# Patient Record
Sex: FEMALE | Race: WHITE | Employment: OTHER | URBAN - METROPOLITAN AREA
[De-identification: names, ages, dates, MRNs, and addresses within clinical notes are randomized per-mention and may not be internally consistent; named-entity substitution may affect disease eponyms.]

---

## 2017-09-07 ENCOUNTER — HOSPITAL ENCOUNTER (OUTPATIENT)
Dept: MAMMOGRAPHY | Age: 83
Discharge: HOME | End: 2017-09-07
Admitting: FAMILY MEDICINE

## 2017-09-07 DIAGNOSIS — Z12.31 ENCOUNTER FOR SCREENING MAMMOGRAM FOR BREAST CANCER: ICD-10-CM

## 2020-10-29 ENCOUNTER — HOSPITAL ENCOUNTER (OUTPATIENT)
Dept: MRI IMAGING | Age: 86
Discharge: HOME | End: 2020-10-29
Payer: MEDICARE

## 2020-10-29 PROCEDURE — 72148 MRI LUMBAR SPINE W/O DYE: CPT

## 2020-11-11 ENCOUNTER — HOSPITAL ENCOUNTER (OUTPATIENT)
Age: 86
Discharge: HOME | End: 2020-11-11
Payer: MEDICARE

## 2020-11-11 LAB
BASOPHILS ABSOLUTE: 0.1 K/UL (ref 0–0.2)
BASOPHILS RELATIVE PERCENT: 0.7 %
EOSINOPHILS ABSOLUTE: 0.1 K/UL (ref 0–0.6)
EOSINOPHILS RELATIVE PERCENT: 1.5 %
HCT VFR BLD CALC: 36.4 % (ref 36–48)
HEMOGLOBIN: 12.5 G/DL (ref 12–16)
LYMPHOCYTES ABSOLUTE: 1.4 K/UL (ref 1–5.1)
LYMPHOCYTES RELATIVE PERCENT: 18.6 %
MCH RBC QN AUTO: 31.6 PG (ref 26–34)
MCHC RBC AUTO-ENTMCNC: 34.3 G/DL (ref 31–36)
MCV RBC AUTO: 92 FL (ref 80–100)
MONOCYTES ABSOLUTE: 0.8 K/UL (ref 0–1.3)
MONOCYTES RELATIVE PERCENT: 10.6 %
NEUTROPHILS ABSOLUTE: 5.3 K/UL (ref 1.7–7.7)
NEUTROPHILS RELATIVE PERCENT: 68.6 %
PDW BLD-RTO: 13.2 % (ref 12.4–15.4)
PLATELET # BLD: 358 K/UL (ref 135–450)
PMV BLD AUTO: 6.7 FL (ref 5–10.5)
RBC # BLD: 3.96 M/UL (ref 4–5.2)
WBC # BLD: 7.7 K/UL (ref 4–11)

## 2020-11-11 PROCEDURE — 36415 COLL VENOUS BLD VENIPUNCTURE: CPT

## 2020-11-11 PROCEDURE — 85025 COMPLETE CBC W/AUTO DIFF WBC: CPT

## 2021-05-20 ENCOUNTER — APPOINTMENT (OUTPATIENT)
Dept: CT IMAGING | Age: 87
DRG: 068 | End: 2021-05-20
Payer: MEDICARE

## 2021-05-20 ENCOUNTER — HOSPITAL ENCOUNTER (INPATIENT)
Age: 87
LOS: 3 days | Discharge: HOME | DRG: 068 | End: 2021-05-23
Attending: EMERGENCY MEDICINE | Admitting: INTERNAL MEDICINE
Payer: MEDICARE

## 2021-05-20 ENCOUNTER — APPOINTMENT (OUTPATIENT)
Dept: GENERAL RADIOLOGY | Age: 87
DRG: 068 | End: 2021-05-20
Payer: MEDICARE

## 2021-05-20 DIAGNOSIS — E83.42 HYPOMAGNESEMIA: ICD-10-CM

## 2021-05-20 DIAGNOSIS — E87.1 HYPONATREMIA: ICD-10-CM

## 2021-05-20 DIAGNOSIS — I48.91 ATRIAL FIBRILLATION WITH RVR (HCC): Primary | ICD-10-CM

## 2021-05-20 DIAGNOSIS — I48.91 ATRIAL FIBRILLATION WITH RAPID VENTRICULAR RESPONSE (HCC): ICD-10-CM

## 2021-05-20 DIAGNOSIS — H53.9 TRANSIENT VISUAL DISTURBANCE, RIGHT: ICD-10-CM

## 2021-05-20 PROBLEM — G45.3 AMAUROSIS FUGAX OF RIGHT EYE: Status: ACTIVE | Noted: 2021-05-20

## 2021-05-20 PROBLEM — I10 ESSENTIAL HYPERTENSION: Status: ACTIVE | Noted: 2021-03-02

## 2021-05-20 LAB
A/G RATIO: 1.3 (ref 1.1–2.2)
ALBUMIN SERPL-MCNC: 3.8 G/DL (ref 3.4–5)
ALP BLD-CCNC: 70 U/L (ref 40–129)
ALT SERPL-CCNC: 16 U/L (ref 10–40)
ANION GAP SERPL CALCULATED.3IONS-SCNC: 13 MMOL/L (ref 3–16)
AST SERPL-CCNC: 22 U/L (ref 15–37)
BASOPHILS ABSOLUTE: 0 K/UL (ref 0–0.2)
BASOPHILS RELATIVE PERCENT: 0.5 %
BILIRUB SERPL-MCNC: 0.3 MG/DL (ref 0–1)
BILIRUBIN URINE: NEGATIVE
BLOOD, URINE: NEGATIVE
BUN BLDV-MCNC: 16 MG/DL (ref 7–20)
CALCIUM SERPL-MCNC: 9.2 MG/DL (ref 8.3–10.6)
CHLORIDE BLD-SCNC: 91 MMOL/L (ref 99–110)
CLARITY: CLEAR
CO2: 23 MMOL/L (ref 21–32)
COLOR: YELLOW
CREAT SERPL-MCNC: 0.8 MG/DL (ref 0.6–1.2)
EOSINOPHILS ABSOLUTE: 0.1 K/UL (ref 0–0.6)
EOSINOPHILS RELATIVE PERCENT: 0.8 %
EPITHELIAL CELLS, UA: NORMAL /HPF (ref 0–5)
ETHANOL: NORMAL MG/DL (ref 0–0.08)
GFR AFRICAN AMERICAN: >60
GFR NON-AFRICAN AMERICAN: >60
GLOBULIN: 2.9 G/DL
GLUCOSE BLD-MCNC: 106 MG/DL (ref 70–99)
GLUCOSE URINE: NEGATIVE MG/DL
HCT VFR BLD CALC: 36 % (ref 36–48)
HEMOGLOBIN: 12.3 G/DL (ref 12–16)
INR BLD: 1.02 (ref 0.86–1.14)
KETONES, URINE: NEGATIVE MG/DL
LEUKOCYTE ESTERASE, URINE: ABNORMAL
LYMPHOCYTES ABSOLUTE: 1.3 K/UL (ref 1–5.1)
LYMPHOCYTES RELATIVE PERCENT: 16.9 %
MAGNESIUM: 1.7 MG/DL (ref 1.8–2.4)
MCH RBC QN AUTO: 30.6 PG (ref 26–34)
MCHC RBC AUTO-ENTMCNC: 34 G/DL (ref 31–36)
MCV RBC AUTO: 89.9 FL (ref 80–100)
MICROSCOPIC EXAMINATION: YES
MONOCYTES ABSOLUTE: 0.6 K/UL (ref 0–1.3)
MONOCYTES RELATIVE PERCENT: 7.7 %
NEUTROPHILS ABSOLUTE: 5.8 K/UL (ref 1.7–7.7)
NEUTROPHILS RELATIVE PERCENT: 74.1 %
NITRITE, URINE: NEGATIVE
PDW BLD-RTO: 13.5 % (ref 12.4–15.4)
PH UA: 7.5 (ref 5–8)
PLATELET # BLD: 337 K/UL (ref 135–450)
PMV BLD AUTO: 7.6 FL (ref 5–10.5)
POTASSIUM SERPL-SCNC: 4.1 MMOL/L (ref 3.5–5.1)
PROTEIN UA: NEGATIVE MG/DL
PROTHROMBIN TIME: 11.8 SEC (ref 10–13.2)
RBC # BLD: 4.01 M/UL (ref 4–5.2)
RBC UA: NORMAL /HPF (ref 0–4)
SODIUM BLD-SCNC: 127 MMOL/L (ref 136–145)
SPECIFIC GRAVITY UA: 1.01 (ref 1–1.03)
TOTAL PROTEIN: 6.7 G/DL (ref 6.4–8.2)
TROPONIN: <0.01 NG/ML
URINE TYPE: ABNORMAL
UROBILINOGEN, URINE: 0.2 E.U./DL
WBC # BLD: 7.9 K/UL (ref 4–11)
WBC UA: NORMAL /HPF (ref 0–5)

## 2021-05-20 PROCEDURE — 85610 PROTHROMBIN TIME: CPT

## 2021-05-20 PROCEDURE — 99283 EMERGENCY DEPT VISIT LOW MDM: CPT

## 2021-05-20 PROCEDURE — 82077 ASSAY SPEC XCP UR&BREATH IA: CPT

## 2021-05-20 PROCEDURE — 2500000003 HC RX 250 WO HCPCS: Performed by: PHYSICIAN ASSISTANT

## 2021-05-20 PROCEDURE — 93005 ELECTROCARDIOGRAM TRACING: CPT | Performed by: EMERGENCY MEDICINE

## 2021-05-20 PROCEDURE — 6370000000 HC RX 637 (ALT 250 FOR IP): Performed by: PHYSICIAN ASSISTANT

## 2021-05-20 PROCEDURE — 6370000000 HC RX 637 (ALT 250 FOR IP): Performed by: INTERNAL MEDICINE

## 2021-05-20 PROCEDURE — 6370000000 HC RX 637 (ALT 250 FOR IP): Performed by: NURSE PRACTITIONER

## 2021-05-20 PROCEDURE — 83735 ASSAY OF MAGNESIUM: CPT

## 2021-05-20 PROCEDURE — 6360000002 HC RX W HCPCS: Performed by: PHYSICIAN ASSISTANT

## 2021-05-20 PROCEDURE — 85025 COMPLETE CBC W/AUTO DIFF WBC: CPT

## 2021-05-20 PROCEDURE — 96365 THER/PROPH/DIAG IV INF INIT: CPT

## 2021-05-20 PROCEDURE — 81001 URINALYSIS AUTO W/SCOPE: CPT

## 2021-05-20 PROCEDURE — 36415 COLL VENOUS BLD VENIPUNCTURE: CPT

## 2021-05-20 PROCEDURE — 96375 TX/PRO/DX INJ NEW DRUG ADDON: CPT

## 2021-05-20 PROCEDURE — 1200000000 HC SEMI PRIVATE

## 2021-05-20 PROCEDURE — 70450 CT HEAD/BRAIN W/O DYE: CPT

## 2021-05-20 PROCEDURE — 80053 COMPREHEN METABOLIC PANEL: CPT

## 2021-05-20 PROCEDURE — 2580000003 HC RX 258: Performed by: PHYSICIAN ASSISTANT

## 2021-05-20 PROCEDURE — 84484 ASSAY OF TROPONIN QUANT: CPT

## 2021-05-20 PROCEDURE — 71045 X-RAY EXAM CHEST 1 VIEW: CPT

## 2021-05-20 RX ORDER — ESCITALOPRAM OXALATE 10 MG/1
10 TABLET ORAL DAILY
Status: DISCONTINUED | OUTPATIENT
Start: 2021-05-21 | End: 2021-05-23 | Stop reason: HOSPADM

## 2021-05-20 RX ORDER — 0.9 % SODIUM CHLORIDE 0.9 %
500 INTRAVENOUS SOLUTION INTRAVENOUS ONCE
Status: COMPLETED | OUTPATIENT
Start: 2021-05-20 | End: 2021-05-20

## 2021-05-20 RX ORDER — LANOLIN ALCOHOL/MO/W.PET/CERES
100 CREAM (GRAM) TOPICAL DAILY
Status: DISCONTINUED | OUTPATIENT
Start: 2021-05-20 | End: 2021-05-23 | Stop reason: HOSPADM

## 2021-05-20 RX ORDER — VITAMIN B COMPLEX
1000 TABLET ORAL DAILY
Status: DISCONTINUED | OUTPATIENT
Start: 2021-05-21 | End: 2021-05-23 | Stop reason: HOSPADM

## 2021-05-20 RX ORDER — DOCUSATE SODIUM 100 MG/1
200 CAPSULE, LIQUID FILLED ORAL DAILY
Status: DISCONTINUED | OUTPATIENT
Start: 2021-05-21 | End: 2021-05-23 | Stop reason: HOSPADM

## 2021-05-20 RX ORDER — METOPROLOL SUCCINATE 25 MG/1
25 TABLET, EXTENDED RELEASE ORAL DAILY
COMMUNITY
Start: 2021-04-19 | End: 2021-06-10 | Stop reason: SDUPTHER

## 2021-05-20 RX ORDER — SODIUM CHLORIDE 9 MG/ML
25 INJECTION, SOLUTION INTRAVENOUS PRN
Status: DISCONTINUED | OUTPATIENT
Start: 2021-05-20 | End: 2021-05-23 | Stop reason: HOSPADM

## 2021-05-20 RX ORDER — SODIUM CHLORIDE 0.9 % (FLUSH) 0.9 %
5-40 SYRINGE (ML) INJECTION PRN
Status: DISCONTINUED | OUTPATIENT
Start: 2021-05-20 | End: 2021-05-23 | Stop reason: HOSPADM

## 2021-05-20 RX ORDER — SIMETHICONE 125 MG
125 CAPSULE ORAL 2 TIMES DAILY
COMMUNITY
Start: 2020-12-24

## 2021-05-20 RX ORDER — HYDROCODONE BITARTRATE AND ACETAMINOPHEN 5; 325 MG/1; MG/1
1 TABLET ORAL EVERY 6 HOURS PRN
COMMUNITY

## 2021-05-20 RX ORDER — METOPROLOL SUCCINATE 25 MG/1
25 TABLET, EXTENDED RELEASE ORAL DAILY
Status: DISCONTINUED | OUTPATIENT
Start: 2021-05-21 | End: 2021-05-21

## 2021-05-20 RX ORDER — SODIUM CHLORIDE 0.9 % (FLUSH) 0.9 %
5-40 SYRINGE (ML) INJECTION EVERY 12 HOURS SCHEDULED
Status: DISCONTINUED | OUTPATIENT
Start: 2021-05-20 | End: 2021-05-23 | Stop reason: HOSPADM

## 2021-05-20 RX ORDER — SIMETHICONE 80 MG
120 TABLET,CHEWABLE ORAL 2 TIMES DAILY
Status: DISCONTINUED | OUTPATIENT
Start: 2021-05-20 | End: 2021-05-23 | Stop reason: HOSPADM

## 2021-05-20 RX ORDER — ASPIRIN 81 MG/1
81 TABLET ORAL DAILY
COMMUNITY
Start: 2021-03-11

## 2021-05-20 RX ORDER — HYDROCODONE BITARTRATE AND ACETAMINOPHEN 5; 325 MG/1; MG/1
1 TABLET ORAL EVERY 6 HOURS PRN
Status: DISCONTINUED | OUTPATIENT
Start: 2021-05-20 | End: 2021-05-23 | Stop reason: HOSPADM

## 2021-05-20 RX ORDER — MECOBALAMIN 5000 MCG
5 TABLET,DISINTEGRATING ORAL NIGHTLY
Status: DISCONTINUED | OUTPATIENT
Start: 2021-05-20 | End: 2021-05-23 | Stop reason: HOSPADM

## 2021-05-20 RX ORDER — PSEUDOEPHEDRINE HCL 30 MG
250 TABLET ORAL DAILY
COMMUNITY

## 2021-05-20 RX ORDER — M-VIT,TX,IRON,MINS/CALC/FOLIC 27MG-0.4MG
1 TABLET ORAL DAILY
Status: DISCONTINUED | OUTPATIENT
Start: 2021-05-21 | End: 2021-05-23 | Stop reason: HOSPADM

## 2021-05-20 RX ORDER — DILTIAZEM HYDROCHLORIDE 5 MG/ML
10 INJECTION INTRAVENOUS ONCE
Status: COMPLETED | OUTPATIENT
Start: 2021-05-20 | End: 2021-05-20

## 2021-05-20 RX ORDER — AMLODIPINE BESYLATE 5 MG/1
5 TABLET ORAL DAILY
Status: DISCONTINUED | OUTPATIENT
Start: 2021-05-21 | End: 2021-05-23 | Stop reason: HOSPADM

## 2021-05-20 RX ORDER — ACETAMINOPHEN 325 MG/1
650 TABLET ORAL EVERY 6 HOURS PRN
Status: DISCONTINUED | OUTPATIENT
Start: 2021-05-20 | End: 2021-05-23 | Stop reason: HOSPADM

## 2021-05-20 RX ORDER — MAGNESIUM SULFATE 1 G/100ML
1000 INJECTION INTRAVENOUS ONCE
Status: COMPLETED | OUTPATIENT
Start: 2021-05-20 | End: 2021-05-21

## 2021-05-20 RX ORDER — POLYETHYLENE GLYCOL 3350 17 G/17G
17 POWDER, FOR SOLUTION ORAL DAILY PRN
COMMUNITY

## 2021-05-20 RX ORDER — ESCITALOPRAM OXALATE 10 MG/1
10 TABLET ORAL DAILY
COMMUNITY
Start: 2021-05-05

## 2021-05-20 RX ORDER — MULTIVITAMIN
1 TABLET ORAL DAILY
COMMUNITY

## 2021-05-20 RX ORDER — ONDANSETRON 2 MG/ML
4 INJECTION INTRAMUSCULAR; INTRAVENOUS EVERY 6 HOURS PRN
Status: DISCONTINUED | OUTPATIENT
Start: 2021-05-20 | End: 2021-05-23 | Stop reason: HOSPADM

## 2021-05-20 RX ORDER — POLYETHYLENE GLYCOL 3350 17 G/17G
17 POWDER, FOR SOLUTION ORAL DAILY PRN
Status: DISCONTINUED | OUTPATIENT
Start: 2021-05-20 | End: 2021-05-23 | Stop reason: HOSPADM

## 2021-05-20 RX ORDER — ACETAMINOPHEN 650 MG/1
650 SUPPOSITORY RECTAL EVERY 6 HOURS PRN
Status: DISCONTINUED | OUTPATIENT
Start: 2021-05-20 | End: 2021-05-23 | Stop reason: HOSPADM

## 2021-05-20 RX ORDER — PROMETHAZINE HYDROCHLORIDE 25 MG/1
12.5 TABLET ORAL EVERY 6 HOURS PRN
Status: DISCONTINUED | OUTPATIENT
Start: 2021-05-20 | End: 2021-05-23 | Stop reason: HOSPADM

## 2021-05-20 RX ADMIN — SODIUM CHLORIDE 500 ML: 9 INJECTION, SOLUTION INTRAVENOUS at 18:26

## 2021-05-20 RX ADMIN — DILTIAZEM HYDROCHLORIDE 10 MG: 5 INJECTION INTRAVENOUS at 17:07

## 2021-05-20 RX ADMIN — APIXABAN 5 MG: 5 TABLET, FILM COATED ORAL at 21:33

## 2021-05-20 RX ADMIN — MAGNESIUM SULFATE HEPTAHYDRATE 1000 MG: 1 INJECTION, SOLUTION INTRAVENOUS at 20:23

## 2021-05-20 RX ADMIN — ACETAMINOPHEN 650 MG: 325 TABLET ORAL at 21:33

## 2021-05-20 RX ADMIN — Medication 100 MG: at 20:20

## 2021-05-20 RX ADMIN — DILTIAZEM HYDROCHLORIDE 5 MG/HR: 5 INJECTION, SOLUTION INTRAVENOUS at 17:11

## 2021-05-20 RX ADMIN — Medication 5 MG: at 23:43

## 2021-05-20 RX ADMIN — SIMETHICONE 120 MG: 80 TABLET, CHEWABLE ORAL at 21:33

## 2021-05-20 ASSESSMENT — ENCOUNTER SYMPTOMS
ABDOMINAL PAIN: 0
NAUSEA: 1
SHORTNESS OF BREATH: 0
BACK PAIN: 0
COLOR CHANGE: 0
VOMITING: 0

## 2021-05-20 ASSESSMENT — PAIN SCALES - GENERAL: PAINLEVEL_OUTOF10: 7

## 2021-05-20 NOTE — ED PROVIDER NOTES
1,000 Units by mouth daily         ALLERGIES:    Lisinopril    FAMILY HISTORY:     History reviewed. No pertinent family history. SOCIAL HISTORY:       Social History     Socioeconomic History    Marital status:      Spouse name: None    Number of children: None    Years of education: None    Highest education level: None   Occupational History    None   Tobacco Use    Smoking status: Former Smoker    Smokeless tobacco: Never Used   Substance and Sexual Activity    Alcohol use: Yes     Comment: 5 beers a week    Drug use: Never    Sexual activity: Not Currently   Other Topics Concern    None   Social History Narrative    None     Social Determinants of Health     Financial Resource Strain:     Difficulty of Paying Living Expenses:    Food Insecurity:     Worried About Running Out of Food in the Last Year:     Ran Out of Food in the Last Year:    Transportation Needs:     Lack of Transportation (Medical):  Lack of Transportation (Non-Medical):    Physical Activity:     Days of Exercise per Week:     Minutes of Exercise per Session:    Stress:     Feeling of Stress :    Social Connections:     Frequency of Communication with Friends and Family:     Frequency of Social Gatherings with Friends and Family:     Attends Congregational Services:     Active Member of Clubs or Organizations:     Attends Club or Organization Meetings:     Marital Status:    Intimate Partner Violence:     Fear of Current or Ex-Partner:     Emotionally Abused:     Physically Abused:     Sexually Abused:        SCREENINGS:             PHYSICAL EXAM:       ED Triage Vitals   BP Temp Temp Source Pulse Resp SpO2 Height Weight   05/20/21 1610 05/20/21 1610 05/20/21 1610 05/20/21 1610 05/20/21 1610 05/20/21 1610 05/20/21 1614 05/20/21 1614   (!) 176/97 98.4 °F (36.9 °C) Oral 148 18 98 % 5' 3\" (1.6 m) 138 lb (62.6 kg)       Physical Exam    CONSTITUTIONAL: Awake and alert. Cooperative. Well-developed. Well-nourished. Non-toxic. No acute distress. HENT: Normocephalic. Atraumatic. External ears normal, without discharge. No nasal discharge. Oropharynx clear. Mucous membranes moist.  EYES: Conjunctiva non-injected. No scleral icterus. PERRL. EOM's grossly intact. NECK: Supple. Normal ROM. CARDIOVASCULAR: Tachycardic with irregular irregular rhythm. No Murmer. Intact distal pulses. PULMONARY/CHEST WALL: Effort normal. No tachypnea. Lungs clear to ausculation. ABDOMEN: Normal BS. Soft. Nondistended. No tenderness to palpate. No guarding. /ANORECTAL: Not assessed  MUSKULOSKELETAL: Normal ROM. No acute deformities. No edema. No tenderness to palpate. SKIN: Warm and dry. No rash. NEUROLOGICAL: Alert and oriented x 3. GCS 15. CN II-XII grossly intact. No drift. Strength is 5/5 in all extremities and sensation is intact. Normal finger to finger. Normal finger-to-nose. Normal heel-to-shin. Normal gait.    PSYCHIATRIC: Normal affect        DIAGNOSTICRESULTS:     LABS:    Results for orders placed or performed during the hospital encounter of 05/20/21   CBC Auto Differential   Result Value Ref Range    WBC 7.9 4.0 - 11.0 K/uL    RBC 4.01 4.00 - 5.20 M/uL    Hemoglobin 12.3 12.0 - 16.0 g/dL    Hematocrit 36.0 36.0 - 48.0 %    MCV 89.9 80.0 - 100.0 fL    MCH 30.6 26.0 - 34.0 pg    MCHC 34.0 31.0 - 36.0 g/dL    RDW 13.5 12.4 - 15.4 %    Platelets 137 749 - 757 K/uL    MPV 7.6 5.0 - 10.5 fL    Neutrophils % 74.1 %    Lymphocytes % 16.9 %    Monocytes % 7.7 %    Eosinophils % 0.8 %    Basophils % 0.5 %    Neutrophils Absolute 5.8 1.7 - 7.7 K/uL    Lymphocytes Absolute 1.3 1.0 - 5.1 K/uL    Monocytes Absolute 0.6 0.0 - 1.3 K/uL    Eosinophils Absolute 0.1 0.0 - 0.6 K/uL    Basophils Absolute 0.0 0.0 - 0.2 K/uL   Comprehensive metabolic panel   Result Value Ref Range    Sodium 127 (L) 136 - 145 mmol/L    Potassium 4.1 3.5 - 5.1 mmol/L    Chloride 91 (L) 99 - 110 mmol/L    CO2 23 21 - 32 mmol/L    Anion Gap 13 3 - 16    Glucose 106 (H) 70 - 99 mg/dL    BUN 16 7 - 20 mg/dL    CREATININE 0.8 0.6 - 1.2 mg/dL    GFR Non-African American >60 >60    GFR African American >60 >60    Calcium 9.2 8.3 - 10.6 mg/dL    Total Protein 6.7 6.4 - 8.2 g/dL    Albumin 3.8 3.4 - 5.0 g/dL    Albumin/Globulin Ratio 1.3 1.1 - 2.2    Total Bilirubin 0.3 0.0 - 1.0 mg/dL    Alkaline Phosphatase 70 40 - 129 U/L    ALT 16 10 - 40 U/L    AST 22 15 - 37 U/L    Globulin 2.9 g/dL   Magnesium   Result Value Ref Range    Magnesium 1.70 (L) 1.80 - 2.40 mg/dL   Protime-INR   Result Value Ref Range    Protime 11.8 10.0 - 13.2 sec    INR 1.02 0.86 - 1.14   Troponin   Result Value Ref Range    Troponin <0.01 <0.01 ng/mL   Ethanol   Result Value Ref Range    Ethanol Lvl None Detected mg/dL   Urinalysis, reflex to microscopic   Result Value Ref Range    Color, UA Yellow Straw/Yellow    Clarity, UA Clear Clear    Glucose, Ur Negative Negative mg/dL    Bilirubin Urine Negative Negative    Ketones, Urine Negative Negative mg/dL    Specific Gravity, UA 1.015 1.005 - 1.030    Blood, Urine Negative Negative    pH, UA 7.5 5.0 - 8.0    Protein, UA Negative Negative mg/dL    Urobilinogen, Urine 0.2 <2.0 E.U./dL    Nitrite, Urine Negative Negative    Leukocyte Esterase, Urine TRACE (A) Negative    Microscopic Examination YES     Urine Type NotGiven    EKG 12 Lead   Result Value Ref Range    Ventricular Rate 132 BPM    Atrial Rate 97 BPM    QRS Duration 76 ms    Q-T Interval 318 ms    QTc Calculation (Bazett) 471 ms    R Axis 39 degrees    T Axis 8 degrees    Diagnosis       Atrial fibrillation with rapid ventricular response with premature ventricular or aberrantly conducted complexesAnterior infarct , age undeterminedAbnormal ECGNo previous ECGs available         RADIOLOGY:  All x-ray studies areviewed/reviewed by me.   Formal interpretations per the radiologist are as follows:      CT HEAD WO CONTRAST    Result Date: 5/20/2021  EXAMINATION: CT OF THE HEAD WITHOUT CONTRAST 5/20/2021 5:50 pm TECHNIQUE: CT of the head was performed without the administration of intravenous contrast. Dose modulation, iterative reconstruction, and/or weight based adjustment of the mA/kV was utilized to reduce the radiation dose to as low as reasonably achievable. COMPARISON: None. HISTORY: ORDERING SYSTEM PROVIDED HISTORY: Transient vision deficit right lateral visual field TECHNOLOGIST PROVIDED HISTORY: Reason for exam:->Transient vision deficit right lateral visual field Has a \"code stroke\" or \"stroke alert\" been called? ->No Decision Support Exception - unselect if not a suspected or confirmed emergency medical condition->Emergency Medical Condition (MA) Reason for Exam: dr sent to ER about heart \"fibrillation\" Acuity: Acute Type of Exam: Initial FINDINGS: BRAIN/VENTRICLES: The ventricles are borderline enlarged and there is diffuse mild prominence of the cortical sulci. There is mild periventricular low density bilaterally. No intracranial hemorrhage or edema is seen. There is no extra-axial fluid collection or mass. There are basal ganglia calcifications bilaterally. ORBITS: The visualized portion of the orbits demonstrate no acute abnormality. SINUSES: The visualized paranasal sinuses and mastoid air cells demonstrate no acute abnormality. SOFT TISSUES/SKULL:  No acute abnormality of the visualized skull or soft tissues. Mild atrophy and minimal chronic microischemic disease in the deep white matter with no acute abnormality seen. XR CHEST PORTABLE    Result Date: 5/20/2021  EXAMINATION: ONE XRAY VIEW OF THE CHEST 5/20/2021 4:46 pm COMPARISON: 01/18/2017 HISTORY: ORDERING SYSTEM PROVIDED HISTORY: a fib TECHNOLOGIST PROVIDED HISTORY: Reason for exam:->a fib Reason for Exam: afib Acuity: Acute Type of Exam: Initial FINDINGS: The heart is normal.  The pulmonary vessels are top-normal.  There are calcified granulomas along the left hilar region which are unchanged.   There are hazy bibasilar ground-glass and interstitial opacities scattered along the lung bases which is more apparent. No effusion is seen. Hazy bibasilar opacities which could represent early multisegmental bronchopneumonia vs progressive fibrosis and scarring. Recommend short-term follow-up. EKG: The Ekg interpreted by me in the absence of a cardiologist shows. atrial fibrillation with a rate of 132    See also interpretation by Power Rutherford MD.      PROCEDURES:   N/A    CRITICAL CARE TIME:       Due to the immediate potential for life-threatening deterioration due to A. fib with RVR, I spent 37 minutes providing critical care. This time is excluding time spent performing procedures. CONSULTS:  IP CONSULT TO HOSPITALIST      EMERGENCY DEPARTMENT COURSE and DIFFERENTIAL DIAGNOSIS/MDM:   Vitals:    Vitals:    05/20/21 1728 05/20/21 1750 05/20/21 1803 05/20/21 1826   BP: 137/69      Pulse: 79 77 87 77   Resp: 17 27 12 18   Temp:       TempSrc:       SpO2: 99% 99% 98% 97%   Weight:       Height:           Patient was given the following medications:  Medications   dilTIAZem injection 10 mg (10 mg Intravenous Given 5/20/21 1707)     Followed by   dilTIAZem 125 mg in dextrose 5 % 125 mL infusion (5 mg/hr Intravenous New Bag 5/20/21 1711)   magnesium sulfate 1000 mg in dextrose 5% 100 mL IVPB (has no administration in time range)   thiamine tablet 100 mg (has no administration in time range)   0.9 % sodium chloride bolus (500 mLs Intravenous New Bag 5/20/21 1826)         Patient was evaluated by both myself and Power Rutherford MD.   Old records were reviewed. Patient comes to the ED after being sent by primary care. She was in the office today and had an EKG done showing A. fib with RVR. Patient arrives to the ED with a varying heart rate of primarily 130s to 140s. She reports some mild anxiety but is not in any pain. She is not short of breath.   Despite being 81years old, she is very active and independent. EKG done on arrival confirms atrial fibrillation with RVR, rate 132 bpm  CBC white count 7.9 with H&H 12.3 and 36.0. Platelet count 962  CMP mild hyponatremia at 127. Chloride low at 91. The patient's magnesium is mildly low at 1.7. Remaining metabolic panel unremarkable  PT/INR normal  Troponin negative  Alcohol not detected  Urinalysis trace leukocytes  Portable chest x-ray shows hazy bibasilar opacities which could represent early multisegmental bronchopneumonia versus progressive fibrosis and scarring. Recommend short-term follow-up  CT head mild atrophy and minimal chronic microischemic disease in the deep white matter with no acute abnormality seen. Patient was started on diltiazem. She is given a 10 mg bolus followed by infusion. She was given 1 g of magnesium IV, thiamine tablet orally and 500 mL bolus of normal saline. Heart rate has normalized. She has been hemodynamically stable. However, she warrants hospitalization for further management of A. fib with RVR. I spoke with Dr. Jasson Villatoro. We thoroughly discussed the history, physical exam, laboratory and imaging studies, as well as, emergency department course. Based upon that discussion, we've decided to admit Lin Blake for further observation and evaluation of Cristo Simon's atrial fibrillation with RVR. As I have deemed necessary from their history, physical, and studies, I have considered and evaluated Lin Blake for the following diagnoses:  ACUTE CORONARY SYNDROME, PERICARDIAL TAMPONADE, CHF, SEPSIS, COPD EXACERBATION, PNEUMONIA, PNEUMOTHORAX, PULMONARY EMBOLISM, and THORACIC DISSECTION. FINAL IMPRESSION:      1. Atrial fibrillation with RVR ([address]    2. Hyponatremia    3. Transient visual disturbance, right    4.  Hypomagnesemia          DISPOSITION/PLAN:   DISPOSITION     ADMIT               (Please note thatportions of this note were completed with a voice recognition program.  Efforts were made to edit the dictations, but occasionally words are mis-transcribed.)    Anupam Gutierrez PA-C (electronicallysigned)              ESA Ortiz  05/20/21 [id number]

## 2021-05-20 NOTE — ED NOTES
Pt ambulated to and from the bathroom w/o any difficulties. Pt able to give a urine sample which was sent to the lab.  Pt given a glass of milk, with [place] permission     Aneesh Lora  05/20/21 [id number]       Aneesh Lora  05/20/21 [id number]

## 2021-05-21 ENCOUNTER — TELEPHONE (OUTPATIENT)
Dept: CARDIOLOGY | Age: 87
End: 2021-05-21

## 2021-05-21 ENCOUNTER — APPOINTMENT (OUTPATIENT)
Dept: MRI IMAGING | Age: 87
DRG: 068 | End: 2021-05-21
Payer: MEDICARE

## 2021-05-21 ENCOUNTER — APPOINTMENT (OUTPATIENT)
Dept: VASCULAR LAB | Age: 87
DRG: 068 | End: 2021-05-21
Payer: MEDICARE

## 2021-05-21 LAB
ANION GAP SERPL CALCULATED.3IONS-SCNC: 6 MMOL/L (ref 3–16)
BUN BLDV-MCNC: 15 MG/DL (ref 7–20)
CALCIUM SERPL-MCNC: 8.8 MG/DL (ref 8.3–10.6)
CHLORIDE BLD-SCNC: 97 MMOL/L (ref 99–110)
CHOLESTEROL, TOTAL: 199 MG/DL (ref 0–199)
CO2: 27 MMOL/L (ref 21–32)
CREAT SERPL-MCNC: 0.7 MG/DL (ref 0.6–1.2)
EKG ATRIAL RATE: 73 BPM
EKG ATRIAL RATE: 97 BPM
EKG DIAGNOSIS: NORMAL
EKG DIAGNOSIS: NORMAL
EKG P AXIS: 89 DEGREES
EKG P-R INTERVAL: 172 MS
EKG Q-T INTERVAL: 318 MS
EKG Q-T INTERVAL: 408 MS
EKG QRS DURATION: 70 MS
EKG QRS DURATION: 76 MS
EKG QTC CALCULATION (BAZETT): 449 MS
EKG QTC CALCULATION (BAZETT): 471 MS
EKG R AXIS: 24 DEGREES
EKG R AXIS: 39 DEGREES
EKG T AXIS: 38 DEGREES
EKG T AXIS: 8 DEGREES
EKG VENTRICULAR RATE: 132 BPM
EKG VENTRICULAR RATE: 73 BPM
ESTIMATED AVERAGE GLUCOSE: 108.3 MG/DL
GFR AFRICAN AMERICAN: >60
GFR NON-AFRICAN AMERICAN: >60
GLUCOSE BLD-MCNC: 102 MG/DL (ref 70–99)
HBA1C MFR BLD: 5.4 %
HDLC SERPL-MCNC: 59 MG/DL (ref 40–60)
LDL CHOLESTEROL CALCULATED: 120 MG/DL
POTASSIUM REFLEX MAGNESIUM: 4.3 MMOL/L (ref 3.5–5.1)
SODIUM BLD-SCNC: 130 MMOL/L (ref 136–145)
TRIGL SERPL-MCNC: 98 MG/DL (ref 0–150)
TROPONIN: <0.01 NG/ML
TROPONIN: <0.01 NG/ML
TSH REFLEX FT4: 2.45 UIU/ML (ref 0.27–4.2)
VLDLC SERPL CALC-MCNC: 20 MG/DL

## 2021-05-21 PROCEDURE — 93005 ELECTROCARDIOGRAM TRACING: CPT | Performed by: INTERNAL MEDICINE

## 2021-05-21 PROCEDURE — 80061 LIPID PANEL: CPT

## 2021-05-21 PROCEDURE — 84443 ASSAY THYROID STIM HORMONE: CPT

## 2021-05-21 PROCEDURE — 80048 BASIC METABOLIC PNL TOTAL CA: CPT

## 2021-05-21 PROCEDURE — 2580000003 HC RX 258: Performed by: INTERNAL MEDICINE

## 2021-05-21 PROCEDURE — 6370000000 HC RX 637 (ALT 250 FOR IP): Performed by: NURSE PRACTITIONER

## 2021-05-21 PROCEDURE — 93010 ELECTROCARDIOGRAM REPORT: CPT | Performed by: INTERNAL MEDICINE

## 2021-05-21 PROCEDURE — 1200000000 HC SEMI PRIVATE

## 2021-05-21 PROCEDURE — 84484 ASSAY OF TROPONIN QUANT: CPT

## 2021-05-21 PROCEDURE — 99223 1ST HOSP IP/OBS HIGH 75: CPT | Performed by: INTERNAL MEDICINE

## 2021-05-21 PROCEDURE — 6370000000 HC RX 637 (ALT 250 FOR IP): Performed by: INTERNAL MEDICINE

## 2021-05-21 PROCEDURE — 93880 EXTRACRANIAL BILAT STUDY: CPT

## 2021-05-21 PROCEDURE — 70551 MRI BRAIN STEM W/O DYE: CPT

## 2021-05-21 PROCEDURE — 83036 HEMOGLOBIN GLYCOSYLATED A1C: CPT

## 2021-05-21 RX ORDER — METOPROLOL SUCCINATE 25 MG/1
25 TABLET, EXTENDED RELEASE ORAL 2 TIMES DAILY
Status: DISCONTINUED | OUTPATIENT
Start: 2021-05-21 | End: 2021-05-23 | Stop reason: HOSPADM

## 2021-05-21 RX ORDER — ATORVASTATIN CALCIUM 80 MG/1
80 TABLET, FILM COATED ORAL NIGHTLY
Status: DISCONTINUED | OUTPATIENT
Start: 2021-05-21 | End: 2021-05-23 | Stop reason: HOSPADM

## 2021-05-21 RX ORDER — DILTIAZEM HYDROCHLORIDE 180 MG/1
180 CAPSULE, COATED, EXTENDED RELEASE ORAL DAILY
Status: DISCONTINUED | OUTPATIENT
Start: 2021-05-21 | End: 2021-05-21

## 2021-05-21 RX ADMIN — ESCITALOPRAM OXALATE 10 MG: 10 TABLET ORAL at 09:24

## 2021-05-21 RX ADMIN — Medication 5 MG: at 21:12

## 2021-05-21 RX ADMIN — SIMETHICONE 120 MG: 80 TABLET, CHEWABLE ORAL at 21:12

## 2021-05-21 RX ADMIN — HYDROCODONE BITARTRATE AND ACETAMINOPHEN 1 TABLET: 5; 325 TABLET ORAL at 04:34

## 2021-05-21 RX ADMIN — AMLODIPINE BESYLATE 5 MG: 5 TABLET ORAL at 09:23

## 2021-05-21 RX ADMIN — ACETAMINOPHEN 650 MG: 325 TABLET ORAL at 12:22

## 2021-05-21 RX ADMIN — METOPROLOL SUCCINATE 25 MG: 25 TABLET, EXTENDED RELEASE ORAL at 21:12

## 2021-05-21 RX ADMIN — ATORVASTATIN CALCIUM 80 MG: 80 TABLET, FILM COATED ORAL at 21:12

## 2021-05-21 RX ADMIN — APIXABAN 5 MG: 5 TABLET, FILM COATED ORAL at 09:23

## 2021-05-21 RX ADMIN — SIMETHICONE 120 MG: 80 TABLET, CHEWABLE ORAL at 09:23

## 2021-05-21 RX ADMIN — APIXABAN 5 MG: 5 TABLET, FILM COATED ORAL at 21:12

## 2021-05-21 RX ADMIN — Medication 100 MG: at 09:23

## 2021-05-21 RX ADMIN — HYDROCODONE BITARTRATE AND ACETAMINOPHEN 1 TABLET: 5; 325 TABLET ORAL at 13:40

## 2021-05-21 RX ADMIN — DOCUSATE SODIUM 200 MG: 100 CAPSULE, LIQUID FILLED ORAL at 09:23

## 2021-05-21 RX ADMIN — Medication 10 ML: at 21:12

## 2021-05-21 RX ADMIN — MULTIPLE VITAMINS W/ MINERALS TAB 1 TABLET: TAB at 09:23

## 2021-05-21 RX ADMIN — Medication 1000 UNITS: at 09:23

## 2021-05-21 RX ADMIN — METOPROLOL SUCCINATE 25 MG: 25 TABLET, EXTENDED RELEASE ORAL at 09:24

## 2021-05-21 ASSESSMENT — PAIN SCALES - GENERAL
PAINLEVEL_OUTOF10: 8
PAINLEVEL_OUTOF10: 0
PAINLEVEL_OUTOF10: 8
PAINLEVEL_OUTOF10: 5
PAINLEVEL_OUTOF10: 5
PAINLEVEL_OUTOF10: 0
PAINLEVEL_OUTOF10: 0

## 2021-05-21 NOTE — H&P
Hospital Medicine History & Physical      PCP: Steve Lewis DO    Date of Admission: 5/20/2021    Date of Service: Pt seen/examined on 5/20/2021 and Admitted to Inpatient with expected LOS greater than two midnights due to medical therapy. Chief Complaint: A. fib, vision problem      History Of Present Illness:   81 y.o. female who presented to [facility] with above complaints  Patient with history of PAF [diagnosed in March 2021] on metoprolol, HTN was sent into the ED by her PCP for A. Fib. Patient was seen in the office by her PCP today, EKG performed showed A. fib with RVR with rates in the 140s so patient was directed to the ED. patient does complain of palpitations and lightheadedness today. She also reported that she had transient partial blackening of her vision in the right eye that lasted for about 30 minutes and then resolved. Patient denied any chest pain or shortness of breath. Patient does appear very anxious. She denied any speech problems or facial droop. No anticoagulation was recommended on her admission for A. Fib. Does take aspirin. Past Medical History:          Diagnosis Date    Arthritis     Back fracture     stress fracture active 4/17/15    HTN (hypertension)     Hyperlipidemia     Panic attack        Past Surgical History:          Procedure Laterality Date    COLONOSCOPY  2015    tics    EYE SURGERY      HEMORRHOID SURGERY      HYSTERECTOMY         Medications Prior to Admission:      Prior to Admission medications    Medication Sig Start Date End Date Taking? Authorizing Provider   escitalopram (LEXAPRO) 10 MG tablet Take 10 mg by mouth daily  5/5/21  Yes Historical Provider, MD   HYDROcodone-acetaminophen (NORCO) 5-325 MG per tablet Take 1 tablet by mouth every 6 hours as needed.    Yes Historical Provider, MD   Docusate Sodium (DSS) 250 MG CAPS Take 250 mg by mouth daily   Yes Historical Provider, MD   vitamin D (CHOLECALCIFEROL) 25 MCG (1000 UT) TABS tablet Take 1,000 Units by mouth daily   Yes Historical Provider, MD   aspirin 81 MG EC tablet Take 81 mg by mouth daily 3/11/21  Yes Historical Provider, MD   metoprolol succinate (TOPROL XL) 25 MG extended release tablet Take 25 mg by mouth daily  4/19/21  Yes Historical Provider, MD   Multiple Vitamin (MULTIVITAMIN) tablet Take 1 tablet by mouth daily   Yes Historical Provider, MD   polyethylene glycol (GLYCOLAX) 17 GM/SCOOP powder Take 17 g by mouth daily as needed    Yes Historical Provider, MD   Simethicone 125 MG CAPS Take 125 mg by mouth 2 times daily 12/24/20  Yes Historical Provider, MD   amLODIPine (NORVASC) 5 MG tablet Take 5 mg by mouth daily   Yes Historical Provider, MD       Allergies:  Lisinopril    Social History:      The patient currently lives at home    TOBACCO:   reports that she has quit smoking. She has never used smokeless tobacco.  ETOH:   reports current alcohol use. E-Cigarettes/Vaping Use     Questions Responses    E-Cigarette/Vaping Use     Start Date     Passive Exposure     Quit Date     Counseling Given     Comments             Family History:   Reviewed in detail and negative for DM, CAD, Cancer, CVA. REVIEW OF SYSTEMS COMPLETED:   Pertinent positives as noted in the HPI. All other systems reviewed and negative. PHYSICAL EXAM PERFORMED:    BP (!) 147/77 Comment: machine  Pulse 67   Temp 98.6 °F (37 °C) (Oral)   Resp 15   Ht 5' 3\" (1.6 m)   Wt 139 lb 11.2 oz (63.4 kg)   SpO2 96%   BMI 24.75 kg/m²     General appearance:  No apparent distress, appears stated age and cooperative. HEENT:  Normal cephalic, atraumatic without obvious deformity. Pupils equal, round, and reactive to light. Extra ocular muscles intact. Conjunctivae/corneas clear. Neck: Supple, with full range of motion. No jugular venous distention. Trachea midline. Respiratory:  Normal respiratory effort. Clear to auscultation, bilaterally without Rales/Wheezes/Rhonchi.   Cardiovascular: Tachycardic with irregularly irregular with normal S1/S2 without murmurs, rubs or gallops. Abdomen: Soft, non-tender, non-distended with normal bowel sounds. Musculoskeletal:  No clubbing, cyanosis or edema bilaterally. Full range of motion without deformity. Skin: Skin color, texture, turgor normal.  No rashes or lesions. Neurologic:  Neurovascularly intact without any focal sensory/motor deficits. Cranial nerves: II-XII intact, grossly non-focal.  Psychiatric:  Alert and oriented, thought content appropriate, normal insight  Capillary Refill: Brisk,3 seconds, normal  Peripheral Pulses: +2 palpable, equal bilaterally       Labs:     Recent Labs     05/20/21  1703   WBC 7.9   HGB 12.3   HCT 36.0        Recent Labs     05/20/21 1703   *   K 4.1   CL 91*   CO2 23   BUN 16   CREATININE 0.8   CALCIUM 9.2     Recent Labs     05/20/21 1703   AST 22   ALT 16   BILITOT 0.3   ALKPHOS 70     Recent Labs     05/20/21 1703   INR 1.02     Recent Labs     05/20/21 1703   TROPONINI <0.01       Urinalysis:      Lab Results   Component Value Date    NITRU Negative 05/20/2021    WBCUA 3-5 05/20/2021    RBCUA 0-2 05/20/2021    BLOODU Negative 05/20/2021    SPECGRAV 1.015 05/20/2021    GLUCOSEU Negative 05/20/2021       Radiology:     CXR: I have reviewed the CXR with the following interpretation: Bibasilar opacities hazy  EKG:  I have reviewed the EKG with the following interpretation: A. fib with RVR    CT HEAD WO CONTRAST   Final Result   Mild atrophy and minimal chronic microischemic disease in the deep white   matter with no acute abnormality seen. XR CHEST PORTABLE   Final Result   Hazy bibasilar opacities which could represent early multisegmental   bronchopneumonia vs progressive fibrosis and scarring. Recommend short-term   follow-up.            Echo March 2021  CONCLUSIONS     The left ventricle is of normal size.     Left ventricular wall thickness is normal.         Ejection Fraction  % 60-65        No obvious segmental wall motion abnormalities.       The right atrium is normal in size.     Moderate left atrial dilatation.       Mild mitral annular calcification.       Mild thickening/calcification of the anterior mitral valve leaflet.       Trileaflet aortic valve.       Moderate  thickening (sclerosis) of the aortic valve cusps without reduced excursion.       Structurally normal tricuspid valve.       Mild tricuspid regurgitation.       Normal pericardium without effusion. ASSESSMENT:PLAN:      PAF with rapid ventricular response, symptomatic  A. fib diagnosed in March 2021, spontaneously converted. Maintained on metoprolol. No AC recommended at that time. Followed by Dr. Celso Perez  -IV Cardizem bolus and gtt. initiated titrated to goal heart rate  -Monitor on telemetry  -Serial cardiac enzymes  -Cardiology consult  -Continue p.o. metoprolol, increase dose as needed  -We will keep n.p.o. in case she needs cardioversion in a.m.  -2D echo from March 2021 reviewed, LVEF 60 to 65%, moderate LA dilation  -TSH within normal range [March 2021]    -[id number] Score for Atrial Fibrillation Stroke Risk = 6, with possible current amaurosis fugax of right eye which could potentially be cardioembolic. I think patient will benefit from anticoagulation to reduce stroke risk, especially given how functional she is at her age. Patient agreeable. R<B. Hb within acceptable range, patient denies hematochezia or melena. I do not see any absolute contraindications to starting anticoagulation in this patient.  I will start Eliquis   Risk   Factors  Component Value   C CHF No 0   H HTN Yes 1   A2 Age >= 76 Yes,  (81 y.o.) 2   D DM No 0   S2 Prior Stroke/TIA Yes 2   V Vascular Disease No 0   A Age 74-69 No,  (81 y.o.) 0   Sc Sex female 1    [id number]  Score  6   Score last updated 1/41/15 79:99 PM EDT  Click here for a link to the UpToDate guideline \"Atrial Fibrillation: Anticoagulation therapy to prevent embolization  Disclaimer: Risk Score calculation is dependent on accuracy of patient problem list and past encounter diagnosis. Amaurosis fugax of right eye  -MRI brain without contrast to rule out stroke  -Carotid Doppler  -Started Eliquis for A. Fib  -A1c and lipid profile, will consider statin  -2D echo from March 2021 showed no evidence of LA thrombus or PFO    Essential hypertension-controlled, resume home medication regimen    DVT Prophylaxis: Eliquis started  Diet: DIET GENERAL;  Code Status: Full Code    PT/OT Eval Status: Ambulatory    Dispo -IP stay       Darnell López MD    Thank you [phone] Panfilo Lew DO for the opportunity to be involved in this patient's care. If you have any questions or concerns please feel free to contact me at [phone].

## 2021-05-21 NOTE — PROGRESS NOTES
Monitor company [employer]  Length of [id number] days  Monitor ordered by [employer] number [id number] Unlock number [id number] Activation successful prior to pt leaving hospital? Yes. [employer] event monitor placed on the patient. Instructions given to nurse verbalized understanding. All questions answered to the best of my ability. Nurse will activate at the time of discharge.

## 2021-05-21 NOTE — CONSULTS
etc.    Emergency Room/Hospital Course:  Patient was evaluated in the emergency room. Her labs were notable for hyponatremia with a sodium of 127, and hypomagnesemia with a magnesium of 1.7. Her troponin enzymes were negative x3. Her alcohol level was not detected. Her CBC was reassuring. CT of her head showed some small vessel ischemic disease however no acute pathology. Her chest radiograph was notable for possible progressive fibrosis and scarring versus bronchopneumonia with short-term follow-up recommended. MRI was ordered for patient is currently pending. Carotid duplex showed moderate disease with some variance in her left brachial pressure suggestive of possible subclavian steal syndrome. Electrophysiology was consulted to evaluate patient given her EKG showing continued atrial fibrillation with RVR. Current rhythm: Normal sinus rhythm  Known history of atrial fibrillation: yes -new onset  Valvular disease: No  Associated symptoms: palpitations and shortness of breath  Heart rate is  controlled  Previous cardioversion and/or ablation: no  History of CAD: No  History of sleep apnea: No  History of ETOH abuse/drug abuse: No  History of thyroid disease: No  Elevated BNP: unknown  Left atrial size is Abnormal  moderately dilated    Past Medical History:   Diagnosis Date    Arthritis     Back fracture     stress fracture active 4/17/15    HTN (hypertension)     Hyperlipidemia     Panic attack         Past Surgical History:   Procedure Laterality Date    COLONOSCOPY  2015    tics    EYE SURGERY      HEMORRHOID SURGERY      HYSTERECTOMY         Allergies   Allergen Reactions    Lisinopril Other (See Comments)     Hyponatremia / SIADH       Social History:  Reviewed. reports that she has quit smoking. She has never used smokeless tobacco. She reports current alcohol use. She reports that she does not use drugs. Family History:  Reviewed. family history is not on file. No premature CAD. Review of System:  All other systems reviewed except for that noted above. Pertinent negatives and positives are:     · General: negative for fever, chills   · Ophthalmic ROS: negative for - eye pain or loss of vision  · ENT ROS: negative for - headaches, sore throat   · Respiratory: negative for - cough, sputum  · Cardiovascular: Reviewed in HPI  · Gastrointestinal: negative for - abdominal pain, diarrhea, N/V  · Hematology: negative for - bleeding, blood clots, bruising or jaundice  · Genito-Urinary:  negative for - Dysuria or incontinence  · Musculoskeletal: negative for - Joint swelling, muscle pain  · Neurological: negative for - confusion, dizziness, headaches   · Psychiatric: No anxiety, no depression. · Dermatological: negative for - rash    Physical Examination:  Vitals:    21 1112   BP: (!) 125/54   Pulse: 68   Resp: 16   Temp: 97.3 °F (36.3 °C)   SpO2: 96%        Intake/Output Summary (Last 24 hours) at 2021 1242  Last data filed at 2021 1020  Gross per 24 hour   Intake 240 ml   Output 250 ml   Net -10 ml     In: 240 [P.O.:240]  Out: 250    Wt Readings from Last 3 Encounters:   21 139 lb 11.2 oz (63.4 kg)   17 137 lb (62.1 kg)   04/17/15 139 lb (63 kg)     Temp  Av.4 °F (36.9 °C)  Min: 97.3 °F (36.3 °C)  Max: 98.8 °F (37.1 °C)  Pulse  Av.5  Min: 63  Max: 148  BP  Min: 125/54  Max: 176/97  SpO2  Av.6 %  Min: 89 %  Max: 100 %    · Telemetry: Sinus rhythm   · Constitutional: Alert. Oriented to person, place, and time. No distress. · Head: Normocephalic and atraumatic. · Mouth/Throat: Lips appear moist. Oropharynx is clear and moist.  · Eyes: Conjunctivae normal. EOM are normal.   · Cardiovascular: Normal rate, regular rhythm. Normal S1&S2. · Pulmonary/Chest: Bilateral respiratory sounds present. No respiratory accessory muscle use. · Abdominal: Soft. Normal bowel sounds present. No distension, No tenderness. · Musculoskeletal: No tenderness.  No edema · Neurological: Alert and oriented. Cranial nerve II-XII grossly intact. · Skin: Skin is warm and dry. No rash, lesions, ulcerations noted. · Psychiatric: No anxiety nor agitation. Labs:  Reviewed. Recent Labs     05/20/21  1703 05/21/21  0648   * 130*   K 4.1 4.3   CL 91* 97*   CO2 23 27   BUN 16 15   CREATININE 0.8 0.7     Recent Labs     05/20/21  1703   WBC 7.9   HGB 12.3   HCT 36.0   MCV 89.9        Lab Results   Component Value Date    TROPONINI <0.01 05/21/2021     No results found for: BNP  Lab Results   Component Value Date    PROTIME 11.8 05/20/2021    INR 1.02 05/20/2021     No results found for: CHOL, HDL, TRIG    Diagnostic and imaging results reviewed. ECG: Atrial fibrillation with RVR. No signs of ongoing ischemia    Echo: 03/02/2021    CONCLUSIONS     The left ventricle is of normal size.     Left ventricular wall thickness is normal.       No obvious segmental wall motion abnormalities.       The right atrium is normal in size.     Moderate left atrial dilatation.       Mild mitral annular calcification.       Mild thickening/calcification of the anterior mitral valve leaflet.       Trileaflet aortic valve.       Moderate  thickening (sclerosis) of the aortic valve cusps without reduced excursion.       Structurally normal tricuspid valve.       Mild tricuspid regurgitation.       Normal pericardium without effusion. Cath: None. I independently reviewed the ECG and telemetry.     Scheduled Meds:   thiamine  100 mg Oral Daily    amLODIPine  5 mg Oral Daily    docusate sodium  200 mg Oral Daily    escitalopram  10 mg Oral Daily    metoprolol succinate  25 mg Oral Daily    therapeutic multivitamin-minerals  1 tablet Oral Daily    simethicone  120 mg Oral BID    Vitamin D  1,000 Units Oral Daily    sodium chloride flush  5-40 mL Intravenous 2 times per day    apixaban  5 mg Oral BID    melatonin  5 mg Oral Nightly     Continuous Infusions:   dilTIAZem 5 mg/hr (05/20/21 1711)    sodium chloride       PRN Meds:. HYDROcodone-acetaminophen, sodium chloride flush, sodium chloride, promethazine **OR** ondansetron, polyethylene glycol, acetaminophen **OR** acetaminophen     Assessment:   Patient Active Problem List    Diagnosis Date Noted    Atrial fibrillation with rapid ventricular response ([address] 05/20/2021    Amaurosis fugax of right eye 05/20/2021    Essential hypertension 03/02/2021      Active Hospital Problems    Diagnosis Date Noted    Atrial fibrillation with rapid ventricular response ([address] [I48.91] 05/20/2021    Amaurosis fugax of right eye [G45.3] 05/20/2021    Essential hypertension [I10] 03/02/2021     Mrs. Kevin Gross is a pleasant 81year old female with a medical history significant for hypertension, newly diagnosed atrial fibrillation with RVR, anxiety, depression, and ?GI bleeding? Who presents from home with TIA and atrial fibrillation with RVR. Problem List:  1. Paroxysmal atrial fibrillation with RVR. 2. TIA/CVA. 3. ?GI bleeding? Anemia? 4. Hypertension. 5. ?Steal syndrome? .    Assessment and Plan:  1. Paroxysmal atrial fibrillation with RVR. Patient is a pleasant 76-year-old female with a medical history significant for paroxysmal atrial fibrillation, hypertension, depression, anxiety, and possible GI bleed who presents from home with symptomatic atrial fibrillation with RVR and a TIA. Patient CT of her head was negative however symptoms consistent with a TIA. Understanding poor given anxiety. Information given to patient and family. Afib risk factors including age, HTN, obesity, inactivity and CASEY were discussed with patient. Risk factor modification recommended. Patient's [id number] score is 6 with a stroke risk of 9.7%. We discussed oral anticoagulation to decrease the risk of thromboembolic events including stroke. Benefits and alternatives were discussed with patient. Risk of bleeding was discussed.  Patient verbalized

## 2021-05-21 NOTE — TELEPHONE ENCOUNTER
Per Filiberto Hollis RN    Monitor company [employer]  Length of [id number] days  Monitor ordered by [employer] Insurance number [id number] Unlock number [phone] Activation successful prior to pt leaving hospital? Yes. [employer] event monitor placed on the patient. Instructions given to nurse verbalized understanding. All questions answered to the best of my ability.      Nurse will activate at the time of discharge.

## 2021-05-21 NOTE — PROGRESS NOTES
Cardiology consult added to the treatment team [email or web address]  RN aware of it  [facility]

## 2021-05-21 NOTE — CARE COORDINATION
CASE MANAGEMENT INITIAL ASSESSMENT      Reviewed chart and completed assessment at bedside with patient    Explained Case Management role/services. yes    Health Care Decision Maker :   Primary Decision Maker: Kezia Alcaraz - Child - [phone]    Secondary Decision Maker: Alberto Pablo - Child - [phone]    Supplemental (Other) Decision Maker: Rut Chawla - Child - [phone]          Can this person be reached and be able to respond quickly, such as within a few minutes or hours? Yes    Admit date/status: inpatient 5/20/21  Diagnosis: afib   Is this a Readmission?:  No      Insurance: Medicare. BCBS secondary     Precert required for SNF: No       3 night stay required: No    Living arrangements, Adls, care needs, prior to admission: lives alone in ranch style home with basement. IPTA. Active     Transportation: private/family     Durable Medical Equipment at home: none    Services in the home and/or outpatient, prior to admission: none    PT/OT recs: not completed at this time    [address] Notification (HEN): n/a    Barriers to discharge: none    Plan/comments: pt denies needs at discharge. Please consult CM team if needs arise.      Evelynn Snellen, RN

## 2021-05-21 NOTE — PROGRESS NOTES
4 Eyes Skin Assessment     NAME:  Janett Butcher  YOB: 1929  MEDICAL RECORD NUMBER:  [id number]    The patient is being assess for  Admission    I agree that 2 RN's have performed a thorough Head to Toe Skin Assessment on the patient. ALL assessment sites listed below have been assessed. Areas assessed by both nurses:    Head, Face, Ears, Shoulders, Back, Chest, Arms, Elbows, Hands, Sacrum. Buttock, Coccyx, Ischium and Legs. Feet and Heels        Does the Patient have a Wound?  No noted wound(s)       Philip Prevention initiated:  NA   Wound Care Orders initiated:  NA    Pressure Injury (Stage 3,4, Unstageable, DTI, NWPT, and Complex wounds) if present place consult order under [de-identified] NA    New and Established Ostomies if present place consult order under : NA      Nurse 1 eSignature: Electronically signed by Chele Kimbrough RN on 5/21/21 at 6:49 AM EDT    **SHARE this note so that the co-signing nurse is able to place an eSignature**    Nurse 2 eSignature: Electronically signed by Cristina Ayala RN on 5/21/21 at 6:50 AM EDT

## 2021-05-21 NOTE — PROGRESS NOTES
Hospitalist Progress Note      PCP: Yuni Lynn DO    Date of Admission: 5/20/2021    Chief Complaint on Admission: A. fib, vision problem    History Of Present Illness:   81 y.o. female who presented to [facility] with above complaints  Patient with history of PAF [diagnosed in March 2021] on metoprolol, HTN was sent into the ED by her PCP for A. Fib. Patient was seen in the office by her PCP today, EKG performed showed A. fib with RVR with rates in the 140s so patient was directed to the ED. patient does complain of palpitations and lightheadedness today. She also reported that she had transient partial blackening of her vision in the right eye that lasted for about 30 minutes and then resolved. Patient denied any chest pain or shortness of breath. Patient does appear very anxious. She denied any speech problems or facial droop. No anticoagulation was recommended on her admission for A. Fib. Does take aspirin. Pt Seen/Examined and Chart Reviewed. Admitting dx: PAF with RVR    SUBJECTIVE:   Patient states no palpitations or SOB. Reports feeling better. Visual deficits have reversed and she states being back at her baseline. Plan: Titrate off cardizem gtt, MRI to eval for R/O CVA.      OBJECTIVE:     Allergies  Lisinopril    Medications      Scheduled Meds:   thiamine  100 mg Oral Daily    amLODIPine  5 mg Oral Daily    docusate sodium  200 mg Oral Daily    escitalopram  10 mg Oral Daily    metoprolol succinate  25 mg Oral Daily    therapeutic multivitamin-minerals  1 tablet Oral Daily    simethicone  120 mg Oral BID    Vitamin D  1,000 Units Oral Daily    sodium chloride flush  5-40 mL Intravenous 2 times per day    apixaban  5 mg Oral BID    melatonin  5 mg Oral Nightly       Infusions:   dilTIAZem 5 mg/hr (05/20/21 1711)    sodium chloride         PRN Meds:  HYDROcodone-acetaminophen, sodium chloride flush, sodium chloride, promethazine **OR** ondansetron, polyethylene glycol, acetaminophen **OR** acetaminophen    Intake and Output     Intake/Output Summary (Last 24 hours) at 5/21/2021 1102  Last data filed at 5/21/2021 1020  Gross per 24 hour   Intake 240 ml   Output 250 ml   Net -10 ml       Vitals    BP (!) 136/56   Pulse 67   Temp 98.7 °F (37.1 °C) (Oral)   Resp 18   Ht 5' 3\" (1.6 m)   Wt 139 lb 11.2 oz (63.4 kg)   SpO2 96%   BMI 24.75 kg/m²     Exam:  General appearance:  No apparent distress, appears stated age and cooperative. HEENT:  Normal cephalic, atraumatic without obvious deformity. Pupils equal, round, and reactive to light. Extra ocular muscles intact. Conjunctivae/corneas clear. Neck: Supple, with full range of motion. No jugular venous distention. Trachea midline. Respiratory:  Normal respiratory effort. Clear to auscultation, bilaterally without Rales/Wheezes/Rhonchi. Cardiovascular: Tachycardic with irregularly irregular with normal S1/S2 without murmurs, rubs or gallops. Abdomen: Soft, non-tender, non-distended with normal bowel sounds. Musculoskeletal:  No clubbing, cyanosis or edema bilaterally. Full range of motion without deformity. Skin: Skin color, texture, turgor normal.  No rashes or lesions. Neurologic:  Neurovascularly intact without any focal sensory/motor deficits.  Cranial nerves: II-XII intact, grossly non-focal.  Psychiatric:  Alert and oriented, thought content appropriate, normal insight  Capillary Refill: Brisk,3 seconds, normal  Peripheral Pulses: +2 palpable, equal bilaterally     Data    Recent Labs     05/20/21  1703   WBC 7.9   HGB 12.3   HCT 36.0         Recent Labs     05/20/21  1703 05/21/21  0648   * 130*   K 4.1 4.3   CL 91* 97*   CO2 23 27   BUN 16 15   CREATININE 0.8 0.7     Recent Labs     05/20/21  1703   AST 22   ALT 16   BILITOT 0.3   ALKPHOS 70     Recent Labs     05/20/21  1703   INR 1.02     Recent Labs     05/20/21  1703 05/21/21  0003 05/21/21  0648   TROPONINI <0.01 <0.01 <0.01       Consults: IP CONSULT TO HOSPITALIST  IP CONSULT TO CARDIOLOGY    ASSESSMENT AND PLAN      Active Hospital Problems    Diagnosis Date Noted    Atrial fibrillation with rapid ventricular response ([facility] [address] [I48.91] 05/20/2021    Amaurosis fugax of right eye [G45.3] 05/20/2021    Essential hypertension [I10] 03/02/2021   PAF with rapid ventricular response, symptomatic  A. fib diagnosed in March 2021, spontaneously converted. Maintained on metoprolol. AC is recommended at that time. Followed by Dr. Germaine Dunn  -IV Cardizem bolus and gtt. initiated titrated to goal heart rate  -Monitor on telemetry  -Serial cardiac enzymes  -Cardiology consult  -Continue p.o. metoprolol, increase dose as needed  -2D echo from March 2021 reviewed, LVEF 60 to 65%, moderate LA dilation  -TSH within normal range [March 2021]     -[id number] Score for Atrial Fibrillation Stroke Risk = 6, with possible current amaurosis fugax of right eye which could potentially be cardioembolic. I think patient will benefit from anticoagulation to reduce stroke risk, especially given how functional she is at her age. Patient agreeable. R<B. Hb within acceptable range, patient denies hematochezia or melena. I do not see any absolute contraindications to starting anticoagulation in this patient. I will start Eliquis    Risk   Factors   Component Value   C CHF No 0   H HTN Yes 1   A2 Age >= 76 Yes,  (81 y.o.) 2   D DM No 0   S2 Prior Stroke/TIA Yes 2   V Vascular Disease No 0   A Age 74-69 No,  (81 y.o.) 0   Sc Sex female 1     [id number]  Score   6   Score last updated 8/48/87 09:12 PM EDT  Click here for a link to the UpToDate guideline \"Atrial Fibrillation: Anticoagulation therapy to prevent embolization  Disclaimer: Risk Score calculation is dependent on accuracy of patient problem list and past encounter diagnosis.     Amaurosis fugax of right eye  -MRI brain without contrast to rule out stroke  -Carotid Doppler: 50-69% bilat internal carotid stenosis. -Started Eliquis for A. Fib  -A1c and lipid profile, will consider statin if CVA or lipid panel provokes. -2D echo from March 2021 showed no evidence of LA thrombus or PFO     Essential hypertension-controlled, resume home medication regimen     DVT Prophylaxis: Eliquis started  Diet: DIET GENERAL;  Code Status: Full Code     PT/OT Eval Status: Ambulatory     Dispo -Expect DC 5/22 pending MRI, cardizem gtt titration and heart rate control.        Hari Daniel MD

## 2021-05-22 PROCEDURE — 1200000000 HC SEMI PRIVATE

## 2021-05-22 PROCEDURE — 6370000000 HC RX 637 (ALT 250 FOR IP): Performed by: INTERNAL MEDICINE

## 2021-05-22 PROCEDURE — 2580000003 HC RX 258: Performed by: INTERNAL MEDICINE

## 2021-05-22 PROCEDURE — 6370000000 HC RX 637 (ALT 250 FOR IP): Performed by: NURSE PRACTITIONER

## 2021-05-22 RX ORDER — ATORVASTATIN CALCIUM 80 MG/1
80 TABLET, FILM COATED ORAL NIGHTLY
Qty: 30 TABLET | Refills: 3 | Status: ON HOLD
Start: 2021-05-22 | End: 2021-06-17 | Stop reason: HOSPADM

## 2021-05-22 RX ORDER — LANOLIN ALCOHOL/MO/W.PET/CERES
100 CREAM (GRAM) TOPICAL DAILY
Qty: 30 TABLET | Refills: 3 | Status: SHIPPED | OUTPATIENT
Start: 2021-05-23

## 2021-05-22 RX ADMIN — ESCITALOPRAM OXALATE 10 MG: 10 TABLET ORAL at 08:37

## 2021-05-22 RX ADMIN — ATORVASTATIN CALCIUM 80 MG: 80 TABLET, FILM COATED ORAL at 19:59

## 2021-05-22 RX ADMIN — SIMETHICONE 120 MG: 80 TABLET, CHEWABLE ORAL at 08:42

## 2021-05-22 RX ADMIN — SIMETHICONE 120 MG: 80 TABLET, CHEWABLE ORAL at 20:00

## 2021-05-22 RX ADMIN — METOPROLOL SUCCINATE 25 MG: 25 TABLET, EXTENDED RELEASE ORAL at 19:59

## 2021-05-22 RX ADMIN — Medication 5 MG: at 19:59

## 2021-05-22 RX ADMIN — Medication 10 ML: at 08:38

## 2021-05-22 RX ADMIN — Medication 100 MG: at 08:38

## 2021-05-22 RX ADMIN — APIXABAN 5 MG: 5 TABLET, FILM COATED ORAL at 19:59

## 2021-05-22 RX ADMIN — Medication 10 ML: at 20:00

## 2021-05-22 RX ADMIN — Medication 1000 UNITS: at 08:38

## 2021-05-22 RX ADMIN — DOCUSATE SODIUM 200 MG: 100 CAPSULE, LIQUID FILLED ORAL at 08:37

## 2021-05-22 RX ADMIN — METOPROLOL SUCCINATE 25 MG: 25 TABLET, EXTENDED RELEASE ORAL at 08:37

## 2021-05-22 RX ADMIN — AMLODIPINE BESYLATE 5 MG: 5 TABLET ORAL at 08:37

## 2021-05-22 RX ADMIN — MULTIPLE VITAMINS W/ MINERALS TAB 1 TABLET: TAB at 08:38

## 2021-05-22 RX ADMIN — APIXABAN 5 MG: 5 TABLET, FILM COATED ORAL at 08:37

## 2021-05-22 ASSESSMENT — PAIN SCALES - GENERAL
PAINLEVEL_OUTOF10: 0
PAINLEVEL_OUTOF10: 0

## 2021-05-22 NOTE — DISCHARGE SUMMARY
Hospital Medicine Discharge Summary    Matthew Rogers  :  1929  MRN:  [id number]    Admit date:  2021  Discharge date:  2021     Admitting Physician:   Cary Li MD  Primary Care Physician:  Miles Mc DO  Code Status:   Full Code  Status: Inpatient [101]  Discharged Condition: Stable  Activity: activity as tolerated  Diet:  DIET GENERAL;      Discharge Diagnoses:      Atrial fibrillation with rapid ventricular response ([address]    Essential hypertension    Amaurosis fugax of right eye    Dyslipidemia    Hypomagnesemia     Hyponatremia      Hospital Course:   81 y.o. female admitted with  atrial fibrillation with rapid ventricular response. Patient was seen in the office by her PCP, EKG performed showed A. fib with RVR with rates in the 140s so patient was directed to the ED. patient does complain of palpitations and lightheadedness today.  She also reported that she had transient partial blackening of her vision in the right eye that lasted for about 30 minutes and then resolved.  Patient denied any chest pain or shortness of breath.      1. Atrial fibrillation with rapid ventricular response:  Initial rate control with IV diltiazem. [id number] score is 6 with a stroke risk of 9.7%. Continues on apixaban anticoagulation. New script at discharge. 2. Possible current amaurosis fugax of right eye which could potentially be cardioembolic. In addition to Eliquis, she is continued on aspirin 81 mg daily. MRI brain and carotid duplex. 2D echo from 2021 showed no evidence of LA thrombus or PFO. 3. Dyslipidemia (): High intensity statin therapy with atorvastatin 80 mg night, new script at discharge. 4. Hypomagnesemia:  Replaced with IV magnesium sulfate. 5. Hyponatremia: Chronic. Volume expansion with IV saline.        Discharge Medications:  New scripts in [facility]  amLODIPine (NORVASC) 5 MG tablet  Take 5 mg by mouth daily     apixaban (ELIQUIS) 5 MG TABS tablet  Take 1 tablet by mouth 2 times daily (new script, #60, RF-2)     aspirin 81 MG EC tablet  Take 81 mg by mouth daily     atorvastatin (LIPITOR) 80 MG tablet  Take 1 tablet by mouth nightly (new script, #30, RF-2)     Docusate Sodium (DSS) 250 MG CAPS  Take 250 mg by mouth daily     escitalopram (LEXAPRO) 10 MG tablet  Take 10 mg by mouth daily      HYDROcodone-acetaminophen (NORCO) 5-325 MG per tablet  Take 1 tablet by mouth every 6 hours as needed. metoprolol succinate (TOPROL XL) 25 MG extended release tablet  Take 25 mg by mouth daily      Multiple Vitamin (MULTIVITAMIN) tablet  Take 1 tablet by mouth daily     polyethylene glycol (GLYCOLAX) 17 GM/SCOOP powder  Take 17 g by mouth daily as needed      Simethicone 125 MG CAPS  Take 125 mg by mouth 2 times daily     thiamine 100 MG tablet  Take 1 tablet by mouth daily     vitamin D (CHOLECALCIFEROL) 25 MCG (1000 UT) TABS tablet  Take 1,000 Units by mouth daily         Consults:  Electrophysiology (Dr. Marielena Plummer), case management    Significant Diagnostic Studies:    · MRI brain (5/21) Punctate foci of acute lacunar infarctions in the left frontal lobe and left occipital lobe.  Findings raise the possibility of thromboembolic disease. No acute hemorrhage. Mild parenchymal volume loss.  Sequela of mild chronic microvascular ischemic changes. · Carotid Duplex (5/21) 50-69% stenosis in the bilateral internal carotid arteries, however, this may be underestimated due to calcific shadowing.  There is moderate irregular heterogeneous plaque noted within the bilateral  internal carotid arteries. Bilateral vertebral arteries were not visualized. · Noncontrast CT head (5/20) Mild atrophy and minimal chronic microischemic disease in the deep white matter with no acute abnormality seen.   · Hemoglobin A1c (5/21) 5.4%  · TSH (5/21) 2.45 uIU/mL  · Troponin negative x 3  · Magnesium (5/20) 1.70 mg/dL (replaced with IV magnesium sulfate)      Treatments:   Rate control with IV diltiazem, anticoagulation, telemetry and event monitor    Disposition:   Home with event monitor (followed by cardiology)  Follow up with Fabby Villegas DO in 1-2 weeks.     Signed:  Felipe Colon MD  5/22/2021, 4:30 PM

## 2021-05-22 NOTE — PROGRESS NOTES
Patient stated that she does not want to be d/c tonight and wants to stay one more night. Dr Araseli Marino made aware.

## 2021-05-23 ENCOUNTER — TELEPHONE (OUTPATIENT)
Dept: CARDIOLOGY | Age: 87
End: 2021-05-23

## 2021-05-23 VITALS
HEART RATE: 79 BPM | WEIGHT: 134.5 LBS | RESPIRATION RATE: 16 BRPM | SYSTOLIC BLOOD PRESSURE: 144 MMHG | OXYGEN SATURATION: 97 % | DIASTOLIC BLOOD PRESSURE: 79 MMHG | TEMPERATURE: 97.8 F | HEIGHT: 63 IN | BODY MASS INDEX: 23.83 KG/M2

## 2021-05-23 PROCEDURE — 2580000003 HC RX 258: Performed by: INTERNAL MEDICINE

## 2021-05-23 PROCEDURE — 6370000000 HC RX 637 (ALT 250 FOR IP): Performed by: INTERNAL MEDICINE

## 2021-05-23 PROCEDURE — 93228 REMOTE 30 DAY ECG REV/REPORT: CPT | Performed by: INTERNAL MEDICINE

## 2021-05-23 RX ADMIN — Medication 1000 UNITS: at 08:16

## 2021-05-23 RX ADMIN — SIMETHICONE 120 MG: 80 TABLET, CHEWABLE ORAL at 08:16

## 2021-05-23 RX ADMIN — MULTIPLE VITAMINS W/ MINERALS TAB 1 TABLET: TAB at 08:16

## 2021-05-23 RX ADMIN — DOCUSATE SODIUM 200 MG: 100 CAPSULE, LIQUID FILLED ORAL at 08:16

## 2021-05-23 RX ADMIN — METOPROLOL SUCCINATE 25 MG: 25 TABLET, EXTENDED RELEASE ORAL at 08:16

## 2021-05-23 RX ADMIN — Medication 100 MG: at 08:18

## 2021-05-23 RX ADMIN — APIXABAN 5 MG: 5 TABLET, FILM COATED ORAL at 08:16

## 2021-05-23 RX ADMIN — Medication 10 ML: at 08:17

## 2021-05-23 RX ADMIN — AMLODIPINE BESYLATE 5 MG: 5 TABLET ORAL at 08:16

## 2021-05-23 RX ADMIN — ESCITALOPRAM OXALATE 10 MG: 10 TABLET ORAL at 08:16

## 2021-05-23 ASSESSMENT — PAIN SCALES - GENERAL: PAINLEVEL_OUTOF10: 0

## 2021-05-23 NOTE — TELEPHONE ENCOUNTER
Received call from [employer] regarding alert. Patient logged symptoms of syncope. At time she pressed the button monitor showed supraventricular trigeminy. No ventricular arrhythmia. Attempts by company as well as myself to reach the patient and ensure safety were unsuccessful. VM left to proceed to ED for evaluation and/or to call the on-call number to reach myself.       Russell Warren MD, [address] [facility]  [phone] [facility]  [phone] [facility]

## 2021-05-23 NOTE — PROGRESS NOTES
Hospitalist Progress Note  5/23/2021 1:11 PM  Subjective:   Admit Date: 5/20/2021  PCP: Franki Mcbride, DO Status: Inpatient [101]  Interval History: Hospital Day: 4, remained overnight. Patient had left when rounding around noon. History of present illness:  admitted with  atrial fibrillation with rapid ventricular response. Patient was seen in the office by her PCP, EKG performed showed A. fib with RVR with rates in the 140s so patient was directed to the ED. patient does complain of palpitations and lightheadedness today.  She also reported that she had transient partial blackening of her vision in the right eye that lasted for about 30 minutes and then resolved.  Patient denied any chest pain or shortness of breath.        Medications:     metoprolol succinate  25 mg Oral BID   atorvastatin  80 mg Oral Nightly   thiamine  100 mg Oral Daily   amLODIPine  5 mg Oral Daily   docusate sodium  200 mg Oral Daily   escitalopram  10 mg Oral Daily   simethicone  120 mg Oral BID   Vitamin D  1,000 Units Oral Daily   apixaban  5 mg Oral BID   melatonin  5 mg Oral Nightly     Recent Labs     05/20/21  1703   WBC 7.9   HGB 12.3      MCV 89.9     Recent Labs     05/20/21  1703 05/21/21  0648   * 130*   K 4.1 4.3   CL 91* 97*   CO2 23 27   BUN 16 15   CREATININE 0.8 0.7   GLUCOSE 106* 102*     Recent Labs     05/20/21  1703   AST 22   ALT 16   BILITOT 0.3   ALKPHOS 70     Troponin T:  Negative x 3  INR (5/20) 1.02    · MRI brain (5/21) Punctate foci of acute lacunar infarctions in the left frontal lobe and left occipital lobe.  Findings raise the possibility of thromboembolic disease. No acute hemorrhage. Mild parenchymal volume loss.  Sequela of mild chronic microvascular ischemic changes.   · Carotid Duplex (5/21) 50-69% stenosis in the bilateral internal carotid arteries, however, this may be underestimated due to calcific shadowing.  There is moderate irregular heterogeneous plaque noted within the bilateral  internal carotid arteries. Bilateral vertebral arteries were not visualized. · Noncontrast CT head (5/20) Mild atrophy and minimal chronic microischemic disease in the deep white matter with no acute abnormality seen. · Hemoglobin A1c (5/21) 5.4%  · TSH (5/21) 2.45 uIU/mL  · Troponin negative x 3  · Magnesium (5/20) 1.70 mg/dL (replaced with IV magnesium sulfate)    Objective:   Vitals:  BP (!) 144/79   Pulse 79   Temp 97.8 °F (36.6 °C) (Oral)   Resp 16   Ht 5' 3\" (1.6 m)   Wt 134 lb 8 oz (61 kg)   SpO2 97%   BMI 23.83 kg/m²   No exam.  Patient had left. Assessment and Plan:   1. Atrial fibrillation with rapid ventricular response:  Initial rate control with IV diltiazem. [id number] score is 6 with a stroke risk of 9.7%. Continues on apixaban anticoagulation. New script at discharge. 2. Possible current amaurosis fugax of right eye which could potentially be cardioembolic. In addition to Eliquis, she is continued on aspirin 81 mg daily. MRI brain and carotid duplex. 2D echo from March 2021 showed no evidence of LA thrombus or PFO. 3. Dyslipidemia (): High intensity statin therapy with atorvastatin 80 mg night, new script at discharge. 4. Hypomagnesemia:  Replaced with IV magnesium sulfate. 5. Hyponatremia: Chronic. Volume expansion with IV saline    Advance Directive: Full code. Patient left. Discharge summary updated.       Jagruti Canales MD, MD  Rounding Hospitalist

## 2021-05-23 NOTE — PROGRESS NOTES
PM assessment complete, VSS. No acute S/S of distress noted at present time. POC discussed, all questions answered. Will continue to monitor.

## 2021-05-24 NOTE — TELEPHONE ENCOUNTER
Looks like patient did end up going to ED. Jessica Fraction asking for call back to let us know how she is doing.

## 2021-05-26 ENCOUNTER — TELEPHONE (OUTPATIENT)
Dept: NON INVASIVE DIAGNOSTICS | Age: 87
End: 2021-05-26

## 2021-05-27 NOTE — TELEPHONE ENCOUNTER
This message is regarding the urgent report we received from [employer] monitor d/t patient reporting syncopal episode. Left message on voicemail asking for call back.

## 2021-06-02 ENCOUNTER — TELEPHONE (OUTPATIENT)
Dept: CARDIOLOGY CLINIC | Age: 87
End: 2021-06-02

## 2021-06-10 DIAGNOSIS — I48.91 ATRIAL FIBRILLATION WITH RAPID VENTRICULAR RESPONSE (HCC): Primary | ICD-10-CM

## 2021-06-11 RX ORDER — METOPROLOL SUCCINATE 25 MG/1
25 TABLET, EXTENDED RELEASE ORAL 2 TIMES DAILY
Qty: 60 TABLET | Refills: 0 | Status: ON HOLD
Start: 2021-06-11 | End: 2021-06-16 | Stop reason: HOSPADM

## 2021-06-14 ENCOUNTER — HOSPITAL ENCOUNTER (INPATIENT)
Age: 87
LOS: 2 days | Discharge: HOME | DRG: 292 | End: 2021-06-17
Attending: EMERGENCY MEDICINE | Admitting: INTERNAL MEDICINE
Payer: MEDICARE

## 2021-06-14 ENCOUNTER — APPOINTMENT (OUTPATIENT)
Dept: GENERAL RADIOLOGY | Age: 87
DRG: 292 | End: 2021-06-14
Payer: MEDICARE

## 2021-06-14 ENCOUNTER — TELEPHONE (OUTPATIENT)
Dept: CARDIOLOGY CLINIC | Age: 87
End: 2021-06-14

## 2021-06-14 DIAGNOSIS — R06.09 DYSPNEA ON EXERTION: Primary | ICD-10-CM

## 2021-06-14 DIAGNOSIS — R79.89 ELEVATED BRAIN NATRIURETIC PEPTIDE (BNP) LEVEL: ICD-10-CM

## 2021-06-14 DIAGNOSIS — I48.91 ATRIAL FIBRILLATION WITH RVR (HCC): ICD-10-CM

## 2021-06-14 DIAGNOSIS — J81.0 ACUTE PULMONARY EDEMA (HCC): ICD-10-CM

## 2021-06-14 DIAGNOSIS — I48.91 ATRIAL FIBRILLATION WITH RAPID VENTRICULAR RESPONSE (HCC): ICD-10-CM

## 2021-06-14 LAB
A/G RATIO: 1.4 (ref 1.1–2.2)
ALBUMIN SERPL-MCNC: 4.2 G/DL (ref 3.4–5)
ALP BLD-CCNC: 97 U/L (ref 40–129)
ALT SERPL-CCNC: 25 U/L (ref 10–40)
ANION GAP SERPL CALCULATED.3IONS-SCNC: 10 MMOL/L (ref 3–16)
AST SERPL-CCNC: 21 U/L (ref 15–37)
BASOPHILS ABSOLUTE: 0.1 K/UL (ref 0–0.2)
BASOPHILS RELATIVE PERCENT: 1 %
BILIRUB SERPL-MCNC: 0.5 MG/DL (ref 0–1)
BUN BLDV-MCNC: 16 MG/DL (ref 7–20)
CALCIUM SERPL-MCNC: 9.7 MG/DL (ref 8.3–10.6)
CHLORIDE BLD-SCNC: 93 MMOL/L (ref 99–110)
CO2: 26 MMOL/L (ref 21–32)
CREAT SERPL-MCNC: 0.6 MG/DL (ref 0.6–1.2)
EOSINOPHILS ABSOLUTE: 0.2 K/UL (ref 0–0.6)
EOSINOPHILS RELATIVE PERCENT: 1.6 %
GFR AFRICAN AMERICAN: >60
GFR NON-AFRICAN AMERICAN: >60
GLOBULIN: 3 G/DL
GLUCOSE BLD-MCNC: 115 MG/DL (ref 70–99)
HCT VFR BLD CALC: 37.1 % (ref 36–48)
HEMOGLOBIN: 12.9 G/DL (ref 12–16)
LYMPHOCYTES ABSOLUTE: 1.6 K/UL (ref 1–5.1)
LYMPHOCYTES RELATIVE PERCENT: 15.5 %
MCH RBC QN AUTO: 30.7 PG (ref 26–34)
MCHC RBC AUTO-ENTMCNC: 34.6 G/DL (ref 31–36)
MCV RBC AUTO: 88.5 FL (ref 80–100)
MONOCYTES ABSOLUTE: 0.6 K/UL (ref 0–1.3)
MONOCYTES RELATIVE PERCENT: 5.9 %
NEUTROPHILS ABSOLUTE: 7.7 K/UL (ref 1.7–7.7)
NEUTROPHILS RELATIVE PERCENT: 76 %
PDW BLD-RTO: 12.9 % (ref 12.4–15.4)
PLATELET # BLD: 339 K/UL (ref 135–450)
PMV BLD AUTO: 7.2 FL (ref 5–10.5)
POTASSIUM SERPL-SCNC: 4.3 MMOL/L (ref 3.5–5.1)
PRO-BNP: 1811 PG/ML (ref 0–449)
RBC # BLD: 4.19 M/UL (ref 4–5.2)
SODIUM BLD-SCNC: 129 MMOL/L (ref 136–145)
SPECIMEN STATUS: NORMAL
TOTAL PROTEIN: 7.2 G/DL (ref 6.4–8.2)
TROPONIN: <0.01 NG/ML
WBC # BLD: 10.1 K/UL (ref 4–11)

## 2021-06-14 PROCEDURE — 80053 COMPREHEN METABOLIC PANEL: CPT

## 2021-06-14 PROCEDURE — 85025 COMPLETE CBC W/AUTO DIFF WBC: CPT

## 2021-06-14 PROCEDURE — 84145 PROCALCITONIN (PCT): CPT

## 2021-06-14 PROCEDURE — 71046 X-RAY EXAM CHEST 2 VIEWS: CPT

## 2021-06-14 PROCEDURE — 84484 ASSAY OF TROPONIN QUANT: CPT

## 2021-06-14 PROCEDURE — 83880 ASSAY OF NATRIURETIC PEPTIDE: CPT

## 2021-06-14 PROCEDURE — 99283 EMERGENCY DEPT VISIT LOW MDM: CPT

## 2021-06-14 RX ORDER — FUROSEMIDE 10 MG/ML
40 INJECTION INTRAMUSCULAR; INTRAVENOUS ONCE
Status: COMPLETED | OUTPATIENT
Start: 2021-06-15 | End: 2021-06-15

## 2021-06-15 PROBLEM — Z86.73 HISTORY OF TIA (TRANSIENT ISCHEMIC ATTACK): Status: ACTIVE | Noted: 2021-06-15

## 2021-06-15 PROBLEM — I48.0 PAF (PAROXYSMAL ATRIAL FIBRILLATION) (HCC): Status: ACTIVE | Noted: 2021-06-15

## 2021-06-15 PROBLEM — I50.9 CHF (CONGESTIVE HEART FAILURE), NYHA CLASS I, UNSPECIFIED FAILURE CHRONICITY, UNSPECIFIED TYPE (HCC): Status: ACTIVE | Noted: 2021-06-15

## 2021-06-15 PROBLEM — R06.02 SOB (SHORTNESS OF BREATH): Status: ACTIVE | Noted: 2021-06-15

## 2021-06-15 PROBLEM — I50.32 CHRONIC DIASTOLIC CHF (CONGESTIVE HEART FAILURE), NYHA CLASS 3 (HCC): Status: ACTIVE | Noted: 2021-06-15

## 2021-06-15 LAB
ANION GAP SERPL CALCULATED.3IONS-SCNC: 11 MMOL/L (ref 3–16)
BUN BLDV-MCNC: 12 MG/DL (ref 7–20)
CALCIUM SERPL-MCNC: 8.9 MG/DL (ref 8.3–10.6)
CHLORIDE BLD-SCNC: 94 MMOL/L (ref 99–110)
CO2: 27 MMOL/L (ref 21–32)
CREAT SERPL-MCNC: 0.7 MG/DL (ref 0.6–1.2)
EKG ATRIAL RATE: 81 BPM
EKG DIAGNOSIS: NORMAL
EKG P AXIS: 85 DEGREES
EKG P-R INTERVAL: 186 MS
EKG Q-T INTERVAL: 410 MS
EKG QRS DURATION: 76 MS
EKG QTC CALCULATION (BAZETT): 476 MS
EKG R AXIS: 36 DEGREES
EKG T AXIS: 40 DEGREES
EKG VENTRICULAR RATE: 81 BPM
GFR AFRICAN AMERICAN: >60
GFR NON-AFRICAN AMERICAN: >60
GLUCOSE BLD-MCNC: 101 MG/DL (ref 70–99)
MAGNESIUM: 1.5 MG/DL (ref 1.8–2.4)
POTASSIUM SERPL-SCNC: 3.6 MMOL/L (ref 3.5–5.1)
PROCALCITONIN: 0.02 NG/ML (ref 0–0.15)
SARS-COV-2, NAAT: NOT DETECTED
SODIUM BLD-SCNC: 132 MMOL/L (ref 136–145)
TROPONIN: <0.01 NG/ML

## 2021-06-15 PROCEDURE — 87635 SARS-COV-2 COVID-19 AMP PRB: CPT

## 2021-06-15 PROCEDURE — 83735 ASSAY OF MAGNESIUM: CPT

## 2021-06-15 PROCEDURE — 1200000000 HC SEMI PRIVATE

## 2021-06-15 PROCEDURE — 6360000002 HC RX W HCPCS: Performed by: REGISTERED NURSE

## 2021-06-15 PROCEDURE — 84484 ASSAY OF TROPONIN QUANT: CPT

## 2021-06-15 PROCEDURE — 80048 BASIC METABOLIC PNL TOTAL CA: CPT

## 2021-06-15 PROCEDURE — 6360000002 HC RX W HCPCS: Performed by: INTERNAL MEDICINE

## 2021-06-15 PROCEDURE — 93010 ELECTROCARDIOGRAM REPORT: CPT | Performed by: INTERNAL MEDICINE

## 2021-06-15 PROCEDURE — 6370000000 HC RX 637 (ALT 250 FOR IP): Performed by: NURSE PRACTITIONER

## 2021-06-15 PROCEDURE — 93005 ELECTROCARDIOGRAM TRACING: CPT | Performed by: INTERNAL MEDICINE

## 2021-06-15 PROCEDURE — 36415 COLL VENOUS BLD VENIPUNCTURE: CPT

## 2021-06-15 PROCEDURE — 2580000003 HC RX 258: Performed by: INTERNAL MEDICINE

## 2021-06-15 PROCEDURE — 6370000000 HC RX 637 (ALT 250 FOR IP): Performed by: REGISTERED NURSE

## 2021-06-15 PROCEDURE — 99222 1ST HOSP IP/OBS MODERATE 55: CPT | Performed by: INTERNAL MEDICINE

## 2021-06-15 PROCEDURE — 6370000000 HC RX 637 (ALT 250 FOR IP): Performed by: INTERNAL MEDICINE

## 2021-06-15 PROCEDURE — 6360000002 HC RX W HCPCS: Performed by: EMERGENCY MEDICINE

## 2021-06-15 RX ORDER — MECOBALAMIN 5000 MCG
10 TABLET,DISINTEGRATING ORAL NIGHTLY PRN
Status: DISCONTINUED | OUTPATIENT
Start: 2021-06-15 | End: 2021-06-17 | Stop reason: HOSPADM

## 2021-06-15 RX ORDER — ACETAMINOPHEN 650 MG/1
650 SUPPOSITORY RECTAL EVERY 6 HOURS PRN
Status: DISCONTINUED | OUTPATIENT
Start: 2021-06-15 | End: 2021-06-17 | Stop reason: HOSPADM

## 2021-06-15 RX ORDER — VITAMIN B COMPLEX
1000 TABLET ORAL DAILY
Status: DISCONTINUED | OUTPATIENT
Start: 2021-06-15 | End: 2021-06-17 | Stop reason: HOSPADM

## 2021-06-15 RX ORDER — HYDROCODONE BITARTRATE AND ACETAMINOPHEN 5; 325 MG/1; MG/1
1 TABLET ORAL EVERY 6 HOURS PRN
Status: DISCONTINUED | OUTPATIENT
Start: 2021-06-15 | End: 2021-06-17 | Stop reason: HOSPADM

## 2021-06-15 RX ORDER — DOCUSATE SODIUM 100 MG/1
100 CAPSULE, LIQUID FILLED ORAL DAILY
Status: DISCONTINUED | OUTPATIENT
Start: 2021-06-15 | End: 2021-06-17 | Stop reason: HOSPADM

## 2021-06-15 RX ORDER — CYCLOBENZAPRINE HCL 10 MG
5 TABLET ORAL 2 TIMES DAILY PRN
Status: DISCONTINUED | OUTPATIENT
Start: 2021-06-15 | End: 2021-06-17 | Stop reason: HOSPADM

## 2021-06-15 RX ORDER — SIMETHICONE 80 MG
125 TABLET,CHEWABLE ORAL 2 TIMES DAILY
Status: DISCONTINUED | OUTPATIENT
Start: 2021-06-15 | End: 2021-06-17 | Stop reason: HOSPADM

## 2021-06-15 RX ORDER — SODIUM CHLORIDE 0.9 % (FLUSH) 0.9 %
5-40 SYRINGE (ML) INJECTION EVERY 12 HOURS SCHEDULED
Status: DISCONTINUED | OUTPATIENT
Start: 2021-06-15 | End: 2021-06-17 | Stop reason: HOSPADM

## 2021-06-15 RX ORDER — FUROSEMIDE 10 MG/ML
20 INJECTION INTRAMUSCULAR; INTRAVENOUS 2 TIMES DAILY
Status: DISCONTINUED | OUTPATIENT
Start: 2021-06-15 | End: 2021-06-16

## 2021-06-15 RX ORDER — ONDANSETRON 4 MG/1
4 TABLET, ORALLY DISINTEGRATING ORAL EVERY 8 HOURS PRN
Status: DISCONTINUED | OUTPATIENT
Start: 2021-06-15 | End: 2021-06-17 | Stop reason: HOSPADM

## 2021-06-15 RX ORDER — POLYETHYLENE GLYCOL 3350 17 G/17G
17 POWDER, FOR SOLUTION ORAL DAILY PRN
Status: DISCONTINUED | OUTPATIENT
Start: 2021-06-15 | End: 2021-06-17 | Stop reason: HOSPADM

## 2021-06-15 RX ORDER — MAGNESIUM SULFATE IN WATER 40 MG/ML
2000 INJECTION, SOLUTION INTRAVENOUS ONCE
Status: COMPLETED | OUTPATIENT
Start: 2021-06-15 | End: 2021-06-15

## 2021-06-15 RX ORDER — ACETAMINOPHEN 325 MG/1
650 TABLET ORAL EVERY 6 HOURS PRN
Status: DISCONTINUED | OUTPATIENT
Start: 2021-06-15 | End: 2021-06-17 | Stop reason: HOSPADM

## 2021-06-15 RX ORDER — ATORVASTATIN CALCIUM 80 MG/1
80 TABLET, FILM COATED ORAL NIGHTLY
Status: DISCONTINUED | OUTPATIENT
Start: 2021-06-15 | End: 2021-06-17 | Stop reason: HOSPADM

## 2021-06-15 RX ORDER — ONDANSETRON 2 MG/ML
4 INJECTION INTRAMUSCULAR; INTRAVENOUS EVERY 6 HOURS PRN
Status: DISCONTINUED | OUTPATIENT
Start: 2021-06-15 | End: 2021-06-17 | Stop reason: HOSPADM

## 2021-06-15 RX ORDER — ESCITALOPRAM OXALATE 10 MG/1
10 TABLET ORAL DAILY
Status: DISCONTINUED | OUTPATIENT
Start: 2021-06-15 | End: 2021-06-17 | Stop reason: HOSPADM

## 2021-06-15 RX ORDER — AMLODIPINE BESYLATE 5 MG/1
5 TABLET ORAL DAILY
Status: DISCONTINUED | OUTPATIENT
Start: 2021-06-15 | End: 2021-06-17 | Stop reason: HOSPADM

## 2021-06-15 RX ORDER — FOLIC ACID/MULTIVIT,IRON,MINER .4-18-35
1 TABLET,CHEWABLE ORAL DAILY
COMMUNITY

## 2021-06-15 RX ORDER — LANOLIN ALCOHOL/MO/W.PET/CERES
100 CREAM (GRAM) TOPICAL DAILY
Status: DISCONTINUED | OUTPATIENT
Start: 2021-06-15 | End: 2021-06-17 | Stop reason: HOSPADM

## 2021-06-15 RX ORDER — METOPROLOL SUCCINATE 25 MG/1
25 TABLET, EXTENDED RELEASE ORAL 2 TIMES DAILY
Status: DISCONTINUED | OUTPATIENT
Start: 2021-06-15 | End: 2021-06-17 | Stop reason: HOSPADM

## 2021-06-15 RX ORDER — MAGNESIUM 30 MG
30 TABLET ORAL 2 TIMES DAILY
COMMUNITY

## 2021-06-15 RX ORDER — POTASSIUM CHLORIDE 20 MEQ/1
20 TABLET, EXTENDED RELEASE ORAL ONCE
Status: COMPLETED | OUTPATIENT
Start: 2021-06-15 | End: 2021-06-15

## 2021-06-15 RX ORDER — MELATONIN 10 MG
10 CAPSULE ORAL NIGHTLY
COMMUNITY

## 2021-06-15 RX ORDER — SODIUM CHLORIDE 9 MG/ML
25 INJECTION, SOLUTION INTRAVENOUS PRN
Status: DISCONTINUED | OUTPATIENT
Start: 2021-06-15 | End: 2021-06-17 | Stop reason: HOSPADM

## 2021-06-15 RX ORDER — M-VIT,TX,IRON,MINS/CALC/FOLIC 27MG-0.4MG
1 TABLET ORAL DAILY
Status: DISCONTINUED | OUTPATIENT
Start: 2021-06-15 | End: 2021-06-17 | Stop reason: HOSPADM

## 2021-06-15 RX ORDER — THIAMINE MONONITRATE (VIT B1) 100 MG
100 TABLET ORAL DAILY
Status: ON HOLD | COMMUNITY
End: 2021-06-27 | Stop reason: HOSPADM

## 2021-06-15 RX ORDER — ASPIRIN 81 MG/1
81 TABLET ORAL DAILY
Status: DISCONTINUED | OUTPATIENT
Start: 2021-06-15 | End: 2021-06-17 | Stop reason: HOSPADM

## 2021-06-15 RX ORDER — SODIUM CHLORIDE 0.9 % (FLUSH) 0.9 %
5-40 SYRINGE (ML) INJECTION PRN
Status: DISCONTINUED | OUTPATIENT
Start: 2021-06-15 | End: 2021-06-17 | Stop reason: HOSPADM

## 2021-06-15 RX ADMIN — Medication 1000 UNITS: at 09:15

## 2021-06-15 RX ADMIN — Medication 100 MG: at 09:15

## 2021-06-15 RX ADMIN — Medication 10 MG: at 03:00

## 2021-06-15 RX ADMIN — AMLODIPINE BESYLATE 5 MG: 5 TABLET ORAL at 09:16

## 2021-06-15 RX ADMIN — FUROSEMIDE 40 MG: 10 INJECTION, SOLUTION INTRAMUSCULAR; INTRAVENOUS at 00:23

## 2021-06-15 RX ADMIN — Medication 10 ML: at 21:51

## 2021-06-15 RX ADMIN — MAGNESIUM SULFATE HEPTAHYDRATE 2000 MG: 40 INJECTION, SOLUTION INTRAVENOUS at 09:44

## 2021-06-15 RX ADMIN — SODIUM CHLORIDE 25 ML: 9 INJECTION, SOLUTION INTRAVENOUS at 09:41

## 2021-06-15 RX ADMIN — METOPROLOL SUCCINATE 25 MG: 25 TABLET, EXTENDED RELEASE ORAL at 09:15

## 2021-06-15 RX ADMIN — CYCLOBENZAPRINE HYDROCHLORIDE 5 MG: 10 TABLET, FILM COATED ORAL at 10:45

## 2021-06-15 RX ADMIN — MULTIPLE VITAMINS W/ MINERALS TAB 1 TABLET: TAB at 09:15

## 2021-06-15 RX ADMIN — POTASSIUM CHLORIDE 20 MEQ: 1500 TABLET, EXTENDED RELEASE ORAL at 09:37

## 2021-06-15 RX ADMIN — Medication 10 ML: at 09:17

## 2021-06-15 RX ADMIN — SIMETHICONE 120 MG: 80 TABLET, CHEWABLE ORAL at 09:15

## 2021-06-15 RX ADMIN — FUROSEMIDE 20 MG: 10 INJECTION, SOLUTION INTRAMUSCULAR; INTRAVENOUS at 09:17

## 2021-06-15 RX ADMIN — APIXABAN 5 MG: 5 TABLET, FILM COATED ORAL at 09:16

## 2021-06-15 RX ADMIN — APIXABAN 5 MG: 5 TABLET, FILM COATED ORAL at 21:48

## 2021-06-15 RX ADMIN — ATORVASTATIN CALCIUM 80 MG: 80 TABLET, FILM COATED ORAL at 21:48

## 2021-06-15 RX ADMIN — FUROSEMIDE 20 MG: 10 INJECTION, SOLUTION INTRAMUSCULAR; INTRAVENOUS at 17:18

## 2021-06-15 RX ADMIN — Medication 10 MG: at 21:49

## 2021-06-15 RX ADMIN — SIMETHICONE 120 MG: 80 TABLET, CHEWABLE ORAL at 21:50

## 2021-06-15 RX ADMIN — ESCITALOPRAM OXALATE 10 MG: 10 TABLET ORAL at 09:16

## 2021-06-15 RX ADMIN — METOPROLOL SUCCINATE 25 MG: 25 TABLET, EXTENDED RELEASE ORAL at 21:49

## 2021-06-15 RX ADMIN — DOCUSATE SODIUM 100 MG: 100 CAPSULE, LIQUID FILLED ORAL at 09:15

## 2021-06-15 ASSESSMENT — ENCOUNTER SYMPTOMS
PHOTOPHOBIA: 0
CHEST TIGHTNESS: 0
ABDOMINAL PAIN: 0
VOMITING: 0
TROUBLE SWALLOWING: 0
CHOKING: 0
SHORTNESS OF BREATH: 1
COLOR CHANGE: 0
WHEEZING: 0
COUGH: 0

## 2021-06-15 ASSESSMENT — PAIN DESCRIPTION - DESCRIPTORS: DESCRIPTORS: CRAMPING

## 2021-06-15 ASSESSMENT — PAIN DESCRIPTION - LOCATION: LOCATION: LEG

## 2021-06-15 ASSESSMENT — PAIN SCALES - GENERAL
PAINLEVEL_OUTOF10: 0

## 2021-06-15 ASSESSMENT — PAIN DESCRIPTION - ORIENTATION: ORIENTATION: RIGHT;LEFT

## 2021-06-15 ASSESSMENT — PAIN SCALES - WONG BAKER
WONGBAKER_NUMERICALRESPONSE: 0
WONGBAKER_NUMERICALRESPONSE: 0

## 2021-06-15 NOTE — ED PROVIDER NOTES
[facility]  EMERGENCY DEPARTMENTENCOUNTER      Pt Name: Ashely Erickson  MRN: [id number]  [place] 6/30/1929  Date ofevaluation: 6/14/2021  Provider: Deion Santillan MD    CHIEF COMPLAINT       Chief Complaint   Patient presents with    Shortness of Breath     started today         HISTORY OF PRESENT ILLNESS   (Location/Symptom, Timing/Onset,Context/Setting, Quality, Duration, Modifying Factors, Severity)  Note limiting factors. Ashely Erickson is a 81 y.o. female  who  has a past medical history of Arthritis, Back fracture, HTN (hypertension), Hyperlipidemia, and Panic attack. who presents to the emergency department for evaluation of shortness of breath. Patient reports gradual onset of shortness of breath that is typically worse with exertion that began earlier today. She states she denies a history of previous. She does report bilateral lower extremity swelling denies pain in the lower extremities. She denies fevers or cough. Denies rash. Denies abdominal pain nausea or vomiting. Denies contact with sick persons. Patient states has been compliant with her medications and does have a history of proximal atrial fibrillation. HPI    NursingNotes were reviewed. REVIEW OF SYSTEMS    (2-9 systems for level 4, 10 or more for level 5)     Review of Systems   Constitutional: Negative for activity change, fatigue and fever. HENT: Negative for congestion, mouth sores and trouble swallowing. Eyes: Negative for photophobia and visual disturbance. Respiratory: Positive for shortness of breath. Negative for cough, choking, chest tightness and wheezing. Cardiovascular: Positive for leg swelling. Negative for chest pain and palpitations. Gastrointestinal: Negative for abdominal pain and vomiting. Genitourinary: Negative for difficulty urinating and frequency. Musculoskeletal: Negative for gait problem and neck pain. Skin: Negative for color change and rash.    Neurological: Negative for dizziness, light-headedness and headaches. Psychiatric/Behavioral: Negative for confusion. The patient is not nervous/anxious. All other systems reviewed and are negative. Except as noted above the remainder of the review of systems was reviewed and negative. PAST MEDICAL HISTORY     Past Medical History:   Diagnosis Date    Arthritis     Back fracture     stress fracture active 4/17/15    HTN (hypertension)     Hyperlipidemia     Panic attack          SURGICALHISTORY       Past Surgical History:   Procedure Laterality Date    COLONOSCOPY  2015    tics    EYE SURGERY      HEMORRHOID SURGERY      HYSTERECTOMY           CURRENT MEDICATIONS       Current Discharge Medication List      CONTINUE these medications which have NOT CHANGED    Details   melatonin 10 MG CAPS capsule Take 10 mg by mouth nightly      metoprolol succinate (TOPROL XL) 25 MG extended release tablet Take 1 tablet by mouth 2 times daily  Qty: 60 tablet, Refills: 0    Associated Diagnoses: Atrial fibrillation with rapid ventricular response (HCC)      apixaban (ELIQUIS) 5 MG TABS tablet Take 1 tablet by mouth 2 times daily  Qty: 60 tablet, Refills: 2      atorvastatin (LIPITOR) 80 MG tablet Take 1 tablet by mouth nightly  Qty: 30 tablet, Refills: 3      thiamine 100 MG tablet Take 1 tablet by mouth daily  Qty: 30 tablet, Refills: 3      escitalopram (LEXAPRO) 10 MG tablet Take 10 mg by mouth daily       HYDROcodone-acetaminophen (NORCO) 5-325 MG per tablet Take 1 tablet by mouth every 6 hours as needed.       Docusate Sodium (DSS) 250 MG CAPS Take 250 mg by mouth daily      vitamin D (CHOLECALCIFEROL) 25 MCG (1000 UT) TABS tablet Take 1,000 Units by mouth daily      aspirin 81 MG EC tablet Take 81 mg by mouth daily      Multiple Vitamin (MULTIVITAMIN) tablet Take 1 tablet by mouth daily      polyethylene glycol (GLYCOLAX) 17 GM/SCOOP powder Take 17 g by mouth daily as needed       Simethicone 125 MG CAPS Take 125 mg by mouth 2 times daily      amLODIPine (NORVASC) 5 MG tablet Take 5 mg by mouth daily                  Lisinopril    FAMILY HISTORY     History reviewed. No pertinent family history. SOCIAL HISTORY       Social History     Socioeconomic History    Marital status:      Spouse name: None    Number of children: None    Years of education: None    Highest education level: None   Occupational History    None   Tobacco Use    Smoking status: Former Smoker    Smokeless tobacco: Never Used   Substance and Sexual Activity    Alcohol use: Yes     Comment: 5 beers a week    Drug use: Never    Sexual activity: Not Currently   Other Topics Concern    None   Social History Narrative    None     Social Determinants of Health     Financial Resource Strain:     Difficulty of Paying Living Expenses:    Food Insecurity:     Worried About Running Out of Food in the Last Year:     Ran Out of Food in the Last Year:    Transportation Needs:     Lack of Transportation (Medical):      Lack of Transportation (Non-Medical):    Physical Activity:     Days of Exercise per Week:     Minutes of Exercise per Session:    Stress:     Feeling of Stress :    Social Connections:     Frequency of Communication with Friends and Family:     Frequency of Social Gatherings with Friends and Family:     Attends [employer] Services:     Active Member of Clubs or Organizations:     Attends Club or Organization Meetings:     Marital Status:    Intimate Partner Violence:     Fear of Current or Ex-Partner:     Emotionally Abused:     Physically Abused:     Sexually Abused:        SCREENINGS             PHYSICAL EXAM    (up to 7 for level 4, 8 or more for level 5)     ED Triage Vitals   BP Temp Temp Source Pulse Resp SpO2 Height Weight   06/14/21 1910 06/14/21 1910 06/14/21 1910 06/14/21 1910 06/14/21 1910 06/14/21 1910 06/15/21 0156 06/15/21 0156   (!) 193/103 98.1 °F (36.7 °C) Oral 84 16 95 % 5' 3\" (1.6 m) 133 lb 3.2 oz (60.4 Value    Sodium 129 (*)     Chloride 93 (*)     Glucose 115 (*)     All other components within normal limits    Narrative:     Performed at:  [facility]  [address],  [address], [address]   Phone [phone] - Abnormal; Notable for the following components:    Pro-BNP 1,811 (*)     All other components within normal limits    Narrative:     Performed at:  [facility]  [address],  [address], [address]   Phone [phone]   CBC WITH AUTO DIFFERENTIAL    Narrative:     Performed at:  [address],  [address], [address]   Phone [phone]   SAMPLE POSSIBLE BLOOD BANK TESTING    Narrative:     Performed at:  [address],  [address], [address]   Phone [phone]   TROPONIN    Narrative:     Performed at:  [address],  [address], [address]   Phone [phone]   PROCALCITONIN    Narrative:     Performed at:  [address],  [address], [address]   Phone [phone]   BASIC METABOLIC PANEL   MAGNESIUM   TROPONIN       All other labs were within normal range or not returned as of this dictation.     EMERGENCY DEPARTMENT COURSE and DIFFERENTIAL DIAGNOSIS/MDM:   Vitals:    Vitals:    06/15/21 0040 06/15/21 0043 06/15/21 0051 06/15/21 0156   BP:  (!) 197/99  (!) 165/93   Pulse: 90 90 91 84   Resp: 20 18 20 19   Temp:    98 °F (36.7 °C)   TempSrc:    Oral   SpO2: 98%  98% 94%   Weight:    133 lb 3.2 oz (60.4 kg)   Height:    5' 3\" (1.6 m)       Patient was given thefollowing medications:  Medications   amLODIPine (NORVASC) tablet 5 mg (has no administration in time range)   apixaban (ELIQUIS) tablet 5 mg (5 mg Oral Not Given 6/15/21 0204)   aspirin EC tablet 81 mg (has no administration in time range)   atorvastatin incorporated. -  Old chart records reviewed and incorporated. -  Differential diagnosis includes: Differential Diagnosis: Acute Coronary Syndrome, Congestive Heart Failure, Myocardial Infarction, Pulmonary Embolus, Pneumonia, Pneumothorax, other    -  Work-up included:  See above  -  ED treatment included: See above  -  Results discussed with patient. Labs show an elevated BNP. Patient noted to have hyponatremia to the mild side. Patient's blood pressures were significantly elevated. . Imaging studies show pulmonary edema versus infiltrate. She denies any infectious symptoms and has no leukocytosis. Procalcitonin was obtained which was not elevated. Patient given IV Lasix for treatment of pulmonary edema and hypertension. I did review patient's previous echocardiograms. Patient feels well on reevaluation/she will be admitted to hospital for further medical management and evaluation. The patient is agreeable with plan of care and disposition. REASSESSMENT          CRITICAL CARE TIME   Total Critical Care time was 20 minutes, excluding separatelyreportable procedures. There was a high probability ofclinically significant/life threatening deterioration in the patient's condition which required my urgent intervention. CONSULTS:  IP CONSULT TO HEART FAILURE NURSE/COORDINATOR  IP CONSULT TO DIETITIAN    PROCEDURES:  Unless otherwise noted below, none     Procedures    FINAL IMPRESSION      1. Dyspnea on exertion    2. Elevated brain natriuretic peptide (BNP) level    3. Acute pulmonary edema ([address]          DISPOSITION/PLAN   DISPOSITION Admitted 06/15/2021 12:20:48 AM      PATIENT REFERREDTO:  No follow-up provider specified.     DISCHARGEMEDICATIONS:  Current Discharge Medication List             (Please note that portions of this note were completed with a voice recognition program.  Efforts were made to edit the dictations but occasionally words are mis-transcribed.)    Carlos Eduardo Damon MD (electronically signed)  Attending Emergency Physician          Shae Tena MD  06/15/21 [id number]

## 2021-06-15 NOTE — PROGRESS NOTES
NP PAGE via [facility]:     Pt takes 10mg Melatonin nightly and is requesting this. May we order? Please advise. Thank you!

## 2021-06-15 NOTE — CONSULTS
Nutrition Education    Consult for CHF diet education. Provided pt with written and verbal instruction on HF nutrition therapy. Discussed low sodium diet, daily weights, and fluid restriction. Pt reports low sodium diet at home, no additional salt added to foods. Recommended reading nutrition labels for sodium content and choosing lower sodium options when eating out. Encouraged other sodium free seasonings. Pt may be consuming greater than 64 ounces of fluids daily, encouraged to monitor and stay below. No further nutrition questions at this time. Time spent: 10 minutes      · Verbally reviewed information with Patient and Family  · Educated on CHF  · Written educational materials provided. · Contact name and number provided. · Refer to Patient Education activity for more details.     Electronically signed by Tammi Tran MS, RD, LD on 6/15/21 at 11:03 AM EDT    Contact: [phone]

## 2021-06-15 NOTE — PROGRESS NOTES
Physician Progress Note      Lavonne Toribio  CSN #:                  [id number]  :                       1929  ADMIT DATE:       2021 11:28 PM  [address] [place] DATE:  RESPONDING  PROVIDER #:        Evelyn Sevilla CNP          QUERY TEXT:    Patient admitted with CHF. Documentation reflects \"Hyponatremia to the mild   side\" per ED provider note. If possible, please document in the progress   notes and discharge summary if Hyponatremia was: The medical record reflects the following:  Risk Factors: CHF, HTN  Clinical Indicators: Per ED provider-\"Patient noted to have hyponatremia to   the mild side\". Sodium on arrival- 129. Treatment: Lasix, labs, cardiology consult, ongoing supportive care and   monitoring. Thank you,  Johanne Sosa RN, CDS  [phone]  Options provided:  -- Hyponatremia confirmed after study  -- Hyponatremia treated and resolved  -- Hyponatremia ruled out after study  -- Other - I will add my own diagnosis  -- Disagree - Not applicable / Not valid  -- Disagree - Clinically unable to determine / Unknown  -- Refer to Clinical Documentation Reviewer    PROVIDER RESPONSE TEXT:    Hyponatremia confirmed after study.     Query created by: Sheila Sosa on 6/15/2021 11:14 AM      Electronically signed by:  Evelyn Sevilla CNP 6/15/2021 12:28 PM

## 2021-06-15 NOTE — CONSULTS
[address] [facility]   Cardiovascular Evaluation    PATIENT: Isael Valdez  DATE: 6/15/2021  MRN: [id number]  CSN: [id number]  : 1929    Primary Care Doctor/Referring provider: Purnima Hernandes MD     Reason for evaluation/Chief complaint:   Shortness of Breath (started today)    Subjective:    History of present illness on initial date of evaluation:   Isael Valdez is a 81 y.o. patient who presents for the evaluation of SOB with associated lower extremity edema. The patient lives independently at home and had noted increasing lower extremity edema with shortness of breath. She states that the shortness of breath developed and suddenly became significant where she felt panicky and uncomfortable. This sought her to seek medical evaluation. She was seen and evaluated in the emergency room and found to have evidence for clinical heart failure. Patient has been treated with IV Lasix and has had significant improvement of her shortness of breath and lower extremity edema. Cardiology has been asked to see the patient for further recommendations and management regarding her cardiovascular status. The patient was admitted in late May with a episode of atrial fibrillation rapid ventricular response. At that time she was also found to possibly have had a TIA. Patient was on beta-blocker and novel oral anticoagulation at that time. She was scheduled to follow-up with our EP team and undergo 2-week event monitor. In addition, she was scheduled to undergo possible sleep apnea testing. In addition, the patient was admitted to [facility] in 2021. At that time she was admitted for a urinary tract infection and and found to have paroxysmal atrial fibrillation. Her atrial fibrillation resolved without intervention. She did follow-up with cardiology at [facility] in 2021.   At that time they had recommended blood pressure control along with withholding anticoagulation until cleared from gastrointestinal team.  That time she had some degree of known anemia that was of concern. Patient Active Problem List   Diagnosis    Atrial fibrillation with rapid ventricular response ([facility]    Essential hypertension    Amaurosis fugax of right eye    Chronic diastolic CHF (congestive heart failure), NYHA class 3 (HCC)    PAF (paroxysmal atrial fibrillation) (HCC)    History of TIA (transient ischemic attack)    SOB (shortness of breath)         Cardiac Testing: I have reviewed the findings below. EKG:  ECHO:   STRESS TEST:  CATH:  BYPASS:  VASCULAR:    Past Medical History:   has a past medical history of Arthritis, Atrial fibrillation ([address] Back fracture, HTN (hypertension), Hyperlipidemia, and Panic attack. Surgical History:   has a past surgical history that includes Hysterectomy; Hemorrhoid surgery; Colonoscopy (2015); and eye surgery. Social History:   reports that she has quit smoking. She has never used smokeless tobacco. She reports current alcohol use. She reports that she does not use drugs. Family History:  No evidence for sudden cardiac death or premature CAD    Medications:  Reviewed and are listed in nursing record. and/or listed below  Outpatient Medications:  Prior to Admission medications    Medication Sig Start Date End Date Taking?  Authorizing Provider   melatonin 10 MG CAPS capsule Take 10 mg by mouth nightly   Yes Historical Provider, MD   metoprolol succinate (TOPROL XL) 25 MG extended release tablet Take 1 tablet by mouth 2 times daily 6/11/21   RADHA Miller MD   apixaban [facility]) 5 MG TABS tablet Take 1 tablet by mouth 2 times daily 5/22/21   Shauna Cruz MD   atorvastatin (LIPITOR) 80 MG tablet Take 1 tablet by mouth nightly 5/22/21   Shauna Cruz MD   thiamine 100 MG tablet Take 1 tablet by mouth daily 5/23/21   Shauna Cruz MD   escitalopram (LEXAPRO) 10 MG tablet Take 10 mg by mouth daily  5/5/21   Historical Provider, MD   HYDROcodone-acetaminophen (NORCO) 5-325 MG per tablet Take 1 tablet by mouth every 6 hours as needed. Historical Provider, MD   Docusate Sodium (DSS) 250 MG CAPS Take 250 mg by mouth daily    Historical Provider, MD   vitamin D (CHOLECALCIFEROL) 25 MCG (1000 UT) TABS tablet Take 1,000 Units by mouth daily    Historical Provider, MD   aspirin 81 MG EC tablet Take 81 mg by mouth daily 3/11/21   Historical Provider, MD   Multiple Vitamin (MULTIVITAMIN) tablet Take 1 tablet by mouth daily    Historical Provider, MD   polyethylene glycol (GLYCOLAX) 17 GM/SCOOP powder Take 17 g by mouth daily as needed     Historical Provider, MD   Simethicone 125 MG CAPS Take 125 mg by mouth 2 times daily 12/24/20   Historical Provider, MD   amLODIPine (NORVASC) 5 MG tablet Take 5 mg by mouth daily    Historical Provider, MD       In-patient schedule medications:   amLODIPine  5 mg Oral Daily    apixaban  5 mg Oral BID    aspirin  81 mg Oral Daily    atorvastatin  80 mg Oral Nightly    escitalopram  10 mg Oral Daily    docusate sodium  100 mg Oral Daily    metoprolol succinate  25 mg Oral BID    thiamine  100 mg Oral Daily    therapeutic multivitamin-minerals  1 tablet Oral Daily    simethicone  120 mg Oral BID    Vitamin D  1,000 Units Oral Daily    sodium chloride flush  5-40 mL Intravenous 2 times per day    furosemide  20 mg Intravenous BID         Infusion Medications:   sodium chloride 25 mL (06/15/21 0941)         Allergies:  Lisinopril     Review of Systems:   14 point review of systems unable to be completed due to patient condition/cooperation. All available positives mentioned in history of present illness.          Physical Examination:    [unfilled]  /70   Pulse 78   Temp 97.7 °F (36.5 °C) (Oral)   Resp 18   Ht 5' 3\" (1.6 m)   Wt 131 lb 1.6 oz (59.5 kg)   SpO2 95%   BMI 23.22 kg/m²    Weight: 131 lb 1.6 oz (59.5 kg)     Wt Readings from Last 3 Encounters:   06/15/21 131 lb 1.6 oz (59.5 kg)   05/23/21 134 lb 8 oz (61 kg)   01/18/17 137 lb (62.1 kg)       Intake/Output Summary (Last 24 hours) at 6/15/2021 1252  Last data filed at 6/15/2021 1045  Gross per 24 hour   Intake 720 ml   Output 2150 ml   Net -1430 ml       Physical Examination:    [unfilled]  /70   Pulse 78   Temp 97.7 °F (36.5 °C) (Oral)   Resp 18   Ht 5' 3\" (1.6 m)   Wt 131 lb 1.6 oz (59.5 kg)   SpO2 95%   BMI 23.22 kg/m²    Weight: 131 lb 1.6 oz (59.5 kg)     Wt Readings from Last 3 Encounters:   06/15/21 131 lb 1.6 oz (59.5 kg)   05/23/21 134 lb 8 oz (61 kg)   01/18/17 137 lb (62.1 kg)       Intake/Output Summary (Last 24 hours) at 6/15/2021 1253  Last data filed at 6/15/2021 1045  Gross per 24 hour   Intake 720 ml   Output 2150 ml   Net -1430 ml       General Appearance:  Alert, cooperative, no distress, appears stated age   Head:  Normocephalic, without obvious abnormality, atraumatic   Eyes:  PERRL, conjunctiva/corneas clear       Nose: Nares normal, no drainage or sinus tenderness   Throat: Lips, mucosa, and tongue normal   Neck: Supple, symmetrical, trachea midline, no adenopathy, thyroid: not enlarged, symmetric, no tenderness/mass/nodules, no carotid bruit. no JVD       Lungs:   Reduced to auscultation with rales bilaterally, respirations mildly labored   Chest Wall:  No tenderness or deformity   Heart:  Regular rhythm and normal rate; S1, S2 are normal; no murmur noted; no rub or gallop   Abdomen:   Soft, non-tender, bowel sounds active all four quadrants,  no masses, no organomegaly           Extremities: Extremities atraumatic, no cyanosis.  + edema   Pulses: 2+ and symmetric   Skin: Skin color, texture, turgor normal, no rashes or lesions   Pysch: Normal mood and affect   Neurologic: Normal gross motor and sensory exam.           Labs  Recent Labs     06/14/21 1916   WBC 10.1   HGB 12.9   HCT 37.1   MCV 88.5        Recent Labs     06/14/21 1916 06/15/21  0815   CREATININE 0.6 0.7   BUN 16 12   * 132*   K 4.3 3.6   CL 93* 94*   CO2 26 27     No results for input(s): INR, PROTIME in the last 72 hours. Recent Labs     06/14/21  1916 06/15/21  0815   TROPONINI <0.01 <0.01     Invalid input(s): PRO-BNP  No results for input(s): CHOL, HDL in the last 72 hours. Invalid input(s): LDL, TG      Imaging:  I have reviewed the below testing personally and my interpretation is below. EKG:  Sinus rhythm with Premature atrial complexesConfirmed by Violet Mallory MD, Gualberto Crawley ([id number]) on 6/15/2021 11:55:30 AM    CXR:    Assessment:  81 y.o. patient with:  Principal Problem:    Chronic diastolic CHF (congestive heart failure), NYHA class 3 ([facility])  Active Problems:    Essential hypertension    PAF (paroxysmal atrial fibrillation) ([facility])    History of TIA (transient ischemic attack)    SOB (shortness of breath)  Resolved Problems:    * No resolved hospital problems. *    Plan:  1. Clinical symptoms at this point appeared to be related to flash pulmonary edema and underlying presumptive diastolic heart disease. .  2.  Echocardiogram had been done in March 2021 while she was admitted to [facility].  At that time she had normal left ventricular function with moderate left atrial enlargement. He did not have aortic stenosis or mitral stenosis on that exam.  3.  Education given on low sodium and fluid restriction. Daughter is at bedside and was additionally educated  4. Continue her anticoagulation ( if not contraindicated ) and metoprolol for atrial fibrillation. 5.  Would consider adding low-dose diuretic to her outpatient regimen   ~lasix 20mg  6. No inpatient cardiovascular testing is necessary in house. 7.  Patient can be discharged home from cardiovascular standpoint when medical issues are resolved      Medical Decision Making:   The following items were considered in medical decision making:  Independent review of images  Review / order clinical lab tests  Review / order radiology tests  Decision to obtain old records  Review and summation of old records as accessed through [facility] (a summary of my findings in these old records)        All questions and concerns were addressed to the patient/family. Alternatives to my treatment were discussed. The note was completed using EMR. Every effort was made to ensure accuracy; however, inadvertent computerized transcription errors may be present.     Grant Wagoner MD, [id number], [facility]  [phone] [facility] office  [phone] [facility]  6/15/2021  12:52 PM

## 2021-06-15 NOTE — H&P
Hospital Medicine History & Physical      PCP: Fifi Redd DO    Date of Admission: 6/14/2021    Date of Service: Pt seen/examined on 6/15/2021 and Admitted to Inpatient with expected LOS greater than two midnights due to medical therapy. Chief Complaint: Shortness of breath      History Of Present Illness:    81 y.o. female who presented to [facility] with above complaints  Patient with PMH of PAF, HTN, TIA, HLD presented to the ED today with complaints of shortness of breath. Patient reports shortness of breath over the last couple of days, got worse yesterday. Particularly worse with exertion. Patient reports she went outside the house to bring the trash cans in, by the time she was done with the activity she was very short of breath. Does complain of productive cough with whitish sputum. No subjective fever chills or rigors. Denied chest pain or palpitations. No lightheadedness or syncopal episode. No nausea or vomiting. Patient does endorse to bilateral ankle edema that she noticed earlier today. Denies orthopnea or PND. Past Medical History:          Diagnosis Date    Arthritis     Atrial fibrillation ([address]     Back fracture     stress fracture active 4/17/15    HTN (hypertension)     Hyperlipidemia     Panic attack        Past Surgical History:          Procedure Laterality Date    COLONOSCOPY  2015    tics    EYE SURGERY      HEMORRHOID SURGERY      HYSTERECTOMY         Medications Prior to Admission:      Prior to Admission medications    Medication Sig Start Date End Date Taking?  Authorizing Provider   melatonin 10 MG CAPS capsule Take 10 mg by mouth nightly   Yes Historical Provider, MD   metoprolol succinate (TOPROL XL) 25 MG extended release tablet Take 1 tablet by mouth 2 times daily 6/11/21   RADHA Juarez MD   apixaban [facility]) 5 MG TABS tablet Take 1 tablet by mouth 2 times daily 5/22/21   Aga Mccarty MD   atorvastatin (LIPITOR) 80 MG tablet without obvious deformity. Pupils equal, round, and reactive to light. Extra ocular muscles intact. Conjunctivae/corneas clear. Neck: Supple, with full range of motion. No jugular venous distention. Trachea midline. Respiratory:  Normal respiratory effort. Clear to auscultation, bilaterally with occasional rales bilaterally at the bases  Cardiovascular:  Regular rate and rhythm with normal S1/S2 without murmurs, rubs or gallops. Abdomen: Soft, non-tender, non-distended with normal bowel sounds. Musculoskeletal:  No clubbing, cyanosis or edema bilaterally. Full range of motion without deformity. Skin: Skin color, texture, turgor normal.  No rashes or lesions. Neurologic:  Neurovascularly intact without any focal sensory/motor deficits. Cranial nerves: II-XII intact, grossly non-focal.  Psychiatric:  Alert and oriented, thought content appropriate, normal insight  Capillary Refill: Brisk,3 seconds, normal  Peripheral Pulses: +2 palpable, equal bilaterally       Labs:     Recent Labs     06/14/21 1916   WBC 10.1   HGB 12.9   HCT 37.1        Recent Labs     06/14/21 1916   *   K 4.3   CL 93*   CO2 26   BUN 16   CREATININE 0.6   CALCIUM 9.7     Recent Labs     06/14/21 1916   AST 21   ALT 25   BILITOT 0.5   ALKPHOS 97     No results for input(s): INR in the last 72 hours. Recent Labs     06/14/21 1916   TROPONINI <0.01       Urinalysis:      Lab Results   Component Value Date    NITRU Negative 05/20/2021    WBCUA 3-5 05/20/2021    RBCUA 0-2 05/20/2021    BLOODU Negative 05/20/2021    SPECGRAV 1.015 05/20/2021    GLUCOSEU Negative 05/20/2021       Radiology:     CXR: I have reviewed the CXR with the following interpretation: Bilateral small pleural effusions, pulmonary edema        XR CHEST (2 VW)   Final Result   Small bilateral pleural effusions and interstitial septal opacities,   suggestive pulmonary edema. Mild bibasilar atelectasis versus pulmonary edema.   Pneumonia is a   differential possibility. EKG:  I have reviewed the EKG with the following interpretation: Pending    Echo 3-21 from [address]     The left ventricle is of normal size.     Left ventricular wall thickness is normal.       No obvious segmental wall motion abnormalities.       The right atrium is normal in size.     Moderate left atrial dilatation.       Mild mitral annular calcification.       Mild thickening/calcification of the anterior mitral valve leaflet.       Trileaflet aortic valve.       Moderate  thickening (sclerosis) of the aortic valve cusps without reduced excursion.       Structurally normal tricuspid valve.       Mild tricuspid regurgitation.       Normal pericardium without effusion. ASSESSMENT:PLAN:    Shortness of breath  Likely acute on chronic diastolic CHF. BNP elevated 1811, CXR showing pulmonary edema, bilateral pleural effusions  Patient has exertional dyspnea, BLE edema and subtle crackles on auscultation  -IV Lasix  -2D echo  -Strict I's/O and daily weights  -Low suspicion for COVID-19, rapid test pending  -Low suspicion for PNA, procalcitonin normal    PAF -rate controlled, resume apixaban, resume metoprolol    History of TIA/amaurosis fugax -resume aspirin, Eliquis and statin for secondary prophylaxis    Essential hypertension  Uncontrolled on arrival was 195/111  Improved with IV Lasix in ED. currently 127/75  Resume home antihypertensive regimen    DVT Prophylaxis: eliquis  Diet: ADULT DIET; Regular; Low Sodium (2 gm)  Code Status: Full Code    PT/OT Eval Status: Ambulatory    Dispo -IP stay       Megan Cole MD    Thank you [id number] Panfilo Lew DO for the opportunity to be involved in this patient's care. If you have any questions or concerns please feel free to contact me at [phone].

## 2021-06-15 NOTE — PLAN OF CARE
Problem: OXYGENATION/RESPIRATORY FUNCTION  Goal: Patient will maintain patent airway  Outcome: Ongoing  Note:   Patient's EF (Ejection Fraction) is NO ECHO ON FILE, ECHO ordered 40%    Heart Failure Medications:  Diuretics[de-identified] Furosemide    (One of the following REQUIRED for EF <40%/SYSTOLIC FAILURE but MAY be used in EF% >40%/DIASTOLIC FAILURE)        ACE[de-identified] None        ARB[de-identified] None         ARNI[de-identified] None    (Beta Blockers)  NON- Evidenced Based Beta Blocker (for EF% >40%/DIASTOLIC FAILURE): None    Evidenced Based Beta Blocker::(REQUIRED for EF% <40%/SYSTOLIC FAILURE) Metoprolol SUCCinate- Toprol XL  . .................................................................................................................................................. Patient's weights and intake/output reviewed: Yes    Patient's Last Weight: 131 lbs obtained by standing scale. Difference of 2 lbs less than last documented weight. Intake/Output Summary (Last 24 hours) at 6/15/2021 1003  Last data filed at 6/15/2021 0920  Gross per 24 hour   Intake 240 ml   Output 2150 ml   Net -1910 ml       Comorbidities Reviewed Yes    Patient has a past medical history of Arthritis, Atrial fibrillation ([address] Back fracture, HTN (hypertension), Hyperlipidemia, and Panic attack. >>For CHF and Comorbidity documentation on Education Time and Topics, please see Education Tab    Progressive Mobility Assessment:  What is this patient's Current Level of Mobility?: Ambulatory-Up Ad Abby  How was this patient Mobilized today?: Edge of Bed,  Up to Toilet/Shower, and Up in Room                 With Whom? Self                 Level of Difficulty/Assistance: Independent     Pt resting in bed at this time on room air. Pt denies shortness of breath. Pt with nonpitting lower extremity edema.      Patient and/or Family's stated Goal of Care this Admission: reduce shortness of breath, increase activity tolerance, better understand heart failure and disease management, be more comfortable, and reduce lower extremity edema prior to discharge        :

## 2021-06-16 LAB
ANION GAP SERPL CALCULATED.3IONS-SCNC: 10 MMOL/L (ref 3–16)
BUN BLDV-MCNC: 15 MG/DL (ref 7–20)
CALCIUM SERPL-MCNC: 8.7 MG/DL (ref 8.3–10.6)
CHLORIDE BLD-SCNC: 92 MMOL/L (ref 99–110)
CHOLESTEROL, TOTAL: 124 MG/DL (ref 0–199)
CO2: 29 MMOL/L (ref 21–32)
CREAT SERPL-MCNC: 0.7 MG/DL (ref 0.6–1.2)
GFR AFRICAN AMERICAN: >60
GFR NON-AFRICAN AMERICAN: >60
GLUCOSE BLD-MCNC: 96 MG/DL (ref 70–99)
HDLC SERPL-MCNC: 57 MG/DL (ref 40–60)
LDL CHOLESTEROL CALCULATED: 49 MG/DL
MAGNESIUM: 1.9 MG/DL (ref 1.8–2.4)
POTASSIUM SERPL-SCNC: 3.6 MMOL/L (ref 3.5–5.1)
SODIUM BLD-SCNC: 131 MMOL/L (ref 136–145)
TRIGL SERPL-MCNC: 89 MG/DL (ref 0–150)
VLDLC SERPL CALC-MCNC: 18 MG/DL

## 2021-06-16 PROCEDURE — 6370000000 HC RX 637 (ALT 250 FOR IP): Performed by: REGISTERED NURSE

## 2021-06-16 PROCEDURE — 80061 LIPID PANEL: CPT

## 2021-06-16 PROCEDURE — 80048 BASIC METABOLIC PNL TOTAL CA: CPT

## 2021-06-16 PROCEDURE — 83735 ASSAY OF MAGNESIUM: CPT

## 2021-06-16 PROCEDURE — 36415 COLL VENOUS BLD VENIPUNCTURE: CPT

## 2021-06-16 PROCEDURE — 99233 SBSQ HOSP IP/OBS HIGH 50: CPT | Performed by: INTERNAL MEDICINE

## 2021-06-16 PROCEDURE — 6370000000 HC RX 637 (ALT 250 FOR IP): Performed by: INTERNAL MEDICINE

## 2021-06-16 PROCEDURE — 2580000003 HC RX 258: Performed by: INTERNAL MEDICINE

## 2021-06-16 PROCEDURE — 1200000000 HC SEMI PRIVATE

## 2021-06-16 PROCEDURE — 6360000002 HC RX W HCPCS: Performed by: INTERNAL MEDICINE

## 2021-06-16 RX ORDER — FUROSEMIDE 20 MG/1
20 TABLET ORAL DAILY
Status: DISCONTINUED | OUTPATIENT
Start: 2021-06-17 | End: 2021-06-17 | Stop reason: HOSPADM

## 2021-06-16 RX ORDER — POTASSIUM CHLORIDE 20 MEQ/1
20 TABLET, EXTENDED RELEASE ORAL DAILY
Qty: 90 TABLET | Refills: 1 | Status: SHIPPED | OUTPATIENT
Start: 2021-06-16

## 2021-06-16 RX ORDER — METOPROLOL SUCCINATE 25 MG/1
25 TABLET, EXTENDED RELEASE ORAL 2 TIMES DAILY
Qty: 30 TABLET | Refills: 3 | Status: SHIPPED | OUTPATIENT
Start: 2021-06-16

## 2021-06-16 RX ORDER — FUROSEMIDE 20 MG/1
20 TABLET ORAL DAILY
Qty: 60 TABLET | Refills: 3 | Status: ON HOLD
Start: 2021-06-17 | End: 2021-06-27 | Stop reason: HOSPADM

## 2021-06-16 RX ORDER — POTASSIUM CHLORIDE 20 MEQ/1
20 TABLET, EXTENDED RELEASE ORAL ONCE
Status: COMPLETED | OUTPATIENT
Start: 2021-06-16 | End: 2021-06-16

## 2021-06-16 RX ADMIN — APIXABAN 5 MG: 5 TABLET, FILM COATED ORAL at 21:20

## 2021-06-16 RX ADMIN — POTASSIUM CHLORIDE 20 MEQ: 1500 TABLET, EXTENDED RELEASE ORAL at 17:18

## 2021-06-16 RX ADMIN — Medication 10 ML: at 21:21

## 2021-06-16 RX ADMIN — APIXABAN 5 MG: 5 TABLET, FILM COATED ORAL at 08:39

## 2021-06-16 RX ADMIN — Medication 1000 UNITS: at 08:39

## 2021-06-16 RX ADMIN — METOPROLOL SUCCINATE 25 MG: 25 TABLET, EXTENDED RELEASE ORAL at 08:40

## 2021-06-16 RX ADMIN — POLYETHYLENE GLYCOL 3350 17 G: 17 POWDER, FOR SOLUTION ORAL at 08:40

## 2021-06-16 RX ADMIN — SIMETHICONE 120 MG: 80 TABLET, CHEWABLE ORAL at 08:39

## 2021-06-16 RX ADMIN — MULTIPLE VITAMINS W/ MINERALS TAB 1 TABLET: TAB at 08:39

## 2021-06-16 RX ADMIN — Medication 100 MG: at 08:40

## 2021-06-16 RX ADMIN — SIMETHICONE 120 MG: 80 TABLET, CHEWABLE ORAL at 21:21

## 2021-06-16 RX ADMIN — DOCUSATE SODIUM 100 MG: 100 CAPSULE, LIQUID FILLED ORAL at 08:40

## 2021-06-16 RX ADMIN — FUROSEMIDE 20 MG: 10 INJECTION, SOLUTION INTRAMUSCULAR; INTRAVENOUS at 08:40

## 2021-06-16 RX ADMIN — ESCITALOPRAM OXALATE 10 MG: 10 TABLET ORAL at 08:40

## 2021-06-16 RX ADMIN — Medication 10 ML: at 08:40

## 2021-06-16 RX ADMIN — AMLODIPINE BESYLATE 5 MG: 5 TABLET ORAL at 08:40

## 2021-06-16 RX ADMIN — METOPROLOL SUCCINATE 25 MG: 25 TABLET, EXTENDED RELEASE ORAL at 21:20

## 2021-06-16 RX ADMIN — CYCLOBENZAPRINE HYDROCHLORIDE 5 MG: 10 TABLET, FILM COATED ORAL at 08:40

## 2021-06-16 ASSESSMENT — PAIN SCALES - GENERAL
PAINLEVEL_OUTOF10: 0

## 2021-06-16 NOTE — CARE COORDINATION
Pt did not d/c today. Pt and family stating that they are concerned w/pt leg cramps and medications. NP made aware and decided to keep pt another day.   Trey Harrison RN            CASE MANAGEMENT DISCHARGE SUMMARY      Discharge to: home w/[facility]    Transportation: private  Confirmed discharge plan with:     Patient: yes     Family, name and contact number: Family in room      RN, name: Tricia Lui RN    Note: Referral made to [facility] for additional resources, including meals on wheels, per family request.  Trey Harrison RN

## 2021-06-16 NOTE — DISCHARGE INSTR - COC
Continuity of Care Form    Patient Name: Moses Malone   :  1929  MRN:  [id number]    Admit date:  2021  Discharge date:  21    Code Status Order: Full Code   Advance Directives:   [address] [facility] Documentation       Date/Time Healthcare Directive Type of Healthcare Directive Copy in [address] [facility] [address] Agent's Name Healthcare Agent's Phone Number    06/15/21 0206  Yes, patient has an advance directive for healthcare treatment  Living will;Durable power of  for health care  No, copy requested from family  Healthcare power of   --  --            Admitting Physician:  Hines Bosworth, MD  PCP: Drew Landau, DO    Discharging Nurse: [facility] Unit/Room#: 0218/0218-02  Discharging Unit Phone Number: [phone]    Emergency Contact:   Extended Emergency Contact Information  Primary Emergency Contact: [facility] Phone: [phone]  Relation: Child  Secondary Emergency Contact: [place] [address] Phone: [phone]  Mobile Phone: [phone]  Relation: Child    Past Surgical History:  Past Surgical History:   Procedure Laterality Date    COLONOSCOPY      tics    EYE SURGERY      HEMORRHOID SURGERY      HYSTERECTOMY         Immunization History: There is no immunization history on file for this patient.     Active Problems:  Patient Active Problem List   Diagnosis Code    Atrial fibrillation with rapid ventricular response ([facility]) I48.91    Essential hypertension I10    Amaurosis fugax of right eye G45.3    Chronic diastolic CHF (congestive heart failure), NYHA class 3 ([facility]) I50.32    PAF (paroxysmal atrial fibrillation) ([facility]) I48.0    History of TIA (transient ischemic attack) Z86.73    SOB (shortness of breath) R06.02       Isolation/Infection:   Isolation            No Isolation          Patient Infection Status       Infection Onset Added Last Indicated Last Indicated By Review Planned Expiration Resolved Resolved By    None active    Resolved    COVID-19 Rule Out 06/15/21 06/15/21 06/15/21 COVID-19, Rapid (Ordered)   06/15/21 Rule-Out Test Resulted            Nurse Assessment:  Last Vital Signs: /78   Pulse 65   Temp 98.4 °F (36.9 °C) (Oral)   Resp 16   Ht 5' 3\" (1.6 m)   Wt 133 lb 9.6 oz (60.6 kg)   SpO2 94%   BMI 23.67 kg/m²     Last documented pain score (0-10 scale): Pain Level: 0  Last Weight:   Wt Readings from Last 1 Encounters:   06/16/21 133 lb 9.6 oz (60.6 kg)     Mental Status:  oriented, alert, coherent, logical, thought processes intact and able to concentrate and follow conversation    IV Access:  - None    Nursing Mobility/ADLs:  Walking   Independent  Transfer  Independent  Bathing  Independent  Dressing  Independent  [address] Delivery   whole    Wound Care Documentation and Therapy:        Elimination:  Continence:   · Bowel: Yes  · Bladder: Yes  Urinary Catheter: None   Colostomy/Ileostomy/Ileal Conduit: No       Date of Last BM: 6/14/21    Intake/Output Summary (Last 24 hours) at 6/16/2021 1136  Last data filed at 6/16/2021 0918  Gross per 24 hour   Intake 1984.12 ml   Output 1650 ml   Net 334.12 ml     I/O last 3 completed shifts: In: 1730.1 [P.O.:1680; IV Piggyback:50.1]  Out: 1700 [Urine:1700]    Safety Concerns: At Risk for Falls    Impairments/Disabilities:      None    Nutrition Therapy:  Current Nutrition Therapy:   - Oral Diet:  General    Routes of Feeding: Oral  Liquids: No Restrictions  Daily Fluid Restriction: yes - amount 2000  Last Modified Barium Swallow with Video (Video Swallowing Test): not done    Treatments at the Time of Hospital Discharge:   Respiratory Treatments: none  Oxygen Therapy:  is not on home oxygen therapy.   Ventilator:    - No ventilator support    Rehab Therapies: na  Weight Bearing Status/Restrictions: No weight bearing restirctions  Other Medical Equipment (for information only, NOT a DME order):  none  Other Treatments: none    Patient's personal belongings (please select all that are sent with patient):  None    RN SIGNATURE:  Electronically signed by Toma Ritchie RN on 6/16/21 at 12:33 PM EDT    CASE MANAGEMENT/SOCIAL WORK SECTION    Inpatient Status Date: 6/14/21    Readmission Risk Assessment Score:  Readmission Risk              Risk of Unplanned Readmission:  14           Discharging to Facility/ [address]   [address] [facility]      / signature: Electronically signed by Lilly Baez RN on 6/16/21 at 1:41 PM EDT    PHYSICIAN SECTION    Prognosis: Fair    Condition at Discharge: Stable    Rehab Potential (if transferring to Rehab): N/A    Recommended Labs or Other Treatments After Discharge: [facility] on 6/21/21 -- results to PCP     Physician Certification: I certify the above information and transfer of Inga Reyes  is necessary for the continuing treatment of the diagnosis listed and that she requires [address] for greater 30 days.      Update Admission H&P: No change in H&P    PHYSICIAN SIGNATURE:  Electronically signed by [address], APRN - CNP on 6/16/21 at 11:36 AM EDT

## 2021-06-16 NOTE — PROGRESS NOTES
[address] [facility]   Cardiovascular Evaluation    PATIENT: Debi Olvera  DATE: 2021  MRN: [id number]  CSN: [id number]  : 1929    Primary Care Doctor/Referring provider: Jazmyn Walker MD     Reason for evaluation/Chief complaint:   Shortness of Breath (started today)    Subjective: Patient feels back to normal.     History of present illness on initial date of evaluation:   Debi Olvera is a 81 y.o. patient who presents for the evaluation of SOB with associated lower extremity edema. The patient lives independently at home and had noted increasing lower extremity edema with shortness of breath. She states that the shortness of breath developed and suddenly became significant where she felt panicky and uncomfortable. This sought her to seek medical evaluation. She was seen and evaluated in the emergency room and found to have evidence for clinical heart failure. Patient has been treated with IV Lasix and has had significant improvement of her shortness of breath and lower extremity edema. Cardiology has been asked to see the patient for further recommendations and management regarding her cardiovascular status. The patient was admitted in late May with a episode of atrial fibrillation rapid ventricular response. At that time she was also found to possibly have had a TIA. Patient was on beta-blocker and novel oral anticoagulation at that time. She was scheduled to follow-up with our EP team and undergo 2-week event monitor. In addition, she was scheduled to undergo possible sleep apnea testing. In addition, the patient was admitted to [facility] in 2021. At that time she was admitted for a urinary tract infection and and found to have paroxysmal atrial fibrillation. Her atrial fibrillation resolved without intervention. She did follow-up with cardiology at [facility] in 2021.   At that time they had recommended blood pressure control along with withholding anticoagulation until cleared from gastrointestinal team.  That time she had some degree of known anemia that was of concern. Patient Active Problem List   Diagnosis    Atrial fibrillation with rapid ventricular response ([facility] [address]    Essential hypertension    Amaurosis fugax of right eye    Chronic diastolic CHF (congestive heart failure), NYHA class 3 (HCC)    PAF (paroxysmal atrial fibrillation) ([facility])    History of TIA (transient ischemic attack)    SOB (shortness of breath)         Cardiac Testing: I have reviewed the findings below. EKG:  ECHO:   STRESS TEST:  CATH:  BYPASS:  VASCULAR:    Past Medical History:   has a past medical history of Arthritis, Atrial fibrillation ([address] Back fracture, HTN (hypertension), Hyperlipidemia, and Panic attack. Surgical History:   has a past surgical history that includes Hysterectomy; Hemorrhoid surgery; Colonoscopy (2015); and eye surgery. Social History:   reports that she has quit smoking. She has never used smokeless tobacco. She reports current alcohol use. She reports that she does not use drugs. Family History:  No evidence for sudden cardiac death or premature CAD    Medications:  Reviewed and are listed in nursing record. and/or listed below  Outpatient Medications:  Prior to Admission medications    Medication Sig Start Date End Date Taking?  Authorizing Provider   metoprolol succinate (TOPROL XL) 25 MG extended release tablet Take 1 tablet by mouth 2 times daily 6/16/21  Yes KRYSTLE Fried CNP   furosemide (LASIX) 20 MG tablet Take 1 tablet by mouth daily 6/17/21  Yes KRYSTLE Fried CNP   potassium chloride (KLOR-CON M) 20 MEQ extended release tablet Take 1 tablet by mouth daily 6/16/21  Yes KRYSTLE Fried CNP   melatonin 10 MG CAPS capsule Take 10 mg by mouth nightly   Yes Historical Provider, MD   multivitamin-iron-minerals-folic acid (CENTRUM) chewable tablet Take 1 tablet by mouth daily   Yes Historical Provider, MD   vitamin B-1 (THIAMINE) 100 MG tablet Take 100 mg by mouth daily   Yes Historical Provider, MD   magnesium 30 MG tablet Take 30 mg by mouth 2 times daily   Yes Historical Provider, MD   apixaban (ELIQUIS) 5 MG TABS tablet Take 1 tablet by mouth 2 times daily 5/22/21  Yes Leighton Gonzalez MD   atorvastatin (LIPITOR) 80 MG tablet Take 1 tablet by mouth nightly 5/22/21  Yes Leighton Gonzalez MD   vitamin D (CHOLECALCIFEROL) 25 MCG (1000 UT) TABS tablet Take 1,000 Units by mouth daily   Yes Historical Provider, MD   amLODIPine (NORVASC) 5 MG tablet Take 5 mg by mouth daily   Yes Historical Provider, MD   thiamine 100 MG tablet Take 1 tablet by mouth daily 5/23/21   Leighton Gonzalez MD   escitalopram (LEXAPRO) 10 MG tablet Take 10 mg by mouth daily  5/5/21   Historical Provider, MD   HYDROcodone-acetaminophen (NORCO) 5-325 MG per tablet Take 1 tablet by mouth every 6 hours as needed.     Historical Provider, MD   Docusate Sodium (DSS) 250 MG CAPS Take 250 mg by mouth daily    Historical Provider, MD   aspirin 81 MG EC tablet Take 81 mg by mouth daily 3/11/21   Historical Provider, MD   Multiple Vitamin (MULTIVITAMIN) tablet Take 1 tablet by mouth daily    Historical Provider, MD   polyethylene glycol (GLYCOLAX) 17 GM/SCOOP powder Take 17 g by mouth daily as needed     Historical Provider, MD   Simethicone 125 MG CAPS Take 125 mg by mouth 2 times daily 12/24/20   Historical Provider, MD       In-patient schedule medications:   [START ON 6/17/2021] furosemide  20 mg Oral Daily    amLODIPine  5 mg Oral Daily    apixaban  5 mg Oral BID    aspirin  81 mg Oral Daily    [Held by provider] atorvastatin  80 mg Oral Nightly    escitalopram  10 mg Oral Daily    docusate sodium  100 mg Oral Daily    metoprolol succinate  25 mg Oral BID    thiamine  100 mg Oral Daily    therapeutic multivitamin-minerals  1 tablet Oral Daily    simethicone 120 mg Oral BID    Vitamin D  1,000 Units Oral Daily    sodium chloride flush  5-40 mL Intravenous 2 times per day         Infusion Medications:   sodium chloride Stopped (06/15/21 1200)         Allergies:  Lisinopril     Review of Systems:   14 point review of systems unable to be completed due to patient condition/cooperation. All available positives mentioned in history of present illness. Physical Examination:    [unfilled]  /64   Pulse 74   Temp 98.3 °F (36.8 °C) (Oral)   Resp 16   Ht 5' 3\" (1.6 m)   Wt 133 lb 9.6 oz (60.6 kg)   SpO2 94%   BMI 23.67 kg/m²    Weight: 133 lb 9.6 oz (60.6 kg)     Wt Readings from Last 3 Encounters:   06/16/21 133 lb 9.6 oz (60.6 kg)   05/23/21 134 lb 8 oz (61 kg)   01/18/17 137 lb (62.1 kg)       Intake/Output Summary (Last 24 hours) at 6/16/2021 1722  Last data filed at 6/16/2021 1718  Gross per 24 hour   Intake 1694 ml   Output 1650 ml   Net 44 ml       Physical Examination:    [unfilled]  /64   Pulse 74   Temp 98.3 °F (36.8 °C) (Oral)   Resp 16   Ht 5' 3\" (1.6 m)   Wt 133 lb 9.6 oz (60.6 kg)   SpO2 94%   BMI 23.67 kg/m²    Weight: 133 lb 9.6 oz (60.6 kg)     Wt Readings from Last 3 Encounters:   06/16/21 133 lb 9.6 oz (60.6 kg)   05/23/21 134 lb 8 oz (61 kg)   01/18/17 137 lb (62.1 kg)       Intake/Output Summary (Last 24 hours) at 6/16/2021 1722  Last data filed at 6/16/2021 1718  Gross per 24 hour   Intake 1694 ml   Output 1650 ml   Net 44 ml       General Appearance:  Alert, cooperative, no distress, appears stated age   Head:  Normocephalic, without obvious abnormality, atraumatic   Eyes:  PERRL, conjunctiva/corneas clear       Nose: Nares normal, no drainage or sinus tenderness   Throat: Lips, mucosa, and tongue normal   Neck: Supple, symmetrical, trachea midline, no adenopathy, thyroid: not enlarged, symmetric, no tenderness/mass/nodules, no carotid bruit.  no JVD       Lungs:   Reduced to auscultation with rales bilaterally, respirations mildly labored   Chest Wall:  No tenderness or deformity   Heart:  Regular rhythm and normal rate; S1, S2 are normal; no murmur noted; no rub or gallop   Abdomen:   Soft, non-tender, bowel sounds active all four quadrants,  no masses, no organomegaly           Extremities: Extremities atraumatic, no cyanosis. + edema   Pulses: 2+ and symmetric   Skin: Skin color, texture, turgor normal, no rashes or lesions   Pysch: Normal mood and affect   Neurologic: Normal gross motor and sensory exam.           Labs  Recent Labs     06/14/21 1916   WBC 10.1   HGB 12.9   HCT 37.1   MCV 88.5        Recent Labs     06/15/21  0815 06/16/21  0716   CREATININE 0.7 0.7   BUN 12 15   * 131*   K 3.6 3.6   CL 94* 92*   CO2 27 29     No results for input(s): INR, PROTIME in the last 72 hours. Recent Labs     06/14/21  1916 06/15/21  0815   TROPONINI <0.01 <0.01     Invalid input(s): PRO-BNP  Recent Labs     06/16/21  0716   CHOL 124   HDL 57         Imaging:  I have reviewed the below testing personally and my interpretation is below. EKG:  Sinus rhythm with Premature atrial complexesConfirmed by Julissa Mcdaniel MD, [facility] ([id number]) on 6/15/2021 11:55:30 AM    CXR:    Assessment:  81 y.o. patient with:  Principal Problem:    Chronic diastolic CHF (congestive heart failure), NYHA class 3 ([facility])  Active Problems:    Essential hypertension    PAF (paroxysmal atrial fibrillation) ([facility])    History of TIA (transient ischemic attack)    SOB (shortness of breath)  Resolved Problems:    * No resolved hospital problems. *    Plan:  1. Clinical symptoms at this point appeared to be related to flash pulmonary edema and underlying presumptive diastolic heart disease. .  2.  Echocardiogram had been done in March 2021 while she was admitted to [facility].  At that time she had normal left ventricular function with moderate left atrial enlargement.   He did not have aortic stenosis or mitral stenosis on that exam.  3.  Education given

## 2021-06-16 NOTE — PLAN OF CARE
Problem: OXYGENATION/RESPIRATORY FUNCTION  Goal: Patient will maintain patent airway  Outcome: Ongoing  Note:   Patient's EF (Ejection Fraction) is no ECHO on file    Heart Failure Medications:  Diuretics[de-identified] Furosemide    (One of the following REQUIRED for EF <40%/SYSTOLIC FAILURE but MAY be used in EF% >40%/DIASTOLIC FAILURE)        ACE[de-identified] None        ARB[de-identified] None         ARNI[de-identified] None    (Beta Blockers)  NON- Evidenced Based Beta Blocker (for EF% >40%/DIASTOLIC FAILURE): None    Evidenced Based Beta Blocker::(REQUIRED for EF% <40%/SYSTOLIC FAILURE) Metoprolol SUCCinate- Toprol XL  . .................................................................................................................................................. Patient's weights and intake/output reviewed: Yes    Patient's Last Weight: 133 lbs obtained by standing scale. Difference of 2 lbs more than last documented weight. Intake/Output Summary (Last 24 hours) at 6/16/2021 0935  Last data filed at 6/16/2021 8445  Gross per 24 hour   Intake 2464.12 ml   Output 1650 ml   Net 814.12 ml       Comorbidities Reviewed Yes    Patient has a past medical history of Arthritis, Atrial fibrillation ([address] Back fracture, HTN (hypertension), Hyperlipidemia, and Panic attack. >>For CHF and Comorbidity documentation on Education Time and Topics, please see Education Tab    Progressive Mobility Assessment:  What is this patient's Current Level of Mobility?: Ambulatory-Up Ad Abby  How was this patient Mobilized today?: Edge of Bed,  Up to Toilet/Shower, and Up in Room                 With Whom? Self                 Level of Difficulty/Assistance: Independent     Pt resting in bed at this time on room air. Pt denies shortness of breath. Pt without lower extremity edema.      Patient and/or Family's stated Goal of Care this Admission: reduce shortness of breath, increase activity tolerance, better understand heart failure and disease management, and be more comfortable prior to discharge        :

## 2021-06-16 NOTE — DISCHARGE SUMMARY
Hospital Medicine Discharge Summary    Patient ID: Peterson Habermann      Patient's PCP: Lary Simms DO    Admit Date: 6/14/2021     Discharge Date:   6/17/2021    Admitting Physician: Isaías Strickland MD     Discharge Physician: [address] [place] [address], [facility] [place] [facility]     Discharge Diagnoses: Active Hospital Problems    Diagnosis     Chronic diastolic CHF (congestive heart failure), NYHA class 3 (HCC) [I50.32]     PAF (paroxysmal atrial fibrillation) (HCC) [I48.0]     History of TIA (transient ischemic attack) [Z86.73]     SOB (shortness of breath) [R06.02]     Essential hypertension [I10]        The patient was seen and examined on day of discharge and this discharge summary is in conjunction with any daily progress note from day of discharge. Hospital Course:   81 y.o. female with PMH of PAF, HTN, TIA, HLD presented to [facility] ED with complaints of shortness of breath. Patient reports SOB over the last couple of days, got worse yesterday. Particularly worse with exertion. Pt reports she went outside the house to bring the trash cans in, by the time she was done with the activity she was very SOB. Does complain of productive cough with whitish sputum. No subjective fever, chills or rigors. Denies chest pain or palpitations. No lightheadedness or syncopal episode. No nausea or vomiting. Pt does endorse to bilateral ankle edema that she noticed earlier today. Denies orthopnea or PND. She was admitted for further evaluation and management. Dyspnea secondary to acute on chronic diastolic CHF (clinically improved):  - BNP elevated 1811, CXR showed pulmonary edema and bilateral pleural effusions.  - Pt has exertional dyspnea, BLE edema and subtle crackles on auscultation.  - Pt started on on IV lasix 20 mg BID. Changed to 20 mg daily at dc. - Monitor daily weights and fluid intake.    - ECHO from [facility] in March 2021 showed normal LVEF of 60-65%, no RWMAs.  - Cardiology consulted while inpt. No further testing indicated while inpt. Hyponatremia:  - Appears chronic / stable. Repeat BMP on 6/21.     Paroxsymal atrial fibrillation, currently in NSR:  - Continue apixaban and metoprolol.     History of TIA / amaurosis fugax  - Continue aspirin, Eliquis and statin for secondary prophylaxis. - LDL 49.     Essential hypertension  - Uncontrolled on arrival, was 195/11.  - Improved with diuresis. Continue her home medication regimen. Myalgias:  - Uncertain how long this has been going on, pt thinks ~9 months. - This could be statin induced, she is on a high statin dose of 80 mg Lipitor, would recommend reducing dose to 20 mg with close PCP follow-up. - CK was normal. Recent TSH was normal.    Hypokalemia / hypomagnesemia:  - Continue PO supplement. Physical Exam Performed:     BP (!) 146/81   Pulse 71   Temp 98.1 °F (36.7 °C) (Oral)   Resp 18   Ht 5' 3\" (1.6 m)   Wt 132 lb 9.6 oz (60.1 kg)   SpO2 96%   BMI 23.49 kg/m²       General appearance:  Elderly female in no apparent distress, appears stated age and cooperative. HEENT:  Normal cephalic, atraumatic without obvious deformity. Pupils equal, round, and reactive to light. Extra ocular muscles intact. Conjunctivae/corneas clear. Neck: Supple, with full range of motion. No jugular venous distention. Trachea midline. Respiratory:  Normal respiratory effort. Clear to auscultation, bilaterally without Rales/Wheezes/Rhonchi. Cardiovascular:  Regular rate and rhythm with normal S1/S2 without murmurs, rubs or gallops. Abdomen: Soft, non-tender, non-distended with normal bowel sounds. Musculoskeletal:  No clubbing, cyanosis or edema bilaterally. Full range of motion without deformity. Skin: Skin color, texture, turgor normal.  No rashes or lesions. Neurologic:  Neurovascularly intact without any focal sensory/motor deficits.  Cranial nerves: II-XII intact, grossly non-focal.  Psychiatric:  Alert and oriented, thought content appropriate, normal insight  Capillary Refill: Brisk,< 3 seconds   Peripheral Pulses: +2 palpable, equal bilaterally       Labs: For convenience and continuity at follow-up the following most recent labs are provided:      CBC:    Lab Results   Component Value Date    WBC 10.1 06/14/2021    HGB 12.9 06/14/2021    HCT 37.1 06/14/2021     06/14/2021       Renal:    Lab Results   Component Value Date     06/17/2021    K 4.0 06/17/2021    K 4.3 05/21/2021    CL 94 06/17/2021    CO2 25 06/17/2021    BUN 13 06/17/2021    CREATININE 0.7 06/17/2021    CALCIUM 8.8 06/17/2021         Significant Diagnostic Studies    Radiology:   XR CHEST (2 VW)   Final Result   Small bilateral pleural effusions and interstitial septal opacities,   suggestive pulmonary edema. Mild bibasilar atelectasis versus pulmonary edema. Pneumonia is a   differential possibility.                 Consults:     IP CONSULT TO HEART FAILURE NURSE/COORDINATOR  IP CONSULT TO DIETITIAN  IP CONSULT TO CARDIOLOGY  IP CONSULT TO HEART FAILURE NURSE/COORDINATOR  IP CONSULT TO HOME CARE NEEDS    Disposition:  [facility] with [facility]     Condition at Discharge: Stable    Discharge Instructions/Follow-up:  Follow-up with PCP     Code Status:  Prior     Activity: activity as tolerated    Diet: cardiac diet      Discharge Medications:     Discharge Medication List as of 6/17/2021  1:13 PM           Details   furosemide (LASIX) 20 MG tablet Take 1 tablet by mouth daily, Disp-60 tablet, R-3Normal      potassium chloride (KLOR-CON M) 20 MEQ extended release tablet Take 1 tablet by mouth daily, Disp-90 tablet, R-1Normal              Details   atorvastatin (LIPITOR) 20 MG tablet Take 1 tablet by mouth daily, Disp-30 tablet, R-3Normal      metoprolol succinate (TOPROL XL) 25 MG extended release tablet Take 1 tablet by mouth 2 times daily, Disp-30 tablet, R-3Normal              Details   melatonin 10 MG CAPS capsule Take 10 mg by mouth nightlyHistorical Med multivitamin-iron-minerals-folic acid (CENTRUM) chewable tablet Take 1 tablet by mouth dailyHistorical Med      vitamin B-1 (THIAMINE) 100 MG tablet Take 100 mg by mouth dailyHistorical Med      magnesium 30 MG tablet Take 30 mg by mouth 2 times dailyHistorical Med      apixaban (ELIQUIS) 5 MG TABS tablet Take 1 tablet by mouth 2 times daily, Disp-60 tablet, R-2Normal      thiamine 100 MG tablet Take 1 tablet by mouth daily, Disp-30 tablet, R-3Normal      escitalopram (LEXAPRO) 10 MG tablet Take 10 mg by mouth daily Historical Med      HYDROcodone-acetaminophen (NORCO) 5-325 MG per tablet Take 1 tablet by mouth every 6 hours as needed. Historical Med      Docusate Sodium (DSS) 250 MG CAPS Take 250 mg by mouth dailyHistorical Med      vitamin D (CHOLECALCIFEROL) 25 MCG (1000 UT) TABS tablet Take 1,000 Units by mouth dailyHistorical Med      aspirin 81 MG EC tablet Take 81 mg by mouth dailyHistorical Med      Multiple Vitamin (MULTIVITAMIN) tablet Take 1 tablet by mouth dailyHistorical Med      polyethylene glycol (GLYCOLAX) 17 GM/SCOOP powder Take 17 g by mouth daily as needed Historical Med      Simethicone 125 MG CAPS Take 125 mg by mouth 2 times dailyHistorical Med      amLODIPine (NORVASC) 5 MG tablet Take 5 mg by mouth daily             Time Spent on discharge is more than 45 minutes in the examination, evaluation, counseling and review of medications and discharge plan. Signed:    [address] [place] [address], APRN - [facility]   6/17/2021      Thank you [phone] Panfilo Lew DO for the opportunity to be involved in this patient's care. If you have any questions or concerns please feel free to contact me at [phone].

## 2021-06-17 VITALS
SYSTOLIC BLOOD PRESSURE: 146 MMHG | HEIGHT: 63 IN | HEART RATE: 71 BPM | DIASTOLIC BLOOD PRESSURE: 81 MMHG | BODY MASS INDEX: 23.5 KG/M2 | WEIGHT: 132.6 LBS | OXYGEN SATURATION: 96 % | TEMPERATURE: 98.1 F | RESPIRATION RATE: 18 BRPM

## 2021-06-17 LAB
ANION GAP SERPL CALCULATED.3IONS-SCNC: 10 MMOL/L (ref 3–16)
BUN BLDV-MCNC: 13 MG/DL (ref 7–20)
CALCIUM SERPL-MCNC: 8.8 MG/DL (ref 8.3–10.6)
CHLORIDE BLD-SCNC: 94 MMOL/L (ref 99–110)
CO2: 25 MMOL/L (ref 21–32)
CREAT SERPL-MCNC: 0.7 MG/DL (ref 0.6–1.2)
GFR AFRICAN AMERICAN: >60
GFR NON-AFRICAN AMERICAN: >60
GLUCOSE BLD-MCNC: 92 MG/DL (ref 70–99)
MAGNESIUM: 1.7 MG/DL (ref 1.8–2.4)
POTASSIUM SERPL-SCNC: 4 MMOL/L (ref 3.5–5.1)
SODIUM BLD-SCNC: 129 MMOL/L (ref 136–145)
TOTAL CK: 68 U/L (ref 26–192)

## 2021-06-17 PROCEDURE — 2580000003 HC RX 258: Performed by: INTERNAL MEDICINE

## 2021-06-17 PROCEDURE — 83735 ASSAY OF MAGNESIUM: CPT

## 2021-06-17 PROCEDURE — 6370000000 HC RX 637 (ALT 250 FOR IP): Performed by: INTERNAL MEDICINE

## 2021-06-17 PROCEDURE — 82550 ASSAY OF CK (CPK): CPT

## 2021-06-17 PROCEDURE — 36415 COLL VENOUS BLD VENIPUNCTURE: CPT

## 2021-06-17 PROCEDURE — 80048 BASIC METABOLIC PNL TOTAL CA: CPT

## 2021-06-17 PROCEDURE — 6370000000 HC RX 637 (ALT 250 FOR IP): Performed by: REGISTERED NURSE

## 2021-06-17 RX ORDER — ATORVASTATIN CALCIUM 20 MG/1
20 TABLET, FILM COATED ORAL DAILY
Qty: 30 TABLET | Refills: 3 | Status: SHIPPED | OUTPATIENT
Start: 2021-06-17

## 2021-06-17 RX ADMIN — Medication 100 MG: at 08:29

## 2021-06-17 RX ADMIN — Medication 10 ML: at 08:30

## 2021-06-17 RX ADMIN — SIMETHICONE 120 MG: 80 TABLET, CHEWABLE ORAL at 08:29

## 2021-06-17 RX ADMIN — MAGNESIUM GLUCONATE 500 MG ORAL TABLET 400 MG: 500 TABLET ORAL at 12:05

## 2021-06-17 RX ADMIN — MULTIPLE VITAMINS W/ MINERALS TAB 1 TABLET: TAB at 08:29

## 2021-06-17 RX ADMIN — APIXABAN 5 MG: 5 TABLET, FILM COATED ORAL at 08:30

## 2021-06-17 RX ADMIN — Medication 1000 UNITS: at 08:29

## 2021-06-17 RX ADMIN — METOPROLOL SUCCINATE 25 MG: 25 TABLET, EXTENDED RELEASE ORAL at 08:29

## 2021-06-17 RX ADMIN — ESCITALOPRAM OXALATE 10 MG: 10 TABLET ORAL at 08:29

## 2021-06-17 RX ADMIN — CYCLOBENZAPRINE HYDROCHLORIDE 5 MG: 10 TABLET, FILM COATED ORAL at 04:01

## 2021-06-17 RX ADMIN — FUROSEMIDE 20 MG: 20 TABLET ORAL at 08:29

## 2021-06-17 RX ADMIN — AMLODIPINE BESYLATE 5 MG: 5 TABLET ORAL at 08:30

## 2021-06-17 RX ADMIN — DOCUSATE SODIUM 100 MG: 100 CAPSULE, LIQUID FILLED ORAL at 08:29

## 2021-06-17 ASSESSMENT — PAIN SCALES - GENERAL
PAINLEVEL_OUTOF10: 0

## 2021-06-17 NOTE — PROGRESS NOTES
Pt d/c'd home. Removed PIV and stopped bleeding. Catheter intact. Pt tolerated well. No redness noted at site. Notified [facility] and removed tele box. Reviewed d/c instructions, home meds, and  f/u information utilizing teach-back method. Medications picked up from outpatient pharmacy. Patient verbalized understanding.

## 2021-06-17 NOTE — PLAN OF CARE
Problem: OXYGENATION/RESPIRATORY FUNCTION  Goal: Patient will maintain patent airway  6/17/2021 1142 by Cortney Julio RN  Outcome: Ongoing     Patient's EF (Ejection Fraction) is not on file    Heart Failure Medications:  Diuretics[de-identified] Furosemide    (One of the following REQUIRED for EF <40%/SYSTOLIC FAILURE but MAY be used in EF% >40%/DIASTOLIC FAILURE)        ACE[de-identified] None        ARB[de-identified] None         ARNI[de-identified] None    (Beta Blockers)  NON- Evidenced Based Beta Blocker (for EF% >40%/DIASTOLIC FAILURE): None    Evidenced Based Beta Blocker::(REQUIRED for EF% <40%/SYSTOLIC FAILURE) Metoprolol SUCCinate- Toprol XL  . .................................................................................................................................................. Patient's weights and intake/output reviewed: Yes    Patient's Last Weight: 132 lbs obtained by standing scale. Difference of 1 lbs less than last documented weight. Comorbidities Reviewed Yes    Patient has a past medical history of Arthritis, Atrial fibrillation ([address] Back fracture, HTN (hypertension), Hyperlipidemia, and Panic attack. >>For CHF and Comorbidity documentation on Education Time and Topics, please see Education Tab    Progressive Mobility Assessment:  What is this patient's Current Level of Mobility?: Ambulatory-Up Ad Abby  How was this patient Mobilized today?: Edge of Bed, Up to Chair,  Up to Toilet/Shower and Up in Room                 With Whom? Self                 Level of Difficulty/Assistance: Independent     Pt resting in bed at this time on room air. Pt denies shortness of breath. Pt without lower extremity edema.      Patient and/or Family's stated Goal of Care this Admission: increase activity tolerance, better understand heart failure and disease management, be more comfortable and reduce lower extremity edema prior to discharge      :

## 2021-06-17 NOTE — CARE COORDINATION
CASE MANAGEMENT DISCHARGE SUMMARY        Discharge to: home w/[facility]     Transportation: private  Confirmed discharge plan with:                 Patient: yes                 Family, name and contact number: Family in room                            RN, name: Omar Dodson RN     Note: NP addressed family concerns.   Referral made to [place] for additional resources, including meals on wheels, per family request.  Nirav Guevara RN

## 2021-06-17 NOTE — PLAN OF CARE
Problem: OXYGENATION/RESPIRATORY FUNCTION  Goal: Patient will maintain patent airway  Outcome: Ongoing  Note:   Patient's EF (Ejection Fraction) is unknown (no echo on file). Heart Failure Medications:  Diuretics[de-identified] Furosemide    (One of the following REQUIRED for EF <40%/SYSTOLIC FAILURE but MAY be used in EF% >40%/DIASTOLIC FAILURE)        ACE[de-identified] None        ARB[de-identified] None         ARNI[de-identified] None    (Beta Blockers)  NON- Evidenced Based Beta Blocker (for EF% >40%/DIASTOLIC FAILURE): None    Evidenced Based Beta Blocker::(REQUIRED for EF% <40%/SYSTOLIC FAILURE) Metoprolol SUCCinate- Toprol XL  . .................................................................................................................................................. Patient's weights and intake/output reviewed: Yes    Patient's Last Weight: 133 lbs obtained by standing scale. Difference of 2 lbs more than last documented weight. Intake/Output Summary (Last 24 hours) at 6/17/2021 0051  Last data filed at 6/16/2021 2045  Gross per 24 hour   Intake 1810 ml   Output 1350 ml   Net 460 ml       Comorbidities Reviewed Yes    Patient has a past medical history of Arthritis, Atrial fibrillation ([address] Back fracture, HTN (hypertension), Hyperlipidemia, and Panic attack. >>For CHF and Comorbidity documentation on Education Time and Topics, please see Education Tab    Progressive Mobility Assessment:  What is this patient's Current Level of Mobility?: Ambulatory-Up Ad Abby  How was this patient Mobilized today?: Edge of Bed,  Up to Toilet/Shower, and Up in Room                 With Whom? Self                 Level of Difficulty/Assistance: Independent     Pt resting in bed at this time on room air. Pt denies shortness of breath. Pt with nonpitting lower extremity edema.      Patient and/or Family's stated Goal of Care this Admission: increase activity tolerance, be more comfortable, and reduce lower extremity edema prior to discharge        :

## 2021-06-18 NOTE — PROGRESS NOTES
Hospitalist Progress Note      PCP: Nidhi Iyer DO    Date of Admission: 6/14/2021    Chief Complaint: SOB    Hospital Course:   81 y. o. female with PMH of PAF, HTN, TIA, HLD presented to [facility] ED with complaints of shortness of breath. Patient reports SOB over the last couple of days, got worse yesterday. Particularly worse with exertion. Pt reports she went outside the house to bring the trash cans in, by the time she was done with the activity she was very SOB. Does complain of productive cough with whitish sputum. No subjective fever, chills or rigors. Denies chest pain or palpitations. No lightheadedness or syncopal episode. No nausea or vomiting. Pt does endorse to bilateral ankle edema that she noticed earlier today. Denies orthopnea or PND. She was admitted for further evaluation and management. Subjective:   Pt is on RA. Afebrile. VSS. No dyspnea or chest pain. No N/V/D. Medications:  Reviewed    Infusion Medications   Scheduled Medications   PRN Meds:       Intake/Output Summary (Last 24 hours) at 6/17/2021 2023  Last data filed at 6/16/2021 2045  Gross per 24 hour   Intake 0 ml   Output 200 ml   Net -200 ml       Physical Exam Performed:    BP (!) 146/81   Pulse 71   Temp 98.1 °F (36.7 °C) (Oral)   Resp 18   Ht 5' 3\" (1.6 m)   Wt 132 lb 9.6 oz (60.1 kg)   SpO2 96%   BMI 23.49 kg/m²     General appearance: No apparent distress, appears stated age and cooperative. HEENT: Pupils equal, round, and reactive to light. Conjunctivae/corneas clear. Neck: Supple, with full range of motion. No jugular venous distention. Trachea midline. Respiratory:  Normal respiratory effort. Clear to auscultation, bilaterally without Rales/Wheezes/Rhonchi. Cardiovascular: Regular rate and rhythm with normal S1/S2 without murmurs, rubs or gallops. Abdomen: Soft, non-tender, non-distended with normal bowel sounds. Musculoskeletal: No clubbing, cyanosis or edema bilaterally.   Full range of motion without deformity. Skin: Skin color, texture, turgor normal.  No rashes or lesions. Neurologic:  Neurovascularly intact without any focal sensory/motor deficits. Cranial nerves: II-XII intact, grossly non-focal.  Psychiatric: Alert and oriented, thought content appropriate, normal insight  Capillary Refill: Brisk,3 seconds, normal   Peripheral Pulses: +2 palpable, equal bilaterally       Labs:   No results for input(s): WBC, HGB, HCT, PLT in the last 72 hours. Recent Labs     06/15/21  0815 06/16/21  0716 06/17/21  0803   * 131* 129*   K 3.6 3.6 4.0   CL 94* 92* 94*   CO2 27 29 25   BUN 12 15 13   CREATININE 0.7 0.7 0.7   CALCIUM 8.9 8.7 8.8       Recent Labs     06/15/21  0815 06/17/21  0803   CKTOTAL  --  68   TROPONINI <0.01  --        Urinalysis:      Lab Results   Component Value Date    NITRU Negative 05/20/2021    WBCUA 3-5 05/20/2021    RBCUA 0-2 05/20/2021    BLOODU Negative 05/20/2021    SPECGRAV 1.015 05/20/2021    GLUCOSEU Negative 05/20/2021       Radiology:  XR CHEST (2 VW)   Final Result   Small bilateral pleural effusions and interstitial septal opacities,   suggestive pulmonary edema. Mild bibasilar atelectasis versus pulmonary edema. Pneumonia is a   differential possibility. Assessment/Plan:    Active Hospital Problems    Diagnosis     Chronic diastolic CHF (congestive heart failure), NYHA class 3 ([facility]) [I50.32]     PAF (paroxysmal atrial fibrillation) ([facility]) [I48.0]     History of TIA (transient ischemic attack) [Z86.73]     SOB (shortness of breath) [R06.02]     Essential hypertension [I10]        Dyspnea secondary to acute on chronic diastolic CHF (clinically improved):  - BNP elevated 1811, CXR showed pulmonary edema and bilateral pleural effusions.  - Pt has exertional dyspnea, BLE edema and subtle crackles on auscultation.  - Pt started on on IV lasix 20 mg BID. Cardiology recommending 20 mg PO daily at dc. - Monitor daily weights and fluid intake.    -

## 2021-06-21 ENCOUNTER — APPOINTMENT (OUTPATIENT)
Dept: GENERAL RADIOLOGY | Age: 87
DRG: 291 | End: 2021-06-21
Payer: MEDICARE

## 2021-06-21 ENCOUNTER — HOSPITAL ENCOUNTER (INPATIENT)
Age: 87
LOS: 6 days | Discharge: HOME HEALTH | DRG: 291 | End: 2021-06-27
Attending: EMERGENCY MEDICINE | Admitting: HOSPITALIST
Payer: MEDICARE

## 2021-06-21 DIAGNOSIS — I48.91 ATRIAL FIBRILLATION WITH RVR (HCC): ICD-10-CM

## 2021-06-21 DIAGNOSIS — N17.9 AKI (ACUTE KIDNEY INJURY) (HCC): ICD-10-CM

## 2021-06-21 DIAGNOSIS — I50.9 ACUTE ON CHRONIC CONGESTIVE HEART FAILURE, UNSPECIFIED HEART FAILURE TYPE (HCC): Primary | ICD-10-CM

## 2021-06-21 DIAGNOSIS — E87.1 HYPONATREMIA: ICD-10-CM

## 2021-06-21 DIAGNOSIS — I50.33 ACUTE ON CHRONIC DIASTOLIC CONGESTIVE HEART FAILURE (HCC): ICD-10-CM

## 2021-06-21 LAB
A/G RATIO: 1.1 (ref 1.1–2.2)
ALBUMIN SERPL-MCNC: 3.6 G/DL (ref 3.4–5)
ALP BLD-CCNC: 78 U/L (ref 40–129)
ALT SERPL-CCNC: 20 U/L (ref 10–40)
ANION GAP SERPL CALCULATED.3IONS-SCNC: 10 MMOL/L (ref 3–16)
AST SERPL-CCNC: 22 U/L (ref 15–37)
BASOPHILS ABSOLUTE: 0.1 K/UL (ref 0–0.2)
BASOPHILS RELATIVE PERCENT: 0.8 %
BILIRUB SERPL-MCNC: 0.3 MG/DL (ref 0–1)
BUN BLDV-MCNC: 28 MG/DL (ref 7–20)
CALCIUM SERPL-MCNC: 9.2 MG/DL (ref 8.3–10.6)
CHLORIDE BLD-SCNC: 91 MMOL/L (ref 99–110)
CO2: 25 MMOL/L (ref 21–32)
CREAT SERPL-MCNC: 1.5 MG/DL (ref 0.6–1.2)
EOSINOPHILS ABSOLUTE: 0.2 K/UL (ref 0–0.6)
EOSINOPHILS RELATIVE PERCENT: 1.9 %
ETHANOL: NORMAL MG/DL (ref 0–0.08)
GFR AFRICAN AMERICAN: 39
GFR NON-AFRICAN AMERICAN: 33
GLOBULIN: 3.3 G/DL
GLUCOSE BLD-MCNC: 112 MG/DL (ref 70–99)
HCT VFR BLD CALC: 36.8 % (ref 36–48)
HEMOGLOBIN: 12.7 G/DL (ref 12–16)
LYMPHOCYTES ABSOLUTE: 1.7 K/UL (ref 1–5.1)
LYMPHOCYTES RELATIVE PERCENT: 15.3 %
MCH RBC QN AUTO: 30.1 PG (ref 26–34)
MCHC RBC AUTO-ENTMCNC: 34.4 G/DL (ref 31–36)
MCV RBC AUTO: 87.7 FL (ref 80–100)
MONOCYTES ABSOLUTE: 0.9 K/UL (ref 0–1.3)
MONOCYTES RELATIVE PERCENT: 8.4 %
NEUTROPHILS ABSOLUTE: 8.3 K/UL (ref 1.7–7.7)
NEUTROPHILS RELATIVE PERCENT: 73.6 %
PDW BLD-RTO: 13 % (ref 12.4–15.4)
PLATELET # BLD: 438 K/UL (ref 135–450)
PMV BLD AUTO: 7.4 FL (ref 5–10.5)
POTASSIUM REFLEX MAGNESIUM: 4.3 MMOL/L (ref 3.5–5.1)
PRO-BNP: 3008 PG/ML (ref 0–449)
RBC # BLD: 4.2 M/UL (ref 4–5.2)
SODIUM BLD-SCNC: 126 MMOL/L (ref 136–145)
SODIUM URINE: 44 MMOL/L
TOTAL PROTEIN: 6.9 G/DL (ref 6.4–8.2)
TROPONIN: <0.01 NG/ML
TROPONIN: <0.01 NG/ML
WBC # BLD: 11.3 K/UL (ref 4–11)

## 2021-06-21 PROCEDURE — 84443 ASSAY THYROID STIM HORMONE: CPT

## 2021-06-21 PROCEDURE — 6370000000 HC RX 637 (ALT 250 FOR IP): Performed by: HOSPITALIST

## 2021-06-21 PROCEDURE — 83880 ASSAY OF NATRIURETIC PEPTIDE: CPT

## 2021-06-21 PROCEDURE — 82077 ASSAY SPEC XCP UR&BREATH IA: CPT

## 2021-06-21 PROCEDURE — 99283 EMERGENCY DEPT VISIT LOW MDM: CPT

## 2021-06-21 PROCEDURE — 6360000002 HC RX W HCPCS: Performed by: HOSPITALIST

## 2021-06-21 PROCEDURE — 83935 ASSAY OF URINE OSMOLALITY: CPT

## 2021-06-21 PROCEDURE — 96375 TX/PRO/DX INJ NEW DRUG ADDON: CPT

## 2021-06-21 PROCEDURE — 2500000003 HC RX 250 WO HCPCS: Performed by: EMERGENCY MEDICINE

## 2021-06-21 PROCEDURE — 85025 COMPLETE CBC W/AUTO DIFF WBC: CPT

## 2021-06-21 PROCEDURE — 96374 THER/PROPH/DIAG INJ IV PUSH: CPT

## 2021-06-21 PROCEDURE — 36415 COLL VENOUS BLD VENIPUNCTURE: CPT

## 2021-06-21 PROCEDURE — 6360000002 HC RX W HCPCS: Performed by: EMERGENCY MEDICINE

## 2021-06-21 PROCEDURE — 84439 ASSAY OF FREE THYROXINE: CPT

## 2021-06-21 PROCEDURE — 2580000003 HC RX 258: Performed by: HOSPITALIST

## 2021-06-21 PROCEDURE — 84484 ASSAY OF TROPONIN QUANT: CPT

## 2021-06-21 PROCEDURE — 80053 COMPREHEN METABOLIC PANEL: CPT

## 2021-06-21 PROCEDURE — 93005 ELECTROCARDIOGRAM TRACING: CPT | Performed by: EMERGENCY MEDICINE

## 2021-06-21 PROCEDURE — 83930 ASSAY OF BLOOD OSMOLALITY: CPT

## 2021-06-21 PROCEDURE — 71046 X-RAY EXAM CHEST 2 VIEWS: CPT

## 2021-06-21 PROCEDURE — 84300 ASSAY OF URINE SODIUM: CPT

## 2021-06-21 PROCEDURE — 82570 ASSAY OF URINE CREATININE: CPT

## 2021-06-21 PROCEDURE — 82043 UR ALBUMIN QUANTITATIVE: CPT

## 2021-06-21 PROCEDURE — 2060000000 HC ICU INTERMEDIATE R&B

## 2021-06-21 RX ORDER — ACETAMINOPHEN 325 MG/1
650 TABLET ORAL EVERY 6 HOURS PRN
Status: DISCONTINUED | OUTPATIENT
Start: 2021-06-21 | End: 2021-06-27 | Stop reason: HOSPADM

## 2021-06-21 RX ORDER — POTASSIUM CHLORIDE 7.45 MG/ML
10 INJECTION INTRAVENOUS PRN
Status: DISCONTINUED | OUTPATIENT
Start: 2021-06-21 | End: 2021-06-22

## 2021-06-21 RX ORDER — POTASSIUM CHLORIDE 20 MEQ/1
40 TABLET, EXTENDED RELEASE ORAL PRN
Status: DISCONTINUED | OUTPATIENT
Start: 2021-06-21 | End: 2021-06-22

## 2021-06-21 RX ORDER — DILTIAZEM HYDROCHLORIDE 5 MG/ML
10 INJECTION INTRAVENOUS ONCE
Status: COMPLETED | OUTPATIENT
Start: 2021-06-21 | End: 2021-06-21

## 2021-06-21 RX ORDER — SODIUM CHLORIDE 0.9 % (FLUSH) 0.9 %
10 SYRINGE (ML) INJECTION PRN
Status: DISCONTINUED | OUTPATIENT
Start: 2021-06-21 | End: 2021-06-27 | Stop reason: HOSPADM

## 2021-06-21 RX ORDER — LANOLIN ALCOHOL/MO/W.PET/CERES
100 CREAM (GRAM) TOPICAL DAILY
Status: DISCONTINUED | OUTPATIENT
Start: 2021-06-22 | End: 2021-06-23

## 2021-06-21 RX ORDER — ACETAMINOPHEN 650 MG/1
650 SUPPOSITORY RECTAL EVERY 6 HOURS PRN
Status: DISCONTINUED | OUTPATIENT
Start: 2021-06-21 | End: 2021-06-27 | Stop reason: HOSPADM

## 2021-06-21 RX ORDER — METOPROLOL SUCCINATE 25 MG/1
25 TABLET, EXTENDED RELEASE ORAL 2 TIMES DAILY
Status: DISCONTINUED | OUTPATIENT
Start: 2021-06-22 | End: 2021-06-22

## 2021-06-21 RX ORDER — SODIUM CHLORIDE 0.9 % (FLUSH) 0.9 %
10 SYRINGE (ML) INJECTION EVERY 12 HOURS SCHEDULED
Status: DISCONTINUED | OUTPATIENT
Start: 2021-06-21 | End: 2021-06-27 | Stop reason: HOSPADM

## 2021-06-21 RX ORDER — M-VIT,TX,IRON,MINS/CALC/FOLIC 27MG-0.4MG
1 TABLET ORAL DAILY
Status: DISCONTINUED | OUTPATIENT
Start: 2021-06-22 | End: 2021-06-27 | Stop reason: HOSPADM

## 2021-06-21 RX ORDER — ASPIRIN 81 MG/1
81 TABLET ORAL DAILY
Status: DISCONTINUED | OUTPATIENT
Start: 2021-06-22 | End: 2021-06-27 | Stop reason: HOSPADM

## 2021-06-21 RX ORDER — ONDANSETRON 4 MG/1
4 TABLET, ORALLY DISINTEGRATING ORAL EVERY 8 HOURS PRN
Status: DISCONTINUED | OUTPATIENT
Start: 2021-06-21 | End: 2021-06-27 | Stop reason: HOSPADM

## 2021-06-21 RX ORDER — SODIUM CHLORIDE 9 MG/ML
25 INJECTION, SOLUTION INTRAVENOUS PRN
Status: DISCONTINUED | OUTPATIENT
Start: 2021-06-21 | End: 2021-06-27 | Stop reason: HOSPADM

## 2021-06-21 RX ORDER — SENNA PLUS 8.6 MG/1
1 TABLET ORAL DAILY PRN
Status: DISCONTINUED | OUTPATIENT
Start: 2021-06-21 | End: 2021-06-27 | Stop reason: HOSPADM

## 2021-06-21 RX ORDER — FUROSEMIDE 10 MG/ML
20 INJECTION INTRAMUSCULAR; INTRAVENOUS ONCE
Status: COMPLETED | OUTPATIENT
Start: 2021-06-21 | End: 2021-06-21

## 2021-06-21 RX ORDER — SIMETHICONE 80 MG
120 TABLET,CHEWABLE ORAL 2 TIMES DAILY
Status: DISCONTINUED | OUTPATIENT
Start: 2021-06-21 | End: 2021-06-27 | Stop reason: HOSPADM

## 2021-06-21 RX ORDER — VITAMIN B COMPLEX
1000 TABLET ORAL DAILY
Status: DISCONTINUED | OUTPATIENT
Start: 2021-06-22 | End: 2021-06-27 | Stop reason: HOSPADM

## 2021-06-21 RX ORDER — ONDANSETRON 2 MG/ML
4 INJECTION INTRAMUSCULAR; INTRAVENOUS EVERY 6 HOURS PRN
Status: DISCONTINUED | OUTPATIENT
Start: 2021-06-21 | End: 2021-06-27 | Stop reason: HOSPADM

## 2021-06-21 RX ORDER — ATORVASTATIN CALCIUM 10 MG/1
20 TABLET, FILM COATED ORAL DAILY
Status: DISCONTINUED | OUTPATIENT
Start: 2021-06-21 | End: 2021-06-27 | Stop reason: HOSPADM

## 2021-06-21 RX ORDER — LANOLIN ALCOHOL/MO/W.PET/CERES
100 CREAM (GRAM) TOPICAL DAILY
Status: DISCONTINUED | OUTPATIENT
Start: 2021-06-21 | End: 2021-06-27 | Stop reason: HOSPADM

## 2021-06-21 RX ORDER — POLYETHYLENE GLYCOL 3350 17 G/17G
17 POWDER, FOR SOLUTION ORAL DAILY
Status: DISCONTINUED | OUTPATIENT
Start: 2021-06-21 | End: 2021-06-27 | Stop reason: HOSPADM

## 2021-06-21 RX ORDER — FOLIC ACID/MULTIVIT,IRON,MINER .4-18-35
1 TABLET,CHEWABLE ORAL DAILY
Status: DISCONTINUED | OUTPATIENT
Start: 2021-06-21 | End: 2021-06-21 | Stop reason: CLARIF

## 2021-06-21 RX ORDER — MECOBALAMIN 5000 MCG
10 TABLET,DISINTEGRATING ORAL NIGHTLY
Status: DISCONTINUED | OUTPATIENT
Start: 2021-06-21 | End: 2021-06-27 | Stop reason: HOSPADM

## 2021-06-21 RX ORDER — ESCITALOPRAM OXALATE 10 MG/1
10 TABLET ORAL DAILY
Status: DISCONTINUED | OUTPATIENT
Start: 2021-06-21 | End: 2021-06-27 | Stop reason: HOSPADM

## 2021-06-21 RX ORDER — MAGNESIUM SULFATE IN WATER 40 MG/ML
2000 INJECTION, SOLUTION INTRAVENOUS PRN
Status: DISCONTINUED | OUTPATIENT
Start: 2021-06-21 | End: 2021-06-22

## 2021-06-21 RX ADMIN — APIXABAN 2.5 MG: 2.5 TABLET, FILM COATED ORAL at 21:52

## 2021-06-21 RX ADMIN — FUROSEMIDE 20 MG: 10 INJECTION, SOLUTION INTRAMUSCULAR; INTRAVENOUS at 16:56

## 2021-06-21 RX ADMIN — FUROSEMIDE 2 MG/HR: 10 INJECTION, SOLUTION INTRAMUSCULAR; INTRAVENOUS at 18:25

## 2021-06-21 RX ADMIN — DILTIAZEM HYDROCHLORIDE 10 MG: 5 INJECTION INTRAVENOUS at 16:57

## 2021-06-21 RX ADMIN — Medication 10 ML: at 21:52

## 2021-06-21 RX ADMIN — Medication 10 MG: at 21:52

## 2021-06-21 NOTE — ED NOTES
PT and son transported to A2 rm 18, pt on lasix gtt, heart monitor applied. Bedside report given to A2 RN.        Gil Acosta, SHARON  06/21/21 1939

## 2021-06-21 NOTE — H&P
HOSPITALISTS HISTORY AND PHYSICAL    6/21/2021 5:26 PM    Patient Information:  Lottie Negron is a 81 y.o. female [id number]  PCP:  Susi Peña MD, MD (Tel: [phone] )    Chief complaint:    Chief Complaint   Patient presents with    Shortness of Breath     recently admitted for fluid overload, states she was discharged, felt good for a day, now SOB again. History of Present Illness:  Zofia Best is a 81 y.o. female who presented to the ED less than 4 days following recent admission and discharge on 6/17/2021 for A. fib RVR with acute decompensated heart failure. Today patient presents with identical symptoms of weakness, exertional dyspnea, and peripheral edema. During her hospital stay, the patient was placed on sodium/fluid restrictive diet as well as twice daily parenteral Lasix with immediate improvement in her symptoms. Home health care was not established as recommended at time of discharge. Upon arrival to the ED, EKG was obtained revealing A. fib RVR with ventricular rate 118 bpm.  Labs were notable for hyponatremia 126, BUN/CR 28/1.5, proBNP 3008. CXR revealed no acute pulmonary disease or volume overload. She received a one-time bolus of IV Cardizem in the ED followed by 20 mg of IV Lasix.   Hospitalist team consulted to readmit this patient for recurrent acutely decompensated diastolic heart failure    History obtained from patient and review of Epic chart    Review of [facility] chart in care everywhere reveals the patient had an echo performed 3/2/2021 at [address] [place] [address] with the following results:  CONCLUSIONS     The left ventricle is of normal size.     Left ventricular wall thickness is normal.       No obvious segmental wall motion abnormalities.       The right atrium is normal in size.     Moderate left atrial dilatation.       Mild mitral annular calcification.     Mild thickening/calcification of the anterior mitral valve leaflet.       Trileaflet aortic valve.       Moderate  thickening (sclerosis) of the aortic valve cusps without reduced excursion.       Structurally normal tricuspid valve.       Mild tricuspid regurgitation.       Normal pericardium without effusion. REVIEW OF SYSTEMS:   Constitutional: Negative for fever,chills,weight loss; positive generalized weakness  ENT: Negative for rhinorrhea, epistaxis, hoarseness, and sore throat. Respiratory: Positive for shortness of breath; denies wheezing, and cough  Cardiovascular: Negative for chest pain; positive palpitations and peripheral edema; no orthopnea or PND  Gastrointestinal: Negative for N/V/D; no hematemesis, hematochezia, or melena; no anorexia  Genitourinary: Negative for dysuria, frequency, hesitancy, and urgency; no incontinence  Hematologic/Lymphatic: Negative for bleeding tendency, excessive bruising, and enlarged LN  Musculoskeletal: Positive pain for myalgias and arthalgias; able to ambulate without difficulty  Neurologic: Negative for LOC, seizure activity, paresthesias, dysarthria, vertigo, and gait disturbance  Skin: Negative for itching,rash  Psychiatric: Negative for depression,anxiety, and agitation; denies SI/HI  Endocrine: Negative for polyuria/polydipsia/polyphagia; no heat/cold intolerance    Past Medical History:   has a past medical history of Arthritis, Atrial fibrillation ([address] Back fracture, CHF (congestive heart failure) ([address] HTN (hypertension), Hyperlipidemia, and Panic attack. Past Surgical History:   has a past surgical history that includes Hysterectomy; Hemorrhoid surgery; Colonoscopy (2015); and eye surgery. Medications:  No current facility-administered medications on file prior to encounter.      Current Outpatient Medications on File Prior to Encounter   Medication Sig Dispense Refill    atorvastatin (LIPITOR) 20 MG tablet Take 1 tablet by mouth daily 30 tablet 3    metoprolol succinate (TOPROL XL) 25 MG extended release tablet Take 1 tablet by mouth 2 times daily 30 tablet 3    furosemide (LASIX) 20 MG tablet Take 1 tablet by mouth daily 60 tablet 3    potassium chloride (KLOR-CON M) 20 MEQ extended release tablet Take 1 tablet by mouth daily 90 tablet 1    melatonin 10 MG CAPS capsule Take 10 mg by mouth nightly      vitamin B-1 (THIAMINE) 100 MG tablet Take 100 mg by mouth daily      magnesium 30 MG tablet Take 30 mg by mouth 2 times daily      apixaban (ELIQUIS) 5 MG TABS tablet Take 1 tablet by mouth 2 times daily 60 tablet 2    thiamine 100 MG tablet Take 1 tablet by mouth daily 30 tablet 3    escitalopram (LEXAPRO) 10 MG tablet Take 10 mg by mouth daily       HYDROcodone-acetaminophen (NORCO) 5-325 MG per tablet Take 1 tablet by mouth every 6 hours as needed.  vitamin D (CHOLECALCIFEROL) 25 MCG (1000 UT) TABS tablet Take 1,000 Units by mouth daily      aspirin 81 MG EC tablet Take 81 mg by mouth daily      Multiple Vitamin (MULTIVITAMIN) tablet Take 1 tablet by mouth daily      amLODIPine (NORVASC) 5 MG tablet Take 5 mg by mouth daily      multivitamin-iron-minerals-folic acid (CENTRUM) chewable tablet Take 1 tablet by mouth daily      Docusate Sodium (DSS) 250 MG CAPS Take 250 mg by mouth daily      polyethylene glycol (GLYCOLAX) 17 GM/SCOOP powder Take 17 g by mouth daily as needed       Simethicone 125 MG CAPS Take 125 mg by mouth 2 times daily         Allergies: Allergies   Allergen Reactions    Lisinopril Other (See Comments)     Hyponatremia / SIADH        Social History:   reports that she has quit smoking. She has never used smokeless tobacco. She reports current alcohol use. She reports that she does not use drugs. Family History:  family history is not on file.      Physical Exam:  BP (!) 111/58   Pulse 122   Temp 98.2 °F (36.8 °C) (Temporal)   Resp 27   Ht 5' 3\" (1.6 m)   Wt 130 lb (59 kg)   SpO2 96%   BMI 23.03 kg/m²     General appearance: Frail elderly female resting in bed in NAD  Eyes: Sclera clear without conjunctival injection; PERRLA; EOMI  ENT: Mucous membranes moist without thrush; normal dentition  Neck: Supple without meningismus; no goiter; no carotid bruit bilaterally  Cardiovascular: Irregularly irregular tachyarrhythmia; normal S1-S2 with 2/60SEM; 2+ peripheral edema  Respiratory: No tachypnea; adequate air exchange, bibasilar crackles present  Gastrointestinal: Abdomen soft, non-tender, not distended; bowel sounds normal x4 quadrants; no masses/organomegaly appreciated  Musculoskeletal: FROM spine and extremities x4; no gross deformity  Neurology: A&O x3; cranial nerves 2-12 grossly intact; motor 5/5  BUE/BLE; finger-to-nose/heel-to-shin intact; no pronator drift; no seizure activity  Psychiatry: Well-groomed with good eye contact; appropriate affect; no visual/auditory hallucination  Skin: Warm, dry, normal turgor, no rash  PV: 2/4 radial and dorsalis pedis bilaterally; brisk capillary refill    Labs:  CBC:   Lab Results   Component Value Date    WBC 11.3 06/21/2021    RBC 4.20 06/21/2021    HGB 12.7 06/21/2021    HCT 36.8 06/21/2021    MCV 87.7 06/21/2021    MCH 30.1 06/21/2021    MCHC 34.4 06/21/2021    RDW 13.0 06/21/2021     06/21/2021    MPV 7.4 06/21/2021     BMP:    Lab Results   Component Value Date     06/21/2021    K 4.3 06/21/2021    CL 91 06/21/2021    CO2 25 06/21/2021    BUN 28 06/21/2021    CREATININE 1.5 06/21/2021    CALCIUM 9.2 06/21/2021    GFRAA 39 06/21/2021    LABGLOM 33 06/21/2021    GLUCOSE 112 06/21/2021     XR CHEST (2 VW)   Final Result   No radiographic evidence of acute pulmonary disease. EKG:     Ventricular Rate 118 P BPM QTc Calculation (Bazett) 442 P ms   Atrial Rate 300 P BPM R Axis 21 P degrees   QRS Duration 76 P ms T Axis 52 P degrees   Q-T Interval 316 P ms Diagnosis Atrial fibrillation with rapid ventricular responseSeptal infarct , age undeterminedAbnormal        I visualized CXR images and EKG strips personally and agree with documented interpretation    Discussed case  with ED provider    Problem List  Principal Problem:    Heart failure, diastolic, with acute decompensation (HCC)  Active Problems:    Atrial fibrillation with rapid ventricular response (HCC)    TEREZA (acute kidney injury) ([facility]    Essential hypertension    Hyponatremia  Resolved Problems:    * No resolved hospital problems.  *        Assessment/Plan:     BHAVESH dial RVR  -Admit to medical floor for continuous telemetry and pulse oximetry monitoring  -Patient initiated on IV Cardizem bolus and drip, the latter of which will be continued overnight (BP permitting)   -Electrolytes, thyroid studies ordered to further assess underlying causes for this tachyarrhythmia  -Patient currently anticoagulated on twice daily Eliquis; dosage reduced to 2.5 mg BID due to TEREZA at the time of admission    Acutely decompensated diastolic CHF  -Admit to floor for continuous telemetry monitoring  -Strict I's and O's; pure wick versus Nelson to be placed to assist with measurements  -IV Lasix GTT initiated at 2 mg/H rather than bolus due to borderline hypotension  -Echo performed 3/2/2021 therefore not reordered at this time; will defer to CARDS   -Continue home doses of ASA, Lipitor, Toprol-XL; BP parameters placed for Norvasc dosing  -2G Na and fluid restriction dietary modifications in place  -Consult placed to Cardiology for further recommendations    Hyponatremia with TEREZA  -Patient admitted to telemetry floor with fall and seizure precautions in place  -Serial neuro checks scheduled Q4H overnight  -Strict I's and O's   -Serial renal panels to ensure adequate electrolyte correction (while avoiding overly aggressive correction with subsequent CPM)  -Serum/urine osmolality and spot urine sodium to determine FENa       DVT prophylaxis-Home Eliquis dosage decreased to 2.5 mg twice daily due to CrCl 21 cc/min  Code status-full code despite advanced age  Diet-cardiac 2 g sodium with 1800 cc fluid restriction  IV access-PIV established in ED      Admit as inpatient. I anticipate hospitalization spanning more than two midnights for investigation and treatment of the above medically necessary diagnoses. Please note that some part of this chart was generated using Dragon dictation software. Although every effort was made to ensure the accuracy of this automated transcription, some errors in transcription may have occurred inadvertently. If you may need any clarification, please do not hesitate to contact me through [facility].        Damien Masters MD    6/21/2021 5:26 PM

## 2021-06-21 NOTE — CARE COORDINATION
Patient recently hospitalized 06/14-06/17 re CHF (congestive heart failure). Patient was to be set up with [facility]), but when [facility] called to complete intake family reports was not to discharge at that time and would call at discharge. No further follow up up complete. [facility] aware now back in ED, writer to notify Home Care if Patient admitted or not. PAULINA Figueroa  Call placed to [facility] notified of admission. PAULINA Figueroa

## 2021-06-22 PROBLEM — I50.9 CHF (CONGESTIVE HEART FAILURE) (HCC): Status: ACTIVE | Noted: 2021-06-22

## 2021-06-22 LAB
ANION GAP SERPL CALCULATED.3IONS-SCNC: 10 MMOL/L (ref 3–16)
BASOPHILS ABSOLUTE: 0.1 K/UL (ref 0–0.2)
BASOPHILS RELATIVE PERCENT: 1.2 %
BUN BLDV-MCNC: 22 MG/DL (ref 7–20)
CALCIUM SERPL-MCNC: 9.3 MG/DL (ref 8.3–10.6)
CHLORIDE BLD-SCNC: 93 MMOL/L (ref 99–110)
CHOLESTEROL, TOTAL: 161 MG/DL (ref 0–199)
CO2: 30 MMOL/L (ref 21–32)
CREAT SERPL-MCNC: 0.9 MG/DL (ref 0.6–1.2)
CREATININE URINE: 39.6 MG/DL (ref 28–259)
EKG ATRIAL RATE: 144 BPM
EKG ATRIAL RATE: 300 BPM
EKG DIAGNOSIS: NORMAL
EKG DIAGNOSIS: NORMAL
EKG Q-T INTERVAL: 316 MS
EKG Q-T INTERVAL: 354 MS
EKG QRS DURATION: 74 MS
EKG QRS DURATION: 76 MS
EKG QTC CALCULATION (BAZETT): 442 MS
EKG QTC CALCULATION (BAZETT): 476 MS
EKG R AXIS: 21 DEGREES
EKG R AXIS: 21 DEGREES
EKG T AXIS: -31 DEGREES
EKG T AXIS: 52 DEGREES
EKG VENTRICULAR RATE: 109 BPM
EKG VENTRICULAR RATE: 118 BPM
EOSINOPHILS ABSOLUTE: 0.3 K/UL (ref 0–0.6)
EOSINOPHILS RELATIVE PERCENT: 4.4 %
GFR AFRICAN AMERICAN: >60
GFR NON-AFRICAN AMERICAN: 59
GLUCOSE BLD-MCNC: 102 MG/DL (ref 70–99)
HCT VFR BLD CALC: 38.5 % (ref 36–48)
HDLC SERPL-MCNC: 58 MG/DL (ref 40–60)
HEMOGLOBIN: 13 G/DL (ref 12–16)
LDL CHOLESTEROL CALCULATED: 86 MG/DL
LYMPHOCYTES ABSOLUTE: 1.8 K/UL (ref 1–5.1)
LYMPHOCYTES RELATIVE PERCENT: 29.6 %
MAGNESIUM: 1.6 MG/DL (ref 1.8–2.4)
MCH RBC QN AUTO: 29.8 PG (ref 26–34)
MCHC RBC AUTO-ENTMCNC: 33.9 G/DL (ref 31–36)
MCV RBC AUTO: 88 FL (ref 80–100)
MICROALBUMIN UR-MCNC: <1.2 MG/DL
MICROALBUMIN/CREAT UR-RTO: NORMAL MG/G (ref 0–30)
MONOCYTES ABSOLUTE: 0.7 K/UL (ref 0–1.3)
MONOCYTES RELATIVE PERCENT: 11.5 %
NEUTROPHILS ABSOLUTE: 3.2 K/UL (ref 1.7–7.7)
NEUTROPHILS RELATIVE PERCENT: 53.3 %
OSMOLALITY URINE: 279 MOSM/KG (ref 390–1070)
OSMOLALITY: 279 MOSM/KG (ref 280–301)
PDW BLD-RTO: 12.9 % (ref 12.4–15.4)
PLATELET # BLD: 369 K/UL (ref 135–450)
PMV BLD AUTO: 7.4 FL (ref 5–10.5)
POTASSIUM SERPL-SCNC: 3.6 MMOL/L (ref 3.5–5.1)
RBC # BLD: 4.38 M/UL (ref 4–5.2)
SODIUM BLD-SCNC: 133 MMOL/L (ref 136–145)
T4 FREE: 1.2 NG/DL (ref 0.9–1.8)
TRIGL SERPL-MCNC: 85 MG/DL (ref 0–150)
TSH SERPL DL<=0.05 MIU/L-ACNC: 3.02 UIU/ML (ref 0.27–4.2)
VLDLC SERPL CALC-MCNC: 17 MG/DL
WBC # BLD: 6 K/UL (ref 4–11)

## 2021-06-22 PROCEDURE — 36415 COLL VENOUS BLD VENIPUNCTURE: CPT

## 2021-06-22 PROCEDURE — 2060000000 HC ICU INTERMEDIATE R&B

## 2021-06-22 PROCEDURE — 97110 THERAPEUTIC EXERCISES: CPT

## 2021-06-22 PROCEDURE — 6360000002 HC RX W HCPCS: Performed by: HOSPITALIST

## 2021-06-22 PROCEDURE — 93005 ELECTROCARDIOGRAM TRACING: CPT | Performed by: HOSPITALIST

## 2021-06-22 PROCEDURE — 97116 GAIT TRAINING THERAPY: CPT

## 2021-06-22 PROCEDURE — 85025 COMPLETE CBC W/AUTO DIFF WBC: CPT

## 2021-06-22 PROCEDURE — 6370000000 HC RX 637 (ALT 250 FOR IP): Performed by: HOSPITALIST

## 2021-06-22 PROCEDURE — 2580000003 HC RX 258: Performed by: HOSPITALIST

## 2021-06-22 PROCEDURE — 6360000002 HC RX W HCPCS: Performed by: INTERNAL MEDICINE

## 2021-06-22 PROCEDURE — 97161 PT EVAL LOW COMPLEX 20 MIN: CPT

## 2021-06-22 PROCEDURE — 80061 LIPID PANEL: CPT

## 2021-06-22 PROCEDURE — 83735 ASSAY OF MAGNESIUM: CPT

## 2021-06-22 PROCEDURE — 97535 SELF CARE MNGMENT TRAINING: CPT

## 2021-06-22 PROCEDURE — 6370000000 HC RX 637 (ALT 250 FOR IP): Performed by: INTERNAL MEDICINE

## 2021-06-22 PROCEDURE — 93010 ELECTROCARDIOGRAM REPORT: CPT | Performed by: INTERNAL MEDICINE

## 2021-06-22 PROCEDURE — 80048 BASIC METABOLIC PNL TOTAL CA: CPT

## 2021-06-22 PROCEDURE — 99222 1ST HOSP IP/OBS MODERATE 55: CPT | Performed by: INTERNAL MEDICINE

## 2021-06-22 PROCEDURE — 97165 OT EVAL LOW COMPLEX 30 MIN: CPT

## 2021-06-22 RX ORDER — MAGNESIUM SULFATE IN WATER 40 MG/ML
2000 INJECTION, SOLUTION INTRAVENOUS ONCE
Status: COMPLETED | OUTPATIENT
Start: 2021-06-22 | End: 2021-06-22

## 2021-06-22 RX ORDER — METOPROLOL SUCCINATE 50 MG/1
50 TABLET, EXTENDED RELEASE ORAL 2 TIMES DAILY
Status: DISCONTINUED | OUTPATIENT
Start: 2021-06-22 | End: 2021-06-27 | Stop reason: HOSPADM

## 2021-06-22 RX ORDER — POTASSIUM CHLORIDE 20 MEQ/1
40 TABLET, EXTENDED RELEASE ORAL 3 TIMES DAILY
Status: COMPLETED | OUTPATIENT
Start: 2021-06-22 | End: 2021-06-22

## 2021-06-22 RX ADMIN — FUROSEMIDE 2 MG/HR: 10 INJECTION, SOLUTION INTRAMUSCULAR; INTRAVENOUS at 05:41

## 2021-06-22 RX ADMIN — FUROSEMIDE 2 MG/HR: 10 INJECTION, SOLUTION INTRAMUSCULAR; INTRAVENOUS at 22:05

## 2021-06-22 RX ADMIN — SIMETHICONE 120 MG: 80 TABLET, CHEWABLE ORAL at 09:17

## 2021-06-22 RX ADMIN — METOPROLOL SUCCINATE 50 MG: 50 TABLET, EXTENDED RELEASE ORAL at 22:07

## 2021-06-22 RX ADMIN — Medication 10 MG: at 22:06

## 2021-06-22 RX ADMIN — MULTIPLE VITAMINS W/ MINERALS TAB 1 TABLET: TAB at 09:17

## 2021-06-22 RX ADMIN — ASPIRIN 81 MG: 81 TABLET, COATED ORAL at 09:17

## 2021-06-22 RX ADMIN — POTASSIUM CHLORIDE 40 MEQ: 1500 TABLET, EXTENDED RELEASE ORAL at 22:07

## 2021-06-22 RX ADMIN — SIMETHICONE 120 MG: 80 TABLET, CHEWABLE ORAL at 22:07

## 2021-06-22 RX ADMIN — POTASSIUM CHLORIDE 40 MEQ: 1500 TABLET, EXTENDED RELEASE ORAL at 15:01

## 2021-06-22 RX ADMIN — APIXABAN 2.5 MG: 2.5 TABLET, FILM COATED ORAL at 22:07

## 2021-06-22 RX ADMIN — ATORVASTATIN CALCIUM 20 MG: 10 TABLET, FILM COATED ORAL at 09:17

## 2021-06-22 RX ADMIN — ESCITALOPRAM OXALATE 10 MG: 10 TABLET ORAL at 09:17

## 2021-06-22 RX ADMIN — POLYETHYLENE GLYCOL 3350 17 G: 17 POWDER, FOR SOLUTION ORAL at 09:17

## 2021-06-22 RX ADMIN — MAGNESIUM SULFATE HEPTAHYDRATE 2000 MG: 40 INJECTION, SOLUTION INTRAVENOUS at 15:54

## 2021-06-22 RX ADMIN — APIXABAN 2.5 MG: 2.5 TABLET, FILM COATED ORAL at 09:17

## 2021-06-22 RX ADMIN — Medication 10 ML: at 22:09

## 2021-06-22 RX ADMIN — Medication 1000 UNITS: at 09:17

## 2021-06-22 RX ADMIN — Medication 100 MG: at 09:17

## 2021-06-22 ASSESSMENT — PAIN SCALES - GENERAL
PAINLEVEL_OUTOF10: 0
PAINLEVEL_OUTOF10: 0

## 2021-06-22 NOTE — PROGRESS NOTES
Patient admitted to room 206 from ED. Patient oriented to room, call light, bed rails, phone, lights and bathroom. Patient instructed about the schedule of the day including: vital sign frequency, lab draws, possible tests, frequency of MD and staff rounds, including RN/MD rounding together at bedside, daily weights, and I &O's. Patient instructed about prescribed diet, how to use 8MENU, and television. Bed alarm in place, patient aware of placement and reason. Telemetry box 104 in place, patient aware of placement and reason. Bed locked, in lowest position, side rails up 2/4, call light within reach. Will continue to monitor.

## 2021-06-22 NOTE — CONSULTS
[facility]   Cardiovascular Evaluation    PATIENT: Delia Mccartney  DATE: 2021  MRN: [id number]  CSN: [id number]  : 1929    Primary Care Doctor/Referring provider: Paul Evans MD, MD, Jacoby Sutherland MD     Reason for evaluation/Chief complaint:   Shortness of Breath (recently admitted for fluid overload, states she was discharged, felt good for a day, now SOB again. )    Subjective:    History of present illness on initial date of evaluation:   Delia Mccartney is a 81 y.o. patient who presents for the evaluation of recurrent shortness of breath. The patient was recently admitted for a short hospital course last week with symptoms of atrial fibrillation and rapid ventricular response in conjunction with decompensated congestive heart failure. She was treated aggressively with medication and had stabilization of her atrial fibrillation and volume status. She was educated on aggressive sodium reduction and fluid reduction. She states that she did well until yesterday. She states that her shortness of breath returned. She states that her shortness of breath is mainly with exertion. The started make her feel uncomfortable similar to her previous presentation. This prompted her to seek medical evaluation emergency room. She was seen evaluated in the ER noted to have atrial fibrillation with rapid ventricular response again. She was subsequently admitted to the hospital.  Was also noted that she had a mild degree of her renal failure and electrolyte abnormalities. Last week my consult \"SOB with associated lower extremity edema. The patient lives independently at home and had noted increasing lower extremity edema with shortness of breath. She states that the shortness of breath developed and suddenly became significant where she felt panicky and uncomfortable. This sought her to seek medical evaluation.   She was seen and evaluated in the emergency room and found to have evidence for clinical heart failure. Patient has been treated with IV Lasix and has had significant improvement of her shortness of breath and lower extremity edema. Cardiology has been asked to see the patient for further recommendations and management regarding her cardiovascular status. he patient was admitted in late May with a episode of atrial fibrillation rapid ventricular response. At that time she was also found to possibly have had a TIA. Patient was on beta-blocker and novel oral anticoagulation at that time. She was scheduled to follow-up with our EP team and undergo 2-week event monitor. In addition, she was scheduled to undergo possible sleep apnea testing. In addition, the patient was admitted to [facility] in March 2021. At that time she was admitted for a urinary tract infection and and found to have paroxysmal atrial fibrillation. Her atrial fibrillation resolved without intervention. She did follow-up with cardiology at [facility] in March 2021. At that time they had recommended blood pressure control along with withholding anticoagulation until cleared from gastrointestinal team.  That time she had some degree of known anemia that was of concern. \"       Patient Active Problem List   Diagnosis    Atrial fibrillation with rapid ventricular response ([address]    Essential hypertension    Amaurosis fugax of right eye    Chronic diastolic CHF (congestive heart failure), NYHA class 3 ([facility])    PAF (paroxysmal atrial fibrillation) ([facility])    History of TIA (transient ischemic attack)    SOB (shortness of breath)    Heart failure, diastolic, with acute decompensation ([facility])    Hyponatremia    TEREZA (acute kidney injury) ([address]    CHF (congestive heart failure) ([facility] [facility]         Cardiac Testing: I have reviewed the findings below.   EKG:  ECHO:   STRESS TEST:  CATH:  BYPASS:  VASCULAR:    Past Medical History:   has a past medical history of Arthritis, Atrial fibrillation ([facility] [address] Back fracture, CHF (congestive heart failure) ([facility] [address] HTN (hypertension), Hyperlipidemia, and Panic attack. Surgical History:   has a past surgical history that includes Hysterectomy; Hemorrhoid surgery; Colonoscopy (2015); and eye surgery. Social History:   reports that she has quit smoking. She has never used smokeless tobacco. She reports current alcohol use. She reports that she does not use drugs. Family History:  No evidence for sudden cardiac death or premature CAD    Medications:  Reviewed and are listed in nursing record. and/or listed below  Outpatient Medications:  Prior to Admission medications    Medication Sig Start Date End Date Taking?  Authorizing Provider   atorvastatin (LIPITOR) 20 MG tablet Take 1 tablet by mouth daily 6/17/21  Yes KRYSTLE Samson CNP   metoprolol succinate (TOPROL XL) 25 MG extended release tablet Take 1 tablet by mouth 2 times daily 6/16/21  Yes KRYSTLE Samson CNP   furosemide (LASIX) 20 MG tablet Take 1 tablet by mouth daily 6/17/21  Yes KRYSTLE Samson CNP   potassium chloride (KLOR-CON M) 20 MEQ extended release tablet Take 1 tablet by mouth daily 6/16/21  Yes KRYSTLE Samson CNP   melatonin 10 MG CAPS capsule Take 10 mg by mouth nightly   Yes Historical Provider, MD   multivitamin-iron-minerals-folic acid (CENTRUM) chewable tablet Take 1 tablet by mouth daily   Yes Historical Provider, MD   magnesium 30 MG tablet Take 30 mg by mouth 2 times daily   Yes Historical Provider, MD   apixaban (ELIQUIS) 5 MG TABS tablet Take 1 tablet by mouth 2 times daily 5/22/21  Yes Cisco Thurman MD   Docusate Sodium (DSS) 250 MG CAPS Take 250 mg by mouth daily   Yes Historical Provider, MD   vitamin D (CHOLECALCIFEROL) 25 MCG (1000 UT) TABS tablet Take 1,000 Units by mouth daily   Yes Historical Provider, MD   Multiple Vitamin (MULTIVITAMIN) tablet Take 1 tablet by mouth daily   Yes Historical Provider, MD polyethylene glycol (GLYCOLAX) 17 GM/SCOOP powder Take 17 g by mouth daily as needed    Yes Historical Provider, MD   amLODIPine (NORVASC) 5 MG tablet Take 5 mg by mouth daily   Yes Historical Provider, MD   vitamin B-1 (THIAMINE) 100 MG tablet Take 100 mg by mouth daily    Historical Provider, MD   thiamine 100 MG tablet Take 1 tablet by mouth daily 5/23/21   Erik Eagle MD   escitalopram (LEXAPRO) 10 MG tablet Take 10 mg by mouth daily  5/5/21   Historical Provider, MD   HYDROcodone-acetaminophen (NORCO) 5-325 MG per tablet Take 1 tablet by mouth every 6 hours as needed. Historical Provider, MD   aspirin 81 MG EC tablet Take 81 mg by mouth daily 3/11/21   Historical Provider, MD   Simethicone 125 MG CAPS Take 125 mg by mouth 2 times daily 12/24/20   Historical Provider, MD       In-patient schedule medications:   apixaban  2.5 mg Oral BID    aspirin  81 mg Oral Daily    atorvastatin  20 mg Oral Daily    escitalopram  10 mg Oral Daily    polyethylene glycol  17 g Oral Daily    melatonin  10 mg Oral Nightly    metoprolol succinate  25 mg Oral BID    simethicone  120 mg Oral BID    thiamine  100 mg Oral Daily    thiamine  100 mg Oral Daily    Vitamin D  1,000 Units Oral Daily    sodium chloride flush  10 mL Intravenous 2 times per day    multivitamin  1 tablet Oral Daily         Infusion Medications:   dilTIAZem (CARDIZEM) 125 mg in dextrose 5% 125 mL infusion Stopped (06/21/21 1826)    sodium chloride      furosemide (LASIX) 1mg/ml infusion 2 mg/hr (06/22/21 0541)         Allergies:  Lisinopril     Review of Systems:   14 point review of systems unable to be completed due to patient condition/cooperation. All available positives mentioned in history of present illness.          Physical Examination:    [unfilled]  /75   Pulse 116   Temp 98.4 °F (36.9 °C) (Oral)   Resp 16   Ht 5' 3\" (1.6 m)   Wt 125 lb 4.8 oz (56.8 kg)   SpO2 92%   BMI 22.20 kg/m²    Weight: 125 lb 4.8 oz (56.8 kg)     Wt Readings from Last 3 Encounters:   06/22/21 125 lb 4.8 oz (56.8 kg)   06/17/21 132 lb 9.6 oz (60.1 kg)   05/23/21 134 lb 8 oz (61 kg)       Intake/Output Summary (Last 24 hours) at 6/22/2021 0847  Last data filed at 6/22/2021 0716  Gross per 24 hour   Intake 253.59 ml   Output 1590 ml   Net -1336.41 ml       Physical Examination:    [unfilled]  /75   Pulse 116   Temp 98.4 °F (36.9 °C) (Oral)   Resp 16   Ht 5' 3\" (1.6 m)   Wt 125 lb 4.8 oz (56.8 kg)   SpO2 92%   BMI 22.20 kg/m²    Weight: 125 lb 4.8 oz (56.8 kg)     Wt Readings from Last 3 Encounters:   06/22/21 125 lb 4.8 oz (56.8 kg)   06/17/21 132 lb 9.6 oz (60.1 kg)   05/23/21 134 lb 8 oz (61 kg)       Intake/Output Summary (Last 24 hours) at 6/22/2021 0847  Last data filed at 6/22/2021 9386  Gross per 24 hour   Intake 253.59 ml   Output 1590 ml   Net -1336.41 ml       General Appearance:  Alert, cooperative, no distress, appears stated age   Head:  Normocephalic, without obvious abnormality, atraumatic   Eyes:  PERRL, conjunctiva/corneas clear       Nose: Nares normal, no drainage or sinus tenderness   Throat: Lips, mucosa, and tongue normal   Neck: Supple, symmetrical, trachea midline, no adenopathy, thyroid: not enlarged, symmetric, no tenderness/mass/nodules, no carotid bruit. no JVD       Lungs:   Reduced to auscultation with rales bilaterally, respirations mildly labored   Chest Wall:  No tenderness or deformity   Heart:  irregular rhythm and normal rate; S1, S2 are normal; no murmur noted; no rub or gallop   Abdomen:   Soft, non-tender, bowel sounds active all four quadrants,  no masses, no organomegaly           Extremities: Extremities atraumatic, no cyanosis.  trace edema   Pulses: 2+ and symmetric   Skin: Skin color, texture, turgor normal, no rashes or lesions   Pysch: Normal mood and affect   Neurologic: Normal gross motor and sensory exam.           Labs  Recent Labs     06/21/21  1525 06/22/21  0657   WBC 11.3* 6.0   HGB 12.7 13.0   HCT 36.8 38.5   MCV 87.7 88.0    369     Recent Labs     06/21/21  1525 06/22/21  0657   CREATININE 1.5* 0.9   BUN 28* 22*   * 133*   K 4.3 3.6   CL 91* 93*   CO2 25 30     No results for input(s): INR, PROTIME in the last 72 hours. Recent Labs     06/21/21  1525 06/21/21  2039   Doug Cuff <0.01 <0.01     Invalid input(s): PRO-BNP  No results for input(s): CHOL, HDL in the last 72 hours. Invalid input(s): LDL, TG      Imaging:  I have reviewed the below testing personally and my interpretation is below. EKG:  Atrial fibrillation with rapid ventricular responseNonspecific ST and T wave abnormalityAbnormal ECGWhen compared with ECG of 21-JUN-2021 16:27,Nonspecific T wave abnormality now evident in Inferior leadsNonspecific T wave abnormality now evident in Lateral leads    CXR:    Assessment:  81 y.o. patient with:  Principal Problem:    CHF (congestive heart failure) ([facility])  Active Problems:    Atrial fibrillation with rapid ventricular response ([facility])    Essential hypertension    Hyponatremia    TEREZA (acute kidney injury) ([facility] [address]  Resolved Problems:    * No resolved hospital problems. *    Plan:  1. Clinical symptoms at this point appeared to be related to AFIB with RVR episode. ~MAY NEED TO TARGET ATRIAL ARRHYTHMIA MANAGEMENT. Will increase metoprolol dose. ~possible flash pulmonary edema and underlying presumptive diastolic heart disease. .  2.  Echocardiogram had been done in March 2021 while she was admitted to [facility].  At that time she had normal left ventricular function with moderate left atrial enlargement. She did not have aortic stenosis or mitral stenosis on that exam.  3.  Education given on low sodium and fluid restriction. Daughter is at bedside and was additionally educated  4. Continue her anticoagulation ( if not contraindicated ) and metoprolol for atrial fibrillation.   ~this has been adjusted for RF  5.   Continue low-dose diuretic to her outpatient regimen   ~lasix 20mg  6. No inpatient cardiovascular testing is necessary in house. Medical Decision Making: The following items were considered in medical decision making:  Independent review of images  Review / order clinical lab tests  Review / order radiology tests  Decision to obtain old records  Review and summation of old records as accessed through Alex (a summary of my findings in these old records)        All questions and concerns were addressed to the patient/family. Alternatives to my treatment were discussed. The note was completed using EMR. Every effort was made to ensure accuracy; however, inadvertent computerized transcription errors may be present.     Gabrielle Dumont MD, [id number], [facility]  [phone] [facility] office  [phone] Main central  6/22/2021  8:47 AM

## 2021-06-22 NOTE — PROGRESS NOTES
Pulmonary Office Follow-up    Subjective     Vishnu Waller is seen today at the office for   Chief Complaint   Patient presents with   • Follow-up     3 month         HPI  Vishnu Waller is a 79 y.o. male with a PMH significant for COPD with asthma, past tobacco use, allergies, CAD s/p CABG, HTN, DM, and borderline glaucoma who presents for follow-up of COPD.      8/30/19: He remained stable on Symbicort with infrequent albuterol use. I again cautioned him on smoking marijuana.    3/5/20:  He had persistent dyspnea on Symbicort 80 so recommend increasing the dose to 160 and advised him continue on his allergy regimen.  He is also started smoking again so I advised him strongly to stop but he was not willing to set a quit date.    7/6/20: His insurance was no longer covering Symbicort as he had not been using it regularly.  I recommended that he change over to Breo 100 daily and continue on his allergy regimen.    10/23/20: Unfortunately, his insurance changed and the Breo was going to cost over $300 a month so he did not get it filled.  He had some Symbicort left so he has been using it 1-2 times a day.  Patient states that his breathing is about the same.  He does continue to get out of breath but admits he is not very active due to his worsening back pain.  He does not need to use the albuterol and in fact does not have an inhaler currently.  He has had occasional cough, but is not bothersome he denies any sputum, fever, or chills.  He did have a course of antibiotics when he underwent lithotripsy, but he has not had any antibiotics for respiratory illness.  He is having some allergy symptoms but continues on his Flonase and Zyrtec.  He has been able to maintain tobacco cessation since May.      Tobacco use history:  Type: cigarettes  Amount: 1-2 ppd  Duration: 60 years  Cessation: 2017   Willing to quit: N/A      Patient Active Problem List   Diagnosis   • Degeneration of lumbar intervertebral  Nutrition Education    Pt seen for CHF nutrition education. Writer modified diet to 2 gm sodium. Provided pt with written and verbal instruction on HF nutrition therapy. Discussed low sodium diet, daily weights, and fluid restriction. Additional handouts provided on low sodium frozen meals and salt substitutes. Pt c/o taste of new diet, samples provided to pt. Will continue to monitor for further nutrition questions. Time spent: 10 minutes      · Verbally reviewed information with Patient and Family  · Educated on CHF  · Written educational materials provided. · Contact name and number provided. · Refer to Patient Education activity for more details.     Electronically signed by Maria D Ram MS, RD, LD on 6/22/21 at 12:02 PM EDT    Contact: [address] disc   • Low back pain without sciatica   • Neuritis or radiculitis due to rupture of lumbar intervertebral disc   • Borderline glaucoma, open angle with borderline findings   • Pseudophakia   • History of coronary artery bypass surgery   • Essential hypertension   • Mixed hyperlipidemia   • Thrombocytopenia (CMS/HCC)   • Spinal stenosis of lumbar region   • Degenerative disc disease, lumbar   • Chronic pain of right knee   • History of artificial joint   • B12 deficiency   • Degeneration of intervertebral disc of lumbosacral region   • Personal history of other infectious and parasitic diseases   • Status post lumbar spine operation   • History of pyelonephritis   • History of surgical procedure   • History of repair of rotator cuff   • Encounter for follow-up examination after completed treatment for conditions other than malignant neoplasm   • Encounter for general adult medical examination without abnormal findings   • Osteoarthritis of knee   • Osteoarthritis of multiple joints   • Primary insomnia   • Encounter for screening for malignant neoplasm of colon   • Encounter for screening for malignant neoplasm of prostate   • Type 2 diabetes mellitus without complication, without long-term current use of insulin (CMS/HCC)   • Coronary artery disease involving native coronary artery of native heart without angina pectoris   • Dyspnea on exertion   • Presence of aortocoronary bypass graft   • Dizziness and giddiness   • Postviral fatigue syndrome   • Postviral fatigue syndrome   • Recurrent kidney stones   • Thrombocytopenia (CMS/HCC)   • Pyelonephritis   • COPD with asthma (CMS/HCC)   • Chronic non-seasonal allergic rhinitis   • Unstable angina (CMS/HCC)   • NSTEMI (non-ST elevated myocardial infarction) (CMS/HCC)   • History of PTCA 1   • Medicare annual wellness visit, subsequent   • Thoracic aortic aneurysm without rupture (CMS/HCC)   • Chronic kidney disease, stage 2 (mild)   • Personal history of tobacco use,  presenting hazards to health   • Cervical pain (neck)   • Thoracic spine pain   • Calculus of ureter   • Foreign body in bladder and urethra   • Physical deconditioning   • Left hip pain   • Pain of left humerus   • PVD (peripheral vascular disease) (CMS/HCC)       Review of Systems  Review of Systems   Constitutional: Positive for fatigue. Negative for fever and unexpected weight change.   HENT: Positive for congestion and postnasal drip.    Respiratory: Positive for shortness of breath. Negative for cough, chest tightness and wheezing.    Cardiovascular: Negative for chest pain and leg swelling.   Musculoskeletal: Positive for arthralgias and back pain.     As described in the HPI. Otherwise, remainder of ROS (14 systems) were negative.    Medications, Allergies, Social, and Family Histories reviewed as per EMR.    Objective     Vitals:    10/23/20 1454   BP: 142/68   Pulse: 82   SpO2: 94%     Physical Exam   Constitutional: He is oriented to person, place, and time. He appears well-developed.   HENT:   Head: Normocephalic and atraumatic.   Eyes: Pupils are equal, round, and reactive to light. Conjunctivae and lids are normal.   Neck: Trachea normal and normal range of motion. No tracheal tenderness present. No thyroid mass present.   Cardiovascular: Normal rate, regular rhythm and normal heart sounds. PMI is not displaced. Exam reveals no gallop.   No murmur heard.  Pulmonary/Chest: Effort normal and breath sounds normal. No respiratory distress. He has no decreased breath sounds. He has no wheezes. He has no rhonchi. He exhibits no tenderness.   Abdominal: Soft. Normal appearance and bowel sounds are normal. There is no hepatomegaly. There is no abdominal tenderness.   Musculoskeletal:      Comments: Normal gait, no extremity edema     Vascular Status -  His right foot exhibits no edema. His left foot exhibits no edema.  Lymphadenopathy:        Head (right side): No submandibular adenopathy present.        Head  (left side): No submandibular adenopathy present.     He has no cervical adenopathy.        Right: No supraclavicular adenopathy present.        Left: No supraclavicular adenopathy present.   Neurological: He is alert and oriented to person, place, and time. Gait normal.   Skin: Skin is warm and dry. No rash noted. Nails show no clubbing.   Psychiatric: His speech is normal and behavior is normal. Judgment normal.   Nursing note and vitals reviewed.          Assessment/Plan     Diagnoses and all orders for this visit:    1. COPD with asthma (CMS/[facility]) (Primary)  -     albuterol sulfate  (90 Base) MCG/ACT inhaler; Inhale 2 puffs Every 4 (Four) Hours As Needed for Wheezing or Shortness of Air.  Dispense: 54 g; Refill: 4    2. Chronic non-seasonal allergic rhinitis    3. Physical deconditioning    4. Personal history of tobacco use, presenting hazards to health         Discussion/ Recommendations:   I do think he would benefit from being on ICS/LABA, but his insurance is changed and the coverage is uncertain.  I recommended that he contact his insurance company to find out which bronchodilator they cover and I will send in for this.  Otherwise, if he cannot afford it through his insurance, I have recommended using generic fluticasone/salmeterol using the good Rx coupon or worst case we can do scheduled duo nebs.    -Continue Symbicort twice daily for now.  -Patient is to contact insurance company to find out which ICS/LABA is covered and he will call the office so I can send prescription.  If he cannot afford these with his insurance, we will try fluticasone/salmeterol using the good Rx coupon for around $100 a month.  -Use albuterol only as needed for dyspnea or wheeze.  -Continue Flonase and Singulair daily.  Encouraged frequent nasal saline.  -Qualifies for LDCT, but also having CT chest annually to follow thoracic aortic aneurysm. Will use these scans as his screening (1-2ppd x 60yrs, quit 2017).  -Encouraged  lifelong tobacco and marijuana cessation.  -Up-to-date with pneumococcal and influenza vaccinations.    Patient's Body mass index is 26.04 kg/m². BMI is within normal parameters. No follow-up required.        Return in about 3 months (around 1/23/2021) for Recheck COPD.          This document has been electronically signed by Camryn Koehler MD on October 23, 2020 15:24 CDT      Dictated using Dragon

## 2021-06-22 NOTE — ED PROVIDER NOTES
CHIEF COMPLAINT  Shortness of Breath (recently admitted for fluid overload, states she was discharged, felt good for a day, now SOB again. )      HISTORY OF PRESENT ILLNESS  Zofia Best is a 81 y.o. female with a history of atrial fibrillation, CHF, hypertension, TIA, dyslipidemia, on Eliquis who presents to the ED complaining of shortness of breath. Patient discharged from the hospital on 6/17/2021 after treatment for CHF exacerbation. Reports she was feeling well for a few days after getting out of the hospital however has developed recurrence of her dyspnea which is worse with ambulation. Was seen by her doctor today who was concerned enough to refer her back to the ER. She denies chest pain, productive cough, fever, chills, diaphoresis, syncope, nausea, vomiting, abdominal pain. Patient states she is compliant with her home medications. No other complaints, modifying factors or associated symptoms. I have reviewed the following from the nursing documentation. Past Medical History:   Diagnosis Date    Arthritis     Atrial fibrillation ([address]     Back fracture     stress fracture active 4/17/15    CHF (congestive heart failure) (HCC)     HTN (hypertension)     Hyperlipidemia     Panic attack      Past Surgical History:   Procedure Laterality Date    COLONOSCOPY  2015    tics    EYE SURGERY      HEMORRHOID SURGERY      HYSTERECTOMY       History reviewed. No pertinent family history. Social History     Socioeconomic History    Marital status:       Spouse name: Not on file    Number of children: Not on file    Years of education: Not on file    Highest education level: Not on file   Occupational History    Not on file   Tobacco Use    Smoking status: Former Smoker    Smokeless tobacco: Never Used   Substance and Sexual Activity    Alcohol use: Yes     Comment: 5 beers a week    Drug use: Never    Sexual activity: Not Currently   Other Topics Concern    Not on file Social History Narrative    Not on file     Social Determinants of Health     Financial Resource Strain:     Difficulty of Paying Living Expenses:    Food Insecurity:     Worried About Running Out of Food in the Last Year:     [address] in the Last Year:    Transportation Needs:     Lack of Transportation (Medical):      Lack of Transportation (Non-Medical):    Physical Activity:     Days of Exercise per Week:     Minutes of Exercise per Session:    Stress:     Feeling of Stress :    Social Connections:     Frequency of Communication with Friends and Family:     Frequency of Social Gatherings with Friends and Family:     Attends Sikh Services:     Active Member of Clubs or Organizations:     Attends Club or Organization Meetings:     Marital Status:    Intimate Partner Violence:     Fear of Current or Ex-Partner:     Emotionally Abused:     Physically Abused:     Sexually Abused:      Current Facility-Administered Medications   Medication Dose Route Frequency Provider Last Rate Last Admin    apixaban (ELIQUIS) tablet 2.5 mg  2.5 mg Oral BID Ailin Rios MD   2.5 mg at 06/21/21 2152    [START ON 6/22/2021] aspirin EC tablet 81 mg  81 mg Oral Daily Ailin Rios MD        atorvastatin (LIPITOR) tablet 20 mg  20 mg Oral Daily Ailin Rios MD        escitalopram (LEXAPRO) tablet 10 mg  10 mg Oral Daily Ailin Rios MD        polyethylene glycol (GLYCOLAX) packet 17 g  17 g Oral Daily Ailin Rios MD        melatonin disintegrating tablet 10 mg  10 mg Oral Nightly Ailin Rios MD   10 mg at 06/21/21 2152    [START ON 6/22/2021] metoprolol succinate (TOPROL XL) extended release tablet 25 mg  25 mg Oral BID Ailin Rios MD        simethicone (MYLICON) chewable tablet 120 mg  120 mg Oral BID Ailin Rios MD        [START ON 6/22/2021] thiamine tablet 100 mg  100 mg Oral Daily Ailin Rios MD        thiamine tablet 100 mg  100 mg Oral Daily Zoe A MD Modesto Acosta ON 6/22/2021] vitamin D (CHOLECALCIFEROL) tablet 1,000 Units  1,000 Units Oral Daily Marie Terry MD        dilTIAZem 125 mg in dextrose 5 % 125 mL infusion  2-15 mg/hr Intravenous Continuous Marie Terry MD   Held at 06/21/21 1826    sodium chloride flush 0.9 % injection 10 mL  10 mL Intravenous 2 times per day Marie Terry MD   10 mL at 06/21/21 2152    sodium chloride flush 0.9 % injection 10 mL  10 mL Intravenous PRN Marie Terry MD        0.9 % sodium chloride infusion  25 mL Intravenous PRN Marie Trery MD        senna (SENOKOT) tablet 8.6 mg  1 tablet Oral Daily PRN Marie Terry MD        acetaminophen (TYLENOL) tablet 650 mg  650 mg Oral Q6H PRN Marie Terry MD        Or   [facility] acetaminophen (TYLENOL) suppository 650 mg  650 mg Rectal Q6H PRN Marie Terry MD        potassium chloride (KLOR-CON M) extended release tablet 40 mEq  40 mEq Oral PRN Marie Terry MD        Or    potassium bicarb-citric acid (EFFER-K) effervescent tablet 40 mEq  40 mEq Oral PRN Marie Terry MD        Or   [facility] potassium chloride 10 mEq/100 mL IVPB (Peripheral Line)  10 mEq Intravenous PRN Marie Terry MD        magnesium sulfate 2000 mg in 50 mL IVPB premix  2,000 mg Intravenous PRN Marie Terry MD        ondansetron (ZOFRAN-ODT) disintegrating tablet 4 mg  4 mg Oral Q8H PRN Marie Terry MD        Or    ondansetron [facility]) injection 4 mg  4 mg Intravenous Q6H PRN Marie Terry MD        furosemide (LASIX) 100 mg in dextrose 5 % 100 mL infusion  2 mg/hr Intravenous Continuous Marie Terry MD 2 mL/hr at 06/21/21 1825 2 mg/hr at 06/21/21 1825    [START ON 6/22/2021] therapeutic multivitamin-minerals 1 tablet  1 tablet Oral Daily Marie Terry MD         Allergies   Allergen Reactions    Lisinopril Other (See Comments)     Hyponatremia / SIADH       REVIEW OF SYSTEMS  10 systems reviewed, pertinent positives per HPI otherwise noted to be negative. PHYSICAL EXAM  /71   Pulse 85   Temp 97.7 °F (36.5 °C) (Oral)   Resp 18   Ht 5' 3\" (1.6 m)   Wt 130 lb 6.4 oz (59.1 kg)   SpO2 100%   BMI 23.10 kg/m²   GENERAL APPEARANCE: Awake and alert. Cooperative. No acute distress. HEAD: Normocephalic. Atraumatic. EYES: PERRL. EOM's grossly intact. ENT: Mucous membranes are moist.   NECK: Supple, trachea midline. HEART: Irregularly irregular, rate 112. Normal S1, S2. No murmurs, rubs or gallops. LUNGS: Respirations unlabored. Moderate air movement, fine rales at bilateral lung bases. Speaking comfortably in full sentences. ABDOMEN: Soft. Non-distended. Non-tender. No guarding or rebound. Normal Bowel sounds. EXTREMITIES: 1+ pitting edema. MAEE. No acute deformities. SKIN: Warm and dry. No acute rashes. NEUROLOGICAL: Alert and oriented X 3. CN II-XII intact. No gross facial drooping. Strength 5/5, sensation intact. No pronator drift. Normal coordination. PSYCHIATRIC: Normal mood and affect. LABS  I have reviewed all labs for this visit.    Results for orders placed or performed during the hospital encounter of 06/21/21   CBC auto differential   Result Value Ref Range    WBC 11.3 (H) 4.0 - 11.0 K/uL    RBC 4.20 4.00 - 5.20 M/uL    Hemoglobin 12.7 12.0 - 16.0 g/dL    Hematocrit 36.8 36.0 - 48.0 %    MCV 87.7 80.0 - 100.0 fL    MCH 30.1 26.0 - 34.0 pg    MCHC 34.4 31.0 - 36.0 g/dL    RDW 13.0 12.4 - 15.4 %    Platelets 032 142 - 325 K/uL    MPV 7.4 5.0 - 10.5 fL    Neutrophils % 73.6 %    Lymphocytes % 15.3 %    Monocytes % 8.4 %    Eosinophils % 1.9 %    Basophils % 0.8 %    Neutrophils Absolute 8.3 (H) 1.7 - 7.7 K/uL    Lymphocytes Absolute 1.7 1.0 - 5.1 K/uL    Monocytes Absolute 0.9 0.0 - 1.3 K/uL    Eosinophils Absolute 0.2 0.0 - 0.6 K/uL    Basophils Absolute 0.1 0.0 - 0.2 K/uL   Comprehensive Metabolic Panel w/ Reflex to MG   Result Value Ref Range    Sodium 126 (L) 136 - 145 mmol/L    Potassium reflex Magnesium 4.3 3.5 - 5.1 mmol/L    Chloride 91 (L) 99 - 110 mmol/L    CO2 25 21 - 32 mmol/L    Anion Gap 10 3 - 16    Glucose 112 (H) 70 - 99 mg/dL    BUN 28 (H) 7 - 20 mg/dL    CREATININE 1.5 (H) 0.6 - 1.2 mg/dL    GFR Non- 33 (A) >60    GFR  39 (A) >60    Calcium 9.2 8.3 - 10.6 mg/dL    Total Protein 6.9 6.4 - 8.2 g/dL    Albumin 3.6 3.4 - 5.0 g/dL    Albumin/Globulin Ratio 1.1 1.1 - 2.2    Total Bilirubin 0.3 0.0 - 1.0 mg/dL    Alkaline Phosphatase 78 40 - 129 U/L    ALT 20 10 - 40 U/L    AST 22 15 - 37 U/L    Globulin 3.3 g/dL   Brain Natriuretic Peptide   Result Value Ref Range    Pro-BNP 3,008 (H) 0 - 449 pg/mL   Troponin   Result Value Ref Range    Troponin <0.01 <0.01 ng/mL   Ethanol   Result Value Ref Range    Ethanol Lvl None Detected mg/dL   Microalbumin / creatinine urine ratio   Result Value Ref Range    Creatinine, Ur 39.6 28.0 - 259.0 mg/dL   Sodium, urine, random   Result Value Ref Range    Sodium, Ur 44 Not Established mmol/L   Troponin   Result Value Ref Range    Troponin <0.01 <0.01 ng/mL   EKG 12 Lead   Result Value Ref Range    Ventricular Rate 118 BPM    Atrial Rate 300 BPM    QRS Duration 76 ms    Q-T Interval 316 ms    QTc Calculation (Bazett) 442 ms    R Axis 21 degrees    T Axis 52 degrees    Diagnosis       Atrial fibrillation with rapid ventricular responseSeptal infarct , age undeterminedAbnormal ECGWhen compared with ECG of 15-CHELSEA-2021 05:56,Atrial fibrillation has replaced Sinus rhythmSeptal infarct is now Present       EKG  Atrial fibrillation with RVR, rate 118, normal axis, QTC within normal limits, no acute ST or T wave changes from prior dated 6/15/2021      RADIOLOGY  X-RAYS:  I have reviewed radiologic plain film image(s). ALL OTHER NON-PLAIN FILM IMAGES SUCH AS CT, ULTRASOUND AND MRI HAVE BEEN READ BY THE RADIOLOGIST. XR CHEST (2 VW)   Final Result   No radiographic evidence of acute pulmonary disease.                     Rechecks: Physical assessment performed. Non-toxic, comfortable. ED COURSE/MDM  Patient seen and evaluated. Old records reviewed. Labs and imaging reviewed and results discussed with patient. Patient recently discharged after treatment of CHF. Returns with shortness of breath and dyspnea on exertion. She has some rales at her lung bases and elevated BNP concerning for recurrence of CHF exacerbation. Patient also in atrial fibrillation with RVR and labs demonstrate acute kidney injury. Patient readmitted to the hospitalist service. Current Discharge Medication List          CLINICAL IMPRESSION  1. Acute on chronic congestive heart failure, unspecified heart failure type ([facility]    2. Atrial fibrillation with RVR ([facility]    3. TEREZA (acute kidney injury) ([facility]        Blood pressure 139/71, pulse 85, temperature 97.7 °F (36.5 °C), temperature source Oral, resp. rate 18, height 5' 3\" (1.6 m), weight 130 lb 6.4 oz (59.1 kg), SpO2 100 %, not currently breastfeeding. DISPOSITION  Basilia Ballard was admitted in stable condition.         Mehrdad Carter MD  06/21/21 1758

## 2021-06-22 NOTE — PROGRESS NOTES
range of motion without deformity. Skin: Skin color, texture, turgor normal.  No rashes or lesions. Neurologic:  Neurovascularly intact without any focal sensory/motor deficits. Cranial nerves: II-XII intact, grossly non-focal.  Psychiatric: Alert and oriented, thought content appropriate, normal insight  Capillary Refill: Brisk,< 3 seconds   Peripheral Pulses: +2 palpable, equal bilaterally       Labs:   Recent Labs     06/21/21  1525 06/22/21  0657   WBC 11.3* 6.0   HGB 12.7 13.0   HCT 36.8 38.5    369     Recent Labs     06/21/21  1525 06/22/21  0657   * 133*   K 4.3 3.6   CL 91* 93*   CO2 25 30   BUN 28* 22*   CREATININE 1.5* 0.9   CALCIUM 9.2 9.3     Recent Labs     06/21/21  1525   AST 22   ALT 20   BILITOT 0.3   ALKPHOS 78     No results for input(s): INR in the last 72 hours.   Recent Labs     06/21/21  1525 06/21/21 2039   Doug Cuff <0.01 <0.01       Urinalysis:      Lab Results   Component Value Date    NITRU Negative 05/20/2021    WBCUA 3-5 05/20/2021    RBCUA 0-2 05/20/2021    BLOODU Negative 05/20/2021    SPECGRAV 1.015 05/20/2021    GLUCOSEU Negative 05/20/2021       Consults:    IP CONSULT TO HOSPITALIST  IP CONSULT TO CARDIOLOGY  IP CONSULT TO HEART FAILURE NURSE/COORDINATOR      Assessment/Plan:    Active Hospital Problems    Diagnosis     CHF (congestive heart failure) ([address] [I50.9]     Hyponatremia [E87.1]     TEREZA (acute kidney injury) ([address] [N17.9]     Atrial fibrillation with rapid ventricular response ([facility] [I48.91]     Essential hypertension [I10]          A. fib RVR  -Admit to medical floor for continuous telemetry and pulse oximetry monitoring  -Patient initiated on IV Cardizem bolus and drip, the latter of which will be continued overnight (BP permitting)   -Electrolytes, thyroid studies ordered to further assess underlying causes for this tachyarrhythmia  -Patient currently anticoagulated on twice daily Eliquis; dosage reduced to 2.5 mg BID due to TEREZA at the time of admission     Acutely decompensated diastolic CHF  -Admit to floor for continuous telemetry monitoring  -Strict I's and O's; pure wick versus Nelson to be placed to assist with measurements  -IV Lasix GTT initiated at 2 mg/H rather than bolus due to borderline hypotension  -Echo performed 3/2/2021 therefore not reordered at this time; will defer to CARDS   -Continue home doses of ASA, Lipitor, Toprol-XL; BP parameters placed for Norvasc dosing  -2G Na and fluid restriction dietary modifications in place  -Consult placed to Cardiology for further recommendations       ARF - w/ elevated BUN/Cr ratio c/w pre-renal azotemia. Given IVF hydration and follow serial labs. Reviewed and documented as above. HypoNatremia - etiology clinically unable to determine but likely hypovolemic. Follow serial labs on IVF. Reviewed and documented as above. HypoMagnesemia - etiology clinically unable to determine. Follow serial labs and replace PRN. Reviewed and documented as above. DVT Prophylaxis: Eliquis     Recent Labs     06/21/21  1525 06/22/21  0657    369     Diet: ADULT DIET; Easy to Chew  Code Status: Full Code      PT/OT Eval Status: seen w/ recs for home w/ assist.     Cassie Del Valle - Possibly Wed/Thurs 23/24 June pending clinical course and subspecialty recs.      Shanell Jacobsen MD

## 2021-06-22 NOTE — PLAN OF CARE
Problem: HEMODYNAMIC STATUS  Goal: Patient has stable vital signs and fluid balance  Outcome: Ongoing     Problem: ACTIVITY INTOLERANCE/IMPAIRED MOBILITY  Goal: Mobility/activity is maintained at optimum level for patient  Outcome: Ongoing         Heart Failure Medications:  Diuretics[de-identified] Furosemide    (One of the following REQUIRED for EF <40%/SYSTOLIC FAILURE but MAY be used in EF% >40%/DIASTOLIC FAILURE)        ACE[de-identified] None        ARB[de-identified] None         ARNI[de-identified] None    (Beta Blockers)  NON- Evidenced Based Beta Blocker (for EF% >40%/DIASTOLIC FAILURE): None    Evidenced Based Beta Blocker::(REQUIRED for EF% <40%/SYSTOLIC FAILURE) Metoprolol SUCCinate- Toprol XL  . .................................................................................................................................................. Patient's weights and intake/output reviewed: Yes    Patient's Last Weight: 130 lbs obtained by standing scale. Admission weight. Intake/Output Summary (Last 24 hours) at 6/22/2021 0240  Last data filed at 6/22/2021 0125  Gross per 24 hour   Intake 13.59 ml   Output 720 ml   Net -706.41 ml       Comorbidities Reviewed Yes    Patient has a past medical history of Arthritis, Atrial fibrillation ([address] Back fracture, CHF (congestive heart failure) ([address] HTN (hypertension), Hyperlipidemia, and Panic attack. >>For CHF and Comorbidity documentation on Education Time and Topics, please see Education Tab    Progressive Mobility Assessment:  What is this patient's Current Level of Mobility?: Ambulatory- with Assistance  How was this patient Mobilized today?: Edge of Bed,  Up to Toilet/Shower, and Up in Room                 With Whom? Nurse                 Level of Difficulty/Assistance: 1x Assist     Pt resting in bed at this time on room air. Pt with complaints of shortness of breath. Pt without lower extremity edema.      Patient and/or Family's stated Goal of Care this Admission: reduce shortness of breath, increase activity tolerance, better understand heart failure and disease management, be more comfortable, and reduce lower extremity edema prior to discharge        :

## 2021-06-22 NOTE — PROGRESS NOTES
Occupational Therapy   Occupational Therapy Initial/discharge Assessment  1 x only    Date: 2021   Patient Name: Cristofer Bishop  MRN: [id number]     : 1929    Date of Service: 2021    Discharge Recommendations:  Home with assist PRN, Home independently       Assessment      OT Education: OT Role  Patient Education: disease specific:  importance of RED/nurse call light for A with transfers/mobility in room  Barriers to Learning: none  No Skilled OT: At baseline function  REQUIRES OT FOLLOW UP: No  Activity Tolerance  Activity Tolerance: Patient Tolerated treatment well  Activity Tolerance: sitting EOB after bathroom mobility on RA:  BP = 114/75, 92 % O 2 sats, HR = 110  Safety Devices  Safety Devices in place: Yes  Type of devices: Call light within reach; Chair alarm in place; Left in chair;Nurse notified           Patient Diagnosis(es): The primary encounter diagnosis was Acute on chronic congestive heart failure, unspecified heart failure type ([facility] Diagnoses of Atrial fibrillation with RVR ([facility] and TEREZA (acute kidney injury) ([facility] were also pertinent to this visit. has a past medical history of Arthritis, Atrial fibrillation ([facility] Back fracture, CHF (congestive heart failure) ([facility] HTN (hypertension), Hyperlipidemia, and Panic attack. has a past surgical history that includes Hysterectomy; Hemorrhoid surgery; Colonoscopy (); and eye surgery.            Restrictions  Restrictions/Precautions  Restrictions/Precautions: General Precautions, Fall Risk  Position Activity Restriction  Other position/activity restrictions: bedrest to commode, medium fall risk, IV, telemetry    Subjective   General  Chart Reviewed: Yes  Patient assessed for rehabilitation services?: Yes  Family / Caregiver Present: No  Referring Practitioner: Dr. Grant Wilson  Diagnosis: acute on chronic CHF  General Comment  Comments: RN cleared pt for OT eval; pt awake in bed, agreeable to therapy  Patient Currently in Pain: Denies    Social/Functional History  Social/Functional History  Lives With: Alone  Type of Home: House  Home Layout: One level, Laundry in basement  Coats Shower/Tub: Walk-in shower  Bathroom Toilet: Standard  Bathroom Equipment: Shower chair, Grab bars in shower  Receives Help From: Family  ADL Assistance: Independent  Homemaking Responsibilities: Yes  Meal Prep Responsibility: Primary  Laundry Responsibility: Primary  Ambulation Assistance:  (without AD)  Transfer Assistance: Independent  Active : Yes  Mode of Transportation: Car  Occupation: Retired  Type of occupation:  @ [employer]"  Leisure & Hobbies: Bowling in a league, exercise 30 minutes/day, watch TV, movies       Objective        Orientation  Overall Orientation Status: Within Functional Limits     Balance  Sitting Balance: Supervision  Standing Balance: Supervision (withut AD due to IV pole)  Standing Balance  Time: 1-2 minutes x 3  Activity: bathroom & functional mobility in hallway  Functional Mobility  Functional - Mobility Device: No device  Activity: To/from bathroom  Assist Level: Supervision (due to IV pole)  Toilet Transfers  Toilet - Technique: Ambulating (supervision without AD due to IV pole)  Equipment Used: Raised toilet seat with rails  Toilet Transfer: Modified independent  ADL  Grooming: Independent (standing at sink to wash hands after toileting)  LE Dressing: Independent  Toileting: Independent    RUE Tone: Normotonic  LUE Tone: Normotonic  Coordination  Movements Are Fluid And Coordinated: Yes     Bed mobility  Sit to Supine: Unable to assess (Left up in recliner chair, pt agreeable)  Transfers  Sit to stand: Independent  Stand to sit:  Independent     Cognition  Overall Cognitive Status: WFL          Plan   OT eval only    AM-PAC Score        AM-PAC Inpatient Daily Activity Raw Score: 24 (06/22/21 0846)  AM-PAC Inpatient ADL T-Scale Score : 57.54 (06/22/21 0846)  ADL Inpatient CMS 0-100% Score: 0 (06/22/21 0846)  ADL Inpatient CMS G-Code Modifier : [facility] (06/22/21 8623)    Goals  Patient Goals   Patient goals : go home soon       Therapy Time   Individual Concurrent Group Co-treatment   Time In [address]         Time Out 0838         Minutes [address], [place]

## 2021-06-22 NOTE — PROGRESS NOTES
Physical Therapy    Facility/Department: [facility] A2 CARD TELEMETRY  Initial Assessment/ Discharge    NAME: Anders Terry  : 1929  MRN: [id number]    Date of Service: 2021    Discharge Recommendations:  Home with assist PRN   PT Equipment Recommendations  Equipment Needed: No    Assessment   Body structures, Functions, Activity limitations: Decreased endurance  Assessment: pt is 80 yo female adm with Afib RVR, acute decompensated diastolic heart failure, hyponatremia with TEREZA. PT was able to demonstrate baseline independence [facility] bed mob, transfers and ambulation without device 50 ft. She performed BLE ex well and reports she has a HEP. Pt voices understanding of general safety, role of PT, energy conservation and importance of exercise. No additional PT planned. Recommend home assist prn  Treatment Diagnosis: decreased endurance  Prognosis: Good  Decision Making: Low Complexity  PT Education: Goals;PT Role;Disease Specific Education;General Safety;Plan of Care;Gait Training  Patient Education: Disease Specific: pt educated in benefits of exercise. She voices understanding  Barriers to Learning: none  REQUIRES PT FOLLOW UP: Yes  Activity Tolerance  Activity Tolerance: Patient Tolerated treatment well       Patient Diagnosis(es): The primary encounter diagnosis was Acute on chronic congestive heart failure, unspecified heart failure type ([address] Diagnoses of Atrial fibrillation with RVR ([address] and TEREZA (acute kidney injury) ([address] were also pertinent to this visit. has a past medical history of Arthritis, Atrial fibrillation ([address] Back fracture, CHF (congestive heart failure) ([address] HTN (hypertension), Hyperlipidemia, and Panic attack. has a past surgical history that includes Hysterectomy; Hemorrhoid surgery; Colonoscopy (); and eye surgery.     Restrictions  Restrictions/Precautions: General Precautions, Fall Risk  Position Activity Restriction  Other position/activity restrictions: bedrest to commode, medium fall risk, IV, telemetry  Vision/Hearing  Vision: Within Functional Limits  Hearing: Within functional limits     Subjective  General  Chart Reviewed: Yes  Patient assessed for rehabilitation services?: Yes  Additional Pertinent Hx: pt was hospitalized 6/14 to 6/17 with same diagnosis. Home health ordered but not yet established when pt returned to ED. Referring Practitioner: Corazon Bueno DO  Referral Date : 06/21/21  Diagnosis: afib with RVR, acute decompensated diastolic heart failure, Hyponatremia with TEREZA  Comments: RN cleared pt for therapy, pt resting in bed on approach  Subjective  Subjective: Agrees to therapy, denies pain  Pain Screening  Patient Currently in Pain: Denies  Vital Signs  Pulse: 116  BP: 114/75  Patient Currently in Pain: Denies  Oxygen Therapy  SpO2: 92 %  Pulse Oximeter Device Mode: Intermittent  O2 Device: None (Room air)       Orientation  Overall Orientation Status: Within Normal Limits  Social/Functional History  Social/Functional History  Lives With: Alone  Type of Home: House  Home Layout: One level, Laundry in basement  [place] Shower/Tub: Walk-in shower  Bathroom Toilet: Standard  Bathroom Equipment: Shower chair, Grab bars in shower  Receives Help From: Family  ADL Assistance: Independent  Homemaking Responsibilities: Yes  Meal Prep Responsibility: Primary  Laundry Responsibility: Primary  Ambulation Assistance:  (without AD)  Transfer Assistance: Independent  Active : Yes  Mode of Transportation: Car  Occupation: Retired  Type of occupation:  @ [employer]"  Leisure & Hobbies: Bowling in a league, exercise 30 minutes/day, watch TV, movies    Objective     RLE AROM: WFL  LLE AROM : WFL  Strength RLE: WFL  Strength LLE: WFL        Bed mobility  Supine to Sit: Independent  Transfers  Sit to Stand: Independent  Stand to sit:  Independent  Ambulation  Device: No Device  Assistance: Independent  Quality of Gait: mild flexed posture, recipricol pattern, no loss of balance  Distance: 150 ft        Exercises  Straight Leg Raise: 5 x B  Heelslides: 5 x B with manual resistance on hip extension  Gluteal Sets: 5 x B  Sit to and from stand 5 x in succession  Ankle Pumps: 10 x B     Standing B heel raise 5 x     Plan   Times per week: 1 x only  Current Treatment Recommendations: Strengthening, Gait Training, Functional Mobility Training, Safety Education & Training, Transfer Training, Home Exercise Program  Safety Devices  Type of devices:  All fall risk precautions in place, Gait belt, Patient at risk for falls, Nurse notified, Call light within reach, Left in chair, Chair alarm in place      AM-PAC Score     AM-PAC Inpatient Mobility without Stair Climbing Raw Score : 20 (06/22/21 0845)  AM-PAC Inpatient without Stair Climbing T-Scale Score : 60.57 (06/22/21 0845)  Mobility Inpatient CMS 0-100% Score: 0 (06/22/21 0845)  Mobility Inpatient without Stair CMS G-Code Modifier : [address] (06/22/21 0845)       Goals  Short term goals  Time Frame for Short term goals: 1 visit  Short term goal 1: pt to voice understanding of general safety, HEP, prevention of complications of bedrest- MET  Short term goal 2: pt to demonstrate indep bed mob and transfers- MET  Short term goal 3: pt to amb 150 ft indep- MET  Patient Goals   Patient goals : \"to get back to my usual activites\"       Therapy Time   Individual Concurrent Group Co-treatment   Time In 0805         Time Out 0838         Minutes 33         Timed Code Treatment Minutes: [facility], PT

## 2021-06-23 LAB
ANION GAP SERPL CALCULATED.3IONS-SCNC: 9 MMOL/L (ref 3–16)
BUN BLDV-MCNC: 24 MG/DL (ref 7–20)
CALCIUM SERPL-MCNC: 9.1 MG/DL (ref 8.3–10.6)
CHLORIDE BLD-SCNC: 94 MMOL/L (ref 99–110)
CO2: 30 MMOL/L (ref 21–32)
CREAT SERPL-MCNC: 0.9 MG/DL (ref 0.6–1.2)
GFR AFRICAN AMERICAN: >60
GFR NON-AFRICAN AMERICAN: 59
GLUCOSE BLD-MCNC: 108 MG/DL (ref 70–99)
HCT VFR BLD CALC: 38 % (ref 36–48)
HEMOGLOBIN: 13.1 G/DL (ref 12–16)
MAGNESIUM: 1.8 MG/DL (ref 1.8–2.4)
MCH RBC QN AUTO: 30.4 PG (ref 26–34)
MCHC RBC AUTO-ENTMCNC: 34.4 G/DL (ref 31–36)
MCV RBC AUTO: 88.5 FL (ref 80–100)
PDW BLD-RTO: 13.1 % (ref 12.4–15.4)
PLATELET # BLD: 370 K/UL (ref 135–450)
PMV BLD AUTO: 7.6 FL (ref 5–10.5)
POTASSIUM SERPL-SCNC: 4.7 MMOL/L (ref 3.5–5.1)
RBC # BLD: 4.3 M/UL (ref 4–5.2)
SODIUM BLD-SCNC: 133 MMOL/L (ref 136–145)
WBC # BLD: 7.1 K/UL (ref 4–11)

## 2021-06-23 PROCEDURE — 99233 SBSQ HOSP IP/OBS HIGH 50: CPT | Performed by: NURSE PRACTITIONER

## 2021-06-23 PROCEDURE — 36415 COLL VENOUS BLD VENIPUNCTURE: CPT

## 2021-06-23 PROCEDURE — 2580000003 HC RX 258: Performed by: HOSPITALIST

## 2021-06-23 PROCEDURE — 6370000000 HC RX 637 (ALT 250 FOR IP): Performed by: NURSE PRACTITIONER

## 2021-06-23 PROCEDURE — 2060000000 HC ICU INTERMEDIATE R&B

## 2021-06-23 PROCEDURE — 80048 BASIC METABOLIC PNL TOTAL CA: CPT

## 2021-06-23 PROCEDURE — 6370000000 HC RX 637 (ALT 250 FOR IP): Performed by: INTERNAL MEDICINE

## 2021-06-23 PROCEDURE — 6370000000 HC RX 637 (ALT 250 FOR IP): Performed by: HOSPITALIST

## 2021-06-23 PROCEDURE — 51702 INSERT TEMP BLADDER CATH: CPT

## 2021-06-23 PROCEDURE — 85027 COMPLETE CBC AUTOMATED: CPT

## 2021-06-23 PROCEDURE — 6360000002 HC RX W HCPCS: Performed by: NURSE PRACTITIONER

## 2021-06-23 PROCEDURE — 83735 ASSAY OF MAGNESIUM: CPT

## 2021-06-23 PROCEDURE — 2580000003 HC RX 258: Performed by: NURSE PRACTITIONER

## 2021-06-23 PROCEDURE — 6360000002 HC RX W HCPCS: Performed by: HOSPITALIST

## 2021-06-23 PROCEDURE — 51798 US URINE CAPACITY MEASURE: CPT

## 2021-06-23 RX ORDER — DILTIAZEM HYDROCHLORIDE 60 MG/1
30 TABLET, FILM COATED ORAL EVERY 6 HOURS SCHEDULED
Status: DISCONTINUED | OUTPATIENT
Start: 2021-06-23 | End: 2021-06-24

## 2021-06-23 RX ADMIN — FUROSEMIDE 2 MG/HR: 10 INJECTION, SOLUTION INTRAMUSCULAR; INTRAVENOUS at 09:27

## 2021-06-23 RX ADMIN — APIXABAN 2.5 MG: 2.5 TABLET, FILM COATED ORAL at 09:18

## 2021-06-23 RX ADMIN — ASPIRIN 81 MG: 81 TABLET, COATED ORAL at 09:18

## 2021-06-23 RX ADMIN — METOPROLOL SUCCINATE 50 MG: 50 TABLET, EXTENDED RELEASE ORAL at 22:11

## 2021-06-23 RX ADMIN — ATORVASTATIN CALCIUM 20 MG: 10 TABLET, FILM COATED ORAL at 09:18

## 2021-06-23 RX ADMIN — METOPROLOL SUCCINATE 50 MG: 50 TABLET, EXTENDED RELEASE ORAL at 09:18

## 2021-06-23 RX ADMIN — MAGNESIUM GLUCONATE 500 MG ORAL TABLET 400 MG: 500 TABLET ORAL at 13:33

## 2021-06-23 RX ADMIN — MULTIPLE VITAMINS W/ MINERALS TAB 1 TABLET: TAB at 09:18

## 2021-06-23 RX ADMIN — SIMETHICONE 120 MG: 80 TABLET, CHEWABLE ORAL at 22:11

## 2021-06-23 RX ADMIN — DILTIAZEM HYDROCHLORIDE 30 MG: 60 TABLET, FILM COATED ORAL at 18:48

## 2021-06-23 RX ADMIN — SIMETHICONE 120 MG: 80 TABLET, CHEWABLE ORAL at 09:18

## 2021-06-23 RX ADMIN — Medication 10 MG: at 22:11

## 2021-06-23 RX ADMIN — APIXABAN 2.5 MG: 2.5 TABLET, FILM COATED ORAL at 22:11

## 2021-06-23 RX ADMIN — Medication 1000 UNITS: at 09:18

## 2021-06-23 RX ADMIN — POLYETHYLENE GLYCOL 3350 17 G: 17 POWDER, FOR SOLUTION ORAL at 09:19

## 2021-06-23 RX ADMIN — FUROSEMIDE 5 MG/HR: 10 INJECTION, SOLUTION INTRAMUSCULAR; INTRAVENOUS at 22:11

## 2021-06-23 RX ADMIN — Medication 100 MG: at 09:19

## 2021-06-23 RX ADMIN — ESCITALOPRAM OXALATE 10 MG: 10 TABLET ORAL at 09:19

## 2021-06-23 RX ADMIN — DILTIAZEM HYDROCHLORIDE 30 MG: 60 TABLET, FILM COATED ORAL at 13:32

## 2021-06-23 ASSESSMENT — PAIN SCALES - GENERAL
PAINLEVEL_OUTOF10: 0
PAINLEVEL_OUTOF10: 0

## 2021-06-23 NOTE — PROGRESS NOTES
Bladder scanned patient d/t low output overnight. Retaining 819 ml of urine. 4 lb wt gain noted. Paged for orders. Will continue to monitor.

## 2021-06-23 NOTE — PROGRESS NOTES
Hospitalist Progress Note      PCP: Tootie Randle MD, MD    Date of Admission: 6/21/2021    Chief Complaint: SOB    Subjective: no new c/o. Medications:  Reviewed    Infusion Medications    dilTIAZem (CARDIZEM) 125 mg in dextrose 5% 125 mL infusion Stopped (06/21/21 1826)    sodium chloride      furosemide (LASIX) 1mg/ml infusion 2 mg/hr (06/23/21 0969)     Scheduled Medications    metoprolol succinate  50 mg Oral BID    apixaban  2.5 mg Oral BID    aspirin  81 mg Oral Daily    atorvastatin  20 mg Oral Daily    escitalopram  10 mg Oral Daily    polyethylene glycol  17 g Oral Daily    melatonin  10 mg Oral Nightly    simethicone  120 mg Oral BID    thiamine  100 mg Oral Daily    thiamine  100 mg Oral Daily    Vitamin D  1,000 Units Oral Daily    sodium chloride flush  10 mL Intravenous 2 times per day    multivitamin  1 tablet Oral Daily     PRN Meds: sodium chloride flush, sodium chloride, senna, acetaminophen **OR** acetaminophen, ondansetron **OR** ondansetron      Intake/Output Summary (Last 24 hours) at 6/23/2021 1026  Last data filed at 6/23/2021 0855  Gross per 24 hour   Intake 1367.19 ml   Output 1775 ml   Net -407.81 ml       Physical Exam Performed:    /61   Pulse 89   Temp 97.8 °F (36.6 °C) (Oral)   Resp 16   Ht 5' 3\" (1.6 m)   Wt 129 lb 8 oz (58.7 kg)   SpO2 96%   BMI 22.94 kg/m²     General appearance: No apparent distress, appears stated age and cooperative. HEENT: Pupils equal, round, and reactive to light. Conjunctivae/corneas clear. Neck: Supple, with full range of motion. No jugular venous distention. Trachea midline. Respiratory:  Normal respiratory effort. Clear to auscultation, bilaterally without Rales/Wheezes/Rhonchi. Cardiovascular: Regular rate and rhythm with normal S1/S2 without murmurs, rubs or gallops. Abdomen: Soft, non-tender, non-distended with normal bowel sounds. Musculoskeletal: No clubbing, cyanosis or edema bilaterally.   Full range of motion without deformity. Skin: Skin color, texture, turgor normal.  No rashes or lesions. Neurologic:  Neurovascularly intact without any focal sensory/motor deficits. Cranial nerves: II-XII intact, grossly non-focal.  Psychiatric: Alert and oriented, thought content appropriate, normal insight  Capillary Refill: Brisk,< 3 seconds   Peripheral Pulses: +2 palpable, equal bilaterally       Labs:   Recent Labs     06/21/21  1525 06/22/21  0657 06/23/21  0740   WBC 11.3* 6.0 7.1   HGB 12.7 13.0 13.1   HCT 36.8 38.5 38.0    369 370     Recent Labs     06/21/21  1525 06/22/21  0657 06/23/21  0740   * 133* 133*   K 4.3 3.6 4.7   CL 91* 93* 94*   CO2 25 30 30   BUN 28* 22* 24*   CREATININE 1.5* 0.9 0.9   CALCIUM 9.2 9.3 9.1     Recent Labs     06/21/21  1525   AST 22   ALT 20   BILITOT 0.3   ALKPHOS 78     No results for input(s): INR in the last 72 hours.   Recent Labs     06/21/21  1525 06/21/21 2039   [facility] <0.01 <0.01       Urinalysis:      Lab Results   Component Value Date    NITRU Negative 05/20/2021    WBCUA 3-5 05/20/2021    RBCUA 0-2 05/20/2021    BLOODU Negative 05/20/2021    SPECGRAV 1.015 05/20/2021    GLUCOSEU Negative 05/20/2021       Consults:    IP CONSULT TO HOSPITALIST  IP CONSULT TO CARDIOLOGY  IP CONSULT TO HEART FAILURE NURSE/COORDINATOR      Assessment/Plan:    Active Hospital Problems    Diagnosis     CHF (congestive heart failure) [address] [I50.9]     Hyponatremia [E87.1]     TEREZA (acute kidney injury) ([facility] [N17.9]     Atrial fibrillation with rapid ventricular response ([facility] [I48.91]     Essential hypertension [I10]          A. fib RVR  -Admit to medical floor for continuous telemetry and pulse oximetry monitoring  -Patient initiated on IV Cardizem bolus and drip, the latter of which will be continued overnight (BP permitting)   -Electrolytes, thyroid studies ordered to further assess underlying causes for this tachyarrhythmia  -Patient currently anticoagulated on twice daily Eliquis; dosage reduced to 2.5 mg BID due to TEREZA at the time of admission     Acutely decompensated diastolic CHF  -Admit to floor for continuous telemetry monitoring  -Strict I's and O's; pure wick versus Coy to be placed to assist with measurements  -IV Lasix GTT initiated at 2 mg/H rather than bolus due to borderline hypotension  -Echo performed 3/2/2021 therefore not reordered at this time; will defer to CARDS   -Continue home doses of ASA, Lipitor, Toprol-XL; BP parameters placed for Norvasc dosing  -2G Na and fluid restriction dietary modifications in place  -Consult placed to Cardiology for further recommendations     Urinary Retention - coy placed. ARF - w/ elevated BUN/Cr ratio c/w pre-renal azotemia. Given IVF hydration and follow serial labs. Reviewed and documented as above. HypoNatremia - etiology clinically unable to determine but likely hypovolemic. Follow serial labs on IVF. Reviewed and documented as above. HypoMagnesemia - etiology clinically unable to determine. Follow serial labs and replace PRN. Reviewed and documented as above. DVT Prophylaxis: Eliquis     Recent Labs     06/21/21  1525 06/22/21  0657 06/23/21  0740    369 370     Diet: ADULT DIET; Easy to Chew; Low Sodium (2 gm)  Code Status: Full Code      PT/OT Eval Status: seen w/ recs for home w/ assist.     Lali Rust - Possibly Wed/Thurs 23/24 June pending clinical course and subspecialty recs.      Geovani Rubin MD

## 2021-06-23 NOTE — PROGRESS NOTES
16 [facility] non-latex catheter inserted using proper sterile technique per orders for retention and IV lasix gtt. Patient tolerated procedure well. Urine returned. Will continue to monitor.

## 2021-06-23 NOTE — CARE COORDINATION
In an effort to increase med compliance upon d/c, CM called [facility] to see if pt could have daily medications placed in a blister pack. Per [facility], they do not offer this service. However, CM was informed that an independent pharmacy may be able to set this up. CM will continue to follow up regarding possible solutions for pt upon discharge.   Stefano Yoder RN

## 2021-06-23 NOTE — PROGRESS NOTES
[facility]   Daily Progress Note    Admit Date:  6/21/2021  HPI:    Chief Complaint   Patient presents with    Shortness of Breath     recently admitted for fluid overload, states she was discharged, felt good for a day, now SOB again. [place] ended with worsening shortness of breath and edema. She was found to have A. fib with RVR in the emergency department. She was recently hospitalized for similar symptoms of dyspnea on exertion and edema and also treated for A. fib with RVR. He has a history of diastolic CHF, PAF on Eliquis for anticoagulation, HTN, HLD, and CKD. Subjective:  Ms. Crowder Dense seen up in bed, daughter at bedside. Just had Nelson catheter placed for urinary retention of greater than 800 mL on bladder scan. She states her bladder feels better now. She continues to have shortness of breath. She denies any chest pain or palpitations. Long discussion regarding daily weights and to call with weight gain of 3 pound in 1 day or 5 pounds in 1 week. Also reviewed fluid allowance of 48 to 64 ounces per day and no added salt diet.       Objective:   Patient Vitals for the past 24 hrs:   BP Temp Temp src Pulse Resp SpO2 Weight   06/23/21 0739 129/61 97.8 °F (36.6 °C) Oral 89 16 96 % --   06/23/21 0613 (!) 116/53 97.8 °F (36.6 °C) Oral 92 16 98 % --   06/23/21 0608 -- -- -- -- -- -- 129 lb 8 oz (58.7 kg)   06/22/21 2325 117/74 97.6 °F (36.4 °C) Oral 69 16 96 % --   06/22/21 2011 129/76 97.6 °F (36.4 °C) Oral 81 16 95 % --   06/22/21 1625 124/64 98.4 °F (36.9 °C) Oral 93 16 94 % --   06/22/21 1200 104/64 97.8 °F (36.6 °C) Oral 94 16 95 % --       Intake/Output Summary (Last 24 hours) at 6/23/2021 0935  Last data filed at 6/23/2021 0855  Gross per 24 hour   Intake 1367.19 ml   Output 1775 ml   Net -407.81 ml     Wt Readings from Last 3 Encounters:   06/23/21 129 lb 8 oz (58.7 kg)   06/17/21 132 lb 9.6 oz (60.1 kg)   05/23/21 134 lb 8 oz (61 kg)       ASSESSMENT:   1. CHF, acute on chronic diastolic: weight up (?), net net 1.7 L, still with fluid volume excess on exam   2. Afib: Paroxysmal, RVR on presentation, anticoagulation with Eliquis (dose should be 2.5 mg bid given age > [de-identified], weight < 60 kg despite improvement in renal function  3. HTN: Stable  4. Acute on chronic kidney disease: Improved  5. Hyponatremia: likely due to fluid overload  6. HLD    PLAN:  1. Increase IV Lasix to 5 mg an hour  2. Add low-dose diltiazem 30 mg 4 times daily. Anticipate change to long-acting tomorrow if heart rate better controlled  with this. 3. Continue Toprol-XL 50 mg twice daily  4.  Daily weights, daily labs, strict I's/O    KRYSTLE Blackman - CNP, 6/23/2021, 9:35 AM  [facility]   [phone]       Telemetry: A. fib 80-1 60  NYHA: IV    Physical Exam:  General:  Awake, alert, NAD  Skin:  Warm and dry  Neck:  JVP 10 cm  Chest: Bibasilar rales  Cardiovascular: Irregularly irregular, normal D6L1, systolic murmur no GR  Abdomen:  Soft, nontender, +bowel sounds  Extremities: 1+ BLE nonpitting edema      Medications:    metoprolol succinate  50 mg Oral BID    apixaban  2.5 mg Oral BID    aspirin  81 mg Oral Daily    atorvastatin  20 mg Oral Daily    escitalopram  10 mg Oral Daily    polyethylene glycol  17 g Oral Daily    melatonin  10 mg Oral Nightly    simethicone  120 mg Oral BID    thiamine  100 mg Oral Daily    thiamine  100 mg Oral Daily    Vitamin D  1,000 Units Oral Daily    sodium chloride flush  10 mL Intravenous 2 times per day    multivitamin  1 tablet Oral Daily      dilTIAZem (CARDIZEM) 125 mg in dextrose 5% 125 mL infusion Stopped (06/21/21 1826)    sodium chloride      furosemide (LASIX) 1mg/ml infusion 2 mg/hr (06/23/21 0927)       Lab Data: Lab results independently reviewed and analyzed by myself 6/23/21   CBC:   Recent Labs     06/21/21  1525 06/22/21  0657 06/23/21  0740   WBC 11.3* 6.0 7.1   HGB 12.7 13.0 13.1    369 370     BMP:    Recent Labs 06/21/21  1525 06/22/21  0657 06/23/21  0740   * 133* 133*   K 4.3 3.6 4.7   CO2 25 30 30   BUN 28* 22* 24*   CREATININE 1.5* 0.9 0.9     INR:  No results for input(s): INR in the last 72 hours. BNP:    Recent Labs     06/21/21  1525   PROBNP 3,008*     Cardiac Enzymes:   Recent Labs     06/21/21  1525 06/21/21 2039   Artelia Angry <0.01 <0.01     Lipids:   Lab Results   Component Value Date    TRIG 85 06/22/2021    TRIG 89 06/16/2021    HDL 58 06/22/2021    HDL 57 06/16/2021    LDLCALC 86 06/22/2021    LDLCALC 49 06/16/2021       Cardiac Imaging:    Echo: 03/02/2021    CONCLUSIONS     The left ventricle is of normal size. Left ventricular wall thickness is normal.       No obvious segmental wall motion abnormalities. The right atrium is normal in size. Moderate left atrial dilatation. Mild mitral annular calcification. Mild thickening/calcification of the anterior mitral valve leaflet. Trileaflet aortic valve. Moderate  thickening (sclerosis) of the aortic valve cusps without reduced excursion. Structurally normal tricuspid valve. Mild tricuspid regurgitation. Normal pericardium without effusion.

## 2021-06-23 NOTE — PLAN OF CARE
Problem: OXYGENATION/RESPIRATORY FUNCTION  Goal: Patient will maintain patent airway  Outcome: Ongoing  Note:   Patient's EF (Ejection Fraction) is unknown. Heart Failure Medications:  Diuretics[de-identified] Furosemide    (One of the following REQUIRED for EF <40%/SYSTOLIC FAILURE but MAY be used in EF% >40%/DIASTOLIC FAILURE)        ACE[de-identified] None        ARB[de-identified] None         ARNI[de-identified] None    (Beta Blockers)  NON- Evidenced Based Beta Blocker (for EF% >40%/DIASTOLIC FAILURE): None    Evidenced Based Beta Blocker::(REQUIRED for EF% <40%/SYSTOLIC FAILURE) Metoprolol SUCCinate- Toprol XL  . .................................................................................................................................................. Patient's weights and intake/output reviewed: Yes    Patient's Last Weight: 125 lbs obtained by standing scale. Difference of 5 lbs less than last documented weight. Intake/Output Summary (Last 24 hours) at 6/22/2021 2350  Last data filed at 6/22/2021 2218  Gross per 24 hour   Intake 1249.52 ml   Output 1845 ml   Net -595.48 ml       Comorbidities Reviewed Yes    Patient has a past medical history of Arthritis, Atrial fibrillation ([address] Back fracture, CHF (congestive heart failure) ([address] HTN (hypertension), Hyperlipidemia, and Panic attack. >>For CHF and Comorbidity documentation on Education Time and Topics, please see Education Tab    Progressive Mobility Assessment:  What is this patient's Current Level of Mobility?: Ambulatory- with Assistance  How was this patient Mobilized today?: Edge of Bed,  Up to Toilet/Shower, and Up in Room                 With Whom? Nurse, PCA, and Self                 Level of Difficulty/Assistance: 1x Assist     Pt resting in bed at this time on room air. Pt denies shortness of breath. Pt without lower extremity edema.      Patient and/or Family's stated Goal of Care this Admission: increase activity tolerance and be more comfortable prior to discharge        :      Problem: Falls - Risk of:  Goal: Will remain free from falls  Description: Will remain free from falls  Outcome: Ongoing  Note: Pt will remain free from falls throughout hospital stay. Fall precautions in place, bed alarm on, bed in lowest position with wheels locked and side rails 2/4 up. Room door open and hourly rounding completed. Will continue to monitor throughout shift. Problem: Falls - Risk of:  Goal: Will remain free from falls  Description: Will remain free from falls  Outcome: Ongoing  Note: Pt will remain free from falls throughout hospital stay. Fall precautions in place, bed alarm on, bed in lowest position with wheels locked and side rails 2/4 up. Room door open and hourly rounding completed. Will continue to monitor throughout shift.       Problem: Cardiac:  Goal: Ability to maintain an adequate cardiac output will improve  Description: Ability to maintain an adequate cardiac output will improve  Outcome: Ongoing

## 2021-06-23 NOTE — CARE COORDINATION
CASE MANAGEMENT INITIAL ASSESSMENT        Reviewed chart and completed assessment with:patient and daughter Fátima Lion at bedside  Explained Case Management role/services. Primary contact information:see below     Health Care Decision Maker :   Primary Decision Maker: [place] - Child - [phone]    Secondary Decision Maker: Kaitlyn Ni - Child - [phone]    Supplemental (Other) Decision Maker: Francheska Pollack - Child - [phone]            Can this person be reached and be able to respond quickly, such as within a few minutes or hours? Yes  Admit date/status:06/21/2021  DiagnosisHeart failure, diastolic, with acute decompensation   Is this a Readmission?:  No       Insurance:Medicare   Precert required for SNF: No       3 night stay required: waived     Living arrangements, Adls, care needs, prior to admission:Pt lives in a ranch home, [place], drives occasionally.      Transportation:private      Durable Medical Equipment at home:  none     Services in the home and/or outpatient, prior to admission:none, [facility] was to follow, but pt returned before [place] [id number] could see pt        PT/OT recs:none  [facility] Exemption Notification (HEN):not initiated     Barriers to discharge:none     Plan/comments:Pt plans to d/c home when stable; IPTA, family requesting that senior services consult be made for possible meals on wheels. CM will continue to follow and will [facility].   Hira Greenberg RN

## 2021-06-24 LAB
ALBUMIN SERPL-MCNC: 3.6 G/DL (ref 3.4–5)
ANION GAP SERPL CALCULATED.3IONS-SCNC: 9 MMOL/L (ref 3–16)
BUN BLDV-MCNC: 23 MG/DL (ref 7–20)
CALCIUM SERPL-MCNC: 8.9 MG/DL (ref 8.3–10.6)
CHLORIDE BLD-SCNC: 90 MMOL/L (ref 99–110)
CO2: 31 MMOL/L (ref 21–32)
CREAT SERPL-MCNC: 0.9 MG/DL (ref 0.6–1.2)
GFR AFRICAN AMERICAN: >60
GFR NON-AFRICAN AMERICAN: 59
GLUCOSE BLD-MCNC: 107 MG/DL (ref 70–99)
HCT VFR BLD CALC: 38.1 % (ref 36–48)
HEMOGLOBIN: 13 G/DL (ref 12–16)
MAGNESIUM: 1.6 MG/DL (ref 1.8–2.4)
MCH RBC QN AUTO: 30 PG (ref 26–34)
MCHC RBC AUTO-ENTMCNC: 34 G/DL (ref 31–36)
MCV RBC AUTO: 88.1 FL (ref 80–100)
PDW BLD-RTO: 13.2 % (ref 12.4–15.4)
PHOSPHORUS: 3.7 MG/DL (ref 2.5–4.9)
PLATELET # BLD: 393 K/UL (ref 135–450)
PMV BLD AUTO: 7.5 FL (ref 5–10.5)
POTASSIUM SERPL-SCNC: 2.9 MMOL/L (ref 3.5–5.1)
PRO-BNP: 1991 PG/ML (ref 0–449)
RBC # BLD: 4.33 M/UL (ref 4–5.2)
SODIUM BLD-SCNC: 130 MMOL/L (ref 136–145)
WBC # BLD: 7.5 K/UL (ref 4–11)

## 2021-06-24 PROCEDURE — 36415 COLL VENOUS BLD VENIPUNCTURE: CPT

## 2021-06-24 PROCEDURE — 6370000000 HC RX 637 (ALT 250 FOR IP): Performed by: INTERNAL MEDICINE

## 2021-06-24 PROCEDURE — 99233 SBSQ HOSP IP/OBS HIGH 50: CPT | Performed by: NURSE PRACTITIONER

## 2021-06-24 PROCEDURE — 2580000003 HC RX 258: Performed by: NURSE PRACTITIONER

## 2021-06-24 PROCEDURE — 6370000000 HC RX 637 (ALT 250 FOR IP): Performed by: NURSE PRACTITIONER

## 2021-06-24 PROCEDURE — 2060000000 HC ICU INTERMEDIATE R&B

## 2021-06-24 PROCEDURE — 6360000002 HC RX W HCPCS: Performed by: NURSE PRACTITIONER

## 2021-06-24 PROCEDURE — 83880 ASSAY OF NATRIURETIC PEPTIDE: CPT

## 2021-06-24 PROCEDURE — 6360000002 HC RX W HCPCS: Performed by: INTERNAL MEDICINE

## 2021-06-24 PROCEDURE — 80069 RENAL FUNCTION PANEL: CPT

## 2021-06-24 PROCEDURE — 83735 ASSAY OF MAGNESIUM: CPT

## 2021-06-24 PROCEDURE — 2580000003 HC RX 258: Performed by: HOSPITALIST

## 2021-06-24 PROCEDURE — 85027 COMPLETE CBC AUTOMATED: CPT

## 2021-06-24 PROCEDURE — 6370000000 HC RX 637 (ALT 250 FOR IP): Performed by: HOSPITALIST

## 2021-06-24 RX ORDER — DILTIAZEM HYDROCHLORIDE 120 MG/1
120 CAPSULE, COATED, EXTENDED RELEASE ORAL DAILY
Status: DISCONTINUED | OUTPATIENT
Start: 2021-06-24 | End: 2021-06-27 | Stop reason: HOSPADM

## 2021-06-24 RX ORDER — TAMSULOSIN HYDROCHLORIDE 0.4 MG/1
0.4 CAPSULE ORAL DAILY
Status: DISCONTINUED | OUTPATIENT
Start: 2021-06-24 | End: 2021-06-27 | Stop reason: HOSPADM

## 2021-06-24 RX ORDER — POTASSIUM CHLORIDE 7.45 MG/ML
10 INJECTION INTRAVENOUS
Status: DISPENSED | OUTPATIENT
Start: 2021-06-24 | End: 2021-06-24

## 2021-06-24 RX ORDER — POTASSIUM CHLORIDE 20 MEQ/1
40 TABLET, EXTENDED RELEASE ORAL ONCE
Status: DISCONTINUED | OUTPATIENT
Start: 2021-06-24 | End: 2021-06-24

## 2021-06-24 RX ORDER — POTASSIUM CHLORIDE 20 MEQ/1
40 TABLET, EXTENDED RELEASE ORAL 3 TIMES DAILY
Status: COMPLETED | OUTPATIENT
Start: 2021-06-24 | End: 2021-06-24

## 2021-06-24 RX ORDER — SPIRONOLACTONE 25 MG/1
25 TABLET ORAL DAILY
Status: DISCONTINUED | OUTPATIENT
Start: 2021-06-24 | End: 2021-06-27 | Stop reason: HOSPADM

## 2021-06-24 RX ADMIN — POTASSIUM CHLORIDE 40 MEQ: 1500 TABLET, EXTENDED RELEASE ORAL at 13:56

## 2021-06-24 RX ADMIN — DILTIAZEM HYDROCHLORIDE 30 MG: 60 TABLET, FILM COATED ORAL at 06:35

## 2021-06-24 RX ADMIN — POTASSIUM CHLORIDE 40 MEQ: 1500 TABLET, EXTENDED RELEASE ORAL at 10:29

## 2021-06-24 RX ADMIN — SODIUM CHLORIDE 25 ML: 9 INJECTION, SOLUTION INTRAVENOUS at 10:39

## 2021-06-24 RX ADMIN — SIMETHICONE 120 MG: 80 TABLET, CHEWABLE ORAL at 10:30

## 2021-06-24 RX ADMIN — SPIRONOLACTONE 25 MG: 25 TABLET ORAL at 10:30

## 2021-06-24 RX ADMIN — SIMETHICONE 120 MG: 80 TABLET, CHEWABLE ORAL at 23:04

## 2021-06-24 RX ADMIN — MAGNESIUM GLUCONATE 500 MG ORAL TABLET 400 MG: 500 TABLET ORAL at 10:31

## 2021-06-24 RX ADMIN — MULTIPLE VITAMINS W/ MINERALS TAB 1 TABLET: TAB at 10:29

## 2021-06-24 RX ADMIN — METOPROLOL SUCCINATE 50 MG: 50 TABLET, EXTENDED RELEASE ORAL at 23:04

## 2021-06-24 RX ADMIN — DILTIAZEM HYDROCHLORIDE 120 MG: 120 CAPSULE, COATED, EXTENDED RELEASE ORAL at 13:56

## 2021-06-24 RX ADMIN — TAMSULOSIN HYDROCHLORIDE 0.4 MG: 0.4 CAPSULE ORAL at 10:35

## 2021-06-24 RX ADMIN — ATORVASTATIN CALCIUM 20 MG: 10 TABLET, FILM COATED ORAL at 10:29

## 2021-06-24 RX ADMIN — POLYETHYLENE GLYCOL 3350 17 G: 17 POWDER, FOR SOLUTION ORAL at 10:31

## 2021-06-24 RX ADMIN — Medication 1000 UNITS: at 10:30

## 2021-06-24 RX ADMIN — ASPIRIN 81 MG: 81 TABLET, COATED ORAL at 10:30

## 2021-06-24 RX ADMIN — Medication 10 MG: at 23:05

## 2021-06-24 RX ADMIN — Medication 100 MG: at 10:30

## 2021-06-24 RX ADMIN — FUROSEMIDE 5 MG/HR: 10 INJECTION, SOLUTION INTRAMUSCULAR; INTRAVENOUS at 06:35

## 2021-06-24 RX ADMIN — APIXABAN 2.5 MG: 2.5 TABLET, FILM COATED ORAL at 10:30

## 2021-06-24 RX ADMIN — POTASSIUM CHLORIDE 40 MEQ: 1500 TABLET, EXTENDED RELEASE ORAL at 23:04

## 2021-06-24 RX ADMIN — ESCITALOPRAM OXALATE 10 MG: 10 TABLET ORAL at 10:30

## 2021-06-24 RX ADMIN — APIXABAN 2.5 MG: 2.5 TABLET, FILM COATED ORAL at 23:04

## 2021-06-24 RX ADMIN — POTASSIUM CHLORIDE 10 MEQ: 7.46 INJECTION, SOLUTION INTRAVENOUS at 12:44

## 2021-06-24 RX ADMIN — DILTIAZEM HYDROCHLORIDE 30 MG: 60 TABLET, FILM COATED ORAL at 00:31

## 2021-06-24 RX ADMIN — FUROSEMIDE 5 MG/HR: 10 INJECTION, SOLUTION INTRAMUSCULAR; INTRAVENOUS at 23:41

## 2021-06-24 RX ADMIN — POTASSIUM CHLORIDE 10 MEQ: 7.46 INJECTION, SOLUTION INTRAVENOUS at 10:40

## 2021-06-24 ASSESSMENT — PAIN SCALES - GENERAL
PAINLEVEL_OUTOF10: 0

## 2021-06-24 NOTE — DISCHARGE INSTR - COC
Continuity of Care Form    Patient Name: Anders Terry   :  1929  MRN:  [id number]    Admit date:  2021  Discharge date:  ***    Code Status Order: Full Code   Advance Directives:   [facility] Documentation       Date/Time Healthcare Directive Type of Healthcare Directive Copy in [facility] [address] Agent's Name Healthcare Agent's Phone Number    21 1949  No, patient does not have an advance directive for healthcare treatment -- -- -- -- --            Admitting Physician:  Nick Justice MD  PCP: Ina Last MD, MD    Discharging Nurse: [facility] Unit/Room#: 0206/0206-02  Discharging Unit Phone Number: ***    Emergency Contact:   Extended Emergency Contact Information  Primary Emergency Contact: Trisha Mc  [place] Phone: [phone]  Relation: Child  [facility] Phone: [phone]  Mobile Phone: [phone]  Relation: Child    Past Surgical History:  Past Surgical History:   Procedure Laterality Date    COLONOSCOPY      tics    EYE SURGERY      HEMORRHOID SURGERY      HYSTERECTOMY         Immunization History: There is no immunization history on file for this patient.     Active Problems:  Patient Active Problem List   Diagnosis Code    Atrial fibrillation with rapid ventricular response ([facility]) I48.91    Essential hypertension I10    Amaurosis fugax of right eye G45.3    Chronic diastolic CHF (congestive heart failure), NYHA class 3 ([facility]) I50.32    PAF (paroxysmal atrial fibrillation) ([facility]) I48.0    History of TIA (transient ischemic attack) Z86.73    SOB (shortness of breath) R06.02    Heart failure, diastolic, with acute decompensation ([facility]) I50.33    Hyponatremia E87.1    TEREZA (acute kidney injury) ([facility] [address] N17.9    CHF (congestive heart failure) ([facility] [address] I50.9       Isolation/Infection:   Isolation            No Isolation          Patient Infection Status       Infection Onset Added Last Indicated Last Indicated By Review Planned Expiration Resolved Resolved By    None active    Resolved    COVID-19 Rule Out 06/15/21 06/15/21 06/15/21 COVID-19, Rapid (Ordered)   06/15/21 Rule-Out Test Resulted            Nurse Assessment:  Last Vital Signs: /60   Pulse 84   Temp 98 °F (36.7 °C) (Oral)   Resp 18   Ht 5' 3\" (1.6 m)   Wt 127 lb 9.6 oz (57.9 kg)   SpO2 97%   BMI 22.60 kg/m²     Last documented pain score (0-10 scale): Pain Level: 0  Last Weight:   Wt Readings from Last 1 Encounters:   06/24/21 127 lb 9.6 oz (57.9 kg)     Mental Status:  oriented, alert and logical    IV Access:  - None    Nursing Mobility/ADLs:  Walking   Independent  Transfer  Independent  Bathing  Independent  Dressing  Independent  Toileting  Independent  Feeding  [facility] Delivery   whole    Wound Care Documentation and Therapy:        Elimination:  Continence:   · Bowel: Yes  · Bladder: Yes  Urinary Catheter: None   Colostomy/Ileostomy/Ileal Conduit: No       Date of Last BM: 6/27/21    Intake/Output Summary (Last 24 hours) at 6/24/2021 1212  Last data filed at 6/24/2021 1040  Gross per 24 hour   Intake 1052.56 ml   Output 3250 ml   Net -2197.44 ml     I/O last 3 completed shifts: In: 1176.4 [P.O.:1031; I.V.:106.8; IV Piggyback:38.6]  Out: 2169 [Urine:4050]    Safety Concerns:     None    Impairments/Disabilities:      None    Nutrition Therapy:  Current Nutrition Therapy:   - Oral Diet:  Low Sodium (2gm)    Routes of Feeding: Oral  Liquids: No Restrictions  Daily Fluid Restriction: yes - amount 2 liters  (64 ounces)  Last Modified Barium Swallow with Video (Video Swallowing Test): not done    Treatments at the Time of Hospital Discharge:   Respiratory Treatments: n/a  Oxygen Therapy:  is not on home oxygen therapy.   Ventilator:    - No ventilator support    Rehab Therapies: Physical Therapy and Occupational Therapy  Weight Bearing Status/Restrictions: No weight bearing restirctions  Other Medical Equipment (for information only, NOT a DME order):  None  Other Treatments: ***    Patient's personal belongings (please select all that are sent with patient):  None    RN SIGNATURE:  Electronically signed by Gretchen Stern RN on 6/27/21 at 3:25 PM EDT    CASE MANAGEMENT/SOCIAL WORK SECTION    Inpatient Status Date: 6/21    Readmission Risk Assessment Score:  Readmission Risk              Risk of Unplanned Readmission:  24           Discharging to Facility/ Agency   [address]   [address] [facility] [place]      / signature: Electronically signed by Johanne Ibanez RN on 6/24/21 at 12:12 PM EDT  Electronically signed by Fransico Younger RN on 6/27/2021 at 2:55 PM    PHYSICIAN SECTION    Prognosis: Fair    Condition at Discharge: Stable    Rehab Potential (if transferring to Rehab): Fair    Recommended Labs or Other Treatments After Discharge: bmp in 1 week    Physician Certification: I certify the above information and transfer of Delia Mccartney  is necessary for the continuing treatment of the diagnosis listed and that [facility] greater 30 days.      Update Admission H&P: No change in H&P    PHYSICIAN SIGNATURE:  Electronically signed by Karen Collado MD on 6/27/21 at 2:48 PM EDT

## 2021-06-24 NOTE — PROGRESS NOTES
Lab Data:  CBC:   Recent Labs     06/22/21  0657 06/23/21  0740 06/24/21  0656   WBC 6.0 7.1 7.5   HGB 13.0 13.1 13.0    370 393     BMP:    Recent Labs     06/22/21  0657 06/23/21  0740 06/24/21  0656   * 133* 130*   K 3.6 4.7 2.9*   CO2 30 30 31   BUN 22* 24* 23*   CREATININE 0.9 0.9 0.9     INR:  No results for input(s): INR in the last 72 hours. BNP:    Recent Labs     06/21/21  1525 06/24/21  0656   PROBNP 3,008* 1,991*     No results found for: LVEF, LVEFMODE    Testing:  Echo 3/2021 (in Care everywhere)  CONCLUSIONS LVEF 60-65%    The left ventricle is of normal size.     Left ventricular wall thickness is normal.       No obvious segmental wall motion abnormalities.       The right atrium is normal in size.     Moderate left atrial dilatation.       Mild mitral annular calcification.       Mild thickening/calcification of the anterior mitral valve leaflet.       Trileaflet aortic valve.       Moderate  thickening (sclerosis) of the aortic valve cusps without reduced excursion.       Structurally normal tricuspid valve.       Mild tricuspid regurgitation.       Normal pericardium without effusion. Principal Problem:    CHF (congestive heart failure) ([facility])  Active Problems:    Atrial fibrillation with rapid ventricular response ([facility])    Essential hypertension    Hyponatremia    TEREZA (acute kidney injury) ([facility] [address]  Resolved Problems:    * No resolved hospital problems. *    Assessment:  Acute on chronic diastolic heart failure   Afib with RVR on admission- improved control   PAF- on eliquis  TEREZA on CKD- improved   Hyponatremia  HLD  hypokalemia    Plan:  Replace potassium  Change short acting cardizem to long acting starting at noon  Continue lasix infusion 5mg/hr for the next 24 hours and then change to lasix PO  Add Spironolactone (aldactone)  continue toprol  50mg BID  cxr in the am    Discussed with son over the phone per patient request. Updated on above plan.      Dispo: likely Saturday once electrolytes are stable and stable on PO regimen.      Vinnie Silva, KRYSTLE - CNP, CNP, 6/24/2021, 9:40 AM

## 2021-06-24 NOTE — CARE COORDINATION
CM spoke [address] who is aware of [facility] need and aware of the need to start care right away r/t med compliance. Pt likely will d/c on Saturday, per notes. [facility] is unable to accommodate blister packs that could assist w/ med compliance. CM will continue to follow for needs and will assist as needed.   Osmar Hickman RN

## 2021-06-24 NOTE — PROGRESS NOTES
Patient complaining of burning with IV potassium infusions at this time. IV potassium rate slowed down to help with burning. 1200- Patient called again saying that IV potassium was burning even worse. IV potassium slowed and connected to IVF to help with symptoms. Will continue to monitor.

## 2021-06-24 NOTE — PLAN OF CARE
Problem: OXYGENATION/RESPIRATORY FUNCTION  Goal: Patient will maintain patent airway  Outcome: Ongoing     Patient's EF (Ejection Fraction) is greater than 40%    Heart Failure Medications:  Diuretics[de-identified] Furosemide    (One of the following REQUIRED for EF <40%/SYSTOLIC FAILURE but MAY be used in EF% >40%/DIASTOLIC FAILURE)        ACE[de-identified] None        ARB[de-identified] None         ARNI[de-identified] None    (Beta Blockers)  NON- Evidenced Based Beta Blocker (for EF% >40%/DIASTOLIC FAILURE): None    Evidenced Based Beta Blocker::(REQUIRED for EF% <40%/SYSTOLIC FAILURE) None  . .................................................................................................................................................. Patient's weights and intake/output reviewed: Yes    Patient's Last Weight: 127 lbs obtained by standing scale. Difference of 2 lbs less than last documented weight. Intake/Output Summary (Last 24 hours) at 6/24/2021 1500  Last data filed at 6/24/2021 1423  Gross per 24 hour   Intake 1170.56 ml   Output 3551 ml   Net -2380.44 ml       Comorbidities Reviewed Yes    Patient has a past medical history of Arthritis, Atrial fibrillation ([address] Back fracture, CHF (congestive heart failure) ([facility] [address] HTN (hypertension), Hyperlipidemia, and Panic attack. >>For CHF and Comorbidity documentation on Education Time and Topics, please see Education Tab    Progressive Mobility Assessment:  What is this patient's Current Level of Mobility?: Ambulatory- with Assistance  How was this patient Mobilized today?:  Up to Toilet/Shower and Up in Room                 With Whom? Nurse and PCA                 Level of Difficulty/Assistance: 1x Assist     Pt resting in bed at this time on room air. Pt denies shortness of breath. Pt with nonpitting lower extremity edema.      Patient and/or Family's stated Goal of Care this Admission: increase activity tolerance, better understand heart failure and disease management, be more comfortable and reduce lower extremity edema prior to discharge        :

## 2021-06-24 NOTE — PLAN OF CARE
Problem: OXYGENATION/RESPIRATORY FUNCTION  Goal: Patient will maintain patent airway  Outcome: Ongoing  Note:   Patient's EF (Ejection Fraction) is greater than 40%    Heart Failure Medications:  Diuretics[de-identified] Furosemide    (One of the following REQUIRED for EF <40%/SYSTOLIC FAILURE but MAY be used in EF% >40%/DIASTOLIC FAILURE)        ACE[de-identified] None        ARB[de-identified] None         ARNI[de-identified] None    (Beta Blockers)  NON- Evidenced Based Beta Blocker (for EF% >40%/DIASTOLIC FAILURE): None    Evidenced Based Beta Blocker::(REQUIRED for EF% <40%/SYSTOLIC FAILURE) Metoprolol SUCCinate- Toprol XL  . .................................................................................................................................................. Patient's weights and intake/output reviewed: Yes    Patient's Last Weight: 129 lbs obtained by standing scale. Difference of 4 lbs more than last documented weight. Intake/Output Summary (Last 24 hours) at 6/23/2021 2354  Last data filed at 6/23/2021 2215  Gross per 24 hour   Intake 1145.77 ml   Output 2925 ml   Net -1779.23 ml       Comorbidities Reviewed Yes    Patient has a past medical history of Arthritis, Atrial fibrillation ([address] Back fracture, CHF (congestive heart failure) ([address] HTN (hypertension), Hyperlipidemia, and Panic attack. >>For CHF and Comorbidity documentation on Education Time and Topics, please see Education Tab    Progressive Mobility Assessment:  What is this patient's Current Level of Mobility?: Ambulatory- with Assistance  How was this patient Mobilized today?: Edge of Bed,  Up to Toilet/Shower, and Up in Room                 With Whom? Nurse, PCA, and Self                 Level of Difficulty/Assistance: 1x Assist     Pt resting in bed at this time on room air. Pt denies shortness of breath. Pt without lower extremity edema.      Patient and/or Family's stated Goal of Care this Admission: increase activity tolerance, better understand heart failure and disease management, and be more comfortable prior to discharge        :      Problem: Skin Integrity:  Goal: Will show no infection signs and symptoms  Description: Will show no infection signs and symptoms  Outcome: Ongoing     Problem: Falls - Risk of:  Goal: Will remain free from falls  Description: Will remain free from falls  Outcome: Ongoing  Note: Pt will remain free from falls throughout hospital stay. Fall precautions in place, bed alarm on, bed in lowest position with wheels locked and side rails 2/4 up. Room door open and hourly rounding completed. Will continue to monitor throughout shift.       Problem: Cardiac:  Goal: Ability to maintain an adequate cardiac output will improve  Description: Ability to maintain an adequate cardiac output will improve  Outcome: Ongoing

## 2021-06-24 NOTE — PROGRESS NOTES
Secure message to , \"Per protocol paging about critical low potassium at 2.9. Thank you! \"    Awaiting response

## 2021-06-24 NOTE — PROGRESS NOTES
Patient agreed to 2/4 bags of IV potassium due to discomfort with infusions. She will still take the PO replacement.

## 2021-06-24 NOTE — PROGRESS NOTES
Hospitalist Progress Note      PCP: Bolivar Nava MD, MD    Date of Admission: 6/21/2021    Chief Complaint: SOB    Subjective: no new c/o. Medications:  Reviewed    Infusion Medications    sodium chloride      furosemide (LASIX) 1mg/ml infusion 5 mg/hr (06/24/21 9981)     Scheduled Medications    potassium chloride  40 mEq Oral Once    dilTIAZem  120 mg Oral Daily    spironolactone  25 mg Oral Daily    magnesium oxide  400 mg Oral Daily    metoprolol succinate  50 mg Oral BID    apixaban  2.5 mg Oral BID    aspirin  81 mg Oral Daily    atorvastatin  20 mg Oral Daily    escitalopram  10 mg Oral Daily    polyethylene glycol  17 g Oral Daily    melatonin  10 mg Oral Nightly    simethicone  120 mg Oral BID    thiamine  100 mg Oral Daily    Vitamin D  1,000 Units Oral Daily    sodium chloride flush  10 mL Intravenous 2 times per day    multivitamin  1 tablet Oral Daily     PRN Meds: sodium chloride flush, sodium chloride, senna, acetaminophen **OR** acetaminophen, ondansetron **OR** ondansetron      Intake/Output Summary (Last 24 hours) at 6/24/2021 0956  Last data filed at 6/24/2021 0850  Gross per 24 hour   Intake 934.41 ml   Output 3250 ml   Net -2315.59 ml       Physical Exam Performed:    /60   Pulse 84   Temp 98 °F (36.7 °C) (Oral)   Resp 18   Ht 5' 3\" (1.6 m)   Wt 127 lb 9.6 oz (57.9 kg)   SpO2 97%   BMI 22.60 kg/m²     General appearance: No apparent distress, appears stated age and cooperative. HEENT: Pupils equal, round, and reactive to light. Conjunctivae/corneas clear. Neck: Supple, with full range of motion. No jugular venous distention. Trachea midline. Respiratory:  Normal respiratory effort. Clear to auscultation, bilaterally without Rales/Wheezes/Rhonchi. Cardiovascular: Regular rate and rhythm with normal S1/S2 without murmurs, rubs or gallops. Abdomen: Soft, non-tender, non-distended with normal bowel sounds.   Musculoskeletal: No clubbing, cyanosis or edema bilaterally. Full range of motion without deformity. Skin: Skin color, texture, turgor normal.  No rashes or lesions. Neurologic:  Neurovascularly intact without any focal sensory/motor deficits. Cranial nerves: II-XII intact, grossly non-focal.  Psychiatric: Alert and oriented, thought content appropriate, normal insight  Capillary Refill: Brisk,< 3 seconds   Peripheral Pulses: +2 palpable, equal bilaterally       Labs:   Recent Labs     06/22/21  0657 06/23/21  0740 06/24/21  0656   WBC 6.0 7.1 7.5   HGB 13.0 13.1 13.0   HCT 38.5 38.0 38.1    370 393     Recent Labs     06/22/21  0657 06/23/21  0740 06/24/21  0656   * 133* 130*   K 3.6 4.7 2.9*   CL 93* 94* 90*   CO2 30 30 31   BUN 22* 24* 23*   CREATININE 0.9 0.9 0.9   CALCIUM 9.3 9.1 8.9   PHOS  --   --  3.7     Recent Labs     06/21/21  1525   AST 22   ALT 20   BILITOT 0.3   ALKPHOS 78     No results for input(s): INR in the last 72 hours.   Recent Labs     06/21/21  1525 06/21/21 2039   Katerina Litten <0.01 <0.01       Urinalysis:      Lab Results   Component Value Date    NITRU Negative 05/20/2021    WBCUA 3-5 05/20/2021    RBCUA 0-2 05/20/2021    BLOODU Negative 05/20/2021    SPECGRAV 1.015 05/20/2021    GLUCOSEU Negative 05/20/2021       Consults:    IP CONSULT TO HOSPITALIST  IP CONSULT TO CARDIOLOGY  IP CONSULT TO HEART FAILURE NURSE/COORDINATOR      Assessment/Plan:    Active Hospital Problems    Diagnosis     CHF (congestive heart failure) ([facility] [address] [I50.9]     Hyponatremia [E87.1]     TEREZA (acute kidney injury) ([facility] [address] [N17.9]     Atrial fibrillation with rapid ventricular response ([facility] [address] [I48.91]     Essential hypertension [I10]          A. fib RVR  -Admit to medical floor for continuous telemetry and pulse oximetry monitoring  -Patient initiated on IV Cardizem bolus and drip, the latter of which will be continued overnight (BP permitting)   -Electrolytes, thyroid studies ordered to further assess underlying causes for this tachyarrhythmia  -Patient currently anticoagulated on twice daily Eliquis; dosage reduced to 2.5 mg BID due to TEERZA at the time of admission     Acutely decompensated diastolic CHF  -Admit to floor for continuous telemetry monitoring  -Strict I's and O's; pure wick versus Coy to be placed to assist with measurements  -IV Lasix GTT initiated at 2 mg/H rather than bolus due to borderline hypotension  -Echo performed 3/2/2021 therefore not reordered at this time; will defer to CARDS   -Continue home doses of ASA, Lipitor, Toprol-XL; BP parameters placed for Norvasc dosing  -2G Na and fluid restriction dietary modifications in place  -Consult placed to Cardiology and appreciated - CXR ordered and pending for AM 25 June.      Urinary Retention - coy placed. Started on Flomax. ARF - w/ elevated BUN/Cr ratio c/w pre-renal azotemia. Given IVF hydration and follow serial labs. Reviewed and documented as above. HypoNatremia - etiology clinically unable to determine but likely hypovolemic. Follow serial labs on IVF. Reviewed and documented as above. HypoMagnesemia - etiology clinically unable to determine. Follow serial labs and replace PRN. Reviewed and documented as above. HypoKalemia - likely 2nd to aggressive IV diuresis. Follow serial labs and replace PRN - ordered PO/IV 24 June. Reviewed and documented as above. DVT Prophylaxis: Eliquis     Recent Labs     06/22/21  0657 06/23/21  0740 06/24/21  0656    370 393     Diet: ADULT DIET; Easy to Chew; Low Sodium (2 gm); 1500 ml  Code Status: Full Code      PT/OT Eval Status: seen w/ recs for home w/ assist.     Ardia Oats - Possibly Friday 25 June at the earliest pending clinical course and subspecialty recs.      Go De La Torre MD

## 2021-06-25 ENCOUNTER — APPOINTMENT (OUTPATIENT)
Dept: GENERAL RADIOLOGY | Age: 87
DRG: 291 | End: 2021-06-25
Payer: MEDICARE

## 2021-06-25 LAB
ANION GAP SERPL CALCULATED.3IONS-SCNC: 9 MMOL/L (ref 3–16)
BUN BLDV-MCNC: 22 MG/DL (ref 7–20)
CALCIUM SERPL-MCNC: 8.9 MG/DL (ref 8.3–10.6)
CHLORIDE BLD-SCNC: 96 MMOL/L (ref 99–110)
CO2: 31 MMOL/L (ref 21–32)
CREAT SERPL-MCNC: 0.9 MG/DL (ref 0.6–1.2)
GFR AFRICAN AMERICAN: >60
GFR NON-AFRICAN AMERICAN: 59
GLUCOSE BLD-MCNC: 110 MG/DL (ref 70–99)
MAGNESIUM: 1.6 MG/DL (ref 1.8–2.4)
POTASSIUM SERPL-SCNC: 4.3 MMOL/L (ref 3.5–5.1)
SODIUM BLD-SCNC: 136 MMOL/L (ref 136–145)

## 2021-06-25 PROCEDURE — 71046 X-RAY EXAM CHEST 2 VIEWS: CPT

## 2021-06-25 PROCEDURE — 99232 SBSQ HOSP IP/OBS MODERATE 35: CPT | Performed by: NURSE PRACTITIONER

## 2021-06-25 PROCEDURE — 2060000000 HC ICU INTERMEDIATE R&B

## 2021-06-25 PROCEDURE — 6360000002 HC RX W HCPCS: Performed by: INTERNAL MEDICINE

## 2021-06-25 PROCEDURE — 83735 ASSAY OF MAGNESIUM: CPT

## 2021-06-25 PROCEDURE — 6370000000 HC RX 637 (ALT 250 FOR IP): Performed by: INTERNAL MEDICINE

## 2021-06-25 PROCEDURE — 6370000000 HC RX 637 (ALT 250 FOR IP): Performed by: HOSPITALIST

## 2021-06-25 PROCEDURE — 36415 COLL VENOUS BLD VENIPUNCTURE: CPT

## 2021-06-25 PROCEDURE — 6370000000 HC RX 637 (ALT 250 FOR IP): Performed by: NURSE PRACTITIONER

## 2021-06-25 PROCEDURE — 80048 BASIC METABOLIC PNL TOTAL CA: CPT

## 2021-06-25 PROCEDURE — 2580000003 HC RX 258: Performed by: HOSPITALIST

## 2021-06-25 RX ORDER — MAGNESIUM SULFATE IN WATER 40 MG/ML
2000 INJECTION, SOLUTION INTRAVENOUS ONCE
Status: COMPLETED | OUTPATIENT
Start: 2021-06-25 | End: 2021-06-25

## 2021-06-25 RX ORDER — TORSEMIDE 20 MG/1
20 TABLET ORAL DAILY
Status: DISCONTINUED | OUTPATIENT
Start: 2021-06-25 | End: 2021-06-26

## 2021-06-25 RX ADMIN — APIXABAN 2.5 MG: 2.5 TABLET, FILM COATED ORAL at 20:41

## 2021-06-25 RX ADMIN — DILTIAZEM HYDROCHLORIDE 120 MG: 120 CAPSULE, COATED, EXTENDED RELEASE ORAL at 09:05

## 2021-06-25 RX ADMIN — Medication 10 ML: at 20:41

## 2021-06-25 RX ADMIN — METOPROLOL SUCCINATE 50 MG: 50 TABLET, EXTENDED RELEASE ORAL at 09:04

## 2021-06-25 RX ADMIN — SODIUM CHLORIDE 25 ML: 9 INJECTION, SOLUTION INTRAVENOUS at 10:09

## 2021-06-25 RX ADMIN — TORSEMIDE 20 MG: 20 TABLET ORAL at 09:04

## 2021-06-25 RX ADMIN — TAMSULOSIN HYDROCHLORIDE 0.4 MG: 0.4 CAPSULE ORAL at 09:04

## 2021-06-25 RX ADMIN — ATORVASTATIN CALCIUM 20 MG: 10 TABLET, FILM COATED ORAL at 09:04

## 2021-06-25 RX ADMIN — SIMETHICONE 120 MG: 80 TABLET, CHEWABLE ORAL at 20:41

## 2021-06-25 RX ADMIN — MULTIPLE VITAMINS W/ MINERALS TAB 1 TABLET: TAB at 09:04

## 2021-06-25 RX ADMIN — SIMETHICONE 120 MG: 80 TABLET, CHEWABLE ORAL at 09:04

## 2021-06-25 RX ADMIN — MAGNESIUM SULFATE HEPTAHYDRATE 2000 MG: 40 INJECTION, SOLUTION INTRAVENOUS at 10:12

## 2021-06-25 RX ADMIN — Medication 100 MG: at 09:04

## 2021-06-25 RX ADMIN — POLYETHYLENE GLYCOL 3350 17 G: 17 POWDER, FOR SOLUTION ORAL at 09:05

## 2021-06-25 RX ADMIN — METOPROLOL SUCCINATE 50 MG: 50 TABLET, EXTENDED RELEASE ORAL at 20:41

## 2021-06-25 RX ADMIN — ASPIRIN 81 MG: 81 TABLET, COATED ORAL at 09:04

## 2021-06-25 RX ADMIN — Medication 1000 UNITS: at 09:04

## 2021-06-25 RX ADMIN — ESCITALOPRAM OXALATE 10 MG: 10 TABLET ORAL at 09:05

## 2021-06-25 RX ADMIN — SPIRONOLACTONE 25 MG: 25 TABLET ORAL at 09:04

## 2021-06-25 RX ADMIN — MAGNESIUM GLUCONATE 500 MG ORAL TABLET 400 MG: 500 TABLET ORAL at 09:04

## 2021-06-25 RX ADMIN — Medication 10 MG: at 20:41

## 2021-06-25 RX ADMIN — Medication 10 ML: at 09:05

## 2021-06-25 RX ADMIN — APIXABAN 2.5 MG: 2.5 TABLET, FILM COATED ORAL at 09:04

## 2021-06-25 ASSESSMENT — PAIN SCALES - GENERAL
PAINLEVEL_OUTOF10: 0

## 2021-06-25 NOTE — CARE COORDINATION
BAKARI spoke to pt in room who is deferring to son. CM called and spoke to Obi Muller who is at work, but will be in later this evening. BAKARI spoke to RN regarding med compliance. Per pt, she has a box and Obi Muller sets up her pills. BAKARI suggested to Minesh palacios RN, that the son bring in the box to assist w/labeling at discharge to ensure pt takes all meds as prescribed. When pt is ready for d/c, pt will have [facility] [address]. BAKARI spoke to Jenkinjones, who is aware that pt is going to d/c on Saturday. Family will assist w/transport home.   Stefano Yoder RN

## 2021-06-25 NOTE — PROGRESS NOTES
Hospitalist Progress Note      PCP: Evelio Caputo MD, MD    Date of Admission: 6/21/2021    Chief Complaint: SOB    Subjective: no new c/o. Medications:  Reviewed    Infusion Medications    sodium chloride 25 mL (06/24/21 1039)     Scheduled Medications    torsemide  20 mg Oral Daily    dilTIAZem  120 mg Oral Daily    spironolactone  25 mg Oral Daily    tamsulosin  0.4 mg Oral Daily    magnesium oxide  400 mg Oral Daily    metoprolol succinate  50 mg Oral BID    apixaban  2.5 mg Oral BID    aspirin  81 mg Oral Daily    atorvastatin  20 mg Oral Daily    escitalopram  10 mg Oral Daily    polyethylene glycol  17 g Oral Daily    melatonin  10 mg Oral Nightly    simethicone  120 mg Oral BID    thiamine  100 mg Oral Daily    Vitamin D  1,000 Units Oral Daily    sodium chloride flush  10 mL Intravenous 2 times per day    multivitamin  1 tablet Oral Daily     PRN Meds: sodium chloride flush, sodium chloride, senna, acetaminophen **OR** acetaminophen, ondansetron **OR** ondansetron      Intake/Output Summary (Last 24 hours) at 6/25/2021 0946  Last data filed at 6/25/2021 0908  Gross per 24 hour   Intake 1228.54 ml   Output 2275 ml   Net -1046.46 ml       Physical Exam Performed:    /60   Pulse 61   Temp 97.9 °F (36.6 °C) (Oral)   Resp 16   Ht 5' 3\" (1.6 m)   Wt 128 lb 6.4 oz (58.2 kg)   SpO2 93%   BMI 22.75 kg/m²     General appearance: No apparent distress, appears stated age and cooperative. HEENT: Pupils equal, round, and reactive to light. Conjunctivae/corneas clear. Neck: Supple, with full range of motion. No jugular venous distention. Trachea midline. Respiratory:  Normal respiratory effort. Clear to auscultation, bilaterally without Rales/Wheezes/Rhonchi. Cardiovascular: Regular rate and rhythm with normal S1/S2 without murmurs, rubs or gallops. Abdomen: Soft, non-tender, non-distended with normal bowel sounds.   Musculoskeletal: No clubbing, cyanosis or edema bilaterally. Full range of motion without deformity. Skin: Skin color, texture, turgor normal.  No rashes or lesions. Neurologic:  Neurovascularly intact without any focal sensory/motor deficits. Cranial nerves: II-XII intact, grossly non-focal.  Psychiatric: Alert and oriented, thought content appropriate, normal insight  Capillary Refill: Brisk,< 3 seconds   Peripheral Pulses: +2 palpable, equal bilaterally       Labs:   Recent Labs     06/23/21  0740 06/24/21  0656   WBC 7.1 7.5   HGB 13.1 13.0   HCT 38.0 38.1    393     Recent Labs     06/23/21  0740 06/24/21  0656 06/25/21  0708   * 130* 136   K 4.7 2.9* 4.3   CL 94* 90* 96*   CO2 30 31 31   BUN 24* 23* 22*   CREATININE 0.9 0.9 0.9   CALCIUM 9.1 8.9 8.9   PHOS  --  3.7  --      No results for input(s): AST, ALT, BILIDIR, BILITOT, ALKPHOS in the last 72 hours. No results for input(s): INR in the last 72 hours. No results for input(s): Shellia Bugler in the last 72 hours.     Urinalysis:      Lab Results   Component Value Date    NITRU Negative 05/20/2021    WBCUA 3-5 05/20/2021    RBCUA 0-2 05/20/2021    BLOODU Negative 05/20/2021    SPECGRAV 1.015 05/20/2021    GLUCOSEU Negative 05/20/2021       Consults:    IP CONSULT TO HOSPITALIST  IP CONSULT TO CARDIOLOGY  IP CONSULT TO HEART FAILURE NURSE/COORDINATOR      Assessment/Plan:    Active Hospital Problems    Diagnosis     CHF (congestive heart failure) ([facility] [address] [I50.9]     Hyponatremia [E87.1]     TEREZA (acute kidney injury) ([facility] [address] [N17.9]     Atrial fibrillation with rapid ventricular response ([facility] [address] [I48.91]     Essential hypertension [I10]          A. fib RVR  -Admit to medical floor for continuous telemetry and pulse oximetry monitoring  -Patient initiated on IV Cardizem bolus and drip, the latter of which will be continued overnight (BP permitting)   -Electrolytes, thyroid studies ordered to further assess underlying causes for this tachyarrhythmia  -Patient currently anticoagulated on twice daily Eliquis; dosage reduced to 2.5 mg BID due to TEREZA at the time of admission     Acutely decompensated diastolic CHF  -Admit to floor for continuous telemetry monitoring  -Strict I's and O's; pure wick versus Coy to be placed to assist with measurements  -IV Lasix GTT initiated at 2 mg/H rather than bolus due to borderline hypotension  -Echo performed 3/2/2021 therefore not reordered at this time; will defer to CARDS   -Continue home doses of ASA, Lipitor, Toprol-XL; BP parameters placed for Norvasc dosing  -2G Na and fluid restriction dietary modifications in place  -Consult placed to Cardiology and appreciated - CXR ordered and pending for AM 25 June.      Urinary Retention - coy placed. Started on Flomax. ARF - w/ elevated BUN/Cr ratio c/w pre-renal azotemia. Given IVF hydration and follow serial labs. Reviewed and documented as above. HypoNatremia - etiology clinically unable to determine but likely hypovolemic. Follow serial labs on IVF. Reviewed and documented as above. HypoMagnesemia - etiology clinically unable to determine. Follow serial labs and replace PRN - ordered IV 25 June. Reviewed and documented as above. HypoKalemia - likely 2nd to aggressive IV diuresis. Follow serial labs and replace PRN - ordered PO/IV 24 June. Reviewed and documented as above. DVT Prophylaxis: Eliquis     Recent Labs     06/23/21  0740 06/24/21  0656    393     Diet: ADULT DIET; Easy to Chew; Low Sodium (2 gm); 1500 ml  Code Status: Full Code      PT/OT Eval Status: seen w/ recs for home w/ assist.     Scotty Mitchell - Possibly Friday/Sat 25/26 June at the earliest, latter more likely, pending clinical course and subspecialty recs.      Merle Man MD

## 2021-06-25 NOTE — PROGRESS NOTES
Nelson catheter removed by Colt Sunshine RN at this time. Patient tolerated well. Will monitor for urine output.

## 2021-06-25 NOTE — PLAN OF CARE
Problem: OXYGENATION/RESPIRATORY FUNCTION  Goal: Patient will maintain patent airway  6/25/2021 1037 by Fernandez Ramirez RN  Outcome: Ongoing  Note:   Patient's EF (Ejection Fraction) is greater than 40%    Heart Failure Medications:  Diuretics[de-identified] Torsemide and Spironolactone    (One of the following REQUIRED for EF <40%/SYSTOLIC FAILURE but MAY be used in EF% >40%/DIASTOLIC FAILURE)        ACE[de-identified] None        ARB[de-identified] None         ARNI[de-identified] None    (Beta Blockers)  NON- Evidenced Based Beta Blocker (for EF% >40%/DIASTOLIC FAILURE): None    Evidenced Based Beta Blocker::(REQUIRED for EF% <40%/SYSTOLIC FAILURE) Metoprolol SUCCinate- Toprol XL  . .................................................................................................................................................. Patient's weights and intake/output reviewed: Yes    Patient's Last Weight: 128 lbs obtained by standing scale. Difference of 1 lbs more than last documented weight. Intake/Output Summary (Last 24 hours) at 6/25/2021 1038  Last data filed at 6/25/2021 1012  Gross per 24 hour   Intake 1242.19 ml   Output 2475 ml   Net -1232.81 ml       Comorbidities Reviewed Yes    Patient has a past medical history of Arthritis, Atrial fibrillation ([address] Back fracture, CHF (congestive heart failure) ([address] HTN (hypertension), Hyperlipidemia, and Panic attack. >>For CHF and Comorbidity documentation on Education Time and Topics, please see Education Tab    Progressive Mobility Assessment:  What is this patient's Current Level of Mobility?: Ambulatory- with Assistance  How was this patient Mobilized today?: Up to Chair                 With Whom? Nurse                 Level of Difficulty/Assistance: 1x Assist     Pt up in chair at this time on room air. Pt denies shortness of breath. Pt without lower extremity edema.      Patient and/or Family's stated Goal of Care this Admission: reduce shortness of breath, increase activity tolerance, better understand heart failure and disease management, be more comfortable, and reduce lower extremity edema prior to discharge        :

## 2021-06-25 NOTE — PLAN OF CARE
Problem: OXYGENATION/RESPIRATORY FUNCTION  Goal: Patient will maintain patent airway  6/25/2021 0038 by Laci Franks RN  Outcome: Ongoing  Note:   Patient's EF (Ejection Fraction) is greater than 40%    Heart Failure Medications:  Diuretics[de-identified] Furosemide and Spironolactone    (One of the following REQUIRED for EF <40%/SYSTOLIC FAILURE but MAY be used in EF% >40%/DIASTOLIC FAILURE)        ACE[de-identified] None        ARB[de-identified] None         ARNI[de-identified] None    (Beta Blockers)  NON- Evidenced Based Beta Blocker (for EF% >40%/DIASTOLIC FAILURE): None    Evidenced Based Beta Blocker::(REQUIRED for EF% <40%/SYSTOLIC FAILURE) Metoprolol SUCCinate- Toprol XL  . .................................................................................................................................................. Patient's weights and intake/output reviewed: Yes    Patient's Last Weight: 127 lbs obtained by standing scale. Difference of 2 lbs less than last documented weight. Intake/Output Summary (Last 24 hours) at 6/25/2021 0039  Last data filed at 6/24/2021 2323  Gross per 24 hour   Intake 937.9 ml   Output 2125 ml   Net -1187.1 ml       Comorbidities Reviewed Yes    Patient has a past medical history of Arthritis, Atrial fibrillation ([address] Back fracture, CHF (congestive heart failure) ([address] HTN (hypertension), Hyperlipidemia, and Panic attack. >>For CHF and Comorbidity documentation on Education Time and Topics, please see Education Tab    Progressive Mobility Assessment:  What is this patient's Current Level of Mobility?: Ambulatory- with Assistance  How was this patient Mobilized today?: Edge of Bed, Up to Chair,  Up to Toilet/Shower, and Up in Room                 With Whom? Nurse, PCA, and Self                 Level of Difficulty/Assistance: 1x Assist     Pt resting in bed at this time on room air. Pt denies shortness of breath. Pt without lower extremity edema.      Patient and/or Family's stated Goal of Care this Admission: increase activity tolerance and be more comfortable prior to discharge        :      Problem: Falls - Risk of:  Goal: Will remain free from falls  Description: Will remain free from falls  Outcome: Ongoing  Note: Pt will remain free from falls throughout hospital stay. Fall precautions in place, bed alarm on, bed in lowest position with wheels locked and side rails 2/4 up. Room door open and hourly rounding completed. Will continue to monitor throughout shift.       Problem: Cardiac:  Goal: Ability to maintain an adequate cardiac output will improve  Description: Ability to maintain an adequate cardiac output will improve  Outcome: Ongoing

## 2021-06-25 NOTE — PROGRESS NOTES
[facility]   Daily Progress Note    Admit Date:  6/21/2021  HPI:    Chief Complaint   Patient presents with    Shortness of Breath     recently admitted for fluid overload, states she was discharged, felt good for a day, now SOB again. Interval history: Nerissa Méndez is being followed for shortness of breath, Afib. Subjective:  Ms. Chambers Morning breathing is better. Feeling better. Converted to NSR. Rate is much improved.      Objective:   /66   Pulse 65   Temp 97.7 °F (36.5 °C) (Oral)   Resp 16   Ht 5' 3\" (1.6 m)   Wt 128 lb 6.4 oz (58.2 kg)   SpO2 97%   BMI 22.75 kg/m²       Intake/Output Summary (Last 24 hours) at 6/25/2021 1502  Last data filed at 6/25/2021 1422  Gross per 24 hour   Intake 1503.16 ml   Output 2475 ml   Net -971.84 ml       NYHA: III    Physical Exam:  General:  Awake, alert, NAD, thin, appears younger than stated age   Skin:  Warm and dry  Neck:  JVD<8  Chest:  Crackles to auscultation in the base, no wheezes/rhonchi/rales  Telemetry: afib rate controlled 70-80's  Cardiovascular:  Irregular S1S2, no m/r/g   Abdomen:  Soft, nontender, +bowel sounds  Extremities:  No  bilateral lower extremity edema    Medications:    torsemide  20 mg Oral Daily    dilTIAZem  120 mg Oral Daily    spironolactone  25 mg Oral Daily    tamsulosin  0.4 mg Oral Daily    magnesium oxide  400 mg Oral Daily    metoprolol succinate  50 mg Oral BID    apixaban  2.5 mg Oral BID    aspirin  81 mg Oral Daily    atorvastatin  20 mg Oral Daily    escitalopram  10 mg Oral Daily    polyethylene glycol  17 g Oral Daily    melatonin  10 mg Oral Nightly    simethicone  120 mg Oral BID    thiamine  100 mg Oral Daily    Vitamin D  1,000 Units Oral Daily    sodium chloride flush  10 mL Intravenous 2 times per day    multivitamin  1 tablet Oral Daily      sodium chloride Stopped (06/25/21 1009)       Lab Data:  CBC:   Recent Labs     06/23/21  0740 06/24/21  0656   WBC 7.1 7.5   HGB 13.1 13.0    393     BMP:    Recent Labs     06/23/21  0740 06/24/21  0656 06/25/21  0708   * 130* 136   K 4.7 2.9* 4.3   CO2 30 31 31   BUN 24* 23* 22*   CREATININE 0.9 0.9 0.9     INR:  No results for input(s): INR in the last 72 hours. BNP:    Recent Labs     06/24/21  0656   PROBNP 1,991*     No results found for: LVEF, LVEFMODE    Testing:  Echo 3/2021 (in Care everywhere)  CONCLUSIONS LVEF 60-65%    The left ventricle is of normal size.     Left ventricular wall thickness is normal.       No obvious segmental wall motion abnormalities.       The right atrium is normal in size.     Moderate left atrial dilatation.       Mild mitral annular calcification.       Mild thickening/calcification of the anterior mitral valve leaflet.       Trileaflet aortic valve.       Moderate  thickening (sclerosis) of the aortic valve cusps without reduced excursion.       Structurally normal tricuspid valve.       Mild tricuspid regurgitation.       Normal pericardium without effusion. Principal Problem:    CHF (congestive heart failure) ([facility])  Active Problems:    Atrial fibrillation with rapid ventricular response ([facility])    Essential hypertension    Hyponatremia    TEREZA (acute kidney injury) ([facility] [address]  Resolved Problems:    * No resolved hospital problems.  *    cxr reviewed by my and showed improved pleural effusion and pulmonary congestion compared to CXR from 6/14/21    Assessment:  Acute on chronic diastolic heart failure   Afib with RVR on admission- improved control   PAF- on eliquis  TEREZA on CKD- improved   Hyponatremia  HLD  hypokalemia    Plan:  Potassium is stable and normal.   Change short acting cardizem to long acting starting at noon  D/c lasix infusion and start torsemide 20mg daily   Spironolactone (aldactone)  continue toprol  50mg BID  Will have coy removed and have her do a voiding trial    Recommend daily weights at home; if weight goes up 2lbs in a day then may need to consider increase in torsemide   Fluid restriction and low sodium diet discussed at length. Dispo: likely Saturday once electrolytes are stable and stable on PO regimen.      Follow up with [facility] cardiology     Total time spent education: 30 minutes     KRYSTLE Dill - CNP, CNP, 6/25/2021, 3:53 PM

## 2021-06-25 NOTE — PLAN OF CARE
Problem: Falls - Risk of:  Goal: Will remain free from falls  Description: Will remain free from falls  6/25/2021 1037 by Luz Maria Young RN  Outcome: Ongoing  Note: Pt will remain free from falls throughout hospital stay. Fall precautions in place, chair alarm on, bed in lowest position with wheels locked and side rails 2/4 up. Room door open and hourly rounding completed. Will continue to monitor throughout shift. Problem: Skin Integrity:  Goal: Will show no infection signs and symptoms  Description: Will show no infection signs and symptoms  Outcome: Ongoing  Note: Pt is at risk for skin breakdown. Pt will have skin assessments every shift, encourage safe ambulation, heels elevated off of the bed, and friction and shear prevented when possible. Will continue to monitor for signs of skin breakdown and enforce prevention measures.

## 2021-06-25 NOTE — PROGRESS NOTES
Patient's son Marty Emmanuel was not at the [facility] for [facility] to go over medications today. Patient's daughter at bedside, and RN informed her of plan for medications to be set up in a pill container with am and pm labels.

## 2021-06-26 LAB
ANION GAP SERPL CALCULATED.3IONS-SCNC: 9 MMOL/L (ref 3–16)
BUN BLDV-MCNC: 22 MG/DL (ref 7–20)
CALCIUM SERPL-MCNC: 9 MG/DL (ref 8.3–10.6)
CHLORIDE BLD-SCNC: 90 MMOL/L (ref 99–110)
CO2: 29 MMOL/L (ref 21–32)
CREAT SERPL-MCNC: 0.8 MG/DL (ref 0.6–1.2)
GFR AFRICAN AMERICAN: >60
GFR NON-AFRICAN AMERICAN: >60
GLUCOSE BLD-MCNC: 108 MG/DL (ref 70–99)
MAGNESIUM: 1.9 MG/DL (ref 1.8–2.4)
POTASSIUM SERPL-SCNC: 3.9 MMOL/L (ref 3.5–5.1)
SODIUM BLD-SCNC: 128 MMOL/L (ref 136–145)

## 2021-06-26 PROCEDURE — 80048 BASIC METABOLIC PNL TOTAL CA: CPT

## 2021-06-26 PROCEDURE — 2060000000 HC ICU INTERMEDIATE R&B

## 2021-06-26 PROCEDURE — 6370000000 HC RX 637 (ALT 250 FOR IP): Performed by: INTERNAL MEDICINE

## 2021-06-26 PROCEDURE — 36415 COLL VENOUS BLD VENIPUNCTURE: CPT

## 2021-06-26 PROCEDURE — 51798 US URINE CAPACITY MEASURE: CPT

## 2021-06-26 PROCEDURE — 2580000003 HC RX 258: Performed by: HOSPITALIST

## 2021-06-26 PROCEDURE — 99232 SBSQ HOSP IP/OBS MODERATE 35: CPT | Performed by: NURSE PRACTITIONER

## 2021-06-26 PROCEDURE — 6370000000 HC RX 637 (ALT 250 FOR IP): Performed by: NURSE PRACTITIONER

## 2021-06-26 PROCEDURE — 83735 ASSAY OF MAGNESIUM: CPT

## 2021-06-26 PROCEDURE — 6370000000 HC RX 637 (ALT 250 FOR IP): Performed by: HOSPITALIST

## 2021-06-26 RX ORDER — TORSEMIDE 20 MG/1
40 TABLET ORAL DAILY
Status: DISCONTINUED | OUTPATIENT
Start: 2021-06-27 | End: 2021-06-27 | Stop reason: HOSPADM

## 2021-06-26 RX ADMIN — APIXABAN 2.5 MG: 2.5 TABLET, FILM COATED ORAL at 20:47

## 2021-06-26 RX ADMIN — TAMSULOSIN HYDROCHLORIDE 0.4 MG: 0.4 CAPSULE ORAL at 09:14

## 2021-06-26 RX ADMIN — APIXABAN 2.5 MG: 2.5 TABLET, FILM COATED ORAL at 09:14

## 2021-06-26 RX ADMIN — METOPROLOL SUCCINATE 50 MG: 50 TABLET, EXTENDED RELEASE ORAL at 20:46

## 2021-06-26 RX ADMIN — Medication 1000 UNITS: at 09:15

## 2021-06-26 RX ADMIN — Medication 10 MG: at 20:46

## 2021-06-26 RX ADMIN — MULTIPLE VITAMINS W/ MINERALS TAB 1 TABLET: TAB at 09:15

## 2021-06-26 RX ADMIN — SPIRONOLACTONE 25 MG: 25 TABLET ORAL at 09:14

## 2021-06-26 RX ADMIN — ATORVASTATIN CALCIUM 20 MG: 10 TABLET, FILM COATED ORAL at 09:14

## 2021-06-26 RX ADMIN — ESCITALOPRAM OXALATE 10 MG: 10 TABLET ORAL at 09:14

## 2021-06-26 RX ADMIN — TORSEMIDE 20 MG: 20 TABLET ORAL at 09:15

## 2021-06-26 RX ADMIN — SIMETHICONE 120 MG: 80 TABLET, CHEWABLE ORAL at 20:46

## 2021-06-26 RX ADMIN — POLYETHYLENE GLYCOL 3350 17 G: 17 POWDER, FOR SOLUTION ORAL at 09:14

## 2021-06-26 RX ADMIN — SIMETHICONE 120 MG: 80 TABLET, CHEWABLE ORAL at 09:14

## 2021-06-26 RX ADMIN — DILTIAZEM HYDROCHLORIDE 120 MG: 120 CAPSULE, COATED, EXTENDED RELEASE ORAL at 09:14

## 2021-06-26 RX ADMIN — MAGNESIUM GLUCONATE 500 MG ORAL TABLET 400 MG: 500 TABLET ORAL at 09:14

## 2021-06-26 RX ADMIN — Medication 100 MG: at 09:14

## 2021-06-26 RX ADMIN — Medication 10 ML: at 09:15

## 2021-06-26 RX ADMIN — Medication 10 ML: at 20:47

## 2021-06-26 RX ADMIN — METOPROLOL SUCCINATE 50 MG: 50 TABLET, EXTENDED RELEASE ORAL at 09:15

## 2021-06-26 RX ADMIN — ASPIRIN 81 MG: 81 TABLET, COATED ORAL at 09:14

## 2021-06-26 ASSESSMENT — PAIN SCALES - GENERAL
PAINLEVEL_OUTOF10: 0

## 2021-06-26 NOTE — PROGRESS NOTES
[address]   Daily Progress Note    Admit Date:  6/21/2021  HPI:    Chief Complaint   Patient presents with    Shortness of Breath     recently admitted for fluid overload, states she was discharged, felt good for a day, now SOB again. Ania Feliz ended with worsening shortness of breath and edema. She was found to have A. fib with RVR in the emergency department. She was recently hospitalized for similar symptoms of dyspnea on exertion and edema and also treated for A. fib with RVR. He has a history of diastolic CHF, PAF on Eliquis for anticoagulation, HTN, HLD, and CKD. Subjective:  Ms. Fouzia Harrell sitting up in chair, daughter at bedside. Again has multiple questions regarding diet and fluid allowance which has been reviewed several times. She feels her breathing is stable but has some mild edema. Objective:   Patient Vitals for the past 24 hrs:   BP Temp Temp src Pulse Resp SpO2 Weight   06/26/21 1229 121/67 97.7 °F (36.5 °C) Oral 67 14 96 % --   06/26/21 0817 (!) 152/80 97.9 °F (36.6 °C) Oral 71 16 94 % --   06/26/21 0532 -- -- -- -- -- -- 130 lb (59 kg)   06/26/21 0440 128/65 98 °F (36.7 °C) Oral 67 16 97 % --   06/25/21 2355 123/65 97.9 °F (36.6 °C) Oral 68 18 94 % --   06/25/21 2039 133/81 97.8 °F (36.6 °C) Oral 66 18 97 % --   06/25/21 1608 (!) 120/51 97.8 °F (36.6 °C) Oral 66 16 96 % --       Intake/Output Summary (Last 24 hours) at 6/26/2021 1328  Last data filed at 6/26/2021 1321  Gross per 24 hour   Intake 1406.33 ml   Output 900 ml   Net 506.33 ml     Wt Readings from Last 3 Encounters:   06/26/21 130 lb (59 kg)   06/17/21 132 lb 9.6 oz (60.1 kg)   05/23/21 134 lb 8 oz (61 kg)       ASSESSMENT:   1. CHF, acute on chronic diastolic: weight up 2 pounds, net net 4.9 L, mild fluid volume excess on exam   2.  Afib: Paroxysmal, RVR on presentation, anticoagulation with Eliquis (dose should be 2.5 mg bid given age > [de-identified], weight < 60 kg despite improvement in renal function), CREATININE 0.9 0.9 0.8     INR:  No results for input(s): INR in the last 72 hours. BNP:    Recent Labs     06/24/21  0656   PROBNP 1,991*     Cardiac Enzymes:   No results for input(s): TROPONINI in the last 72 hours. Lipids:   Lab Results   Component Value Date    TRIG 85 06/22/2021    TRIG 89 06/16/2021    HDL 58 06/22/2021    HDL 57 06/16/2021    LDLCALC 86 06/22/2021    LDLCALC 49 06/16/2021       Cardiac Imaging:    Echo: 03/02/2021    CONCLUSIONS     The left ventricle is of normal size. Left ventricular wall thickness is normal.       No obvious segmental wall motion abnormalities. The right atrium is normal in size. Moderate left atrial dilatation. Mild mitral annular calcification. Mild thickening/calcification of the anterior mitral valve leaflet. Trileaflet aortic valve. Moderate  thickening (sclerosis) of the aortic valve cusps without reduced excursion. Structurally normal tricuspid valve. Mild tricuspid regurgitation. Normal pericardium without effusion.

## 2021-06-26 NOTE — PLAN OF CARE
Problem: Falls - Risk of:  Goal: Will remain free from falls  Description: Will remain free from falls  6/25/2021 2338 by Kathleen Brady RN  Outcome: Ongoing

## 2021-06-26 NOTE — PROGRESS NOTES
Hospitalist Progress Note      PCP: Elias Pizano MD, MD    Date of Admission: 6/21/2021    Chief Complaint:   [facility] Course:     Subjective: She is sitting up in a chair, she is in no acute distress however she complains she is weak. She was having trouble urinating through the night and required straight cath. Medications:  Reviewed    Infusion Medications    sodium chloride Stopped (06/25/21 1009)     Scheduled Medications    torsemide  20 mg Oral Daily    dilTIAZem  120 mg Oral Daily    spironolactone  25 mg Oral Daily    tamsulosin  0.4 mg Oral Daily    magnesium oxide  400 mg Oral Daily    metoprolol succinate  50 mg Oral BID    apixaban  2.5 mg Oral BID    aspirin  81 mg Oral Daily    atorvastatin  20 mg Oral Daily    escitalopram  10 mg Oral Daily    polyethylene glycol  17 g Oral Daily    melatonin  10 mg Oral Nightly    simethicone  120 mg Oral BID    thiamine  100 mg Oral Daily    Vitamin D  1,000 Units Oral Daily    sodium chloride flush  10 mL Intravenous 2 times per day    multivitamin  1 tablet Oral Daily     PRN Meds: sodium chloride flush, sodium chloride, senna, acetaminophen **OR** acetaminophen, ondansetron **OR** ondansetron      Intake/Output Summary (Last 24 hours) at 6/26/2021 1039  Last data filed at 6/26/2021 0919  Gross per 24 hour   Intake 1458.97 ml   Output 900 ml   Net 558.97 ml       Physical Exam Performed:    BP (!) 152/80   Pulse 71   Temp 97.9 °F (36.6 °C) (Oral)   Resp 16   Ht 5' 3\" (1.6 m)   Wt 130 lb (59 kg)   SpO2 94%   BMI 23.03 kg/m²     General appearance: She is up in a chair, in no acute distress is alert and  cooperative. HEENT: Pupils equal, round, and reactive to light. Conjunctivae/corneas clear. Neck: Supple, with full range of motion. Trachea midline. Respiratory:  Normal respiratory effort. Clear to auscultation, bilaterally without Rales/Wheezes/Rhonchi.   Cardiovascular: Regular rate and rhythm with normal S1/S2 of admission. Cardiology is following in consultation.     Acutely decompensated diastolic CHF    -Strict I's and O's  She was receiving IV Lasix GTT. She is now on oral spironolactone 25 mg daily.  -Echo performed 3/2/2021 therefore not reordered at this time; will defer to CARDS   -Continue home doses of ASA, Lipitor, Toprol-XL; BP parameters placed for Norvasc dosing  -2G Na and fluid restriction dietary modifications in place  Audiology is following consultation. Continue to follow volume status adjust medication therapy as needed     Urinary Retention - coy was placed and she has been started on Flomax. Coy catheter was discontinued however through the night was having difficulty urinating required straight cath. We will continue straight caths as needed and follow her urine output. If this does not improve may need to ask urology to see but will follow for another day.     ARF - w/ elevated BUN/Cr ratio c/w pre-renal azotemia. Given IVF hydration and follow serial labs. Reviewed and documented as above.     HypoNatremia - etiology clinically unable to determine but likely hypovolemic. This was improving however today sodium is again decreased. Will follow sodium and repeat in the morning. HypoMagnesemia -improved and will follow     HypoKalemia - likely 2nd to aggressive IV diuresis. Follow serial labs and replace PRN - ordered PO/IV 24 June. Reviewed and documented as above.     DVT Prophylaxis: Eliquis  Diet: ADULT DIET; Easy to Chew; Low Sodium (2 gm); 1500 ml  Code Status: Full Code    PT/OT Eval Status: Seen with Chele for home with assist    Dispo -1 to 2 days if her urinary retention issue improves and her sodium improves    Kelby Peguero MD

## 2021-06-26 NOTE — PLAN OF CARE
Problem: Falls - Risk of:  Goal: Will remain free from falls  Description: Will remain free from falls  Outcome: Ongoing   Monitor for falls. Non skid slipper socks on. Encourage to call for assistance before getting out of bed. Problem: OXYGENATION/RESPIRATORY FUNCTION  Goal: Patient will maintain patent airway  Outcome: Ongoing   Monitor oxygen levels. On room air. Problem: FLUID AND ELECTROLYTE IMBALANCE  Goal: Fluid and electrolyte balance are achieved/maintained  Outcome: Ongoing   Monitor intake and output every shift. Patient's EF (Ejection Fraction) is greater than 40%    Heart Failure Medications:  Diuretics[de-identified] Torsemide and Spironolactone    (One of the following REQUIRED for EF <40%/SYSTOLIC FAILURE but MAY be used in EF% >40%/DIASTOLIC FAILURE)        ACE[de-identified] None        ARB[de-identified] None         ARNI[de-identified] None    (Beta Blockers)  NON- Evidenced Based Beta Blocker (for EF% >40%/DIASTOLIC FAILURE): None    Evidenced Based Beta Blocker::(REQUIRED for EF% <40%/SYSTOLIC FAILURE) Metoprolol SUCCinate- Toprol XL  . .................................................................................................................................................. Patient's weights and intake/output reviewed: Yes    Patient's Last Weight: 130 lbs obtained by standing scale. Difference of 2 lbs more than last documented weight. Intake/Output Summary (Last 24 hours) at 6/26/2021 4300  Last data filed at 6/26/2021 1500  Gross per 24 hour   Intake 1200 ml   Output 700 ml   Net 500 ml       Comorbidities Reviewed Yes    Patient has a past medical history of Arthritis, Atrial fibrillation ([address] Back fracture, CHF (congestive heart failure) ([address] HTN (hypertension), Hyperlipidemia, and Panic attack.      >>For CHF and Comorbidity documentation on Education Time and Topics, please see Education Tab    Progressive Mobility Assessment:  What is this patient's Current Level of Mobility?: Ambulatory- with Assistance  How was this patient Mobilized today?: Up to Chair and  Up to Toilet/Shower                 With Whom? Nurse and Self                 Level of Difficulty/Assistance: Independent     Pt up in chair at this time on room air. Pt denies shortness of breath. Pt without lower extremity edema.      Patient and/or Family's stated Goal of Care this Admission: reduce shortness of breath and better understand heart failure and disease management prior to discharge        :

## 2021-06-27 VITALS
RESPIRATION RATE: 18 BRPM | SYSTOLIC BLOOD PRESSURE: 112 MMHG | BODY MASS INDEX: 23.12 KG/M2 | DIASTOLIC BLOOD PRESSURE: 53 MMHG | OXYGEN SATURATION: 95 % | HEART RATE: 62 BPM | HEIGHT: 63 IN | WEIGHT: 130.5 LBS | TEMPERATURE: 98.1 F

## 2021-06-27 LAB
ANION GAP SERPL CALCULATED.3IONS-SCNC: 7 MMOL/L (ref 3–16)
BUN BLDV-MCNC: 24 MG/DL (ref 7–20)
CALCIUM SERPL-MCNC: 9.2 MG/DL (ref 8.3–10.6)
CHLORIDE BLD-SCNC: 92 MMOL/L (ref 99–110)
CO2: 31 MMOL/L (ref 21–32)
CREAT SERPL-MCNC: 0.8 MG/DL (ref 0.6–1.2)
GFR AFRICAN AMERICAN: >60
GFR NON-AFRICAN AMERICAN: >60
GLUCOSE BLD-MCNC: 100 MG/DL (ref 70–99)
POTASSIUM SERPL-SCNC: 3.8 MMOL/L (ref 3.5–5.1)
PRO-BNP: 686 PG/ML (ref 0–449)
SODIUM BLD-SCNC: 130 MMOL/L (ref 136–145)

## 2021-06-27 PROCEDURE — 2580000003 HC RX 258: Performed by: HOSPITALIST

## 2021-06-27 PROCEDURE — 6370000000 HC RX 637 (ALT 250 FOR IP): Performed by: NURSE PRACTITIONER

## 2021-06-27 PROCEDURE — 36415 COLL VENOUS BLD VENIPUNCTURE: CPT

## 2021-06-27 PROCEDURE — 83880 ASSAY OF NATRIURETIC PEPTIDE: CPT

## 2021-06-27 PROCEDURE — 80048 BASIC METABOLIC PNL TOTAL CA: CPT

## 2021-06-27 PROCEDURE — 99232 SBSQ HOSP IP/OBS MODERATE 35: CPT | Performed by: NURSE PRACTITIONER

## 2021-06-27 PROCEDURE — 6370000000 HC RX 637 (ALT 250 FOR IP): Performed by: INTERNAL MEDICINE

## 2021-06-27 PROCEDURE — 6370000000 HC RX 637 (ALT 250 FOR IP): Performed by: HOSPITALIST

## 2021-06-27 RX ORDER — TORSEMIDE 20 MG/1
40 TABLET ORAL DAILY
Qty: 30 TABLET | Refills: 3 | Status: SHIPPED | OUTPATIENT
Start: 2021-06-28

## 2021-06-27 RX ORDER — SPIRONOLACTONE 25 MG/1
25 TABLET ORAL DAILY
Qty: 30 TABLET | Refills: 3 | Status: SHIPPED | OUTPATIENT
Start: 2021-06-28

## 2021-06-27 RX ORDER — DILTIAZEM HYDROCHLORIDE 120 MG/1
120 CAPSULE, COATED, EXTENDED RELEASE ORAL DAILY
Qty: 30 CAPSULE | Refills: 3 | Status: ON HOLD
Start: 2021-06-28 | End: 2021-07-12 | Stop reason: HOSPADM

## 2021-06-27 RX ADMIN — TORSEMIDE 40 MG: 20 TABLET ORAL at 08:53

## 2021-06-27 RX ADMIN — METOPROLOL SUCCINATE 50 MG: 50 TABLET, EXTENDED RELEASE ORAL at 08:54

## 2021-06-27 RX ADMIN — DILTIAZEM HYDROCHLORIDE 120 MG: 120 CAPSULE, COATED, EXTENDED RELEASE ORAL at 08:53

## 2021-06-27 RX ADMIN — SPIRONOLACTONE 25 MG: 25 TABLET ORAL at 08:53

## 2021-06-27 RX ADMIN — ASPIRIN 81 MG: 81 TABLET, COATED ORAL at 08:53

## 2021-06-27 RX ADMIN — SIMETHICONE 120 MG: 80 TABLET, CHEWABLE ORAL at 08:53

## 2021-06-27 RX ADMIN — Medication 1000 UNITS: at 08:53

## 2021-06-27 RX ADMIN — MULTIPLE VITAMINS W/ MINERALS TAB 1 TABLET: TAB at 08:54

## 2021-06-27 RX ADMIN — Medication 100 MG: at 08:53

## 2021-06-27 RX ADMIN — MAGNESIUM GLUCONATE 500 MG ORAL TABLET 400 MG: 500 TABLET ORAL at 08:53

## 2021-06-27 RX ADMIN — ESCITALOPRAM OXALATE 10 MG: 10 TABLET ORAL at 08:54

## 2021-06-27 RX ADMIN — Medication 10 ML: at 08:54

## 2021-06-27 RX ADMIN — APIXABAN 2.5 MG: 2.5 TABLET, FILM COATED ORAL at 08:53

## 2021-06-27 RX ADMIN — ATORVASTATIN CALCIUM 20 MG: 10 TABLET, FILM COATED ORAL at 08:53

## 2021-06-27 RX ADMIN — TAMSULOSIN HYDROCHLORIDE 0.4 MG: 0.4 CAPSULE ORAL at 08:53

## 2021-06-27 RX ADMIN — POLYETHYLENE GLYCOL 3350 17 G: 17 POWDER, FOR SOLUTION ORAL at 08:56

## 2021-06-27 NOTE — PLAN OF CARE
Problem: Falls - Risk of:  Goal: Will remain free from falls  Description: Will remain free from falls  Outcome: Ongoing   Monitor for falls. Problem: OXYGENATION/RESPIRATORY FUNCTION  Goal: Patient will maintain patent airway  Outcome: Ongoing   Monitor for oxygen use. Currently on room air. Problem: HEMODYNAMIC STATUS  Goal: Patient has stable vital signs and fluid balance  Outcome: Ongoing   Monitor intake and output. Patient's EF (Ejection Fraction) is greater than 40%    Heart Failure Medications:  Diuretics[de-identified] Torsemide and Spironolactone    (One of the following REQUIRED for EF <40%/SYSTOLIC FAILURE but MAY be used in EF% >40%/DIASTOLIC FAILURE)        ACE[de-identified] None        ARB[de-identified] None         ARNI[de-identified] None    (Beta Blockers)  NON- Evidenced Based Beta Blocker (for EF% >40%/DIASTOLIC FAILURE): None    Evidenced Based Beta Blocker::(REQUIRED for EF% <40%/SYSTOLIC FAILURE) Metoprolol SUCCinate- Toprol XL  . .................................................................................................................................................. Patient's weights and intake/output reviewed: Yes    Patient's Last Weight: 130 and 8 ounces lbs obtained by standing scale. Difference of 8 ounces  more than last documented weight. Intake/Output Summary (Last 24 hours) at 6/27/2021 1845  Last data filed at 6/27/2021 1229  Gross per 24 hour   Intake 536 ml   Output 1700 ml   Net -1164 ml       Comorbidities Reviewed Yes    Patient has a past medical history of Arthritis, Atrial fibrillation ([address] Back fracture, CHF (congestive heart failure) ([address] HTN (hypertension), Hyperlipidemia, and Panic attack.      >>For CHF and Comorbidity documentation on Education Time and Topics, please see Education Tab    Progressive Mobility Assessment:  What is this patient's Current Level of Mobility?: Ambulatory-Up Ad Abby  How was this patient Mobilized today?: Up to Chair,  Up to Toilet/Shower, Up in Room, and Up in Hallway                 With Whom? Nurse, PCA, and Self                 Level of Difficulty/Assistance: Independent     Pt up in chair at this time on room air. Pt denies shortness of breath. Pt without lower extremity edema.      Patient and/or Family's stated Goal of Care this Admission: reduce shortness of breath, increase activity tolerance, better understand heart failure and disease management, and be more comfortable prior to discharge        :

## 2021-06-27 NOTE — PROGRESS NOTES
Patient and daughter given discharge instructions. Patient voiced understanding. Patient discharged in stable condition with all belongings. To car via wheelchair. Home with daughter.  RUBI,UB

## 2021-06-27 NOTE — PLAN OF CARE
Problem: Cardiac:  Goal: Complications related to the disease process, condition or treatment will be avoided or minimized  Description: Complications related to the disease process, condition or treatment will be avoided or minimized  Outcome: Ongoing     Patient's EF (Ejection Fraction) is greater than 40%    Heart Failure Medications:  Diuretics[de-identified] Torsemide and Spironolactone    (One of the following REQUIRED for EF <40%/SYSTOLIC FAILURE but MAY be used in EF% >40%/DIASTOLIC FAILURE)        ACE[de-identified] None        ARB[de-identified] None         ARNI[de-identified] None    (Beta Blockers)  NON- Evidenced Based Beta Blocker (for EF% >40%/DIASTOLIC FAILURE): None    Evidenced Based Beta Blocker::(REQUIRED for EF% <40%/SYSTOLIC FAILURE) Metoprolol SUCCinate- Toprol XL  . .................................................................................................................................................. Patient's weights and intake/output reviewed: Yes    Patient's Last Weight: 130 lbs obtained by standing scale. Difference of 2 lbs more than last documented weight. Intake/Output Summary (Last 24 hours) at 6/26/2021 2122  Last data filed at 6/26/2021 2044  Gross per 24 hour   Intake 1316 ml   Output 900 ml   Net 416 ml       Comorbidities Reviewed Yes    Patient has a past medical history of Arthritis, Atrial fibrillation ([address] Back fracture, CHF (congestive heart failure) ([address] HTN (hypertension), Hyperlipidemia, and Panic attack. >>For CHF and Comorbidity documentation on Education Time and Topics, please see Education Tab    Progressive Mobility Assessment:  What is this patient's Current Level of Mobility?: Ambulatory-Up Ad Abby  How was this patient Mobilized today?: Edge of Bed, Up to Chair,  Up to Toilet/Shower, and Up in Room                 With Whom? Nurse                 Level of Difficulty/Assistance: SBA    Pt resting in bed at this time on room air. Pt denies shortness of breath.  Pt without lower extremity edema.      Patient and/or Family's stated Goal of Care this Admission: increase activity tolerance, better understand heart failure and disease management, and be more comfortable prior to discharge        :

## 2021-06-27 NOTE — DISCHARGE SUMMARY
(59.2 kg)   SpO2 95%   BMI 23.12 kg/m²   Gen/overall appearance: Not in acute distress. Alert. Head: Normocephalic, atraumatic  Eyes: EOMI, good acuity  ENT:- Oral mucosa moist  Neck: No JVD, thyromegaly  CVS: Nml S1S2, no MRG, RRR  Pulm: Clear bilaterally. No crackles/wheezes  Gastrointestinal: Soft, NT/ND, +BS  Musculoskeletal: No edema. Warm  Neuro: No focal deficit. Moves extremity spontaneously. Psychiatry: Appropriate affect. Not agitated. Skin: Warm, dry with normal turgor. No rash        Significant Diagnostic Studies:    As tolerated        Treatments: As above.       Discharge Medications:     Medication List      START taking these medications    dilTIAZem 120 MG extended release capsule  Commonly known as: CARDIZEM CD  Take 1 capsule by mouth daily  Start taking on: June 28, 2021     spironolactone 25 MG tablet  Commonly known as: ALDACTONE  Take 1 tablet by mouth daily  Start taking on: June 28, 2021     torsemide 20 MG tablet  Commonly known as: DEMADEX  Take 2 tablets by mouth daily  Start taking on: June 28, 2021        CONTINUE taking these medications    apixaban 5 MG Tabs tablet  Commonly known as: ELIQUIS  Take 1 tablet by mouth 2 times daily     aspirin 81 MG EC tablet     atorvastatin 20 MG tablet  Commonly known as: Lipitor  Take 1 tablet by mouth daily      MG Caps     escitalopram 10 MG tablet  Commonly known as: LEXAPRO     HYDROcodone-acetaminophen 5-325 MG per tablet  Commonly known as: NORCO     magnesium 30 MG tablet     melatonin 10 MG Caps capsule     metoprolol succinate 25 MG extended release tablet  Commonly known as: TOPROL XL  Take 1 tablet by mouth 2 times daily     multivitamin tablet     multivitamin-iron-minerals-folic acid chewable tablet     polyethylene glycol 17 GM/SCOOP powder  Commonly known as: GLYCOLAX     potassium chloride 20 MEQ extended release tablet  Commonly known as: KLOR-CON M  Take 1 tablet by mouth daily     Simethicone 125 MG Caps     thiamine 100 MG tablet  Take 1 tablet by mouth daily     vitamin D 25 MCG (1000 UT) Tabs tablet  Commonly known as: CHOLECALCIFEROL        STOP taking these medications    amLODIPine 5 MG tablet  Commonly known as: NORVASC     furosemide 20 MG tablet  Commonly known as: LASIX     vitamin B-1 100 MG tablet  Commonly known as: THIAMINE           Where to Get Your Medications      These medications were sent to [facility] [address] [place] [address] [phone] [address], [place] [address]    Phone: [phone]   · dilTIAZem 120 MG extended release capsule  · spironolactone 25 MG tablet  · torsemide 20 MG tablet         Activity: activity as tolerated  Diet: ADULT DIET; Easy to Chew; Low Sodium (2 gm); 1500 ml      Disposition: Home health  Discharged Condition: Stable  Follow Up:   Kelsi Jolley MD  [facility] [address]  [phone]    Go on 6/30/2021  Post hospital follow up appt with Kelsi Jolley MD at 7:40am    CM  [address]  [address] [phone]            Code status:  Full Code         Total time spent on discharge, finalizing medications, referrals and arranging outpatient follow up was more than 45 minutes      Thank you Dr. Kelsi Jolley MD, MD for the opportunity to be involved in this patients care.

## 2021-06-27 NOTE — PROGRESS NOTES
[facility] [address]   Daily Progress Note    Admit Date:  6/21/2021  HPI:    Chief Complaint   Patient presents with    Shortness of Breath     recently admitted for fluid overload, states she was discharged, felt good for a day, now SOB again. Lanette Speak ended with worsening shortness of breath and edema. She was found to have A. fib with RVR in the emergency department. She was recently hospitalized for similar symptoms of dyspnea on exertion and edema and also treated for A. fib with RVR. He has a history of diastolic CHF, PAF on Eliquis for anticoagulation, HTN, HLD, and CKD. Subjective:  Ms. Nica Azul sitting up in chair, daughter at bedside. States breathing is improved. Objective:   Patient Vitals for the past 24 hrs:   BP Temp Temp src Pulse Resp SpO2 Weight   06/27/21 1224 (!) 112/53 98.1 °F (36.7 °C) Oral 62 18 95 % --   06/27/21 0819 (!) 119/56 98.1 °F (36.7 °C) Oral 62 16 98 % --   06/27/21 0445 138/66 98 °F (36.7 °C) Oral 63 18 99 % --   06/27/21 0430 -- -- -- -- -- -- 130 lb 8 oz (59.2 kg)   06/26/21 2348 133/71 98.3 °F (36.8 °C) Oral 67 16 92 % --   06/26/21 2044 132/68 98.2 °F (36.8 °C) Oral 68 18 99 % --   06/26/21 1621 113/68 98 °F (36.7 °C) Oral 66 14 97 % --       Intake/Output Summary (Last 24 hours) at 6/27/2021 1347  Last data filed at 6/27/2021 1229  Gross per 24 hour   Intake 776 ml   Output 1900 ml   Net -1124 ml     Wt Readings from Last 3 Encounters:   06/27/21 130 lb 8 oz (59.2 kg)   06/17/21 132 lb 9.6 oz (60.1 kg)   05/23/21 134 lb 8 oz (61 kg)       ASSESSMENT:   1. CHF, acute on chronic diastolic: weight unchanged, net net 5.4 L, euvolemia on exam   2. Afib: Paroxysmal, RVR on presentation, anticoagulation with Eliquis (dose should be 2.5 mg bid given age > [de-identified], weight < 60 kg despite improvement in renal function), converted back to sinus rhythm with stable rate control  3. HTN: Stable  4. Acute on chronic kidney disease: Improved/stable  5.  Hyponatremia: likely due to fluid overload, improved  6. HLD  7. Urinary retention: Required straight cath, improved    PLAN:  1. Continue torsemide to 40 mg daily  2. Continue Toprol-XL 50 mg twice daily, Cardizem 120 mg daily, Mag-Ox 400 mg daily and spironolactone 25 mg daily  3. Daily weights, daily labs, strict I's/O  4. Okay for discharge from cardiac perspective. Patient and family still deciding if she would like to follow-[facility] [place] . Suman Adam, KRYSTLE - CNP, 6/27/2021, 1:47 PM  [place] [address]   [phone]       Telemetry: SR 60's  NYHA: IV    Physical Exam:  General:  Awake, alert, NAD  Skin:  Warm and dry  Neck:  JVP 8-9 cm  Chest: Left basilar rales  Cardiovascular: Irregularly irregular, normal X9R4, systolic murmur, no GR  Abdomen:  Soft, nontender, +bowel sounds  Extremities:Trace BLE nonpitting edema      Medications:    torsemide  40 mg Oral Daily    dilTIAZem  120 mg Oral Daily    spironolactone  25 mg Oral Daily    tamsulosin  0.4 mg Oral Daily    magnesium oxide  400 mg Oral Daily    metoprolol succinate  50 mg Oral BID    apixaban  2.5 mg Oral BID    aspirin  81 mg Oral Daily    atorvastatin  20 mg Oral Daily    escitalopram  10 mg Oral Daily    polyethylene glycol  17 g Oral Daily    melatonin  10 mg Oral Nightly    simethicone  120 mg Oral BID    thiamine  100 mg Oral Daily    Vitamin D  1,000 Units Oral Daily    sodium chloride flush  10 mL Intravenous 2 times per day    multivitamin  1 tablet Oral Daily      sodium chloride Stopped (06/25/21 1009)       Lab Data: Lab results independently reviewed and analyzed by myself 6/23/21   CBC:   No results for input(s): WBC, HGB, PLT in the last 72 hours. BMP:    Recent Labs     06/25/21  0708 06/26/21  0723 06/27/21  0711    128* 130*   K 4.3 3.9 3.8   CO2 31 29 31   BUN 22* 22* 24*   CREATININE 0.9 0.8 0.8     INR:  No results for input(s): INR in the last 72 hours.   BNP:    Recent Labs 06/27/21  0711   PROBNP 686*     Cardiac Enzymes:   No results for input(s): TROPONINI in the last 72 hours. Lipids:   Lab Results   Component Value Date    TRIG 85 06/22/2021    TRIG 89 06/16/2021    HDL 58 06/22/2021    HDL 57 06/16/2021    LDLCALC 86 06/22/2021    LDLCALC 49 06/16/2021       Cardiac Imaging:    Echo: 03/02/2021    CONCLUSIONS     The left ventricle is of normal size. Left ventricular wall thickness is normal.       No obvious segmental wall motion abnormalities. The right atrium is normal in size. Moderate left atrial dilatation. Mild mitral annular calcification. Mild thickening/calcification of the anterior mitral valve leaflet. Trileaflet aortic valve. Moderate  thickening (sclerosis) of the aortic valve cusps without reduced excursion. Structurally normal tricuspid valve. Mild tricuspid regurgitation. Normal pericardium without effusion.

## 2021-07-08 ENCOUNTER — HOSPITAL ENCOUNTER (INPATIENT)
Age: 87
LOS: 4 days | Discharge: HOME | DRG: 291 | End: 2021-07-12
Attending: EMERGENCY MEDICINE | Admitting: INTERNAL MEDICINE
Payer: MEDICARE

## 2021-07-08 ENCOUNTER — APPOINTMENT (OUTPATIENT)
Dept: GENERAL RADIOLOGY | Age: 87
DRG: 291 | End: 2021-07-08
Payer: MEDICARE

## 2021-07-08 DIAGNOSIS — I21.4 NSTEMI (NON-ST ELEVATED MYOCARDIAL INFARCTION) (HCC): ICD-10-CM

## 2021-07-08 DIAGNOSIS — N17.9 ACUTE RENAL FAILURE, UNSPECIFIED ACUTE RENAL FAILURE TYPE (HCC): ICD-10-CM

## 2021-07-08 DIAGNOSIS — I48.91 ATRIAL FIBRILLATION WITH RVR (HCC): Primary | ICD-10-CM

## 2021-07-08 DIAGNOSIS — I50.33 ACUTE ON CHRONIC DIASTOLIC CONGESTIVE HEART FAILURE (HCC): ICD-10-CM

## 2021-07-08 DIAGNOSIS — E87.1 HYPONATREMIA: ICD-10-CM

## 2021-07-08 PROBLEM — R79.89 ELEVATED TROPONIN: Status: ACTIVE | Noted: 2021-07-08

## 2021-07-08 PROBLEM — R77.8 ELEVATED TROPONIN: Status: ACTIVE | Noted: 2021-07-08

## 2021-07-08 LAB
A/G RATIO: 1.1 (ref 1.1–2.2)
ALBUMIN SERPL-MCNC: 3.8 G/DL (ref 3.4–5)
ALP BLD-CCNC: 132 U/L (ref 40–129)
ALT SERPL-CCNC: 27 U/L (ref 10–40)
ANION GAP SERPL CALCULATED.3IONS-SCNC: 12 MMOL/L (ref 3–16)
APTT: 37.1 SEC (ref 26.2–38.6)
AST SERPL-CCNC: 36 U/L (ref 15–37)
BASOPHILS ABSOLUTE: 0.1 K/UL (ref 0–0.2)
BASOPHILS RELATIVE PERCENT: 1 %
BILIRUB SERPL-MCNC: 0.3 MG/DL (ref 0–1)
BUN BLDV-MCNC: 38 MG/DL (ref 7–20)
CALCIUM SERPL-MCNC: 10 MG/DL (ref 8.3–10.6)
CHLORIDE BLD-SCNC: 88 MMOL/L (ref 99–110)
CO2: 26 MMOL/L (ref 21–32)
CREAT SERPL-MCNC: 1.3 MG/DL (ref 0.6–1.2)
EOSINOPHILS ABSOLUTE: 0.2 K/UL (ref 0–0.6)
EOSINOPHILS RELATIVE PERCENT: 2 %
GFR AFRICAN AMERICAN: 46
GFR NON-AFRICAN AMERICAN: 38
GLOBULIN: 3.5 G/DL
GLUCOSE BLD-MCNC: 129 MG/DL (ref 70–99)
HCT VFR BLD CALC: 38.1 % (ref 36–48)
HEMOGLOBIN: 13.3 G/DL (ref 12–16)
INR BLD: 1.15 (ref 0.88–1.12)
LYMPHOCYTES ABSOLUTE: 2.2 K/UL (ref 1–5.1)
LYMPHOCYTES RELATIVE PERCENT: 19.6 %
MCH RBC QN AUTO: 30.5 PG (ref 26–34)
MCHC RBC AUTO-ENTMCNC: 34.8 G/DL (ref 31–36)
MCV RBC AUTO: 87.6 FL (ref 80–100)
MONOCYTES ABSOLUTE: 0.8 K/UL (ref 0–1.3)
MONOCYTES RELATIVE PERCENT: 7.6 %
NEUTROPHILS ABSOLUTE: 7.7 K/UL (ref 1.7–7.7)
NEUTROPHILS RELATIVE PERCENT: 69.8 %
PDW BLD-RTO: 13.5 % (ref 12.4–15.4)
PLATELET # BLD: 406 K/UL (ref 135–450)
PMV BLD AUTO: 8 FL (ref 5–10.5)
POTASSIUM REFLEX MAGNESIUM: 4.8 MMOL/L (ref 3.5–5.1)
PRO-BNP: 5962 PG/ML (ref 0–449)
PROTHROMBIN TIME: 13.1 SEC (ref 9.9–12.7)
RBC # BLD: 4.36 M/UL (ref 4–5.2)
SODIUM BLD-SCNC: 126 MMOL/L (ref 136–145)
TOTAL PROTEIN: 7.3 G/DL (ref 6.4–8.2)
TROPONIN: 0.17 NG/ML
TROPONIN: 0.22 NG/ML
TROPONIN: 0.22 NG/ML
WBC # BLD: 11 K/UL (ref 4–11)

## 2021-07-08 PROCEDURE — 99285 EMERGENCY DEPT VISIT HI MDM: CPT

## 2021-07-08 PROCEDURE — 85730 THROMBOPLASTIN TIME PARTIAL: CPT

## 2021-07-08 PROCEDURE — 85025 COMPLETE CBC W/AUTO DIFF WBC: CPT

## 2021-07-08 PROCEDURE — 6370000000 HC RX 637 (ALT 250 FOR IP): Performed by: EMERGENCY MEDICINE

## 2021-07-08 PROCEDURE — 2500000003 HC RX 250 WO HCPCS: Performed by: EMERGENCY MEDICINE

## 2021-07-08 PROCEDURE — 51798 US URINE CAPACITY MEASURE: CPT

## 2021-07-08 PROCEDURE — 80053 COMPREHEN METABOLIC PANEL: CPT

## 2021-07-08 PROCEDURE — 84484 ASSAY OF TROPONIN QUANT: CPT

## 2021-07-08 PROCEDURE — 71045 X-RAY EXAM CHEST 1 VIEW: CPT

## 2021-07-08 PROCEDURE — 36415 COLL VENOUS BLD VENIPUNCTURE: CPT

## 2021-07-08 PROCEDURE — 83880 ASSAY OF NATRIURETIC PEPTIDE: CPT

## 2021-07-08 PROCEDURE — 2580000003 HC RX 258: Performed by: EMERGENCY MEDICINE

## 2021-07-08 PROCEDURE — 85610 PROTHROMBIN TIME: CPT

## 2021-07-08 PROCEDURE — 99222 1ST HOSP IP/OBS MODERATE 55: CPT | Performed by: INTERNAL MEDICINE

## 2021-07-08 PROCEDURE — 6360000002 HC RX W HCPCS: Performed by: INTERNAL MEDICINE

## 2021-07-08 PROCEDURE — 6370000000 HC RX 637 (ALT 250 FOR IP): Performed by: INTERNAL MEDICINE

## 2021-07-08 PROCEDURE — 2580000003 HC RX 258: Performed by: INTERNAL MEDICINE

## 2021-07-08 PROCEDURE — 96374 THER/PROPH/DIAG INJ IV PUSH: CPT

## 2021-07-08 PROCEDURE — 93005 ELECTROCARDIOGRAM TRACING: CPT | Performed by: EMERGENCY MEDICINE

## 2021-07-08 PROCEDURE — 1200000000 HC SEMI PRIVATE

## 2021-07-08 RX ORDER — ACETAMINOPHEN 650 MG/1
650 SUPPOSITORY RECTAL EVERY 6 HOURS PRN
Status: DISCONTINUED | OUTPATIENT
Start: 2021-07-08 | End: 2021-07-12 | Stop reason: HOSPADM

## 2021-07-08 RX ORDER — ACETAMINOPHEN 325 MG/1
650 TABLET ORAL EVERY 6 HOURS PRN
Status: DISCONTINUED | OUTPATIENT
Start: 2021-07-08 | End: 2021-07-12 | Stop reason: HOSPADM

## 2021-07-08 RX ORDER — ASPIRIN 81 MG/1
324 TABLET, CHEWABLE ORAL ONCE
Status: COMPLETED | OUTPATIENT
Start: 2021-07-08 | End: 2021-07-08

## 2021-07-08 RX ORDER — FUROSEMIDE 10 MG/ML
40 INJECTION INTRAMUSCULAR; INTRAVENOUS ONCE
Status: COMPLETED | OUTPATIENT
Start: 2021-07-08 | End: 2021-07-08

## 2021-07-08 RX ORDER — ASPIRIN 81 MG/1
81 TABLET ORAL DAILY
Status: DISCONTINUED | OUTPATIENT
Start: 2021-07-08 | End: 2021-07-12 | Stop reason: HOSPADM

## 2021-07-08 RX ORDER — SODIUM CHLORIDE 0.9 % (FLUSH) 0.9 %
5-40 SYRINGE (ML) INJECTION PRN
Status: DISCONTINUED | OUTPATIENT
Start: 2021-07-08 | End: 2021-07-12 | Stop reason: HOSPADM

## 2021-07-08 RX ORDER — ONDANSETRON 2 MG/ML
4 INJECTION INTRAMUSCULAR; INTRAVENOUS EVERY 6 HOURS PRN
Status: DISCONTINUED | OUTPATIENT
Start: 2021-07-08 | End: 2021-07-12 | Stop reason: HOSPADM

## 2021-07-08 RX ORDER — ATORVASTATIN CALCIUM 10 MG/1
20 TABLET, FILM COATED ORAL DAILY
Status: DISCONTINUED | OUTPATIENT
Start: 2021-07-08 | End: 2021-07-12 | Stop reason: HOSPADM

## 2021-07-08 RX ORDER — SODIUM CHLORIDE 9 MG/ML
25 INJECTION, SOLUTION INTRAVENOUS PRN
Status: DISCONTINUED | OUTPATIENT
Start: 2021-07-08 | End: 2021-07-12 | Stop reason: HOSPADM

## 2021-07-08 RX ORDER — 0.9 % SODIUM CHLORIDE 0.9 %
500 INTRAVENOUS SOLUTION INTRAVENOUS ONCE
Status: COMPLETED | OUTPATIENT
Start: 2021-07-08 | End: 2021-07-08

## 2021-07-08 RX ORDER — SODIUM CHLORIDE 0.9 % (FLUSH) 0.9 %
5-40 SYRINGE (ML) INJECTION EVERY 12 HOURS SCHEDULED
Status: DISCONTINUED | OUTPATIENT
Start: 2021-07-08 | End: 2021-07-12 | Stop reason: HOSPADM

## 2021-07-08 RX ORDER — DILTIAZEM HYDROCHLORIDE 5 MG/ML
10 INJECTION INTRAVENOUS ONCE
Status: COMPLETED | OUTPATIENT
Start: 2021-07-08 | End: 2021-07-08

## 2021-07-08 RX ORDER — ONDANSETRON 4 MG/1
4 TABLET, ORALLY DISINTEGRATING ORAL EVERY 8 HOURS PRN
Status: DISCONTINUED | OUTPATIENT
Start: 2021-07-08 | End: 2021-07-12 | Stop reason: HOSPADM

## 2021-07-08 RX ORDER — HYDROCODONE BITARTRATE AND ACETAMINOPHEN 5; 325 MG/1; MG/1
1 TABLET ORAL EVERY 6 HOURS PRN
Status: DISCONTINUED | OUTPATIENT
Start: 2021-07-08 | End: 2021-07-12 | Stop reason: HOSPADM

## 2021-07-08 RX ORDER — POLYETHYLENE GLYCOL 3350 17 G/17G
17 POWDER, FOR SOLUTION ORAL DAILY PRN
Status: DISCONTINUED | OUTPATIENT
Start: 2021-07-08 | End: 2021-07-12 | Stop reason: HOSPADM

## 2021-07-08 RX ORDER — METOPROLOL SUCCINATE 25 MG/1
25 TABLET, EXTENDED RELEASE ORAL 2 TIMES DAILY
Status: DISCONTINUED | OUTPATIENT
Start: 2021-07-08 | End: 2021-07-12 | Stop reason: HOSPADM

## 2021-07-08 RX ORDER — ESCITALOPRAM OXALATE 10 MG/1
10 TABLET ORAL DAILY
Status: DISCONTINUED | OUTPATIENT
Start: 2021-07-08 | End: 2021-07-12 | Stop reason: HOSPADM

## 2021-07-08 RX ORDER — DILTIAZEM HYDROCHLORIDE 120 MG/1
120 CAPSULE, COATED, EXTENDED RELEASE ORAL DAILY
Status: DISCONTINUED | OUTPATIENT
Start: 2021-07-08 | End: 2021-07-10

## 2021-07-08 RX ADMIN — APIXABAN 5 MG: 5 TABLET, FILM COATED ORAL at 10:15

## 2021-07-08 RX ADMIN — Medication 10 ML: at 10:17

## 2021-07-08 RX ADMIN — DILTIAZEM HYDROCHLORIDE 10 MG: 5 INJECTION INTRAVENOUS at 06:46

## 2021-07-08 RX ADMIN — ESCITALOPRAM OXALATE 10 MG: 10 TABLET ORAL at 10:15

## 2021-07-08 RX ADMIN — SODIUM CHLORIDE 500 ML: 9 INJECTION, SOLUTION INTRAVENOUS at 07:15

## 2021-07-08 RX ADMIN — METOPROLOL SUCCINATE 25 MG: 25 TABLET, FILM COATED, EXTENDED RELEASE ORAL at 20:58

## 2021-07-08 RX ADMIN — ASPIRIN 324 MG: 81 TABLET, CHEWABLE ORAL at 06:43

## 2021-07-08 RX ADMIN — METOPROLOL SUCCINATE 25 MG: 25 TABLET, FILM COATED, EXTENDED RELEASE ORAL at 10:15

## 2021-07-08 RX ADMIN — ASPIRIN 81 MG: 81 TABLET, COATED ORAL at 10:15

## 2021-07-08 RX ADMIN — FUROSEMIDE 40 MG: 10 INJECTION, SOLUTION INTRAMUSCULAR; INTRAVENOUS at 16:44

## 2021-07-08 RX ADMIN — Medication 10 ML: at 20:59

## 2021-07-08 RX ADMIN — DILTIAZEM HYDROCHLORIDE 120 MG: 120 CAPSULE, COATED, EXTENDED RELEASE ORAL at 16:44

## 2021-07-08 RX ADMIN — APIXABAN 5 MG: 5 TABLET, FILM COATED ORAL at 20:58

## 2021-07-08 RX ADMIN — ATORVASTATIN CALCIUM 20 MG: 10 TABLET, FILM COATED ORAL at 10:14

## 2021-07-08 NOTE — ED TRIAGE NOTES
Lena pt arrives to ED c/o SOB that started around 0100. Pt states she woke up from her sleep having trouble breathing. Pt denies pain.

## 2021-07-08 NOTE — PROGRESS NOTES
Patient complaining of bladder feeling full and not being able to empty completely. Bladder scanned patient after void. Retaining 437 ml. Paged cross cover for coy placement. Per Cristino Smith MD ok to put coy in.

## 2021-07-08 NOTE — CONSULTS
[address] [facility]   Cardiovascular Evaluation    PATIENT: Eli Saxena  DATE: 2021  MRN: [id number]  CSN: [id number]  : 1929    Primary Care Doctor/Referring provider: Merry Reed MD, MD, Loly Soler MD     Reason for evaluation/Chief complaint:   Shortness of Breath    Subjective:    History of present illness on initial date of evaluation:   Eli Saxena is a 81 y.o. patient who presents for the evaluation of recurrent shortness of breath. The patient is well-known to our cardiac practice. She has been admitted 3 times in the last month for shortness of breath attributed to the new onset atrial fibrillation and diastolic heart failure. She states that yesterday while sleeping she was awoken with the onset of shortness of breath. She states that she felt like she could not take a deep breath in. She denies any chest pain, palpitations, or lightheadedness. She cannot describe any positional and/or exacerbating symptoms for her shortness of breath. Due to her complaints, she contacted her children. She was simply referred to the emergency room. She was seen evaluated in our emergency room this morning and no longer has shortness of breath. She does have evidence for persistent atrial fibrillation with initially rates that were mildly uncontrolled. Currently her heart rate is controlled. Cardiology's been consulted in the emergency room for further recommendations. From my previous history of present illness in 2021. \"The patient was recently admitted for a short hospital course last week with symptoms of atrial fibrillation and rapid ventricular response in conjunction with decompensated congestive heart failure. She was treated aggressively with medication and had stabilization of her atrial fibrillation and volume status. She was educated on aggressive sodium reduction and fluid reduction. She states that she did well until yesterday.   She states that her shortness of breath returned. She states that her shortness of breath is mainly with exertion. The started make her feel uncomfortable similar to her previous presentation. This prompted her to seek medical evaluation emergency room. She was seen evaluated in the ER noted to have atrial fibrillation with rapid ventricular response again. She was subsequently admitted to the hospital.  Was also noted that she had a mild degree of her renal failure and electrolyte abnormalities. \"    Last week my consult \"SOB with associated lower extremity edema. The patient lives independently at home and had noted increasing lower extremity edema with shortness of breath. She states that the shortness of breath developed and suddenly became significant where she felt panicky and uncomfortable. This sought her to seek medical evaluation. She was seen and evaluated in the emergency room and found to have evidence for clinical heart failure. Patient has been treated with IV Lasix and has had significant improvement of her shortness of breath and lower extremity edema. Cardiology has been asked to see the patient for further recommendations and management regarding her cardiovascular status. he patient was admitted in late May with a episode of atrial fibrillation rapid ventricular response. At that time she was also found to possibly have had a TIA. Patient was on beta-blocker and novel oral anticoagulation at that time. She was scheduled to follow-up with our EP team and undergo 2-week event monitor. In addition, she was scheduled to undergo possible sleep apnea testing. In addition, the patient was admitted to [facility] in March 2021. At that time she was admitted for a urinary tract infection and and found to have paroxysmal atrial fibrillation. Her atrial fibrillation resolved without intervention. She did follow-up with cardiology at [facility] in March 2021.   At that time they had recommended blood pressure control along with withholding anticoagulation until cleared from gastrointestinal team.  That time she had some degree of known anemia that was of concern. \"          Patient Active Problem List   Diagnosis    Atrial fibrillation with rapid ventricular response ([address]    Essential hypertension    Amaurosis fugax of right eye    Chronic diastolic CHF (congestive heart failure), NYHA class 3 ([facility])    PAF (paroxysmal atrial fibrillation) ([facility])    History of TIA (transient ischemic attack)    SOB (shortness of breath)    Heart failure, diastolic, with acute decompensation ([address]    Hyponatremia    TEREZA (acute kidney injury) ([address]    CHF (congestive heart failure) ([address]    Atrial fibrillation with RVR ([address]         Cardiac Testing: I have reviewed the findings below. EKG:  ECHO:   STRESS TEST:  CATH:  BYPASS:  VASCULAR:    Past Medical History:   has a past medical history of Arthritis, Atrial fibrillation ([address] Back fracture, CHF (congestive heart failure) ([address] HTN (hypertension), Hyperlipidemia, and Panic attack. Surgical History:   has a past surgical history that includes Hysterectomy; Hemorrhoid surgery; Colonoscopy (2015); and eye surgery. Social History:   reports that she has quit smoking. She has never used smokeless tobacco. She reports current alcohol use. She reports that she does not use drugs. Family History:  No evidence for sudden cardiac death or premature CAD    Medications:  Reviewed and are listed in nursing record. and/or listed below  Outpatient Medications:  Prior to Admission medications    Medication Sig Start Date End Date Taking?  Authorizing Provider   dilTIAZem (CARDIZEM CD) 120 MG extended release capsule Take 1 capsule by mouth daily 6/28/21   Reyes Vela MD   torsemide [facility]) 20 MG tablet Take 2 tablets by mouth daily 6/28/21   Reyes Vela MD   spironolactone (ALDACTONE) 25 MG tablet Take 1 tablet by mouth daily 6/28/21   Rashel Ferreira MD   atorvastatin (LIPITOR) 20 MG tablet Take 1 tablet by mouth daily 6/17/21   KRYSTLE Hunt - CNP   metoprolol succinate (TOPROL XL) 25 MG extended release tablet Take 1 tablet by mouth 2 times daily 6/16/21   KRYSTLE Hunt - CNP   potassium chloride (KLOR-CON M) 20 MEQ extended release tablet Take 1 tablet by mouth daily 6/16/21   KRYSTLE Hunt - CNP   melatonin 10 MG CAPS capsule Take 10 mg by mouth nightly    Historical Provider, MD   multivitamin-iron-minerals-folic acid (CENTRUM) chewable tablet Take 1 tablet by mouth daily    Historical Provider, MD   magnesium 30 MG tablet Take 30 mg by mouth 2 times daily    Historical Provider, MD   apixaban (ELIQUIS) 5 MG TABS tablet Take 1 tablet by mouth 2 times daily 5/22/21   Ely Drake MD   thiamine 100 MG tablet Take 1 tablet by mouth daily 5/23/21   Ely Drake MD   escitalopram (LEXAPRO) 10 MG tablet Take 10 mg by mouth daily  5/5/21   Historical Provider, MD   HYDROcodone-acetaminophen (NORCO) 5-325 MG per tablet Take 1 tablet by mouth every 6 hours as needed.     Historical Provider, MD   Docusate Sodium (DSS) 250 MG CAPS Take 250 mg by mouth daily    Historical Provider, MD   vitamin D (CHOLECALCIFEROL) 25 MCG (1000 UT) TABS tablet Take 1,000 Units by mouth daily    Historical Provider, MD   aspirin 81 MG EC tablet Take 81 mg by mouth daily 3/11/21   Historical Provider, MD   Multiple Vitamin (MULTIVITAMIN) tablet Take 1 tablet by mouth daily    Historical Provider, MD   polyethylene glycol (GLYCOLAX) 17 GM/SCOOP powder Take 17 g by mouth daily as needed     Historical Provider, MD   Simethicone 125 MG CAPS Take 125 mg by mouth 2 times daily 12/24/20   Historical Provider, MD       In-patient schedule medications:   apixaban  5 mg Oral BID    aspirin  81 mg Oral Daily    atorvastatin  20 mg Oral Daily    escitalopram  10 mg Oral Daily    metoprolol succinate  25 mg Oral BID    sodium chloride flush  5-40 mL Intravenous 2 times per day         Infusion Medications:   dilTIAZem      sodium chloride           Allergies:  Lisinopril     Review of Systems:   14 point review of systems unable to be completed due to patient condition/cooperation. All available positives mentioned in history of present illness. Physical Examination:    [unfilled]  /71   Pulse 99   Temp 97.8 °F (36.6 °C) (Oral)   Resp 16   Ht 5' 3\" (1.6 m)   Wt 137 lb (62.1 kg)   SpO2 97%   BMI 24.27 kg/m²    Weight: 137 lb (62.1 kg)     Wt Readings from Last 3 Encounters:   07/08/21 137 lb (62.1 kg)   06/27/21 130 lb 8 oz (59.2 kg)   06/17/21 132 lb 9.6 oz (60.1 kg)       Intake/Output Summary (Last 24 hours) at 7/8/2021 0910  Last data filed at 7/8/2021 0817  Gross per 24 hour   Intake 499.39 ml   Output --   Net 499.39 ml       Physical Examination:    [unfilled]  /71   Pulse 99   Temp 97.8 °F (36.6 °C) (Oral)   Resp 16   Ht 5' 3\" (1.6 m)   Wt 137 lb (62.1 kg)   SpO2 97%   BMI 24.27 kg/m²    Weight: 137 lb (62.1 kg)     Wt Readings from Last 3 Encounters:   07/08/21 137 lb (62.1 kg)   06/27/21 130 lb 8 oz (59.2 kg)   06/17/21 132 lb 9.6 oz (60.1 kg)       Intake/Output Summary (Last 24 hours) at 7/8/2021 0910  Last data filed at 7/8/2021 0817  Gross per 24 hour   Intake 499.39 ml   Output --   Net 499.39 ml       General Appearance:  Alert, cooperative, no distress, appears stated age   Head:  Normocephalic, without obvious abnormality, atraumatic   Eyes:  PERRL, conjunctiva/corneas clear       Nose: Nares normal, no drainage or sinus tenderness   Throat: Lips, mucosa, and tongue normal   Neck: Supple, symmetrical, trachea midline, no adenopathy, thyroid: not enlarged, symmetric, no tenderness/mass/nodules, no carotid bruit.  no JVD       Lungs:   Reduced to auscultation with rales bilaterally, respirations mildly labored   Chest Wall:  No tenderness or deformity   Heart:  irregular rhythm and normal rate; S1, S2 are normal; no murmur noted; no rub or gallop   Abdomen:   Soft, non-tender, bowel sounds active all four quadrants,  no masses, no organomegaly           Extremities: Extremities atraumatic, no cyanosis. trace edema   Pulses: 2+ and symmetric   Skin: Skin color, texture, turgor normal, no rashes or lesions   Pysch: Normal mood and affect   Neurologic: Normal gross motor and sensory exam.           Labs  Recent Labs     07/08/21  0544   WBC 11.0   HGB 13.3   HCT 38.1   MCV 87.6        Recent Labs     07/08/21  0544   CREATININE 1.3*   BUN 38*   *   K 4.8   CL 88*   CO2 26     Recent Labs     07/08/21  0544   INR 1.15*   PROTIME 13.1*     Recent Labs     07/08/21 0544   TROPONINI 0.17*     Invalid input(s): PRO-BNP  No results for input(s): CHOL, HDL in the last 72 hours. Invalid input(s): LDL, TG      Imaging:  I have reviewed the below testing personally and my interpretation is below. EKG:  Atrial fibrillation with rapid ventricular responseNonspecific ST and T wave abnormalityAbnormal ECGWhen compared with ECG of 21-JUN-2021 16:27,Nonspecific T wave abnormality now evident in Inferior leadsNonspecific T wave abnormality now evident in Lateral leads    CXR:    Assessment:  81 y.o. patient with:  Active Problems:    SOB (shortness of breath)    Heart failure, diastolic, with acute decompensation (HCC)    Atrial fibrillation with RVR (HCC)    Elevated troponin    Plan:  1. Clinical symptoms at this point appeared to be related to AFIB with RVR episode. ~NEED TO TARGET more aggressive ATRIAL ARRHYTHMIA MANAGEMENT.    ~possible flash pulmonary edema and underlying presumptive diastolic heart disease in the past.  2.  Echocardiogram had been done in March 2021 while she was admitted to [facility].  At that time she had normal left ventricular function with moderate left atrial enlargement.   She did not have aortic stenosis or mitral stenosis on that exam.  3. Education given on low sodium and fluid restriction. Daughter is at bedside and was additionally educated  4. Continue her anticoagulation ( if not contraindicated ) and metoprolol for atrial fibrillation.   ~this has been adjusted for RF        Medical Decision Making: The following items were considered in medical decision making:  Independent review of images  Review / order clinical lab tests  Review / order radiology tests  Decision to obtain old records  Review and summation of old records as accessed through [employer] (a summary of my findings in these old records)        All questions and concerns were addressed to the patient/family. Alternatives to my treatment were discussed. The note was completed using EMR. Every effort was made to ensure accuracy; however, inadvertent computerized transcription errors may be present.     Ben Medina MD, [id number], [place], [place]  [phone] [facility] office  [phone] [facility]  7/8/2021  9:10 AM

## 2021-07-08 NOTE — ED PROVIDER NOTES
Insecurity:     Worried About Running Out of Food in the Last Year:     [address] in the Last Year:    Transportation Needs:     Lack of Transportation (Medical):      Lack of Transportation (Non-Medical):    Physical Activity:     Days of Exercise per Week:     Minutes of Exercise per Session:    Stress:     Feeling of Stress :    Social Connections:     Frequency of Communication with Friends and Family:     Frequency of Social Gatherings with Friends and Family:     Attends [employer] Services:     Active Member of Clubs or Organizations:     Attends Club or Organization Meetings:     Marital Status:    Intimate Partner Violence:     Fear of Current or Ex-Partner:     Emotionally Abused:     Physically Abused:     Sexually Abused:      Current Facility-Administered Medications   Medication Dose Route Frequency Provider Last Rate Last Admin    dilTIAZem 125 mg in dextrose 5 % 125 mL infusion  5-15 mg/hr Intravenous Continuous Suraj Mandujano MD        0.9 % sodium chloride bolus  500 mL Intravenous Once Suraj Mandujano  mL/hr at 07/08/21 0715 500 mL at 07/08/21 0715     Current Outpatient Medications   Medication Sig Dispense Refill    dilTIAZem (CARDIZEM CD) 120 MG extended release capsule Take 1 capsule by mouth daily 30 capsule 3    torsemide (DEMADEX) 20 MG tablet Take 2 tablets by mouth daily 30 tablet 3    spironolactone (ALDACTONE) 25 MG tablet Take 1 tablet by mouth daily 30 tablet 3    atorvastatin (LIPITOR) 20 MG tablet Take 1 tablet by mouth daily 30 tablet 3    metoprolol succinate (TOPROL XL) 25 MG extended release tablet Take 1 tablet by mouth 2 times daily 30 tablet 3    potassium chloride (KLOR-CON M) 20 MEQ extended release tablet Take 1 tablet by mouth daily 90 tablet 1    melatonin 10 MG CAPS capsule Take 10 mg by mouth nightly      multivitamin-iron-minerals-folic acid (CENTRUM) chewable tablet Take 1 tablet by mouth daily      magnesium 30 MG tablet Take 30 mg by mouth 2 times daily      apixaban (ELIQUIS) 5 MG TABS tablet Take 1 tablet by mouth 2 times daily 60 tablet 2    thiamine 100 MG tablet Take 1 tablet by mouth daily 30 tablet 3    escitalopram (LEXAPRO) 10 MG tablet Take 10 mg by mouth daily       HYDROcodone-acetaminophen (NORCO) 5-325 MG per tablet Take 1 tablet by mouth every 6 hours as needed.  Docusate Sodium (DSS) 250 MG CAPS Take 250 mg by mouth daily      vitamin D (CHOLECALCIFEROL) 25 MCG (1000 UT) TABS tablet Take 1,000 Units by mouth daily      aspirin 81 MG EC tablet Take 81 mg by mouth daily      Multiple Vitamin (MULTIVITAMIN) tablet Take 1 tablet by mouth daily      polyethylene glycol (GLYCOLAX) 17 GM/SCOOP powder Take 17 g by mouth daily as needed       Simethicone 125 MG CAPS Take 125 mg by mouth 2 times daily       Allergies   Allergen Reactions    Lisinopril Other (See Comments)     Hyponatremia / SIADH       REVIEW OF SYSTEMS  10 systems reviewed, pertinent positives per HPI otherwise noted to be negative. PHYSICAL EXAM  /62   Pulse 87   Temp 97.8 °F (36.6 °C) (Oral)   Resp 20   Ht 5' 3\" (1.6 m)   Wt 137 lb (62.1 kg)   SpO2 97%   BMI 24.27 kg/m²   GENERAL APPEARANCE: Awake and alert. Cooperative. No acute distress. HEAD: Normocephalic. Atraumatic. EYES: PERRL. EOM's grossly intact. ENT: Mucous membranes are moist.   NECK: Supple, trachea midline. HEART: Tachycardic, irregularly irregular rhythm  LUNGS: Respirations unlabored. Bibasilar crackles, no wheezing or rhonchi. ABDOMEN: Soft. Non-distended. Non-tender. No guarding or rebound. EXTREMITIES: No peripheral edema. MAEE. No acute deformities. SKIN: Warm, dry and intact. No acute rashes. NEUROLOGICAL: Alert and oriented X 3. CN II-XII grossly intact. Strength 5/5, sensation intact. PSYCHIATRIC: Normal mood and affect. LABS  I have reviewed all labs for this visit.    Results for orders placed or performed during the hospital encounter of 07/08/21   CBC Auto Differential   Result Value Ref Range    WBC 11.0 4.0 - 11.0 K/uL    RBC 4.36 4.00 - 5.20 M/uL    Hemoglobin 13.3 12.0 - 16.0 g/dL    Hematocrit 38.1 36.0 - 48.0 %    MCV 87.6 80.0 - 100.0 fL    MCH 30.5 26.0 - 34.0 pg    MCHC 34.8 31.0 - 36.0 g/dL    RDW 13.5 12.4 - 15.4 %    Platelets 319 349 - 689 K/uL    MPV 8.0 5.0 - 10.5 fL    Neutrophils % 69.8 %    Lymphocytes % 19.6 %    Monocytes % 7.6 %    Eosinophils % 2.0 %    Basophils % 1.0 %    Neutrophils Absolute 7.7 1.7 - 7.7 K/uL    Lymphocytes Absolute 2.2 1.0 - 5.1 K/uL    Monocytes Absolute 0.8 0.0 - 1.3 K/uL    Eosinophils Absolute 0.2 0.0 - 0.6 K/uL    Basophils Absolute 0.1 0.0 - 0.2 K/uL   Comprehensive Metabolic Panel w/ Reflex to MG   Result Value Ref Range    Sodium 126 (L) 136 - 145 mmol/L    Potassium reflex Magnesium 4.8 3.5 - 5.1 mmol/L    Chloride 88 (L) 99 - 110 mmol/L    CO2 26 21 - 32 mmol/L    Anion Gap 12 3 - 16    Glucose 129 (H) 70 - 99 mg/dL    BUN 38 (H) 7 - 20 mg/dL    CREATININE 1.3 (H) 0.6 - 1.2 mg/dL    GFR Non- 38 (A) >60    GFR  46 (A) >60    Calcium 10.0 8.3 - 10.6 mg/dL    Total Protein 7.3 6.4 - 8.2 g/dL    Albumin 3.8 3.4 - 5.0 g/dL    Albumin/Globulin Ratio 1.1 1.1 - 2.2    Total Bilirubin 0.3 0.0 - 1.0 mg/dL    Alkaline Phosphatase 132 (H) 40 - 129 U/L    ALT 27 10 - 40 U/L    AST 36 15 - 37 U/L    Globulin 3.5 g/dL   Troponin   Result Value Ref Range    Troponin 0.17 (H) <0.01 ng/mL   Brain Natriuretic Peptide   Result Value Ref Range    Pro-BNP 5,962 (H) 0 - 449 pg/mL   Protime-INR   Result Value Ref Range    Protime 13.1 (H) 9.9 - 12.7 sec    INR 1.15 (H) 0.88 - 1.12   APTT   Result Value Ref Range    aPTT 37.1 26.2 - 38.6 sec   EKG 12 Lead   Result Value Ref Range    Ventricular Rate 138 BPM    Atrial Rate 174 BPM    QRS Duration 80 ms    Q-T Interval 300 ms    QTc Calculation (Bazett) 454 ms    R Axis 29 degrees    T Axis 29 degrees    Diagnosis       Atrial fibrillation with rapid ventricular responseAbnormal ECGWhen compared with ECG of 22-JUN-2021 08:38,Nonspecific T wave abnormality, improved in Inferior leads       EKG  The Ekg interpreted by myself  atrial fibrillation with a rate of 138  Axis is   Normal  QTc is  normal  Intervals and Durations are unremarkable. No specific ST-T wave changes appreciated. No evidence of acute ischemia. No significant change from prior EKG dated 6/22/21    Cardiac Monitoring: The cardiac monitor revealed A. fib with RVR as interpreted by me. The cardiac monitor was ordered secondary to the patient's complaint of shortness of breath and to monitor the patient for dysrhythmia. RADIOLOGY  X-RAYS:  I have reviewed radiologic plain film image(s). ALL OTHER NON-PLAIN FILM IMAGES SUCH AS CT, ULTRASOUND AND MRI HAVE BEEN READ BY THE RADIOLOGIST. XR CHEST PORTABLE   Final Result   No acute process. Rechecks: Physical assessment performed. After the IV push of diltiazem the patient's heart rate is in the 80s. Blood pressure is 90 systolic therefore we will hold the drip. IV fluids have been initiated given her TEREZA. Critical Care:I personally spent a total of 45 minutes of critical care time in obtaining history, performing a physical exam, bedside monitoring of interventions, collecting and interpreting tests and discussion with consultants but excluding time spent performing procedures, treating other patients and teaching time. ED COURSE/MDM  Patient seen and evaluated. Here the patient is afebrile and tachycardic, noted to be in A. fib with RVR. Old records reviewed. Labs and imaging reviewed and results discussed with patient. She has had 2 admissions recently for similar presentation. She does not appear fluid overloaded today however.   She does have a history of diastolic heart failure. BNP however is elevated. Creatinine is up to 1.3 and on discharge previously it was 0.8. Sodium is back down to 126 as well. Today her troponin is 0.17. She is not having any chest pain and there were no ischemic changes on her EKG. I did speak with Tomas Ellis with cardiology. He does not recommend heparin drip at this time. The patient has been given a full dose aspirin here. Diltiazem bolus and drip has been ordered. After the bolus her heart rate did respond and is now in the 80s. We will hold on the drip for now. She will be admitted to the hospitalist.  Patient was reassessed as noted above . Plan of care discussed with patient. Patient in agreement with plan. CLINICAL IMPRESSION  1. Atrial fibrillation with RVR ([facility]    2. Acute on chronic diastolic congestive heart failure ([facility]    3. NSTEMI (non-ST elevated myocardial infarction) ([facility]    4. Acute renal failure, unspecified acute renal failure type ([address]    5. Hyponatremia        Blood pressure 112/62, pulse 87, temperature 97.8 °F (36.6 °C), temperature source Oral, resp. rate 20, height 5' 3\" (1.6 m), weight 137 lb (62.1 kg), SpO2 97 %, not currently breastfeeding. DISPOSITION  Eddy Storey was admitted in stable condition.     (Please note this note was completed with a voice recognition program.  Efforts were made to edit the dictations but occasionally words are mis-transcribed.)        Pauline Hudson MD  07/08/21 4299

## 2021-07-08 NOTE — ED NOTES
Bed: 10  Expected date:   Expected time:   Means of arrival:   Comments:  [address] Nicole Post RN  07/08/21 0773

## 2021-07-08 NOTE — DISCHARGE INSTR - COC
Continuity of Care Form    Patient Name: Ragini Be   :  1929  MRN:  [id number]    Admit date:  2021  Discharge date:  2021    Code Status Order: Full Code   Advance Directives:   [facility] Documentation       Date/Time Healthcare Directive Type of Healthcare Directive Copy in [address] Agent's Name Healthcare Agent's Phone Number    [phone]  No, patient does not have an advance directive for healthcare treatment  --  --  --  --  --            Admitting Physician:  Cheri Goodwin MD  PCP: Angelina Mejias MD, MD    Discharging Nurse: [facility] Unit/Room#: 0219/0219-01  Discharging Unit Phone Number: [phone]    Emergency Contact:   Extended Emergency Contact Information  Primary Emergency Contact: Susana IZAGUIRRE  Home Phone: [phone]  Relation: Child  Secondary Emergency Contact: [place] [address] Phone: [phone]  Mobile Phone: [phone]  Relation: Child    Past Surgical History:  Past Surgical History:   Procedure Laterality Date    COLONOSCOPY  2015    tics    EYE SURGERY      HEMORRHOID SURGERY      HYSTERECTOMY         Immunization History: There is no immunization history on file for this patient.     Active Problems:  Patient Active Problem List   Diagnosis Code    Atrial fibrillation with rapid ventricular response ([facility]) I48.91    Essential hypertension I10    Amaurosis fugax of right eye G45.3    Chronic diastolic CHF (congestive heart failure), NYHA class 3 ([facility]) I50.32    PAF (paroxysmal atrial fibrillation) ([facility]) I48.0    History of TIA (transient ischemic attack) Z86.73    SOB (shortness of breath) R06.02    Heart failure, diastolic, with acute decompensation ([facility]) I50.33    Hyponatremia E87.1    TEREZA (acute kidney injury) ([facility] N17.9    CHF (congestive heart failure) ([facility]) I50.9    Atrial fibrillation with RVR ([facility]) I48.91    Elevated troponin R77.8       Isolation/Infection: Isolation            No Isolation          Patient Infection Status       Infection Onset Added Last Indicated Last Indicated By Review Planned Expiration Resolved Resolved By    None active    Resolved    COVID-19 Rule Out 06/15/21 06/15/21 06/15/21 COVID-19, Rapid (Ordered)   06/15/21 Rule-Out Test Resulted            Nurse Assessment:  Last Vital Signs: BP 99/82   Pulse 100   Temp 97.9 °F (36.6 °C) (Oral)   Resp 17   Ht 5' 3\" (1.6 m)   Wt 129 lb 4.8 oz (58.7 kg)   SpO2 96%   BMI 22.90 kg/m²     Last documented pain score (0-10 scale):    Last Weight:   Wt Readings from Last 1 Encounters:   07/08/21 129 lb 4.8 oz (58.7 kg)     Mental Status:  oriented, alert, coherent, logical, thought processes intact and able to concentrate and follow conversation    IV Access:  - None    Nursing Mobility/ADLs:  Walking   Independent  Transfer  Independent  Bathing  Independent  Dressing  Independent  [address] Delivery   whole    Wound Care Documentation and Therapy:        Elimination:  Continence:   · Bowel: Yes  · Bladder: Yes  Urinary Catheter: Insertion Date: 7/12/2021   Colostomy/Ileostomy/Ileal Conduit: No       Date of Last BM: 7/12/2021    Intake/Output Summary (Last 24 hours) at 7/8/2021 1627  Last data filed at 7/8/2021 1451  Gross per 24 hour   Intake 739.39 ml   Output --   Net 739.39 ml     I/O last 3 completed shifts: In: 739.4 [P.O.:240; IV Piggyback:499.4]  Out: -     Safety Concerns:     None    Impairments/Disabilities:      None    Nutrition Therapy:  Current Nutrition Therapy:   - Oral Diet:  Low Sodium (2gm)    Routes of Feeding: Oral  Liquids: Thin Liquids  Daily Fluid Restriction: yes - amount 1500 ml  Last Modified Barium Swallow with Video (Video Swallowing Test): not done    Treatments at the Time of Hospital Discharge:   Respiratory Treatments: none  Oxygen Therapy:  is not on home oxygen therapy.   Ventilator:    - No ventilator support    Rehab Therapies: Physical Therapy, Occupational Therapy and Nurse  Weight Bearing Status/Restrictions: No weight bearing restirctions  Other Medical Equipment (for information only, NOT a DME order):  None  Other Treatments: HOME HEALTH CARE: LEVEL 3 [address] to establish plan of care for patient over 60 day period   Nursing  Initial home SN evaluation visit to occur within 24-48 hours for:  1)  medication management  2)  VS and clinical assessment  3)  S&S chronic disease exacerbation education + when to contact MD/NP  4)  care coordination  Medication Reconciliation during 1st SN visit  PT/OT/Speech   Evaluations in home within 24-48 hours of discharge to include DME and home safety   Frontload therapy 5 days, then 3x a week   OT to evaluate if patient has [address] needs for personal care    evaluation within 24-48 hours to evaluate resources & insurance for potential AL, [place], LTC, and Medicaid options   Palliative Care referral within 5 days of hospital discharge   PCP Visit scheduled within 3 - 7 days of hospital discharge [address] [facility] [address] (If patient is agreeable and meets guidelines)      Patient's personal belongings (please select all that are sent with patient):  Dentures upper and lower    RN SIGNATURE:  Electronically signed by Ana Lilia Weiss RN on 7/12/21 at 3:40 PM EDT    CASE MANAGEMENT/SOCIAL WORK SECTION    Inpatient Status Date: ***    Readmission Risk Assessment Score:  Readmission Risk              Risk of Unplanned Readmission:  26           Discharging to Facility/ [address]   [address]   [address] [place] [address]   [phone]     / signature: Electronically signed by Jony Rivas RN on 7/12/21 at 10:23 AM EDT    PHYSICIAN SECTION    Prognosis: Good    Condition at Discharge: Stable    Rehab Potential (if transferring to Rehab): Good    Recommended Labs or Other Treatments After Discharge: PT/OT, skilled nursing   [facility] care    Physician Certification: I certify the above information and transfer of Lelo Oneill  is necessary for the continuing treatment of the diagnosis listed and that she requires [address] for greater 30 days.      Update Admission H&P: No change in H&P    PHYSICIAN SIGNATURE:  Electronically signed by Jude Clemens MD on 7/12/21 at 11:45 AM EDT

## 2021-07-08 NOTE — H&P
Hospital Medicine History & Physical      PCP: Molly Borja MD, MD    Date of Admission: 7/8/2021    Date of Service: Pt seen/examined on 07/08/21 and Admitted to Inpatient with expected LOS greater than two midnights due to medical therapy. Chief Complaint:  SOB      History Of Present Illness:    81 y.o. female who presented to [facility] with SOB. Patient has had several recent admissions for afib with RVR and CHF exacerbations. In addition to getting sudden SOB this AM, patient felt like her heart was racing. This is a very typical presentation for her. There was no chest pain or nausea. She reports taking all of her medications as scheduled and states she has improved her diet at home. Patient has noted 4lbs weight gain at home over the past two days. Past Medical History:          Diagnosis Date    Arthritis     Atrial fibrillation ([address]     Back fracture     stress fracture active 4/17/15    CHF (congestive heart failure) (HCC)     HTN (hypertension)     Hyperlipidemia     Panic attack        Past Surgical History:          Procedure Laterality Date    COLONOSCOPY  2015    tics    EYE SURGERY      HEMORRHOID SURGERY      HYSTERECTOMY         Medications Prior to Admission:      Prior to Admission medications    Medication Sig Start Date End Date Taking?  Authorizing Provider   dilTIAZem (CARDIZEM CD) 120 MG extended release capsule Take 1 capsule by mouth daily 6/28/21   Estela Momin MD   torsemide [facility]) 20 MG tablet Take 2 tablets by mouth daily 6/28/21   Estela Momin MD   spironolactone (ALDACTONE) 25 MG tablet Take 1 tablet by mouth daily 6/28/21   Estela Momin MD   atorvastatin (LIPITOR) 20 MG tablet Take 1 tablet by mouth daily 6/17/21   KRYSTLE Sanchez CNP   metoprolol succinate (TOPROL XL) 25 MG extended release tablet Take 1 tablet by mouth 2 times daily 6/16/21   KRYSTLE Sanchez CNP   potassium chloride (KLOR-CON M) 20 MEQ extended release tablet Take 1 tablet by mouth daily 6/16/21   KRYSTLE Campbell CNP   melatonin 10 MG CAPS capsule Take 10 mg by mouth nightly    Historical Provider, MD   multivitamin-iron-minerals-folic acid (CENTRUM) chewable tablet Take 1 tablet by mouth daily    Historical Provider, MD   magnesium 30 MG tablet Take 30 mg by mouth 2 times daily    Historical Provider, MD   apixaban (ELIQUIS) 5 MG TABS tablet Take 1 tablet by mouth 2 times daily 5/22/21   Zoey Cardona MD   thiamine 100 MG tablet Take 1 tablet by mouth daily 5/23/21   Zoey Cardona MD   escitalopram (LEXAPRO) 10 MG tablet Take 10 mg by mouth daily  5/5/21   Historical Provider, MD   HYDROcodone-acetaminophen (NORCO) 5-325 MG per tablet Take 1 tablet by mouth every 6 hours as needed. Historical Provider, MD   Docusate Sodium (DSS) 250 MG CAPS Take 250 mg by mouth daily    Historical Provider, MD   vitamin D (CHOLECALCIFEROL) 25 MCG (1000 UT) TABS tablet Take 1,000 Units by mouth daily    Historical Provider, MD   aspirin 81 MG EC tablet Take 81 mg by mouth daily 3/11/21   Historical Provider, MD   Multiple Vitamin (MULTIVITAMIN) tablet Take 1 tablet by mouth daily    Historical Provider, MD   polyethylene glycol (GLYCOLAX) 17 GM/SCOOP powder Take 17 g by mouth daily as needed     Historical Provider, MD   Simethicone 125 MG CAPS Take 125 mg by mouth 2 times daily 12/24/20   Historical Provider, MD       Allergies:  Lisinopril    Social History:      The patient currently lives at home    TOBACCO:   reports that she has quit smoking. She has never used smokeless tobacco.  ETOH:   reports current alcohol use. E-Cigarettes/Vaping Use     Questions Responses    E-Cigarette/Vaping Use     Start Date     Passive Exposure     Quit Date     Counseling Given     Comments             Family History:      Reviewed in detail and negative for DM, CAD, Cancer, CVA. Positive as follows:    History reviewed.  No pertinent family history. REVIEW OF SYSTEMS COMPLETED:   Pertinent positives as noted in the HPI. All other systems reviewed and negative. PHYSICAL EXAM PERFORMED:    /74   Pulse 91   Temp 98.1 °F (36.7 °C) (Oral)   Resp 22   Ht 5' 3\" (1.6 m)   Wt 129 lb 4.8 oz (58.7 kg)   SpO2 94%   BMI 22.90 kg/m²     General appearance:  No apparent distress, appears stated age and cooperative. HEENT:  Normal cephalic, atraumatic without obvious deformity. Pupils equal, round, and reactive to light. Extra ocular muscles intact. Conjunctivae/corneas clear. Neck: Supple, with full range of motion. No jugular venous distention. Trachea midline. Respiratory:  Normal respiratory effort. Bibasilar rales without  Wheezes/Rhonchi. Cardiovascular: tachycardia, irregular rhythm with normal S1/S2 without murmurs, rubs or gallops. Abdomen: Soft, non-tender, non-distended with normal bowel sounds. Musculoskeletal:  No clubbing, cyanosis or edema bilaterally. Full range of motion without deformity. Skin: Skin color, texture, turgor normal.  No rashes or lesions. Neurologic:  Neurovascularly intact without any focal sensory/motor deficits.  Cranial nerves: II-XII intact, grossly non-focal.  Psychiatric:  Alert and oriented, thought content appropriate, normal insight  Capillary Refill: Brisk,3 seconds, normal  Peripheral Pulses: +2 palpable, equal bilaterally       Labs:     Recent Labs     07/08/21  0544   WBC 11.0   HGB 13.3   HCT 38.1        Recent Labs     07/08/21  0544   *   K 4.8   CL 88*   CO2 26   BUN 38*   CREATININE 1.3*   CALCIUM 10.0     Recent Labs     07/08/21  0544   AST 36   ALT 27   BILITOT 0.3   ALKPHOS 132*     Recent Labs     07/08/21  0544   INR 1.15*     Recent Labs     07/08/21  0544 07/08/21  0859   TROPONINI 0.17* 0.22*       Urinalysis:      Lab Results   Component Value Date    NITRU Negative 05/20/2021    WBCUA 3-5 05/20/2021    RBCUA 0-2 05/20/2021    BLOODU Negative 05/20/2021    SPECGRAV 1.015 05/20/2021    GLUCOSEU Negative 05/20/2021       Radiology:     CXR: I have reviewed the CXR with the following interpretation: no acute disease  EKG:  I have reviewed the EKG with the following interpretation: afib with RVR    XR CHEST PORTABLE   Final Result   No acute process. ASSESSMENT:    Active Hospital Problems    Diagnosis Date Noted    Atrial fibrillation with RVR ([address] [I48.91] 07/08/2021    Elevated troponin [R77.8] 07/08/2021    Heart failure, diastolic, with acute decompensation (HCC) [I50.33] 06/21/2021    SOB (shortness of breath) [R06.02] 06/15/2021         PLAN:  Afib with RVR  - HR improved with IV cardizem  - cardiology consulted  - continue home PO cardizem and metoprolol  - on eliquis for AC    Acute on chronic diastolic heart failure  - elevated pro-BNP on admission  - IV lasix X1 given  - continue home metoprolol  - monitor daily weights, BMP    Elevated troponin  - low concern for ACS  - suspect related to RVR  - cardiology consulted  - continue to monitor troponin    HTN  - well controlled  - continue home cardizem, metoprolol    HLD  - continue home statin    DVT Prophylaxis: eliquis  Diet: ADULT DIET; Regular  Code Status: Full Code    PT/OT Eval Status: not ordered    Dispo - at least two days       Fernandez Conley MD    Thank you Theresa Lyons MD, MD for the opportunity to be involved in this patient's care. If you have any questions or concerns please feel free to contact me at [phone].

## 2021-07-08 NOTE — CARE COORDINATION
CASE MANAGEMENT INITIAL ASSESSMENT        Reviewed chart and completed assessment with:patient and daughter Andrea Spine at bedside  Explained Case Management role/services. Primary contact information:see below     Health Care Decision Maker :   Primary Decision Maker: Yulia IZAGUIRRE [facility] [phone]    Secondary Decision Maker: Dianen Kimbrough - [phone]    Supplemental (Other) Decision Maker: Anette Dupont - Mariah - [phone]            Can this person be reached and be able to respond quickly, such as within a few minutes or hours? Yes  Admit date/status:07/08/2021  DiagnosisAtrial fibrillation with RVR   Is this a Readmission?:  yes, pt w/Afib RVR, needing admittance to the hospital     Insurance:Medicare   Precert required for SNF: No       3 night stay required: waived     Living arrangements, Adls, care needs, prior to admission:Pt lives in a ranch home, [place], drives occasionally.      Transportation:private      Durable Medical Equipment at home:  none, refused last admission     Services in the home and/or outpatient, prior to admission:none, [facility] following      PT/OT recs:none  [facility]not initiated     Barriers to discharge:none     Plan/comments:Pt plans to d/c home when stable; IPTA, CM discussed at length the need for [address]. Referral made to [facility] to initiated Jodi [address].   Debra Andres RN

## 2021-07-08 NOTE — FLOWSHEET NOTE
Pt's daughter called to bring Holy Communion to pt. Other daughter was present in room. Pt and daughter received Holy Communion. Stories about death and after life were shared. Pt likes to get Communion daily if possible while she is patient.   will follow up

## 2021-07-08 NOTE — ED NOTES
Bedside report given to Susannah HERRERA from A-2. CMU confirmed tele box 49.       Pedro Gonzalez RN  07/08/21 2755

## 2021-07-08 NOTE — CARE COORDINATION
[facility]    Referral received from CM to follow for home care services. I will follow for needs, and speak with patient to verify demos.     s3 chf  - Home care vitals program candidate    Osmar Ridley RN, BSN CTN  [facility] [phone]

## 2021-07-08 NOTE — ED NOTES
Called [facility] Cardio @ 0918  Re: consult for elevated trop, A fib RVR  Brice Ford responded @ 45 [facility] Flight  07/08/21 1701

## 2021-07-09 LAB
ANION GAP SERPL CALCULATED.3IONS-SCNC: 12 MMOL/L (ref 3–16)
BUN BLDV-MCNC: 30 MG/DL (ref 7–20)
CALCIUM SERPL-MCNC: 8.9 MG/DL (ref 8.3–10.6)
CHLORIDE BLD-SCNC: 93 MMOL/L (ref 99–110)
CO2: 27 MMOL/L (ref 21–32)
CREAT SERPL-MCNC: 1.2 MG/DL (ref 0.6–1.2)
EKG ATRIAL RATE: 174 BPM
EKG DIAGNOSIS: NORMAL
EKG Q-T INTERVAL: 300 MS
EKG QRS DURATION: 80 MS
EKG QTC CALCULATION (BAZETT): 454 MS
EKG R AXIS: 29 DEGREES
EKG T AXIS: 29 DEGREES
EKG VENTRICULAR RATE: 138 BPM
GFR AFRICAN AMERICAN: 51
GFR NON-AFRICAN AMERICAN: 42
GLUCOSE BLD-MCNC: 113 MG/DL (ref 70–99)
HCT VFR BLD CALC: 34.2 % (ref 36–48)
HEMOGLOBIN: 11.7 G/DL (ref 12–16)
INR BLD: 1.41 (ref 0.88–1.12)
MCH RBC QN AUTO: 30.5 PG (ref 26–34)
MCHC RBC AUTO-ENTMCNC: 34.1 G/DL (ref 31–36)
MCV RBC AUTO: 89.3 FL (ref 80–100)
PDW BLD-RTO: 13.4 % (ref 12.4–15.4)
PLATELET # BLD: 322 K/UL (ref 135–450)
PMV BLD AUTO: 7.6 FL (ref 5–10.5)
POTASSIUM SERPL-SCNC: 3.8 MMOL/L (ref 3.5–5.1)
PROTHROMBIN TIME: 16.2 SEC (ref 9.9–12.7)
RBC # BLD: 3.83 M/UL (ref 4–5.2)
SODIUM BLD-SCNC: 132 MMOL/L (ref 136–145)
WBC # BLD: 7.3 K/UL (ref 4–11)

## 2021-07-09 PROCEDURE — 85610 PROTHROMBIN TIME: CPT

## 2021-07-09 PROCEDURE — 80048 BASIC METABOLIC PNL TOTAL CA: CPT

## 2021-07-09 PROCEDURE — 6370000000 HC RX 637 (ALT 250 FOR IP): Performed by: INTERNAL MEDICINE

## 2021-07-09 PROCEDURE — 99233 SBSQ HOSP IP/OBS HIGH 50: CPT | Performed by: NURSE PRACTITIONER

## 2021-07-09 PROCEDURE — 6370000000 HC RX 637 (ALT 250 FOR IP): Performed by: NURSE PRACTITIONER

## 2021-07-09 PROCEDURE — 1200000000 HC SEMI PRIVATE

## 2021-07-09 PROCEDURE — 85027 COMPLETE CBC AUTOMATED: CPT

## 2021-07-09 PROCEDURE — 2580000003 HC RX 258: Performed by: INTERNAL MEDICINE

## 2021-07-09 PROCEDURE — 99223 1ST HOSP IP/OBS HIGH 75: CPT | Performed by: INTERNAL MEDICINE

## 2021-07-09 PROCEDURE — 93010 ELECTROCARDIOGRAM REPORT: CPT | Performed by: INTERNAL MEDICINE

## 2021-07-09 PROCEDURE — 36415 COLL VENOUS BLD VENIPUNCTURE: CPT

## 2021-07-09 RX ORDER — POTASSIUM CHLORIDE 20 MEQ/1
20 TABLET, EXTENDED RELEASE ORAL DAILY
Status: DISCONTINUED | OUTPATIENT
Start: 2021-07-09 | End: 2021-07-12 | Stop reason: HOSPADM

## 2021-07-09 RX ORDER — TORSEMIDE 20 MG/1
40 TABLET ORAL DAILY
Status: DISCONTINUED | OUTPATIENT
Start: 2021-07-09 | End: 2021-07-12 | Stop reason: HOSPADM

## 2021-07-09 RX ORDER — SPIRONOLACTONE 25 MG/1
25 TABLET ORAL DAILY
Status: DISCONTINUED | OUTPATIENT
Start: 2021-07-09 | End: 2021-07-12 | Stop reason: HOSPADM

## 2021-07-09 RX ORDER — MAGNESIUM 30 MG
30 TABLET ORAL 2 TIMES DAILY
Status: DISCONTINUED | OUTPATIENT
Start: 2021-07-09 | End: 2021-07-12 | Stop reason: HOSPADM

## 2021-07-09 RX ADMIN — ESCITALOPRAM OXALATE 10 MG: 10 TABLET ORAL at 08:56

## 2021-07-09 RX ADMIN — APIXABAN 5 MG: 5 TABLET, FILM COATED ORAL at 08:56

## 2021-07-09 RX ADMIN — METOPROLOL SUCCINATE 25 MG: 25 TABLET, FILM COATED, EXTENDED RELEASE ORAL at 21:22

## 2021-07-09 RX ADMIN — APIXABAN 2.5 MG: 2.5 TABLET, FILM COATED ORAL at 21:22

## 2021-07-09 RX ADMIN — Medication 10 ML: at 21:22

## 2021-07-09 RX ADMIN — POTASSIUM CHLORIDE 20 MEQ: 1500 TABLET, EXTENDED RELEASE ORAL at 13:15

## 2021-07-09 RX ADMIN — ATORVASTATIN CALCIUM 20 MG: 10 TABLET, FILM COATED ORAL at 08:56

## 2021-07-09 RX ADMIN — Medication 10 ML: at 08:56

## 2021-07-09 RX ADMIN — SPIRONOLACTONE 25 MG: 25 TABLET ORAL at 13:15

## 2021-07-09 RX ADMIN — POLYETHYLENE GLYCOL 3350 17 G: 17 POWDER, FOR SOLUTION ORAL at 21:28

## 2021-07-09 RX ADMIN — TORSEMIDE 40 MG: 20 TABLET ORAL at 13:15

## 2021-07-09 RX ADMIN — METOPROLOL SUCCINATE 25 MG: 25 TABLET, FILM COATED, EXTENDED RELEASE ORAL at 08:56

## 2021-07-09 RX ADMIN — DILTIAZEM HYDROCHLORIDE 120 MG: 120 CAPSULE, COATED, EXTENDED RELEASE ORAL at 08:56

## 2021-07-09 RX ADMIN — ASPIRIN 81 MG: 81 TABLET, COATED ORAL at 08:56

## 2021-07-09 ASSESSMENT — PAIN SCALES - GENERAL: PAINLEVEL_OUTOF10: 0

## 2021-07-09 NOTE — CONSULTS
Electrophysiology Consultation   Date: 7/9/2021  Admit Date:  7/8/2021  Reason for Consultation: Atrial fibrillation   Consult Requesting Physician: Feliz Ramachandran MD     Chief Complaint   Patient presents with    Shortness of Breath     HPI:   Mrs. Dseiree Harmon is a pleasant 81year old female with a medical history significant for paroxysmal atrial fibrillation with RVR, cerebral vascular disease, heart failure preserved ejection fraction, chronic renal insufficiency stage III, hypertension, newly diagnosed atrial fibrillation with RVR, anxiety, depression, and ?GI bleeding? who presents from home with acute on chronic shortness of breath and atrial fibrillation. Patient presents from home with acute onset shortness of breath, elevated troponin enzymes, and elevated BNP. Patient has been admitted to the hospital multiple times in the last few months. She continuously presents with shortness of breath and volume overload. Her understanding of fluid restriction and salt restriction seems to be limited despite education. At times she is in atrial fibrillation and other she is in normal sinus rhythm. Her heart rate on presentation this time was elevated however after restarting her home metoprolol her heart rates have been relatively well controlled. Patient denies fevers, chest pain, orthopnea, PND, lower extremity edema, abdominal swelling, shortness of breath, dyspnea on exertion, chills, visual changes, headaches, sore throat, cough, abdominal pain, nausea, vomiting, bleeding, bruising, dysuria, muscle/joint pain, confusion, depression, anxiety, skin lesions, etc.    Emergency Room/Hospital Course:  Patient presented to the ER. Her CMP was notable for elevated creatinine and hyperglycemia. Her troponin enzymes were elevated. Her CBC was reassuring. Her BNP was elevated. His CXR was reassuring. She continues in atrial fibrillation however her rates are controlled.     Current rhythm: Atrial Examination:  Vitals:    21 1158   BP: (!) 116/57   Pulse: 108   Resp: 16   Temp: 98.1 °F (36.7 °C)   SpO2: 97%        Intake/Output Summary (Last 24 hours) at 2021 1401  Last data filed at 2021 1320  Gross per 24 hour   Intake 1796 ml   Output 2425 ml   Net -629 ml     In: 1556 [P.O.:1556]  Out: 2425    Wt Readings from Last 3 Encounters:   21 128 lb 6.4 oz (58.2 kg)   21 130 lb 8 oz (59.2 kg)   21 132 lb 9.6 oz (60.1 kg)     Temp  Av.9 °F (36.6 °C)  Min: 97.6 °F (36.4 °C)  Max: 98.1 °F (36.7 °C)  Pulse  Av.3  Min: 86  Max: 108  BP  Min: 98/54  Max: 116/57  SpO2  Av.2 %  Min: 92 %  Max: 97 %    · Telemetry: Atrial fibrillation with controlled rates   · Constitutional: Alert. Oriented to person, place, and time. No distress. Very anxious and sounds depressed. · Head: Normocephalic and atraumatic. · Mouth/Throat: Lips appear moist. Oropharynx is clear and moist.  · Eyes: Conjunctivae normal. EOM are normal.   · Neck: Neck supple. No lymphadenopathy. No rigidity. No JVD present. · Cardiovascular: Irregular rate and rhythm w/o M/G/R    · Pulmonary/Chest: Bilateral respiratory sounds present. No respiratory accessory muscle use. No wheezes, No rhonchi. · Abdominal: Soft. Normal bowel sounds present. No distension, No tenderness. · Musculoskeletal: No tenderness. No edema    · Neurological: Alert and oriented. Cranial nerve II-XII grossly intact. · Skin: Skin is warm and dry. No rash, lesions, ulcerations noted. · Psychiatric: No anxiety nor agitation. Labs:  Reviewed.    Recent Labs     21  0544 21  0657   * 132*   K 4.8 3.8   CL 88* 93*   CO2 26 27   BUN 38* 30*   CREATININE 1.3* 1.2     Recent Labs     21  0544 21  0657   WBC 11.0 7.3   HGB 13.3 11.7*   HCT 38.1 34.2*   MCV 87.6 89.3    322     Lab Results   Component Value Date    CKTOTAL 68 2021    TROPONINI 0.22 2021     No results found for: BNP  Lab 07/08/2021    CHF (congestive heart failure) ([facility] 06/22/2021    Heart failure, diastolic, with acute decompensation ([facility] 06/21/2021    Hyponatremia 06/21/2021    TEREZA (acute kidney injury) ([facility] 06/21/2021    Chronic diastolic CHF (congestive heart failure), NYHA class 3 ([address] 06/15/2021    PAF (paroxysmal atrial fibrillation) ([facility] 06/15/2021    History of TIA (transient ischemic attack) 06/15/2021    SOB (shortness of breath) 06/15/2021    Atrial fibrillation with rapid ventricular response ([facility] 05/20/2021    Amaurosis fugax of right eye 05/20/2021    Essential hypertension 03/02/2021      Active Hospital Problems    Diagnosis Date Noted    Stage 3a chronic kidney disease ([facility] [N18.31]     [facility] [I48.91] 07/08/2021    Elevated troponin [R77.8] 07/08/2021    Heart failure, diastolic, with acute decompensation (HCC) [I50.33] 06/21/2021    SOB (shortness of breath) [R06.02] 06/15/2021     Mrs. Maribel Hernandez is a pleasant 81year old female with a medical history significant for paroxysmal atrial fibrillation with RVR, cerebral vascular disease, heart failure preserved ejection fraction, chronic renal insufficiency stage III, hypertension, newly diagnosed atrial fibrillation with RVR, anxiety, depression, and ?GI bleeding? who presents from home with acute on chronic shortness of breath and atrial fibrillation. Problem List:  1. Paroxysmal atrial fibrillation with RVR ([id number] score 6). 2. Heart failure with preserved ejection fraction. 3. Elevated troponin enzymes. 4. Chronic renal insufficiency stage III. Assessment and Plan:  1. Paroxysmal atrial fibrillation with RVR ([id number] score 6).   Patient is a pleasant 81year old female with a medical history significant for paroxysmal atrial fibrillation with RVR, cerebral vascular disease, heart failure preserved ejection fraction, chronic renal insufficiency stage III, hypertension, newly diagnosed atrial fibrillation with RVR, anxiety, depression, and ?GI bleeding? who presents from home with acute on chronic shortness of breath and atrial fibrillation. Electrophysiology was consulted to see if atrial fibrillation may be contributing to her recurrent admissions. I suspect that it adds to her symptoms however she has presented in the past in normal sinus rhythm. Her rates on her monitor showed nearly rate controlled atrial fibrillation and I had increased her metoprolol, which she is currently taking and rates appear well controlled on telemetry. She currently feels much improved with diuresis and continues in rate controlled atrial fibrillation. We discussed antiarrhythmic therapy, amiodarone given her age, troponin enzymes, and renal function. Given side effects, patient declines restoration of normal sinus rhythm with antiarrhythmic therapy. Her blood pressure is a little soft so I'll start her on digoxin however she will need level given her renal disease. As far as anticoagulation, the price remains high for patient and family. I advised to call insurance to see what alternative she may qualify for. She does not want to start on warfarin. Her anticoagulation dose, with diuresis, is higher than advised. I will decrease apixaban to 2.5 mg BID.  - Continue metoprolol.  - Continue dlltiazem. - Decrease apixaban to 2.5 mg BID.  - Follow up with primary cardiologist.  If changes her mind about amiodarone, we would be happy to prescribe. We will sign off case. 2. Heart failure with preserved ejection fraction.  - Per cardiology. 3. Elevated troponin enzymes. - Per cardiology. 4. Chronic renal insufficiency stage III. - Per primary team.    Thank you for allowing me to participate in the care of Ryan Castro . If you have any questions/comments, please do not hesitate to contact us.     Juan Carlos Keith MD  Cardiac Electrophysiology  [address]  (761) [address]

## 2021-07-09 NOTE — PLAN OF CARE
Problem: OXYGENATION/RESPIRATORY FUNCTION  Goal: Patient will maintain patent airway  7/9/2021 1045 by Biju Harp RN  Outcome: Ongoing     Patient's EF (Ejection Fraction) is unable to locate on file    Heart Failure Medications:  Diuretics[de-identified] None - none currently, awaiting cardiac rounds     (One of the following REQUIRED for EF <40%/SYSTOLIC FAILURE but MAY be used in EF% >40%/DIASTOLIC FAILURE)        ACE[de-identified] None        ARB[de-identified] None         ARNI[de-identified] None    (Beta Blockers)  NON- Evidenced Based Beta Blocker (for EF% >40%/DIASTOLIC FAILURE): None    Evidenced Based Beta Blocker::(REQUIRED for EF% <40%/SYSTOLIC FAILURE) Metoprolol SUCCinate- Toprol XL  . .................................................................................................................................................. Patient's weights and intake/output reviewed: Yes    Patient's Last Weight: 128 lbs obtained by standing scale. Difference of 1 lbs less than last documented weight. Intake/Output Summary (Last 24 hours) at 7/9/2021 1040  Last data filed at 7/9/2021 1015  Gross per 24 hour   Intake 1436 ml   Output 2425 ml   Net -989 ml       Comorbidities Reviewed Yes    Patient has a past medical history of Arthritis, Atrial fibrillation ([address] Back fracture, CHF (congestive heart failure) ([address] HTN (hypertension), Hyperlipidemia, and Panic attack. >>For CHF and Comorbidity documentation on Education Time and Topics, please see Education Tab    Progressive Mobility Assessment:  What is this patient's Current Level of Mobility?: Ambulatory- with Assistance  How was this patient Mobilized today?: Edge of Bed,  Up to Toilet/Shower and Up in Room                 With Whom? PCA                 Level of Difficulty/Assistance: 1x Assist     Pt resting in bed at this time on room air. Pt with complaints of shortness of breath. Pt without lower extremity edema.      Patient and/or Family's stated Goal of Care this Admission: reduce shortness of breath, increase activity tolerance, better understand heart failure and disease management and be more comfortable prior to discharge

## 2021-07-09 NOTE — FLOWSHEET NOTE
07/09/21 9920   Encounter Summary   Services provided to: Patient   Referral/Consult From: Patient; Other    Continue Visiting   (7/9 follow up, communion and prayer)   Complexity of Encounter Moderate   Length of Encounter 15 minutes   Spiritual/Adventist   Type Spiritual support   Assessment Approachable; Anxious   Intervention Active listening;Explored feelings, thoughts, concerns;Prayer;Communion;Discussed relationship with God;Discussed illness/injury and it's impact   Outcome Comfort;Expressed gratitude;Engaged in conversation;Expressed feelings/needs/concerns; Less anxious, less agitated   Sacraments   Communion Patient received communion

## 2021-07-09 NOTE — CONSULTS
Nutrition Education    Pt seen per [facility] for CHF diet education. Provided pt with written and verbal instruction on HF nutrition therapy. Discussed low sodium diet, daily weights, and fluid restriction. Pt voiced understanding. Time spent: 20 minutes      · Verbally reviewed information with Patient  · Educated on CHF   · Written educational materials provided. · Contact name and number provided. · Refer to Patient Education activity for more details.     Electronically signed by Jessica Mendoza MS, RD, LD on 7/9/21 at 1:46 PM EDT    Contact: [phone]

## 2021-07-09 NOTE — CARE COORDINATION
[facility], who are following. CM will continue to follow for additional needs.   Johanne Ibanez RN

## 2021-07-09 NOTE — PROGRESS NOTES
[facility]   Daily Progress Note    Admit Date:  7/8/2021  HPI:    Chief Complaint   Patient presents with    Shortness of Breath      Presented with shortness of breath and palpitations. Found to have AFib with RVR, treated with IV diltiazem with improvement. Also noted to have elevated troponin's and BNP. Hx of paroxysmal Afib, dCHF, HTN, HLD, CKD. Multiple recent admissions for same. Subjective:  Ms. Uri Renteria sitting up in bed, daughter at bedside. States feels fine today. Weight at home has been staying 126-129 lbs. Adherent to medicines, diet, daily weights. Objective:   Patient Vitals for the past 24 hrs:   BP Temp Temp src Pulse Resp SpO2 Weight   07/09/21 0758 (!) 98/54 97.9 °F (36.6 °C) Oral 96 20 92 % --   07/09/21 0402 107/67 97.7 °F (36.5 °C) Oral 87 16 95 % 128 lb 6.4 oz (58.2 kg)   07/08/21 2350 113/64 97.9 °F (36.6 °C) Oral 101 16 96 % --   07/08/21 1947 102/60 97.6 °F (36.4 °C) Oral 86 16 95 % --   07/08/21 1614 99/82 97.9 °F (36.6 °C) Oral 100 17 96 % --   07/08/21 1221 107/74 98.1 °F (36.7 °C) Oral 91 22 94 % --       Intake/Output Summary (Last 24 hours) at 7/9/2021 0923  Last data filed at 7/9/2021 0902  Gross per 24 hour   Intake 1200 ml   Output 2200 ml   Net -1000 ml     Wt Readings from Last 3 Encounters:   07/09/21 128 lb 6.4 oz (58.2 kg)   06/27/21 130 lb 8 oz (59.2 kg)   06/17/21 132 lb 9.6 oz (60.1 kg)         ASSESSMENT:   1. CHF, acute on chronic diastolic: elevated BNP, dyspnea  2. Afib, paroxysmal: uncontrolled on admission, improved with IV diltiazem transitioned to home po dose, anticoagulation with Eliquis  3. Elevated troponin: felt 2/2 RVR and CHF, on ASA, statin, BB  4. HTN: stable to low  5. CKD: stable      PLAN:  1. Question if exacerbations related to PAF with tachycardia. Will ask EP to see. 2. Resume home diuretics - torsemide and spironolactone as well as magnesium and potassium  3.  Repeat limited echo    KRYSTLE Small - CNP, 7/9/2021, 9:23 AM  [facility], [place], operated by [facility]   [phone]       Telemetry: AFib   NYHA: III    Physical Exam:  General:  Awake, alert, NAD  Skin:  Warm and dry  Neck:  JVP 8-9 cm  Chest:  Clear to auscultation   Cardiovascular:  Irreg Irreg, normal P5Q0, soft systolic murmur, no g/r  Abdomen:  Soft, nontender, +bowel sounds  Extremities:  No BLE edema    Medications:    apixaban  5 mg Oral BID    aspirin  81 mg Oral Daily    atorvastatin  20 mg Oral Daily    escitalopram  10 mg Oral Daily    metoprolol succinate  25 mg Oral BID    sodium chloride flush  5-40 mL Intravenous 2 times per day    dilTIAZem  120 mg Oral Daily      sodium chloride         Lab Data: Lab results independently reviewed and analyzed by myself 7/9/21   CBC:   Recent Labs     07/08/21  0544 07/09/21  0657   WBC 11.0 7.3   HGB 13.3 11.7*    322     BMP:    Recent Labs     07/08/21  0544 07/09/21  0657   * 132*   K 4.8 3.8   CO2 26 27   BUN 38* 30*   CREATININE 1.3* 1.2     INR:    Recent Labs     07/08/21  0544 07/09/21  0657   INR 1.15* 1.41*     BNP:    Recent Labs     07/08/21  0544   PROBNP 5,962*     Cardiac Enzymes:   Recent Labs     07/08/21  0544 07/08/21  0859 07/08/21  1324   TROPONINI 0.17* 0.22* 0.22*     Lipids:   Lab Results   Component Value Date    TRIG 85 06/22/2021    TRIG 89 06/16/2021    HDL 58 06/22/2021    HDL 57 06/16/2021    LDLCALC 86 06/22/2021    LDLCALC 49 06/16/2021       Cardiac Imaging:    Echo: 03/02/2021    CONCLUSIONS     The left ventricle is of normal size. Left ventricular wall thickness is normal.       No obvious segmental wall motion abnormalities. The right atrium is normal in size. Moderate left atrial dilatation. Mild mitral annular calcification. Mild thickening/calcification of the anterior mitral valve leaflet. Trileaflet aortic valve. Moderate  thickening (sclerosis) of the aortic valve cusps without reduced excursion.        Structurally normal tricuspid valve. Mild tricuspid regurgitation. Normal pericardium without effusion.

## 2021-07-09 NOTE — PLAN OF CARE
Problem: Falls - Risk of:  Goal: Will remain free from falls  Description: Will remain free from falls  Outcome: Ongoing     Problem: Skin Integrity:  Goal: Will show no infection signs and symptoms  Description: Will show no infection signs and symptoms  Outcome: Ongoing     Problem: Cardiac:  Goal: Ability to maintain an adequate cardiac output will improve  Description: Ability to maintain an adequate cardiac output will improve  Outcome: Ongoing     Problem: Cardiac:  Goal: Complications related to the disease process, condition or treatment will be avoided or minimized  Description: Complications related to the disease process, condition or treatment will be avoided or minimized  Outcome: Ongoing     Problem: OXYGENATION/RESPIRATORY FUNCTION  Goal: Patient will maintain patent airway  Outcome: Ongoing       Patient's EF (Ejection Fraction) is unknown    Heart Failure Medications:  Diuretics[de-identified] None    (One of the following REQUIRED for EF <40%/SYSTOLIC FAILURE but MAY be used in EF% >40%/DIASTOLIC FAILURE)        ACE[de-identified] None        ARB[de-identified] None         ARNI[de-identified] None    (Beta Blockers)  NON- Evidenced Based Beta Blocker (for EF% >40%/DIASTOLIC FAILURE): None    Evidenced Based Beta Blocker::(REQUIRED for EF% <40%/SYSTOLIC FAILURE) Metoprolol SUCCinate- Toprol XL  . .................................................................................................................................................. Patient's weights and intake/output reviewed: Yes    Patient's Last Weight: 129 lbs obtained by standing scale. First weight    Intake/Output Summary (Last 24 hours) at 7/9/2021 0310  Last data filed at 7/8/2021 2100  Gross per 24 hour   Intake 1219.39 ml   Output 1600 ml   Net -380.61 ml       Comorbidities Reviewed Yes    Patient has a past medical history of Arthritis, Atrial fibrillation ([address] Back fracture, CHF (congestive heart failure) ([facility] HTN (hypertension), Hyperlipidemia, and Panic attack. >>For CHF and Comorbidity documentation on Education Time and Topics, please see Education Tab    Progressive Mobility Assessment:  What is this patient's Current Level of Mobility?: Ambulatory- with Assistance  How was this patient Mobilized today?: Edge of Bed and  Up to Toilet/Shower                 With Whom? Nurse and Self                 Level of Difficulty/Assistance: 1x Assist     Pt resting in bed at this time on room air. Pt denies shortness of breath. Pt without lower extremity edema.      Patient and/or Family's stated Goal of Care this Admission: reduce shortness of breath, increase activity tolerance, better understand heart failure and disease management, be more comfortable, and reduce lower extremity edema prior to discharge        :

## 2021-07-10 PROCEDURE — 2580000003 HC RX 258: Performed by: INTERNAL MEDICINE

## 2021-07-10 PROCEDURE — 93308 TTE F-UP OR LMTD: CPT

## 2021-07-10 PROCEDURE — 6370000000 HC RX 637 (ALT 250 FOR IP): Performed by: NURSE PRACTITIONER

## 2021-07-10 PROCEDURE — 1200000000 HC SEMI PRIVATE

## 2021-07-10 PROCEDURE — 6370000000 HC RX 637 (ALT 250 FOR IP): Performed by: INTERNAL MEDICINE

## 2021-07-10 PROCEDURE — 51702 INSERT TEMP BLADDER CATH: CPT

## 2021-07-10 PROCEDURE — 51798 US URINE CAPACITY MEASURE: CPT

## 2021-07-10 PROCEDURE — 99233 SBSQ HOSP IP/OBS HIGH 50: CPT | Performed by: NURSE PRACTITIONER

## 2021-07-10 RX ORDER — DILTIAZEM HYDROCHLORIDE 60 MG/1
60 TABLET, FILM COATED ORAL ONCE
Status: DISCONTINUED | OUTPATIENT
Start: 2021-07-10 | End: 2021-07-12 | Stop reason: HOSPADM

## 2021-07-10 RX ORDER — DILTIAZEM HYDROCHLORIDE 180 MG/1
180 CAPSULE, COATED, EXTENDED RELEASE ORAL DAILY
Status: DISCONTINUED | OUTPATIENT
Start: 2021-07-11 | End: 2021-07-12 | Stop reason: HOSPADM

## 2021-07-10 RX ADMIN — APIXABAN 2.5 MG: 2.5 TABLET, FILM COATED ORAL at 21:11

## 2021-07-10 RX ADMIN — TORSEMIDE 40 MG: 20 TABLET ORAL at 10:04

## 2021-07-10 RX ADMIN — METOPROLOL SUCCINATE 25 MG: 25 TABLET, FILM COATED, EXTENDED RELEASE ORAL at 21:11

## 2021-07-10 RX ADMIN — ASPIRIN 81 MG: 81 TABLET, COATED ORAL at 10:03

## 2021-07-10 RX ADMIN — SPIRONOLACTONE 25 MG: 25 TABLET ORAL at 10:03

## 2021-07-10 RX ADMIN — APIXABAN 2.5 MG: 2.5 TABLET, FILM COATED ORAL at 10:03

## 2021-07-10 RX ADMIN — POTASSIUM CHLORIDE 20 MEQ: 1500 TABLET, EXTENDED RELEASE ORAL at 10:03

## 2021-07-10 RX ADMIN — METOPROLOL SUCCINATE 25 MG: 25 TABLET, FILM COATED, EXTENDED RELEASE ORAL at 10:03

## 2021-07-10 RX ADMIN — ESCITALOPRAM OXALATE 10 MG: 10 TABLET ORAL at 10:03

## 2021-07-10 RX ADMIN — ATORVASTATIN CALCIUM 20 MG: 10 TABLET, FILM COATED ORAL at 10:04

## 2021-07-10 RX ADMIN — HYDROCODONE BITARTRATE AND ACETAMINOPHEN 1 TABLET: 5; 325 TABLET ORAL at 21:11

## 2021-07-10 RX ADMIN — DILTIAZEM HYDROCHLORIDE 120 MG: 120 CAPSULE, COATED, EXTENDED RELEASE ORAL at 10:04

## 2021-07-10 RX ADMIN — Medication 10 ML: at 21:11

## 2021-07-10 ASSESSMENT — PAIN DESCRIPTION - DESCRIPTORS: DESCRIPTORS: ACHING

## 2021-07-10 ASSESSMENT — PAIN SCALES - GENERAL
PAINLEVEL_OUTOF10: 4
PAINLEVEL_OUTOF10: 7
PAINLEVEL_OUTOF10: 0

## 2021-07-10 ASSESSMENT — PAIN DESCRIPTION - LOCATION: LOCATION: BACK;BUTTOCKS

## 2021-07-10 NOTE — PROGRESS NOTES
Bladder scanned patient due to minimal voids today after coy was removed this AM. >999ml in bladder. Perfect served cross cover MD: \"The patient had her coy removed this morning which was originally placed for retention. She is retaining >999 ml. Do you want to replace coy ? Thanks. \"

## 2021-07-10 NOTE — PROGRESS NOTES
MD   2.5 mg at 07/10/21 1003    aspirin EC tablet 81 mg  81 mg Oral Daily Claude Spearing, MD   81 mg at 07/10/21 1003    atorvastatin (LIPITOR) tablet 20 mg  20 mg Oral Daily Claude Spearing, MD   20 mg at 07/10/21 1004    escitalopram (LEXAPRO) tablet 10 mg  10 mg Oral Daily Claude Spearing, MD   10 mg at 07/10/21 1003    HYDROcodone-acetaminophen (NORCO) 5-325 MG per tablet 1 tablet  1 tablet Oral Q6H PRN Claude Spearing, MD        metoprolol succinate (TOPROL XL) extended release tablet 25 mg  25 mg Oral BID Claude Spearing, MD   25 mg at 07/10/21 1003    sodium chloride flush 0.9 % injection 5-40 mL  5-40 mL Intravenous 2 times per day Claude Spearing, MD   10 mL at 07/09/21 2122    sodium chloride flush 0.9 % injection 5-40 mL  5-40 mL Intravenous PRN Claude Spearing, MD        0.9 % sodium chloride infusion  25 mL Intravenous PRN Claude Spearing, MD        ondansetron (ZOFRAN-ODT) disintegrating tablet 4 mg  4 mg Oral Q8H PRN Claude Spearing, MD        Or    ondansetron [facility]) injection 4 mg  4 mg Intravenous Q6H PRN Claude Spearing, MD        polyethylene glycol [facility]) packet 17 g  17 g Oral Daily PRN Claude Spearing, MD   17 g at 07/09/21 2128    acetaminophen (TYLENOL) tablet 650 mg  650 mg Oral Q6H PRN Claude Spearing, MD        Or   [employer] acetaminophen (TYLENOL) suppository 650 mg  650 mg Rectal Q6H PRN Claude Spearing, MD           Objective:     Telemetry monitor: 's    Physical Exam:  Constitutional and general appearance: alert, cooperative, no distress and appears stated age  HEENT: PERRL, no cervical lymphadenopathy. No masses palpable.  Normal oral mucosa  Respiratory:  · Normal excursion and expansion without use of accessory muscles  · Resp auscultation: Normal breath sounds without wheezing, rhonchi, and rales  Cardiovascular:  · The apical impulse is not displaced  · Heart tones are crisp and normal. irregular S1 and S2.  · Jugular venous pulsation Normal  · The carotid upstroke is normal in 11.8 05/20/2021     PTT No results found for: PTT   Lab Results   Component Value Date    MG 1.90 06/26/2021      Lab Results   Component Value Date    TSH 3.02 06/21/2021       Assessment:  Shortness of breath: improved   Atrial fibrillation of unknown duration (likely persistent): stable   -rates elevated again today   -[id number] score >2  Acute on chronic diastolic heart failure: compensated    -weight on admission 137 lb, weight today 127 lb  HTN: controlled, soft at times   Hyponatremia  CKD  Anxiety   Depression  History of TIA    Plan:   Give Cardizem 60 mg PO x 1 now  Increase Cardizem to 180 mg PO daily  Continue metoprolol and reduced dose Eliquis (age, creatinine and weight)  Continue ASA, lipitor, spironolactone and torsemide   Options for rate control are limited given lower BP and renal function  May need to reconsider amiodarone or digoxin (with close monitoring of renal function and digoxin levels) with ongoing elevated rates        Otilia Halsted, APRN-CNP  [address] [address]  [phone]

## 2021-07-10 NOTE — PROGRESS NOTES
Hospitalist Progress Note      PCP: Angelina Mejias MD, MD    Date of Admission: 7/8/2021    Chief Complaint: SOB    Hospital Course:   81 y.o. female who presented to [facility] with SOB. Patient has had several recent admissions for afib with RVR and CHF exacerbations. In addition to getting sudden SOB this AM, patient felt like her heart was racing. This is a very typical presentation for her. There was no chest pain or nausea. She reports taking all of her medications as scheduled and states she has improved her diet at home. Patient has noted 4lbs weight gain at home over the past two days. Subjective: patient increasingly anxious about afib and increased james agents. SOB improved. Medications:  Reviewed    Infusion Medications    sodium chloride       Scheduled Medications    dilTIAZem  60 mg Oral Once    [START ON 7/11/2021] dilTIAZem  180 mg Oral Daily    torsemide  40 mg Oral Daily    spironolactone  25 mg Oral Daily    magnesium  30 mg Oral BID    potassium chloride  20 mEq Oral Daily    apixaban  2.5 mg Oral BID    aspirin  81 mg Oral Daily    atorvastatin  20 mg Oral Daily    escitalopram  10 mg Oral Daily    metoprolol succinate  25 mg Oral BID    sodium chloride flush  5-40 mL Intravenous 2 times per day     PRN Meds: perflutren lipid microspheres, HYDROcodone-acetaminophen, sodium chloride flush, sodium chloride, ondansetron **OR** ondansetron, polyethylene glycol, acetaminophen **OR** acetaminophen      Intake/Output Summary (Last 24 hours) at 7/10/2021 1545  Last data filed at 7/10/2021 1028  Gross per 24 hour   Intake 1420 ml   Output 2700 ml   Net -1280 ml       Physical Exam Performed:    BP 98/62   Pulse 83   Temp 97.5 °F (36.4 °C) (Oral)   Resp 16   Ht 5' 3\" (1.6 m)   Wt 127 lb 3.2 oz (57.7 kg)   SpO2 97%   BMI 22.53 kg/m²     General appearance:  No apparent distress, appears stated age and cooperative.   HEENT:  Normal cephalic, atraumatic without obvious deformity. Pupils equal, round, and reactive to light. Extra ocular muscles intact. Conjunctivae/corneas clear. Neck: Supple, with full range of motion. No jugular venous distention. Trachea midline. Respiratory:  Normal respiratory effort. Bibasilar rales without  Wheezes/Rhonchi. Cardiovascular: tachycardia, irregular rhythm with normal S1/S2 without murmurs, rubs or gallops. Abdomen: Soft, non-tender, non-distended with normal bowel sounds. Musculoskeletal:  No clubbing, cyanosis or edema bilaterally. Full range of motion without deformity. Skin: Skin color, texture, turgor normal.  No rashes or lesions. Neurologic:  Neurovascularly intact without any focal sensory/motor deficits. Cranial nerves: II-XII intact, grossly non-focal.  Psychiatric:  Alert and oriented, thought content appropriate, normal insight  Capillary Refill: Brisk,3 seconds, normal  Peripheral Pulses: +2 palpable, equal bilaterally     Labs:   Recent Labs     07/08/21  0544 07/09/21  0657   WBC 11.0 7.3   HGB 13.3 11.7*   HCT 38.1 34.2*    322     Recent Labs     07/08/21  0544 07/09/21  0657   * 132*   K 4.8 3.8   CL 88* 93*   CO2 26 27   BUN 38* 30*   CREATININE 1.3* 1.2   CALCIUM 10.0 8.9     Recent Labs     07/08/21  0544   AST 36   ALT 27   BILITOT 0.3   ALKPHOS 132*     Recent Labs     07/08/21  0544 07/09/21  0657   INR 1.15* 1.41*     Recent Labs     07/08/21  0544 07/08/21  0859 07/08/21  1324   TROPONINI 0.17* 0.22* 0.22*       Urinalysis:      Lab Results   Component Value Date    NITRU Negative 05/20/2021    WBCUA 3-5 05/20/2021    RBCUA 0-2 05/20/2021    BLOODU Negative 05/20/2021    SPECGRAV 1.015 05/20/2021    GLUCOSEU Negative 05/20/2021       Radiology:  XR CHEST PORTABLE   Final Result   No acute process.                  Assessment/Plan:    Active Hospital Problems    Diagnosis     Stage 3a chronic kidney disease ([facility] [N18.31]     Atrial fibrillation with RVR ([facility]) [I48.91]     Elevated troponin [R77.8]     Heart failure, diastolic, with acute decompensation (HCC) [I50.33]     SOB (shortness of breath) [R06.02]      Afib with RVR  - HR improved with IV cardizem  - cardiology consulted  - continue home metoprolol. Cardizem increased  - on eliquis for AC  - EP following  - HR control remains variable     Acute on chronic diastolic heart failure  - elevated pro-BNP on admission  - IV lasix X1 given  - continue home metoprolol. Restarted home torsemide and aldactone  - monitor daily weights, BMP     Elevated troponin  - low concern for ACS  - suspect related to RVR  - cardiology consulted  - continue to monitor troponin     HTN  - well controlled  - continue home cardizem, metoprolol     HLD  - continue home statin    DVT Prophylaxis: eliquis  Diet: ADULT DIET; Regular;  Low Sodium (2 gm)  Code Status: Full Code    PT/OT Eval Status: not ordered    Dispo - home with Brenda Wade tomorrow if HR improved    Fernandez Conley MD

## 2021-07-10 NOTE — PLAN OF CARE
Problem: OXYGENATION/RESPIRATORY FUNCTION  Goal: Patient will achieve/maintain normal respiratory rate/effort  Description: Respiratory rate and effort will be within normal limits for the patient  Outcome: Ongoing     Problem: Falls - Risk of:  Goal: Will remain free from falls  Description: Will remain free from falls  Outcome: Ongoing     Patient's EF (Ejection Fraction) unable to be located in chart    Heart Failure Medications:  Diuretics[de-identified] Torsemide and Spironolactone    (One of the following REQUIRED for EF <40%/SYSTOLIC FAILURE but MAY be used in EF% >40%/DIASTOLIC FAILURE)        ACE[de-identified] None        ARB[de-identified] None         ARNI[de-identified] None    (Beta Blockers)  NON- Evidenced Based Beta Blocker (for EF% >40%/DIASTOLIC FAILURE): None    Evidenced Based Beta Blocker::(REQUIRED for EF% <40%/SYSTOLIC FAILURE) Metoprolol SUCCinate- Toprol XL  . .................................................................................................................................................. Patient's weights and intake/output reviewed: Yes    Patient's Last Weight: 128 lbs obtained by standing scale. Difference of 1 lbs less than last documented weight. Intake/Output Summary (Last 24 hours) at 7/9/2021 2251  Last data filed at 7/9/2021 2121  Gross per 24 hour   Intake 1936 ml   Output 3125 ml   Net -1189 ml       Comorbidities Reviewed Yes    Patient has a past medical history of Arthritis, Atrial fibrillation ([address] Back fracture, CHF (congestive heart failure) ([address] HTN (hypertension), Hyperlipidemia, and Panic attack. >>For CHF and Comorbidity documentation on Education Time and Topics, please see Education Tab    Progressive Mobility Assessment:  What is this patient's Current Level of Mobility?: Ambulatory- with Assistance  How was this patient Mobilized today?: Edge of Bed                 With Whom? Nurse                 Level of Difficulty/Assistance: 1x Assist     Pt resting in bed at this time on room air. Pt denies shortness of breath. Pt without lower extremity edema.      Patient and/or Family's stated Goal of Care this Admission: reduce shortness of breath, increase activity tolerance, better understand heart failure and disease management, and be more comfortable prior to discharge        :

## 2021-07-11 LAB
ANION GAP SERPL CALCULATED.3IONS-SCNC: 10 MMOL/L (ref 3–16)
BUN BLDV-MCNC: 31 MG/DL (ref 7–20)
CALCIUM SERPL-MCNC: 8.9 MG/DL (ref 8.3–10.6)
CHLORIDE BLD-SCNC: 92 MMOL/L (ref 99–110)
CO2: 30 MMOL/L (ref 21–32)
CREAT SERPL-MCNC: 1.2 MG/DL (ref 0.6–1.2)
GFR AFRICAN AMERICAN: 51
GFR NON-AFRICAN AMERICAN: 42
GLUCOSE BLD-MCNC: 98 MG/DL (ref 70–99)
MAGNESIUM: 1.8 MG/DL (ref 1.8–2.4)
POTASSIUM SERPL-SCNC: 4.3 MMOL/L (ref 3.5–5.1)
SODIUM BLD-SCNC: 132 MMOL/L (ref 136–145)

## 2021-07-11 PROCEDURE — 6370000000 HC RX 637 (ALT 250 FOR IP): Performed by: NURSE PRACTITIONER

## 2021-07-11 PROCEDURE — 99232 SBSQ HOSP IP/OBS MODERATE 35: CPT | Performed by: NURSE PRACTITIONER

## 2021-07-11 PROCEDURE — 1200000000 HC SEMI PRIVATE

## 2021-07-11 PROCEDURE — 80048 BASIC METABOLIC PNL TOTAL CA: CPT

## 2021-07-11 PROCEDURE — 83735 ASSAY OF MAGNESIUM: CPT

## 2021-07-11 PROCEDURE — 2580000003 HC RX 258: Performed by: INTERNAL MEDICINE

## 2021-07-11 PROCEDURE — 36415 COLL VENOUS BLD VENIPUNCTURE: CPT

## 2021-07-11 PROCEDURE — 6370000000 HC RX 637 (ALT 250 FOR IP): Performed by: INTERNAL MEDICINE

## 2021-07-11 RX ORDER — TAMSULOSIN HYDROCHLORIDE 0.4 MG/1
0.4 CAPSULE ORAL DAILY
Status: DISCONTINUED | OUTPATIENT
Start: 2021-07-11 | End: 2021-07-12 | Stop reason: HOSPADM

## 2021-07-11 RX ADMIN — SPIRONOLACTONE 25 MG: 25 TABLET ORAL at 10:00

## 2021-07-11 RX ADMIN — Medication 10 ML: at 21:29

## 2021-07-11 RX ADMIN — POTASSIUM CHLORIDE 20 MEQ: 1500 TABLET, EXTENDED RELEASE ORAL at 10:00

## 2021-07-11 RX ADMIN — TAMSULOSIN HYDROCHLORIDE 0.4 MG: 0.4 CAPSULE ORAL at 10:00

## 2021-07-11 RX ADMIN — ATORVASTATIN CALCIUM 20 MG: 10 TABLET, FILM COATED ORAL at 10:00

## 2021-07-11 RX ADMIN — Medication 10 ML: at 10:00

## 2021-07-11 RX ADMIN — POLYETHYLENE GLYCOL 3350 17 G: 17 POWDER, FOR SOLUTION ORAL at 21:28

## 2021-07-11 RX ADMIN — APIXABAN 2.5 MG: 2.5 TABLET, FILM COATED ORAL at 21:28

## 2021-07-11 RX ADMIN — ESCITALOPRAM OXALATE 10 MG: 10 TABLET ORAL at 10:00

## 2021-07-11 RX ADMIN — DILTIAZEM HYDROCHLORIDE 180 MG: 180 CAPSULE, COATED, EXTENDED RELEASE ORAL at 10:00

## 2021-07-11 RX ADMIN — APIXABAN 2.5 MG: 2.5 TABLET, FILM COATED ORAL at 10:00

## 2021-07-11 RX ADMIN — ASPIRIN 81 MG: 81 TABLET, COATED ORAL at 09:59

## 2021-07-11 RX ADMIN — METOPROLOL SUCCINATE 25 MG: 25 TABLET, FILM COATED, EXTENDED RELEASE ORAL at 21:28

## 2021-07-11 RX ADMIN — TORSEMIDE 40 MG: 20 TABLET ORAL at 10:00

## 2021-07-11 RX ADMIN — HYDROCODONE BITARTRATE AND ACETAMINOPHEN 1 TABLET: 5; 325 TABLET ORAL at 21:28

## 2021-07-11 RX ADMIN — METOPROLOL SUCCINATE 25 MG: 25 TABLET, FILM COATED, EXTENDED RELEASE ORAL at 10:00

## 2021-07-11 ASSESSMENT — PAIN SCALES - GENERAL
PAINLEVEL_OUTOF10: 7
PAINLEVEL_OUTOF10: 7

## 2021-07-11 ASSESSMENT — PAIN DESCRIPTION - DESCRIPTORS: DESCRIPTORS: ACHING

## 2021-07-11 ASSESSMENT — PAIN DESCRIPTION - LOCATION: LOCATION: BACK;BUTTOCKS

## 2021-07-11 NOTE — PROGRESS NOTES
1.5 mL Intravenous ONCE PRN Henna Mazariegos, APRN - CNP        apixaban (ELIQUIS) tablet 2.5 mg  2.5 mg Oral BID RADHA Manzanares MD   2.5 mg at 07/11/21 1000    aspirin EC tablet 81 mg  81 mg Oral Daily Eliza Cesar MD   81 mg at 07/11/21 0959    atorvastatin (LIPITOR) tablet 20 mg  20 mg Oral Daily Eliza Cesar MD   20 mg at 07/11/21 1000    escitalopram (LEXAPRO) tablet 10 mg  10 mg Oral Daily Eliza Cesar MD   10 mg at 07/11/21 1000    HYDROcodone-acetaminophen (NORCO) 5-325 MG per tablet 1 tablet  1 tablet Oral Q6H PRN Eliza Cesar MD   1 tablet at 07/10/21 2111    metoprolol succinate (TOPROL XL) extended release tablet 25 mg  25 mg Oral BID Eliza Cesar MD   25 mg at 07/11/21 1000    sodium chloride flush 0.9 % injection 5-40 mL  5-40 mL Intravenous 2 times per day Eliza Cesar MD   10 mL at 07/11/21 1000    sodium chloride flush 0.9 % injection 5-40 mL  5-40 mL Intravenous PRN Eliza Cesar MD        0.9 % sodium chloride infusion  25 mL Intravenous PRN Eliza Cesar MD        ondansetron (ZOFRAN-ODT) disintegrating tablet 4 mg  4 mg Oral Q8H PRN Eliza Cesar MD        Or    ondansetron [facility]) injection 4 mg  4 mg Intravenous Q6H PRN Eliza Cesar MD        polyethylene glycol [facility]) packet 17 g  17 g Oral Daily PRN Eliza Cesar MD   17 g at 07/09/21 2128    acetaminophen (TYLENOL) tablet 650 mg  650 mg Oral Q6H PRN Eliza Cesar MD        Or   [facility] acetaminophen (TYLENOL) suppository 650 mg  650 mg Rectal Q6H PRN Eliza Cesar MD           Objective:     Telemetry monitor: 's    Physical Exam:  Constitutional and general appearance: alert, cooperative, no distress and appears stated age  HEENT: PERRL, no cervical lymphadenopathy. No masses palpable.  Normal oral mucosa  Respiratory:  · Normal excursion and expansion without use of accessory muscles  · Resp auscultation: Normal breath sounds without wheezing, rhonchi, and rales  Cardiovascular:  · The apical impulse is not displaced  · Heart tones are crisp and normal. irregular S1 and S2.  · Jugular venous pulsation Normal  · The carotid upstroke is normal in amplitude and contour without delay or bruit  · Peripheral pulses are symmetrical and full   Abdomen:  · No masses or tenderness  · Bowel sounds present  Extremities:  ·  No cyanosis or clubbing  ·  No lower extremity edema  ·  Skin: warm and dry  Neurological:  · Alert and oriented  · Moves all extremities well  · No abnormalities of mood, affect, memory, mentation, or behavior are noted    Data  Echo 03/02/2021:    CONCLUSIONS     The left ventricle is of normal size.     Left ventricular wall thickness is normal.       No obvious segmental wall motion abnormalities.       The right atrium is normal in size.     Moderate left atrial dilatation.       Mild mitral annular calcification.       Mild thickening/calcification of the anterior mitral valve leaflet.       Trileaflet aortic valve.       Moderate  thickening (sclerosis) of the aortic valve cusps without reduced excursion.       Structurally normal tricuspid valve.       Mild tricuspid regurgitation.       Normal pericardium without effusion.     All labs and testing reviewed.   Lab Review     Renal Profile:   Lab Results   Component Value Date    CREATININE 1.2 07/11/2021    BUN 31 07/11/2021     07/11/2021    K 4.3 07/11/2021    K 4.8 07/08/2021    CL 92 07/11/2021    CO2 30 07/11/2021     CBC:    Lab Results   Component Value Date    WBC 7.3 07/09/2021    RBC 3.83 07/09/2021    HGB 11.7 07/09/2021    HCT 34.2 07/09/2021    MCV 89.3 07/09/2021    RDW 13.4 07/09/2021     07/09/2021     BNP:  No results found for: BNP  Fasting Lipid Panel:    Lab Results   Component Value Date    CHOL 161 06/22/2021    HDL 58 06/22/2021    TRIG 85 06/22/2021     Cardiac Enzymes:  CK/MbTroponin  Lab Results   Component Value Date    CKTOTAL 68 06/17/2021    TROPONINI 0.22 07/08/2021     PT/ INR   Lab Results Component Value Date    INR 1.41 07/09/2021    INR 1.15 07/08/2021    INR 1.02 05/20/2021    PROTIME 16.2 07/09/2021    PROTIME 13.1 07/08/2021    PROTIME 11.8 05/20/2021     PTT No results found for: PTT   Lab Results   Component Value Date    MG 1.80 07/11/2021      Lab Results   Component Value Date    TSH 3.02 06/21/2021       Assessment:  Shortness of breath: improved   Atrial fibrillation of unknown duration (likely persistent): stable   -per nursing staff, heart rates greatly improved after coy was placed for urinary retention 7/10/2021   -[id number] score >2  Acute on chronic diastolic heart failure: compensated    -weight on admission 137 lb, weight today 127 lb  HTN: controlled, soft at times   Hyponatremia: ongoing   CKD  Anxiety   Depression  History of TIA  Urinary retention: coy in place, urology consulted     Plan:   Continue Cardizem, metoprolol and reduced dose Eliquis (age, creatinine and weight)  Continue ASA, lipitor, spironolactone and torsemide   Options for rate control are limited given lower BP and renal function. May need to reconsider amiodarone in the future if rates become difficult to control. Patient to follow up with primary cardiologist     Cardiology/EP will sign off but remains available if needed.       KRYSTLE Read-FAUSTINA [address]  [phone]

## 2021-07-11 NOTE — CONSULTS
7/11/2021  Lanette Speak    Reason for Consult:  Retention  Requesting Physician:  Landen Hatch      History Obtained From:  patient, electronic medical record    HISTORY OF PRESENT ILLNESS:                The patient is a 81 y.o. female who presents with heart issues. While in the hospital she was found to be in retention and has failed one voiding trial.  Per nursing staff this has also happened on prior visits. Patient is now on flomax.     Past Medical History:        Diagnosis Date    Arthritis     Atrial fibrillation ([address]     Back fracture     stress fracture active 4/17/15    [facility]     HTN (hypertension)     Hyperlipidemia     Panic attack      Past Surgical History:        Procedure Laterality Date    COLONOSCOPY  2015    tics    EYE SURGERY      HEMORRHOID SURGERY      HYSTERECTOMY       Current Medications:   Current Facility-Administered Medications: tamsulosin (FLOMAX) capsule 0.4 mg, 0.4 mg, Oral, Daily  dilTIAZem (CARDIZEM) tablet 60 mg, 60 mg, Oral, Once  dilTIAZem (CARDIZEM CD) extended release capsule 180 mg, 180 mg, Oral, Daily  torsemide (DEMADEX) tablet 40 mg, 40 mg, Oral, Daily  spironolactone (ALDACTONE) tablet 25 mg, 25 mg, Oral, Daily  magnesium tablet 30 mg, 30 mg, Oral, BID  potassium chloride (KLOR-CON M) extended release tablet 20 mEq, 20 mEq, Oral, Daily  perflutren lipid microspheres (DEFINITY) injection 1.65 mg, 1.5 mL, Intravenous, ONCE PRN  apixaban (ELIQUIS) tablet 2.5 mg, 2.5 mg, Oral, BID  aspirin EC tablet 81 mg, 81 mg, Oral, Daily  atorvastatin (LIPITOR) tablet 20 mg, 20 mg, Oral, Daily  escitalopram (LEXAPRO) tablet 10 mg, 10 mg, Oral, Daily  HYDROcodone-acetaminophen (NORCO) 5-325 MG per tablet 1 tablet, 1 tablet, Oral, Q6H PRN  metoprolol succinate (TOPROL XL) extended release tablet 25 mg, 25 mg, Oral, BID  sodium chloride flush 0.9 % injection 5-40 mL, 5-40 mL, Intravenous, 2 times per day  sodium chloride flush 0.9 % injection 5-40 mL, 5-40 mL, Intravenous, PRN  0.9 % sodium chloride infusion, 25 mL, Intravenous, PRN  ondansetron (ZOFRAN-ODT) disintegrating tablet 4 mg, 4 mg, Oral, Q8H PRN **OR** ondansetron (ZOFRAN) injection 4 mg, 4 mg, Intravenous, Q6H PRN  polyethylene glycol (GLYCOLAX) packet 17 g, 17 g, Oral, Daily PRN  acetaminophen (TYLENOL) tablet 650 mg, 650 mg, Oral, Q6H PRN **OR** acetaminophen (TYLENOL) suppository 650 mg, 650 mg, Rectal, Q6H PRN  Allergies: Allergies   Allergen Reactions    Lisinopril Other (See Comments)     Hyponatremia / SIADH     Social History:  Reviewed, non contributory    Family History:  Reviewed, non contributory      REVIEW OF SYSTEMS:    12 point ROS has been completed and reviewed    PHYSICAL EXAM:    VITALS:  /72   Pulse 75   Temp 97.9 °F (36.6 °C) (Oral)   Resp 16   Ht 5' 3\" (1.6 m)   Wt 127 lb 12.8 oz (58 kg)   SpO2 96%   BMI 22.64 kg/m²   H&N: Sclera normal, no masses, trachea midline, no bruit  ABDO: Soft, non-tender, bowel sounds active, no organomegaly, no hernias. LYMPH:  No lymphadenopathy. Skin: Warm dry and intact. : No CVAT, normal external genitalia, no coy in place, clear. MSK: Grossly normal for patient  ROSALIE: Grossly normal for patient  PSY: No acute changes noted in psychosocial assessment.     DATA:    CBC:   Lab Results   Component Value Date    WBC 7.3 07/09/2021    RBC 3.83 07/09/2021    HGB 11.7 07/09/2021    HCT 34.2 07/09/2021    MCV 89.3 07/09/2021    MCH 30.5 07/09/2021    MCHC 34.1 07/09/2021    RDW 13.4 07/09/2021     07/09/2021    MPV 7.6 07/09/2021     BMP:    Lab Results   Component Value Date     07/11/2021    K 4.3 07/11/2021    K 4.8 07/08/2021    CL 92 07/11/2021    CO2 30 07/11/2021    BUN 31 07/11/2021    LABALBU 3.8 07/08/2021    CREATININE 1.2 07/11/2021    CALCIUM 8.9 07/11/2021    GFRAA 51 07/11/2021    LABGLOM 42 07/11/2021    GLUCOSE 98 07/11/2021 U/A:    Lab Results   Component Value Date    COLORU Yellow 05/20/2021    PROTEINU Negative 05/20/2021    PHUR 7.5 05/20/2021    WBCUA 3-5 05/20/2021    RBCUA 0-2 05/20/2021    CLARITYU Clear 05/20/2021    SPECGRAV 1.015 05/20/2021    LEUKOCYTESUR TRACE 05/20/2021    UROBILINOGEN 0.2 05/20/2021    BILIRUBINUR Negative 05/20/2021    BLOODU Negative 05/20/2021    GLUCOSEU Negative 05/20/2021       IMPRESSION/RECOMMENDATIONS:      Retention. With hx likely due to bladder distention and detrusor hypofunction. Options were discussed with the patient and her daughter. These include: use of a coy, intermittent cath, cysto and urethral dilation. Patient has decided to have the coy removed and to have another voiding trial.  ISC will be done by the nursing staff QID if she fails. Will follow    Thank you for asking me to see this interesting patient.     Edelmira Diamond MD

## 2021-07-11 NOTE — PLAN OF CARE
Problem: OXYGENATION/RESPIRATORY FUNCTION  Goal: Patient will achieve/maintain normal respiratory rate/effort  Description: Respiratory rate and effort will be within normal limits for the patient  Outcome: Ongoing     Problem: Activity:  Goal: Expression of feelings of increased energy will increase  Description: Expression of feelings of increased energy will increase  Outcome: Ongoing     Patient's EF (Ejection Fraction) is greater than 40%    Heart Failure Medications:  Diuretics[de-identified] Torsemide and Spironolactone    (One of the following REQUIRED for EF <40%/SYSTOLIC FAILURE but MAY be used in EF% >40%/DIASTOLIC FAILURE)        ACE[de-identified] None        ARB[de-identified] None         ARNI[de-identified] None    (Beta Blockers)  NON- Evidenced Based Beta Blocker (for EF% >40%/DIASTOLIC FAILURE): None    Evidenced Based Beta Blocker::(REQUIRED for EF% <40%/SYSTOLIC FAILURE) Metoprolol SUCCinate- Toprol XL  . .................................................................................................................................................. Patient's weights and intake/output reviewed: Yes    Patient's Last Weight: 127 lbs obtained by standing scale. Difference of 1 lbs less than last documented weight. Intake/Output Summary (Last 24 hours) at 7/10/2021 2115  Last data filed at 7/10/2021 2110  Gross per 24 hour   Intake 858 ml   Output 1750 ml   Net -892 ml       Comorbidities Reviewed Yes    Patient has a past medical history of Arthritis, Atrial fibrillation ([address] Back fracture, CHF (congestive heart failure) ([address] HTN (hypertension), Hyperlipidemia, and Panic attack. >>For CHF and Comorbidity documentation on Education Time and Topics, please see Education Tab    Progressive Mobility Assessment:  What is this patient's Current Level of Mobility?: Ambulatory-Up Ad Abby  How was this patient Mobilized today?: Edge of Bed                 With Whom?  Nurse and Self                 Level of Difficulty/Assistance: Independent Pt resting in bed at this time on room air. Pt denies shortness of breath. Pt without lower extremity edema.      Patient and/or Family's stated Goal of Care this Admission: increase activity tolerance, better understand heart failure and disease management, and be more comfortable prior to discharge        :

## 2021-07-11 NOTE — PROGRESS NOTES
Hospitalist Progress Note      PCP: Uriel Jones MD, MD    Date of Admission: 7/8/2021    Chief Complaint: SOB    Hospital Course:   81 y.o. female who presented to [place] with SOB. Patient has had several recent admissions for afib with RVR and CHF exacerbations. In addition to getting sudden SOB this AM, patient felt like her heart was racing. This is a very typical presentation for her. There was no chest pain or nausea. She reports taking all of her medications as scheduled and states she has improved her diet at home. Patient has noted 4lbs weight gain at home over the past two days. Subjective: HR improved. Nelson replaced due to persistent urinary retention. Medications:  Reviewed    Infusion Medications    sodium chloride       Scheduled Medications    tamsulosin  0.4 mg Oral Daily    dilTIAZem  60 mg Oral Once    dilTIAZem  180 mg Oral Daily    torsemide  40 mg Oral Daily    spironolactone  25 mg Oral Daily    magnesium  30 mg Oral BID    potassium chloride  20 mEq Oral Daily    apixaban  2.5 mg Oral BID    aspirin  81 mg Oral Daily    atorvastatin  20 mg Oral Daily    escitalopram  10 mg Oral Daily    metoprolol succinate  25 mg Oral BID    sodium chloride flush  5-40 mL Intravenous 2 times per day     PRN Meds: perflutren lipid microspheres, HYDROcodone-acetaminophen, sodium chloride flush, sodium chloride, ondansetron **OR** ondansetron, polyethylene glycol, acetaminophen **OR** acetaminophen      Intake/Output Summary (Last 24 hours) at 7/11/2021 1428  Last data filed at 7/11/2021 1338  Gross per 24 hour   Intake 718 ml   Output 1925 ml   Net -1207 ml       Physical Exam Performed:    /72   Pulse 75   Temp 97.9 °F (36.6 °C) (Oral)   Resp 16   Ht 5' 3\" (1.6 m)   Wt 127 lb 12.8 oz (58 kg)   SpO2 96%   BMI 22.64 kg/m²     General appearance:  No apparent distress, appears stated age and cooperative.   HEENT:  Normal cephalic, atraumatic without obvious deformity. Pupils equal, round, and reactive to light. Extra ocular muscles intact. Conjunctivae/corneas clear. Neck: Supple, with full range of motion. No jugular venous distention. Trachea midline. Respiratory:  Normal respiratory effort. Bibasilar rales without  Wheezes/Rhonchi. Cardiovascular: tachycardia, irregular rhythm with normal S1/S2 without murmurs, rubs or gallops. Abdomen: Soft, non-tender, non-distended with normal bowel sounds. Musculoskeletal:  No clubbing, cyanosis or edema bilaterally. Full range of motion without deformity. Skin: Skin color, texture, turgor normal.  No rashes or lesions. Neurologic:  Neurovascularly intact without any focal sensory/motor deficits. Cranial nerves: II-XII intact, grossly non-focal.  Psychiatric:  Alert and oriented, thought content appropriate, normal insight  Capillary Refill: Brisk,3 seconds, normal  Peripheral Pulses: +2 palpable, equal bilaterally     Labs:   Recent Labs     07/09/21  0657   WBC 7.3   HGB 11.7*   HCT 34.2*        Recent Labs     07/09/21  0657 07/11/21  0740   * 132*   K 3.8 4.3   CL 93* 92*   CO2 27 30   BUN 30* 31*   CREATININE 1.2 1.2   CALCIUM 8.9 8.9     No results for input(s): AST, ALT, BILIDIR, BILITOT, ALKPHOS in the last 72 hours. Recent Labs     07/09/21  0657   INR 1.41*     No results for input(s): Herrera Snowball in the last 72 hours. Urinalysis:      Lab Results   Component Value Date    NITRU Negative 05/20/2021    WBCUA 3-5 05/20/2021    RBCUA 0-2 05/20/2021    BLOODU Negative 05/20/2021    SPECGRAV 1.015 05/20/2021    GLUCOSEU Negative 05/20/2021       Radiology:  XR CHEST PORTABLE   Final Result   No acute process.                  Assessment/Plan:    Active Hospital Problems    Diagnosis     [facility] 3a chronic [facility] disease ([facility] [N18.31]     Atrial fibrillation with RVR (HCC) [I48.91]     Elevated troponin [R77.8]     Heart failure, diastolic, with acute decompensation ([facility]) [I50.33]     SOB (shortness of breath) [R06.02]      Afib with RVR  - HR improved with IV cardizem  - cardiology consulted  - continue home metoprolol. Cardizem increased  - on eliquis for AC  - EP following  - HR control remains variable     Acute on chronic diastolic heart failure  - elevated pro-BNP on admission  - IV lasix X1 given  - continue home metoprolol. Restarted home torsemide and aldactone  - monitor daily weights, BMP     Elevated troponin  - low concern for ACS  - suspect related to RVR  - cardiology consulted  - continue to monitor troponin     Urinary retention  - coy in place  - started on flomax  - urology consulted    HTN  - well controlled  - continue home cardizem, metoprolol     HLD  - continue home statin    DVT Prophylaxis: eliquis  Diet: ADULT DIET; Regular;  Low Sodium (2 gm)  Code Status: Full Code    PT/OT Eval Status: not ordered    Dispo - home with Brenda  tomorrow following urology assessment    Hair Roberts MD

## 2021-07-12 VITALS
HEART RATE: 83 BPM | TEMPERATURE: 97.9 F | RESPIRATION RATE: 18 BRPM | SYSTOLIC BLOOD PRESSURE: 119 MMHG | OXYGEN SATURATION: 99 % | DIASTOLIC BLOOD PRESSURE: 71 MMHG | WEIGHT: 129.5 LBS | HEIGHT: 63 IN | BODY MASS INDEX: 22.95 KG/M2

## 2021-07-12 PROCEDURE — 6370000000 HC RX 637 (ALT 250 FOR IP): Performed by: INTERNAL MEDICINE

## 2021-07-12 PROCEDURE — 51798 US URINE CAPACITY MEASURE: CPT

## 2021-07-12 PROCEDURE — 6370000000 HC RX 637 (ALT 250 FOR IP): Performed by: NURSE PRACTITIONER

## 2021-07-12 PROCEDURE — 51701 INSERT BLADDER CATHETER: CPT

## 2021-07-12 RX ORDER — TAMSULOSIN HYDROCHLORIDE 0.4 MG/1
0.4 CAPSULE ORAL DAILY
Qty: 30 CAPSULE | Refills: 0 | Status: SHIPPED | OUTPATIENT
Start: 2021-07-13

## 2021-07-12 RX ORDER — DILTIAZEM HYDROCHLORIDE 180 MG/1
180 CAPSULE, COATED, EXTENDED RELEASE ORAL DAILY
Qty: 30 CAPSULE | Refills: 0 | Status: SHIPPED | OUTPATIENT
Start: 2021-07-13

## 2021-07-12 RX ADMIN — POTASSIUM CHLORIDE 20 MEQ: 1500 TABLET, EXTENDED RELEASE ORAL at 11:41

## 2021-07-12 RX ADMIN — TAMSULOSIN HYDROCHLORIDE 0.4 MG: 0.4 CAPSULE ORAL at 11:41

## 2021-07-12 RX ADMIN — SPIRONOLACTONE 25 MG: 25 TABLET ORAL at 11:41

## 2021-07-12 RX ADMIN — APIXABAN 2.5 MG: 2.5 TABLET, FILM COATED ORAL at 11:41

## 2021-07-12 RX ADMIN — ESCITALOPRAM OXALATE 10 MG: 10 TABLET ORAL at 11:41

## 2021-07-12 RX ADMIN — METOPROLOL SUCCINATE 25 MG: 25 TABLET, FILM COATED, EXTENDED RELEASE ORAL at 11:41

## 2021-07-12 RX ADMIN — ASPIRIN 81 MG: 81 TABLET, COATED ORAL at 11:40

## 2021-07-12 RX ADMIN — TORSEMIDE 40 MG: 20 TABLET ORAL at 11:40

## 2021-07-12 RX ADMIN — DILTIAZEM HYDROCHLORIDE 180 MG: 180 CAPSULE, COATED, EXTENDED RELEASE ORAL at 11:41

## 2021-07-12 RX ADMIN — ATORVASTATIN CALCIUM 20 MG: 10 TABLET, FILM COATED ORAL at 11:40

## 2021-07-12 NOTE — PLAN OF CARE
Problem: OXYGENATION/RESPIRATORY FUNCTION  Goal: Patient will achieve/maintain normal respiratory rate/effort  Description: Respiratory rate and effort will be within normal limits for the patient  Outcome: Ongoing     Patient's EF (Ejection Fraction) is greater than 40%    Heart Failure Medications:  Diuretics[de-identified] Torsemide and Spironolactone    (One of the following REQUIRED for EF <40%/SYSTOLIC FAILURE but MAY be used in EF% >40%/DIASTOLIC FAILURE)        ACE[de-identified] None        ARB[de-identified] None         ARNI[de-identified] None    (Beta Blockers)  NON- Evidenced Based Beta Blocker (for EF% >40%/DIASTOLIC FAILURE): None    Evidenced Based Beta Blocker::(REQUIRED for EF% <40%/SYSTOLIC FAILURE) Metoprolol SUCCinate- Toprol XL  . .................................................................................................................................................. Patient's weights and intake/output reviewed: Yes    Patient's Last Weight: 127 lbs obtained by standing scale. Difference of 1 lbs less than last documented weight. Intake/Output Summary (Last 24 hours) at 7/11/2021 2134  Last data filed at 7/11/2021 2132  Gross per 24 hour   Intake 840 ml   Output 1325 ml   Net -485 ml       Comorbidities Reviewed Yes    Patient has a past medical history of Arthritis, Atrial fibrillation ([address] Back fracture, CHF (congestive heart failure) ([address] HTN (hypertension), Hyperlipidemia, and Panic attack. >>For CHF and Comorbidity documentation on Education Time and Topics, please see Education Tab    Progressive Mobility Assessment:  What is this patient's Current Level of Mobility?: Ambulatory-Up Ad Abby  How was this patient Mobilized today?: Edge of Bed,  Up to Toilet/Shower, and Up in Room                 With Whom? Nurse and Self                 Level of Difficulty/Assistance:  SBA      Pt resting in bed at this time on room air. Pt denies shortness of breath. Pt without lower extremity edema.      Patient and/or Family's stated Goal of Care this Admission: increase activity tolerance, better understand heart failure and disease management, and be more comfortable prior to discharge        :

## 2021-07-12 NOTE — DISCHARGE SUMMARY
Hospital Medicine Discharge Summary    Patient ID: Aure Rhodes      Patient's PCP: Ross Hines MD, MD    Admit Date: 7/8/2021     Discharge Date:   07/12/21    Admitting Physician: Reji Hackett MD     Discharge Physician: Reji Hackett MD     Discharge Diagnoses: Active Hospital Problems    Diagnosis     Stage 3a chronic kidney disease ([facility] [address] [facility] [N18.31]     Atrial fibrillation with RVR ([facility] [facility] [I48.91]     Elevated troponin [R77.8]     Heart failure, diastolic, with acute decompensation (HCC) [I50.33]     SOB (shortness of breath) [R06.02]        The patient was seen and examined on day of discharge and this discharge summary is in conjunction with any daily progress note from day of discharge. Hospital Course:   81 y. o. female who presented to [facility] with SOB. Patient has had several recent admissions for afib with RVR and CHF exacerbations. In addition to getting sudden SOB this AM, patient felt like her heart was racing. This is a very typical presentation for her. There was no chest pain or nausea. She reports taking all of her medications as scheduled and states she has improved her diet at home. Patient has noted 4lbs weight gain at home over the past two days. Afib with RVR  - HR improved with IV cardizem  - cardiology consulted  - continue home metoprolol. Cardizem increased  - on eliquis for [facility]  - EP consulted     Acute on chronic diastolic heart failure  - elevated pro-BNP on admission  - IV lasix X1 given  - continue home metoprolol. Restarted home torsemide and aldactone     Elevated troponin  - low concern for ACS  - suspect related to RVR  - cardiology consulted     Urinary retention  - coy in place  - started on flomax  - urology consulted.  Follow up with urology as outpatient     HTN  - well controlled  - continue home cardizem, metoprolol     HLD  - continue home statin    Physical Exam Performed:     /71   Pulse 83   Temp 97.9 °F (36.6 °C) (Oral) Resp 18   Ht 5' 3\" (1.6 m)   Wt 129 lb 8 oz (58.7 kg)   SpO2 99%   BMI 22.94 kg/m²       General appearance:  No apparent distress, appears stated age and cooperative. HEENT:  Normal cephalic, atraumatic without obvious deformity. Pupils equal, round, and reactive to light.  Extra ocular muscles intact. Conjunctivae/corneas clear. Neck: Supple, with full range of motion. No jugular venous distention. Trachea midline. Respiratory:  Normal respiratory effort. Bibasilar rales without  Wheezes/Rhonchi. Cardiovascular: tachycardia, irregular rhythm with normal S1/S2 without murmurs, rubs or gallops. Abdomen: Soft, non-tender, non-distended with normal bowel sounds. Musculoskeletal:  No clubbing, cyanosis or edema bilaterally.  Full range of motion without deformity. Skin: Skin color, texture, turgor normal.  No rashes or lesions. Neurologic:  Neurovascularly intact without any focal sensory/motor deficits. Cranial nerves: II-XII intact, grossly non-focal.  Psychiatric:  Alert and oriented, thought content appropriate, normal insight  Capillary Refill: Brisk,3 seconds, normal  Peripheral Pulses: +2 palpable, equal bilaterally     Labs: For convenience and continuity at follow-up the following most recent labs are provided:      CBC:    Lab Results   Component Value Date    WBC 7.3 07/09/2021    HGB 11.7 07/09/2021    HCT 34.2 07/09/2021     07/09/2021       Renal:    Lab Results   Component Value Date     07/11/2021    K 4.3 07/11/2021    K 4.8 07/08/2021    CL 92 07/11/2021    CO2 30 07/11/2021    BUN 31 07/11/2021    CREATININE 1.2 07/11/2021    CALCIUM 8.9 07/11/2021    PHOS 3.7 06/24/2021         Significant Diagnostic Studies    Radiology:   XR CHEST PORTABLE   Final Result   No acute process.                 Consults:     IP CONSULT TO CARDIOLOGY  IP CONSULT TO CARDIOLOGY  IP CONSULT TO UROLOGY  IP CONSULT TO HOME CARE NEEDS    Disposition:  home    Condition at Discharge: Stable    Discharge Instructions/Follow-up:  Follow up with PCP, cardiology within 1-2 weeks    Code Status:  Full Code     Activity: activity as tolerated    Diet: cardiac diet      Discharge Medications:     Current Discharge Medication List           Details   tamsulosin (FLOMAX) 0.4 MG capsule Take 1 capsule by mouth daily  Qty: 30 capsule, Refills: 0              Details   dilTIAZem (CARDIZEM CD) 180 MG extended release capsule Take 1 capsule by mouth daily  Qty: 30 capsule, Refills: 0              Details   torsemide (DEMADEX) 20 MG tablet Take 2 tablets by mouth daily  Qty: 30 tablet, Refills: 3      spironolactone (ALDACTONE) 25 MG tablet Take 1 tablet by mouth daily  Qty: 30 tablet, Refills: 3      atorvastatin (LIPITOR) 20 MG tablet Take 1 tablet by mouth daily  Qty: 30 tablet, Refills: 3      metoprolol succinate (TOPROL XL) 25 MG extended release tablet Take 1 tablet by mouth 2 times daily  Qty: 30 tablet, Refills: 3      potassium chloride (KLOR-CON M) 20 MEQ extended release tablet Take 1 tablet by mouth daily  Qty: 90 tablet, Refills: 1      melatonin 10 MG CAPS capsule Take 10 mg by mouth nightly      multivitamin-iron-minerals-folic acid (CENTRUM) chewable tablet Take 1 tablet by mouth daily      magnesium 30 MG tablet Take 30 mg by mouth 2 times daily      apixaban (ELIQUIS) 5 MG TABS tablet Take 1 tablet by mouth 2 times daily  Qty: 60 tablet, Refills: 2      thiamine 100 MG tablet Take 1 tablet by mouth daily  Qty: 30 tablet, Refills: 3      escitalopram (LEXAPRO) 10 MG tablet Take 10 mg by mouth daily       HYDROcodone-acetaminophen (NORCO) 5-325 MG per tablet Take 1 tablet by mouth every 6 hours as needed.       Docusate Sodium (DSS) 250 MG CAPS Take 250 mg by mouth daily      vitamin D (CHOLECALCIFEROL) 25 MCG (1000 UT) TABS tablet Take 1,000 Units by mouth daily      aspirin 81 MG EC tablet Take 81 mg by mouth daily      Multiple Vitamin (MULTIVITAMIN) tablet Take 1 tablet by mouth daily      polyethylene glycol (GLYCOLAX) 17 GM/SCOOP powder Take 17 g by mouth daily as needed       Simethicone 125 MG CAPS Take 125 mg by mouth 2 times daily             Time Spent on discharge is more than 30 minutes in the examination, evaluation, counseling and review of medications and discharge plan. Signed:    Fernandez Conley MD   7/12/2021      Thank you Theresa Lyons MD, MD for the opportunity to be involved in this patient's care. If you have any questions or concerns please feel free to contact me at [phone].

## 2021-07-12 NOTE — CARE COORDINATION
CASE MANAGEMENT DISCHARGE SUMMARY      Discharge to: home w[facility]    IMM given: (date) 07/12/2021      Transportation: private   Confirmed discharge plan with:     Patient: yes, family in room   RN, name: Corrinne Boring, RN    Note:  spoke to Sukhdev Jay w/[facility], who will pull orders from Epic and will set up appointment w/family.   Nerissa Dickey RN

## 2021-07-12 NOTE — PROGRESS NOTES
7/12/2021  Marcelino Hutchison     Interval HPI - patient doing well today - unable to cath herself well - wants to go home with zbigniew      Past Medical History:        Diagnosis Date    Arthritis     Atrial fibrillation ([facility] [address] [facility]     Back fracture     stress fracture active 4/17/15    CHF (congestive heart failure) ([facility]     HTN (hypertension)     Hyperlipidemia     Panic attack      Past Surgical History:        Procedure Laterality Date    COLONOSCOPY  2015    tics    EYE SURGERY      HEMORRHOID SURGERY      HYSTERECTOMY       Current Medications:   Current Facility-Administered Medications: tamsulosin (FLOMAX) capsule 0.4 mg, 0.4 mg, Oral, Daily  dilTIAZem (CARDIZEM) tablet 60 mg, 60 mg, Oral, Once  dilTIAZem (CARDIZEM CD) extended release capsule 180 mg, 180 mg, Oral, Daily  torsemide (DEMADEX) tablet 40 mg, 40 mg, Oral, Daily  spironolactone (ALDACTONE) tablet 25 mg, 25 mg, Oral, Daily  magnesium tablet 30 mg, 30 mg, Oral, BID  potassium chloride (KLOR-CON M) extended release tablet 20 mEq, 20 mEq, Oral, Daily  perflutren lipid microspheres (DEFINITY) injection 1.65 mg, 1.5 mL, Intravenous, ONCE PRN  apixaban (ELIQUIS) tablet 2.5 mg, 2.5 mg, Oral, BID  aspirin EC tablet 81 mg, 81 mg, Oral, Daily  atorvastatin (LIPITOR) tablet 20 mg, 20 mg, Oral, Daily  escitalopram (LEXAPRO) tablet 10 mg, 10 mg, Oral, Daily  HYDROcodone-acetaminophen (NORCO) 5-325 MG per tablet 1 tablet, 1 tablet, Oral, Q6H PRN  metoprolol succinate (TOPROL XL) extended release tablet 25 mg, 25 mg, Oral, BID  sodium chloride flush 0.9 % injection 5-40 mL, 5-40 mL, Intravenous, 2 times per day  sodium chloride flush 0.9 % injection 5-40 mL, 5-40 mL, Intravenous, PRN  0.9 % sodium chloride infusion, 25 mL, Intravenous, PRN  ondansetron (ZOFRAN-ODT) disintegrating tablet 4 mg, 4 mg, Oral, Q8H PRN **OR** ondansetron (ZOFRAN) injection 4 mg, 4 mg, Intravenous, Q6H PRN  polyethylene glycol (GLYCOLAX) packet 17 g, 17 g, Oral, Daily PRN  acetaminophen (TYLENOL) tablet 650 mg, 650 mg, Oral, Q6H PRN **OR** acetaminophen (TYLENOL) suppository 650 mg, 650 mg, Rectal, Q6H PRN  Allergies: Allergies   Allergen Reactions    Lisinopril Other (See Comments)     Hyponatremia / SIADH     Social History:  Reviewed, non contributory    Family History:  Reviewed, non contributory      REVIEW OF SYSTEMS:    12 point ROS has been completed and reviewed    PHYSICAL EXAM:    VITALS:  /62   Pulse 74   Temp 97.7 °F (36.5 °C) (Oral)   Resp 18   Ht 5' 3\" (1.6 m)   Wt 129 lb 8 oz (58.7 kg)   SpO2 98%   BMI 22.94 kg/m²   H&N: Sclera normal, no masses, trachea midline, no bruit  ABDO: Soft, non-tender, bowel sounds active, no organomegaly, no hernias. LYMPH:  No lymphadenopathy. Skin: Warm dry and intact. : No CVAT, normal external genitalia, no coy in place, clear. MSK: Grossly normal for patient  ROSALIE: Grossly normal for patient  PSY: No acute changes noted in psychosocial assessment.     DATA:    CBC:   Lab Results   Component Value Date    WBC 7.3 07/09/2021    RBC 3.83 07/09/2021    HGB 11.7 07/09/2021    HCT 34.2 07/09/2021    MCV 89.3 07/09/2021    MCH 30.5 07/09/2021    MCHC 34.1 07/09/2021    RDW 13.4 07/09/2021     07/09/2021    MPV 7.6 07/09/2021     BMP:    Lab Results   Component Value Date     07/11/2021    K 4.3 07/11/2021    K 4.8 07/08/2021    CL 92 07/11/2021    CO2 30 07/11/2021    BUN 31 07/11/2021    LABALBU 3.8 07/08/2021    CREATININE 1.2 07/11/2021    CALCIUM 8.9 07/11/2021    GFRAA 51 07/11/2021    LABGLOM 42 07/11/2021    GLUCOSE 98 07/11/2021     U/A:    Lab Results   Component Value Date    COLORU Yellow 05/20/2021    PROTEINU Negative 05/20/2021    PHUR 7.5 05/20/2021    WBCUA 3-5 05/20/2021    RBCUA 0-2 05/20/2021    CLARITYU Clear 05/20/2021    SPECGRAV 1.015 05/20/2021    LEUKOCYTESUR TRACE 05/20/2021    UROBILINOGEN 0.2 05/20/2021    BILIRUBINUR Negative

## 2021-07-12 NOTE — PROGRESS NOTES
Patient discharged with all instructions given to patient and to three family members. Nelson care and information given to family and patient who verbalized understanding. IV removed, catheter intact and dressing applied. Scripts given to patient's daughter. All belongings taken with patient to private car via wheelchair. Patient left in stable condition.

## 2021-07-12 NOTE — CARE COORDINATION
[facility]    DC order noted, all docs needed have been faxed to [facility] for home care services.     Home care to see patient within 24-48 hrs    Agreeable to [facility] and [address] care visit scheduled w patient and daughter prior to dc as requested    Radames Pardo RN, BSN CTN  [facility] [phone]

## 2021-07-12 NOTE — PROGRESS NOTES
Pt walked around room and attempted to urinate but was unsuccessful. [facility] called to inform RN that pt's HR went up into the 150s-160 while pt was ambulating. Will bladder scan pt in a few hours and straight cath if indicated.

## 2021-07-12 NOTE — FLOWSHEET NOTE
provided Communion, listening, and support for patient, who states that she is discouraged by needing to go home with a catheter. Patient also states that she is still adjusting to her diagnosis, but that her strong yolie helps her. Pt's parish ([place]) has provided spiritual support during this hospitalization; writer encouraged pt to request ongoing support (including Communion) when she is discharged. 07/12/21 1121   Encounter Summary   Services provided to: Patient   Referral/Consult From: Other    Support System Children;Gnosticism/yolie community   Continue Visiting   (Pt received Communion)   Complexity of Encounter Moderate   Length of Encounter 15 minutes   Sacraments   Communion Patient received communion   Grief and Life Adjustment   Type New Diagnosis   Assessment Approachable; Anxious; Doubtful   Intervention Active listening;Explored feelings, thoughts, concerns;Nurtured hope;Communion;Sustaining presence/ Ministry of presence; Discussed illness/injury and it's impact; Discussed belief system/Episcopal practices/yolie   Outcome Expressed gratitude;Engaged in conversation;Expressed feelings/needs/concerns;Encouraged;Receptive

## 2021-07-15 ENCOUNTER — FOLLOWUP TELEPHONE ENCOUNTER (OUTPATIENT)
Dept: TELEMETRY | Age: 87
End: 2021-07-15

## 2021-08-07 PROBLEM — R79.89 ELEVATED TROPONIN: Status: RESOLVED | Noted: 2021-07-08 | Resolved: 2021-08-07

## 2021-08-07 PROBLEM — R77.8 ELEVATED TROPONIN: Status: RESOLVED | Noted: 2021-07-08 | Resolved: 2021-08-07

## 2022-04-20 ENCOUNTER — HOSPITAL ENCOUNTER (OUTPATIENT)
Dept: MRI IMAGING | Age: 88
Discharge: HOME | End: 2022-04-20
Payer: MEDICARE

## 2022-04-20 DIAGNOSIS — M48.062 SPINAL STENOSIS, LUMBAR REGION WITH NEUROGENIC CLAUDICATION: ICD-10-CM

## 2022-04-20 PROCEDURE — 72148 MRI LUMBAR SPINE W/O DYE: CPT

## 2022-05-02 ENCOUNTER — HOSPITAL ENCOUNTER (OUTPATIENT)
Dept: MRI IMAGING | Age: 88
Discharge: HOME | End: 2022-05-02
Payer: MEDICARE

## 2022-05-02 DIAGNOSIS — G95.9 MYELOPATHY (HCC): ICD-10-CM

## 2022-05-02 PROCEDURE — 72141 MRI NECK SPINE W/O DYE: CPT

## 2023-12-14 ENCOUNTER — HOSPITAL ENCOUNTER (EMERGENCY)
Age: 89
Discharge: HOME | End: 2023-12-14
Payer: MEDICARE

## 2023-12-14 VITALS
DIASTOLIC BLOOD PRESSURE: 68 MMHG | RESPIRATION RATE: 12 BRPM | OXYGEN SATURATION: 100 % | TEMPERATURE: 98.4 F | HEIGHT: 63 IN | HEART RATE: 72 BPM | WEIGHT: 129.5 LBS | SYSTOLIC BLOOD PRESSURE: 142 MMHG | BODY MASS INDEX: 22.95 KG/M2

## 2023-12-14 DIAGNOSIS — R25.2 MUSCLE CRAMPS: Primary | ICD-10-CM

## 2023-12-14 LAB
ALBUMIN SERPL-MCNC: 3.9 G/DL (ref 3.4–5)
ALBUMIN/GLOB SERPL: 1.3 {RATIO} (ref 1.1–2.2)
ALP SERPL-CCNC: 93 U/L (ref 40–129)
ALT SERPL-CCNC: 10 U/L (ref 10–40)
ANION GAP SERPL CALCULATED.3IONS-SCNC: 11 MMOL/L (ref 3–16)
AST SERPL-CCNC: 16 U/L (ref 15–37)
BASOPHILS # BLD: 0.1 K/UL (ref 0–0.2)
BASOPHILS NFR BLD: 1 %
BILIRUB SERPL-MCNC: <0.2 MG/DL (ref 0–1)
BUN SERPL-MCNC: 26 MG/DL (ref 7–20)
CALCIUM SERPL-MCNC: 9.3 MG/DL (ref 8.3–10.6)
CHLORIDE SERPL-SCNC: 91 MMOL/L (ref 99–110)
CK SERPL-CCNC: 146 U/L (ref 26–192)
CO2 SERPL-SCNC: 29 MMOL/L (ref 21–32)
CREAT SERPL-MCNC: 1.2 MG/DL (ref 0.6–1.2)
DEPRECATED RDW RBC AUTO: 13 % (ref 12.4–15.4)
EOSINOPHIL # BLD: 0.1 K/UL (ref 0–0.6)
EOSINOPHIL NFR BLD: 1 %
GFR SERPLBLD CREATININE-BSD FMLA CKD-EPI: 42 ML/MIN/{1.73_M2}
GLUCOSE SERPL-MCNC: 103 MG/DL (ref 70–99)
HCT VFR BLD AUTO: 37.4 % (ref 36–48)
HGB BLD-MCNC: 12.5 G/DL (ref 12–16)
LYMPHOCYTES # BLD: 1.6 K/UL (ref 1–5.1)
LYMPHOCYTES NFR BLD: 27.6 %
MCH RBC QN AUTO: 30.7 PG (ref 26–34)
MCHC RBC AUTO-ENTMCNC: 33.5 G/DL (ref 31–36)
MCV RBC AUTO: 91.6 FL (ref 80–100)
MONOCYTES # BLD: 0.6 K/UL (ref 0–1.3)
MONOCYTES NFR BLD: 9.4 %
NEUTROPHILS # BLD: 3.6 K/UL (ref 1.7–7.7)
NEUTROPHILS NFR BLD: 61 %
PLATELET # BLD AUTO: 354 K/UL (ref 135–450)
PMV BLD AUTO: 7.1 FL (ref 5–10.5)
POTASSIUM SERPL-SCNC: 3.5 MMOL/L (ref 3.5–5.1)
PROT SERPL-MCNC: 7 G/DL (ref 6.4–8.2)
RBC # BLD AUTO: 4.09 M/UL (ref 4–5.2)
SODIUM SERPL-SCNC: 131 MMOL/L (ref 136–145)
WBC # BLD AUTO: 6 K/UL (ref 4–11)

## 2023-12-14 PROCEDURE — 84443 ASSAY THYROID STIM HORMONE: CPT

## 2023-12-14 PROCEDURE — 80053 COMPREHEN METABOLIC PANEL: CPT

## 2023-12-14 PROCEDURE — 85025 COMPLETE CBC W/AUTO DIFF WBC: CPT

## 2023-12-14 PROCEDURE — 82550 ASSAY OF CK (CPK): CPT

## 2023-12-14 PROCEDURE — 99283 EMERGENCY DEPT VISIT LOW MDM: CPT

## 2023-12-14 ASSESSMENT — PAIN - FUNCTIONAL ASSESSMENT: PAIN_FUNCTIONAL_ASSESSMENT: 0-10

## 2023-12-14 ASSESSMENT — PAIN SCALES - GENERAL: PAINLEVEL_OUTOF10: 0

## 2023-12-14 ASSESSMENT — LIFESTYLE VARIABLES
HOW OFTEN DO YOU HAVE A DRINK CONTAINING ALCOHOL: MONTHLY OR LESS
HOW MANY STANDARD DRINKS CONTAINING ALCOHOL DO YOU HAVE ON A TYPICAL DAY: 1 OR 2

## 2023-12-15 LAB — TSH SERPL DL<=0.005 MIU/L-ACNC: 3.05 UIU/ML (ref 0.27–4.2)

## 2023-12-15 NOTE — ED NOTES
Writer reviewed discharge instructions, medications, and follow-up with PCP; patient verbalized understanding. Patient's IV removed with no complications with dressing in place. Patient stable, ambulatory with steady gait, GCS 15, no signs/ symptoms of acute distress, respirations unlabored, and with family.       Mara Gonzales RN  12/14/23 [id number]

## 2023-12-15 NOTE — DISCHARGE INSTRUCTIONS
-Try a B vitamin pill.   -Follow up with your primary doctor to adjust your medications.   -Come back if you feel worse.

## 2025-04-11 ENCOUNTER — HOSPITAL ENCOUNTER (INPATIENT)
Age: 89
LOS: 7 days | Discharge: HOME HEALTH | DRG: 644 | End: 2025-04-18
Attending: EMERGENCY MEDICINE
Payer: MEDICARE

## 2025-04-11 ENCOUNTER — APPOINTMENT (OUTPATIENT)
Dept: GENERAL RADIOLOGY | Age: 89
DRG: 644 | End: 2025-04-11
Payer: MEDICARE

## 2025-04-11 DIAGNOSIS — R94.31 ABNORMAL ELECTROCARDIOGRAPHY: ICD-10-CM

## 2025-04-11 DIAGNOSIS — G89.29 OTHER CHRONIC PAIN: ICD-10-CM

## 2025-04-11 DIAGNOSIS — R53.83 OTHER FATIGUE: ICD-10-CM

## 2025-04-11 DIAGNOSIS — R79.89 ELEVATED TROPONIN: ICD-10-CM

## 2025-04-11 DIAGNOSIS — Z01.810 PRE-OPERATIVE CARDIOVASCULAR EXAMINATION: ICD-10-CM

## 2025-04-11 DIAGNOSIS — M79.604 PAIN IN BOTH LOWER EXTREMITIES: ICD-10-CM

## 2025-04-11 DIAGNOSIS — E87.1 HYPONATREMIA: Primary | ICD-10-CM

## 2025-04-11 DIAGNOSIS — M79.605 PAIN IN BOTH LOWER EXTREMITIES: ICD-10-CM

## 2025-04-11 LAB
ALBUMIN SERPL-MCNC: 3.9 G/DL (ref 3.4–5)
ALBUMIN/GLOB SERPL: 1.9 {RATIO} (ref 1.1–2.2)
ALP SERPL-CCNC: 73 U/L (ref 40–129)
ALT SERPL-CCNC: 12 U/L (ref 10–40)
ANION GAP SERPL CALCULATED.3IONS-SCNC: 8 MMOL/L (ref 3–16)
AST SERPL-CCNC: 16 U/L (ref 15–37)
BASOPHILS # BLD: 0.1 K/UL (ref 0–0.2)
BASOPHILS NFR BLD: 0.6 %
BILIRUB SERPL-MCNC: <0.2 MG/DL (ref 0–1)
BILIRUB UR QL STRIP.AUTO: NEGATIVE
BUN SERPL-MCNC: 20 MG/DL (ref 7–20)
CALCIUM SERPL-MCNC: 9.1 MG/DL (ref 8.3–10.6)
CHLORIDE SERPL-SCNC: 87 MMOL/L (ref 99–110)
CHP ED QC CHECK: YES
CLARITY UR: CLEAR
CO2 SERPL-SCNC: 29 MMOL/L (ref 21–32)
COLOR UR: YELLOW
CREAT SERPL-MCNC: 1.2 MG/DL (ref 0.6–1.2)
CREAT UR-MCNC: 14.9 MG/DL (ref 28–259)
DEPRECATED RDW RBC AUTO: 13.3 % (ref 12.4–15.4)
EOSINOPHIL # BLD: 0 K/UL (ref 0–0.6)
EOSINOPHIL NFR BLD: 0.5 %
ETHANOLAMINE SERPL-MCNC: NORMAL MG/DL (ref 0–0.08)
FLUAV RNA RESP QL NAA+PROBE: NOT DETECTED
FLUBV RNA RESP QL NAA+PROBE: NOT DETECTED
GFR SERPLBLD CREATININE-BSD FMLA CKD-EPI: 42 ML/MIN/{1.73_M2}
GLUCOSE BLD-MCNC: 105 MG/DL
GLUCOSE BLD-MCNC: 105 MG/DL (ref 70–99)
GLUCOSE SERPL-MCNC: 102 MG/DL (ref 70–99)
GLUCOSE UR STRIP.AUTO-MCNC: NEGATIVE MG/DL
HCT VFR BLD AUTO: 35.8 % (ref 36–48)
HGB BLD-MCNC: 12.4 G/DL (ref 12–16)
HGB UR QL STRIP.AUTO: NEGATIVE
KETONES UR STRIP.AUTO-MCNC: NEGATIVE MG/DL
LEUKOCYTE ESTERASE UR QL STRIP.AUTO: NEGATIVE
LYMPHOCYTES # BLD: 1.3 K/UL (ref 1–5.1)
LYMPHOCYTES NFR BLD: 13.6 %
MCH RBC QN AUTO: 30.8 PG (ref 26–34)
MCHC RBC AUTO-ENTMCNC: 34.5 G/DL (ref 31–36)
MCV RBC AUTO: 89.3 FL (ref 80–100)
MONOCYTES # BLD: 0.7 K/UL (ref 0–1.3)
MONOCYTES NFR BLD: 7.2 %
NEUTROPHILS # BLD: 7.3 K/UL (ref 1.7–7.7)
NEUTROPHILS NFR BLD: 78.1 %
NITRITE UR QL STRIP.AUTO: NEGATIVE
NT-PROBNP SERPL-MCNC: 850 PG/ML (ref 0–449)
PERFORMED ON: ABNORMAL
PH UR STRIP.AUTO: 7 [PH] (ref 5–8)
PLATELET # BLD AUTO: 344 K/UL (ref 135–450)
PMV BLD AUTO: 7 FL (ref 5–10.5)
POTASSIUM SERPL-SCNC: 4.2 MMOL/L (ref 3.5–5.1)
PROT SERPL-MCNC: 6 G/DL (ref 6.4–8.2)
PROT UR STRIP.AUTO-MCNC: NEGATIVE MG/DL
RBC # BLD AUTO: 4.01 M/UL (ref 4–5.2)
SARS-COV-2 RNA RESP QL NAA+PROBE: NOT DETECTED
SODIUM SERPL-SCNC: 124 MMOL/L (ref 136–145)
SODIUM UR-SCNC: 30 MMOL/L
SP GR UR STRIP.AUTO: 1.01 (ref 1–1.03)
TROPONIN, HIGH SENSITIVITY: 25 NG/L (ref 0–14)
TROPONIN, HIGH SENSITIVITY: 26 NG/L (ref 0–14)
UA COMPLETE W REFLEX CULTURE PNL UR: NORMAL
UA DIPSTICK W REFLEX MICRO PNL UR: NORMAL
URN SPEC COLLECT METH UR: NORMAL
UROBILINOGEN UR STRIP-ACNC: 0.2 E.U./DL
WBC # BLD AUTO: 9.3 K/UL (ref 4–11)

## 2025-04-11 PROCEDURE — 82570 ASSAY OF URINE CREATININE: CPT

## 2025-04-11 PROCEDURE — 81003 URINALYSIS AUTO W/O SCOPE: CPT

## 2025-04-11 PROCEDURE — 73600 X-RAY EXAM OF ANKLE: CPT

## 2025-04-11 PROCEDURE — 84300 ASSAY OF URINE SODIUM: CPT

## 2025-04-11 PROCEDURE — 6370000000 HC RX 637 (ALT 250 FOR IP)

## 2025-04-11 PROCEDURE — 2580000003 HC RX 258

## 2025-04-11 PROCEDURE — 83935 ASSAY OF URINE OSMOLALITY: CPT

## 2025-04-11 PROCEDURE — 85025 COMPLETE CBC W/AUTO DIFF WBC: CPT

## 2025-04-11 PROCEDURE — 83930 ASSAY OF BLOOD OSMOLALITY: CPT

## 2025-04-11 PROCEDURE — 82077 ASSAY SPEC XCP UR&BREATH IA: CPT

## 2025-04-11 PROCEDURE — 1200000000 HC SEMI PRIVATE

## 2025-04-11 PROCEDURE — 73610 X-RAY EXAM OF ANKLE: CPT

## 2025-04-11 PROCEDURE — 93005 ELECTROCARDIOGRAM TRACING: CPT | Performed by: PHYSICIAN ASSISTANT

## 2025-04-11 PROCEDURE — 36415 COLL VENOUS BLD VENIPUNCTURE: CPT

## 2025-04-11 PROCEDURE — 99285 EMERGENCY DEPT VISIT HI MDM: CPT

## 2025-04-11 PROCEDURE — 84484 ASSAY OF TROPONIN QUANT: CPT

## 2025-04-11 PROCEDURE — 87636 SARSCOV2 & INF A&B AMP PRB: CPT

## 2025-04-11 PROCEDURE — 83880 ASSAY OF NATRIURETIC PEPTIDE: CPT

## 2025-04-11 PROCEDURE — 80053 COMPREHEN METABOLIC PANEL: CPT

## 2025-04-11 RX ORDER — POLYETHYLENE GLYCOL 3350 17 G/17G
17 POWDER, FOR SOLUTION ORAL DAILY PRN
Status: DISCONTINUED | OUTPATIENT
Start: 2025-04-11 | End: 2025-04-18 | Stop reason: HOSPADM

## 2025-04-11 RX ORDER — ESCITALOPRAM OXALATE 10 MG/1
10 TABLET ORAL DAILY
Status: DISCONTINUED | OUTPATIENT
Start: 2025-04-11 | End: 2025-04-11

## 2025-04-11 RX ORDER — ACETAMINOPHEN 650 MG/1
650 SUPPOSITORY RECTAL EVERY 6 HOURS PRN
Status: DISCONTINUED | OUTPATIENT
Start: 2025-04-11 | End: 2025-04-18 | Stop reason: HOSPADM

## 2025-04-11 RX ORDER — ATORVASTATIN CALCIUM 10 MG/1
20 TABLET, FILM COATED ORAL DAILY
Status: DISCONTINUED | OUTPATIENT
Start: 2025-04-11 | End: 2025-04-11

## 2025-04-11 RX ORDER — SODIUM CHLORIDE 0.9 % (FLUSH) 0.9 %
5-40 SYRINGE (ML) INJECTION PRN
Status: DISCONTINUED | OUTPATIENT
Start: 2025-04-11 | End: 2025-04-18 | Stop reason: HOSPADM

## 2025-04-11 RX ORDER — SPIRONOLACTONE 25 MG/1
25 TABLET ORAL DAILY
Status: DISCONTINUED | OUTPATIENT
Start: 2025-04-12 | End: 2025-04-15

## 2025-04-11 RX ORDER — SODIUM CHLORIDE 0.9 % (FLUSH) 0.9 %
5-40 SYRINGE (ML) INJECTION EVERY 12 HOURS SCHEDULED
Status: DISCONTINUED | OUTPATIENT
Start: 2025-04-11 | End: 2025-04-18 | Stop reason: HOSPADM

## 2025-04-11 RX ORDER — TORSEMIDE 20 MG/1
20 TABLET ORAL DAILY
Status: DISCONTINUED | OUTPATIENT
Start: 2025-04-12 | End: 2025-04-18 | Stop reason: HOSPADM

## 2025-04-11 RX ORDER — ASPIRIN 81 MG/1
81 TABLET ORAL DAILY
Status: DISCONTINUED | OUTPATIENT
Start: 2025-04-12 | End: 2025-04-18 | Stop reason: HOSPADM

## 2025-04-11 RX ORDER — METOPROLOL SUCCINATE 25 MG/1
25 TABLET, EXTENDED RELEASE ORAL 2 TIMES DAILY
Status: DISCONTINUED | OUTPATIENT
Start: 2025-04-11 | End: 2025-04-15

## 2025-04-11 RX ORDER — ONDANSETRON 4 MG/1
4 TABLET, ORALLY DISINTEGRATING ORAL EVERY 8 HOURS PRN
Status: DISCONTINUED | OUTPATIENT
Start: 2025-04-11 | End: 2025-04-18 | Stop reason: HOSPADM

## 2025-04-11 RX ORDER — SODIUM CHLORIDE 9 MG/ML
INJECTION, SOLUTION INTRAVENOUS CONTINUOUS
Status: DISCONTINUED | OUTPATIENT
Start: 2025-04-11 | End: 2025-04-12

## 2025-04-11 RX ORDER — ACETAMINOPHEN 325 MG/1
650 TABLET ORAL EVERY 6 HOURS PRN
Status: DISCONTINUED | OUTPATIENT
Start: 2025-04-11 | End: 2025-04-18 | Stop reason: HOSPADM

## 2025-04-11 RX ORDER — HYDROCODONE BITARTRATE AND ACETAMINOPHEN 5; 325 MG/1; MG/1
1 TABLET ORAL EVERY 6 HOURS PRN
Status: DISCONTINUED | OUTPATIENT
Start: 2025-04-11 | End: 2025-04-14

## 2025-04-11 RX ORDER — MECOBALAMIN 5000 MCG
10 TABLET,DISINTEGRATING ORAL NIGHTLY
Status: DISCONTINUED | OUTPATIENT
Start: 2025-04-11 | End: 2025-04-18 | Stop reason: HOSPADM

## 2025-04-11 RX ORDER — DILTIAZEM HYDROCHLORIDE 180 MG/1
180 CAPSULE, COATED, EXTENDED RELEASE ORAL DAILY
Status: DISCONTINUED | OUTPATIENT
Start: 2025-04-12 | End: 2025-04-18 | Stop reason: HOSPADM

## 2025-04-11 RX ORDER — POTASSIUM CHLORIDE 1500 MG/1
20 TABLET, EXTENDED RELEASE ORAL DAILY
Status: DISCONTINUED | OUTPATIENT
Start: 2025-04-12 | End: 2025-04-18 | Stop reason: HOSPADM

## 2025-04-11 RX ORDER — SODIUM CHLORIDE 9 MG/ML
INJECTION, SOLUTION INTRAVENOUS PRN
Status: DISCONTINUED | OUTPATIENT
Start: 2025-04-11 | End: 2025-04-18 | Stop reason: HOSPADM

## 2025-04-11 RX ORDER — ONDANSETRON 2 MG/ML
4 INJECTION INTRAMUSCULAR; INTRAVENOUS EVERY 6 HOURS PRN
Status: DISCONTINUED | OUTPATIENT
Start: 2025-04-11 | End: 2025-04-18 | Stop reason: HOSPADM

## 2025-04-11 RX ORDER — TAMSULOSIN HYDROCHLORIDE 0.4 MG/1
0.4 CAPSULE ORAL DAILY
Status: DISCONTINUED | OUTPATIENT
Start: 2025-04-12 | End: 2025-04-18 | Stop reason: HOSPADM

## 2025-04-11 RX ADMIN — SODIUM CHLORIDE: 0.9 INJECTION, SOLUTION INTRAVENOUS at 17:53

## 2025-04-11 RX ADMIN — Medication 10 MG: at 21:25

## 2025-04-11 RX ADMIN — HYDROCODONE BITARTRATE AND ACETAMINOPHEN 1 TABLET: 5; 325 TABLET ORAL at 22:10

## 2025-04-11 RX ADMIN — METOPROLOL SUCCINATE 25 MG: 25 TABLET, EXTENDED RELEASE ORAL at 21:25

## 2025-04-11 RX ADMIN — APIXABAN 2.5 MG: 5 TABLET, FILM COATED ORAL at 21:25

## 2025-04-11 RX ADMIN — ACETAMINOPHEN 650 MG: 325 TABLET ORAL at 21:39

## 2025-04-11 ASSESSMENT — PAIN SCALES - GENERAL
PAINLEVEL_OUTOF10: 10
PAINLEVEL_OUTOF10: 4
PAINLEVEL_OUTOF10: 4

## 2025-04-11 ASSESSMENT — PAIN - FUNCTIONAL ASSESSMENT: PAIN_FUNCTIONAL_ASSESSMENT: 0-10

## 2025-04-11 ASSESSMENT — PAIN DESCRIPTION - DESCRIPTORS
DESCRIPTORS: CRAMPING
DESCRIPTORS: CRAMPING

## 2025-04-11 ASSESSMENT — PAIN DESCRIPTION - LOCATION
LOCATION: LEG
LOCATION: LEG

## 2025-04-11 NOTE — ED PROVIDER NOTES
Emergency Department Attending Physician Note  Location: [facility] EMERGENCY DEPARTMENT  4/11/2025       Pt Name: Muna Simon  MRN: [id number]  Birthdate 6/30/1929    Date of evaluation: 4/11/2025  Provider: DANIEL ANSARI DO  PCP: Robby Castaneda MD    Note Started: 1:57 PM EDT 4/11/25    CHIEF COMPLAINT  Chief Complaint   Patient presents with    Extremity Weakness     Generalized weakness. From home. Pt states, \"I have arthritis everywhere.\" VSS RA. EKG Afib. NKI. BG unknown per squad. Pain mostly in L ankle.        ROOM: 32    HISTORY OF PRESENT ILLNESS:  History obtained by patient. Limitations to history : None.     Muna Simon is a 89 y.o. female with a significant PMHx of Precious's, history of A-fib, CHF, hypertension, and panic attacks, here in the emergency department today with concerns of generalized weakness and fatigue, and what she describes as tingling in her feet and arms.  She says she just feels lousy.  She is very jovial, laughing and joking here in the emergency department.  Alert and oriented.  No syncope or lightheadedness.  No falls.  Says she feels like she rolled her left ankle yesterday or something, because more painful than it usually is.  Took some Tylenol.    Once her daughters are bedside, who are quite anxious about patient being here, they want me to test her for alcohol use, as sometimes she does drink some whiskey.  Additionally, they are initially quite upset that are not giving her IV fluids, although patient is quite stable.  As below, patient has acute on chronic hyponatremia, I have a very long conversation with them about hyponatremia.  They state that she has had low sodium in the past, and she is supposed to drink only 48 ounces of water a day, but she also drinks lots of coffee and sometimes alcohol.  Overall, they think she is at her baseline of mentation.  Otherwise she is been doing quite well.  They are very proud of their mother at 89 years of age,

## 2025-04-11 NOTE — H&P
[facility] Medicine History & Physical    V 1.6    Date of Admission: 4/11/2025    Date of Service:  Pt seen/examined on 4/11/2025     [x]Admitted to Inpatient with expected LOS greater than two midnights due to medical therapy.  []Placed in Observation status.    Chief Admission Complaint:  Cramping and Pain in Lower Extremities     Presenting Admission History:      Patient is a 89-year-old female with a past medical history of atrial fibrillation, congestive heart failure and hypertension who presents today for cramping and pain in her lower extremities.  She states this cramping has been going on for quite some time.  She states that sometimes it is so bad that she cries.  She also endorses drinking the occasional beer.  She states that she has not had any in 2 weeks.  ED workup was remarkable for an elevated BNP, elevated troponin, hyponatremia.  We were consulted for admission.    Assessment/Plan:      Current Principal Problem:  Hyponatremia    Acute on chronic hyponatremia  - Etiology unclear   - Typically between 125-129  - Nephrology consulted - gentle IVFs, hold diuretics     Lower extremity cramping  - Etiology unclear - PAD?  - Patient states that pain worsens with movement   - Make sure electrolytes are replaced  - ABIs ordered     Hypertension   - Typically controlled on home regimen   - Continue metoprolol, diltiazem     Atrial Fibrillation   - Chronic paroxysmal, of unspecified and clinically unable to determine etiology.    - Rate Controlled   - Anticoagulated at baseline on home Eliquis - continued.      HFpEF  - Does not appear to be an acute exacerbation at this time  - Patient appears euvolemic on exam   - Last Echo completed in 7/2021 revealed an EF of 55%  - GDMT: metoprolol - continued        Discussed management and the need for Hospitalization of the patient w/ the Emergency Department Provider: Brisa Hinton     EKG:  I have reviewed the EKG with the following interpretation: Atrial

## 2025-04-11 NOTE — ED NOTES
Muna Simon is a 89 y.o. female admitted for  Principal Problem:    Hyponatremia  Resolved Problems:    * No resolved hospital problems. *  .   Patient Home via EMS transportation with   Chief Complaint   Patient presents with    Extremity Weakness     Generalized weakness. From home. Pt states, \"I have arthritis everywhere.\" VSS RA. EKG Afib. NKI. BG unknown per squad. Pain mostly in L ankle.    .  Patient is alert and Person, Place, Time, and Situation  Patient's baseline mobility: Baseline Mobility: Per pt, is normally independent but has a cane and a walker at home if needed. Per pt, thinks she rolled her L ankle yesterday and hasn't been able to walk the same since. This RN has not visualized pt walking   Code Status: Prior   Cardiac Rhythm:       Is patient on baseline Oxygen: no how many Liters:   Abnormal Assessment Findings: Pt c/o generalized pain, L ankle pain, and generalized muscle cramps    Isolation: None      NIH Score:    C-SSRS:    Bedside swallow:        Active LDA's:   Peripheral IV 04/11/25 Right Antecubital (Active)     Patient admitted with a coy: no If the coy is chronic was it exchanged:  Reason for coy:   Patient admitted with Central Line:  NA . PICC line placement confirmed: YES OR NO:[id number]}   Reason for Central line:   Was central line Inserted from an outside facility:        Family/Caregiver Present yes Any Concerns: no   Restraints no  Sitter no         Vitals:      Vitals:    04/11/25 1628 04/11/25 1658 04/11/25 1728 04/11/25 1758   BP: (!) 151/80 (!) 135/52 (!) 168/81 (!) 154/45   Pulse: 77 98 99 79   Resp:       Temp:       TempSrc:       SpO2:    99%   Weight:       Height:           Last documented pain score (0-10 scale) Pain Level: 10  Pain medication administered No.    Pertinent or High Risk Medications/Drips: Yes- see MAR.    Pending Blood Product Administration: no    Abnormal labs:   Abnormal Labs Reviewed   CBC WITH AUTO DIFFERENTIAL - Abnormal; Notable for the

## 2025-04-11 NOTE — ED NOTES
Called nephrology @1608  Per:  Brisa Hinton,   RE:  acute on chronic hyponatremia   called back @8525

## 2025-04-12 LAB
ANION GAP SERPL CALCULATED.3IONS-SCNC: 5 MMOL/L (ref 3–16)
BASOPHILS # BLD: 0 K/UL (ref 0–0.2)
BASOPHILS NFR BLD: 0.2 %
BUN SERPL-MCNC: 20 MG/DL (ref 7–20)
CALCIUM SERPL-MCNC: 9 MG/DL (ref 8.3–10.6)
CHLORIDE SERPL-SCNC: 94 MMOL/L (ref 99–110)
CO2 SERPL-SCNC: 29 MMOL/L (ref 21–32)
CORTIS AM PEAK SERPL-MCNC: 4.8 UG/DL (ref 4.3–22.4)
CREAT SERPL-MCNC: 1.1 MG/DL (ref 0.6–1.2)
DEPRECATED RDW RBC AUTO: 13.3 % (ref 12.4–15.4)
EKG DIAGNOSIS: NORMAL
EKG Q-T INTERVAL: 386 MS
EKG QRS DURATION: 68 MS
EKG QTC CALCULATION (BAZETT): 425 MS
EKG R AXIS: -1 DEGREES
EKG T AXIS: 32 DEGREES
EKG VENTRICULAR RATE: 73 BPM
EOSINOPHIL # BLD: 0.1 K/UL (ref 0–0.6)
EOSINOPHIL NFR BLD: 0.8 %
GFR SERPLBLD CREATININE-BSD FMLA CKD-EPI: 46 ML/MIN/{1.73_M2}
GLUCOSE SERPL-MCNC: 96 MG/DL (ref 70–99)
HCT VFR BLD AUTO: 33.6 % (ref 36–48)
HGB BLD-MCNC: 11.7 G/DL (ref 12–16)
LYMPHOCYTES # BLD: 1.5 K/UL (ref 1–5.1)
LYMPHOCYTES NFR BLD: 20.3 %
MAGNESIUM SERPL-MCNC: 2.08 MG/DL (ref 1.8–2.4)
MCH RBC QN AUTO: 31.3 PG (ref 26–34)
MCHC RBC AUTO-ENTMCNC: 34.9 G/DL (ref 31–36)
MCV RBC AUTO: 89.7 FL (ref 80–100)
MONOCYTES # BLD: 0.7 K/UL (ref 0–1.3)
MONOCYTES NFR BLD: 10.2 %
NEUTROPHILS # BLD: 5 K/UL (ref 1.7–7.7)
NEUTROPHILS NFR BLD: 68.5 %
OSMOLALITY SERPL: 279 MOSM/KG (ref 280–301)
OSMOLALITY UR: 164 MOSM/KG (ref 390–1070)
PLATELET # BLD AUTO: 315 K/UL (ref 135–450)
PMV BLD AUTO: 7 FL (ref 5–10.5)
POTASSIUM SERPL-SCNC: 4.1 MMOL/L (ref 3.5–5.1)
RBC # BLD AUTO: 3.74 M/UL (ref 4–5.2)
SODIUM SERPL-SCNC: 128 MMOL/L (ref 136–145)
TSH SERPL DL<=0.005 MIU/L-ACNC: 2.61 UIU/ML (ref 0.27–4.2)
WBC # BLD AUTO: 7.2 K/UL (ref 4–11)

## 2025-04-12 PROCEDURE — 97530 THERAPEUTIC ACTIVITIES: CPT

## 2025-04-12 PROCEDURE — 97161 PT EVAL LOW COMPLEX 20 MIN: CPT

## 2025-04-12 PROCEDURE — 93010 ELECTROCARDIOGRAM REPORT: CPT | Performed by: INTERNAL MEDICINE

## 2025-04-12 PROCEDURE — 84443 ASSAY THYROID STIM HORMONE: CPT

## 2025-04-12 PROCEDURE — 97116 GAIT TRAINING THERAPY: CPT

## 2025-04-12 PROCEDURE — 36415 COLL VENOUS BLD VENIPUNCTURE: CPT

## 2025-04-12 PROCEDURE — 6370000000 HC RX 637 (ALT 250 FOR IP)

## 2025-04-12 PROCEDURE — 97535 SELF CARE MNGMENT TRAINING: CPT

## 2025-04-12 PROCEDURE — 6370000000 HC RX 637 (ALT 250 FOR IP): Performed by: NURSE PRACTITIONER

## 2025-04-12 PROCEDURE — 82533 TOTAL CORTISOL: CPT

## 2025-04-12 PROCEDURE — 2500000003 HC RX 250 WO HCPCS

## 2025-04-12 PROCEDURE — 80048 BASIC METABOLIC PNL TOTAL CA: CPT

## 2025-04-12 PROCEDURE — 97165 OT EVAL LOW COMPLEX 30 MIN: CPT

## 2025-04-12 PROCEDURE — 85025 COMPLETE CBC W/AUTO DIFF WBC: CPT

## 2025-04-12 PROCEDURE — 83735 ASSAY OF MAGNESIUM: CPT

## 2025-04-12 PROCEDURE — 1200000000 HC SEMI PRIVATE

## 2025-04-12 RX ADMIN — DILTIAZEM HYDROCHLORIDE 180 MG: 180 CAPSULE, EXTENDED RELEASE ORAL at 08:54

## 2025-04-12 RX ADMIN — METOPROLOL SUCCINATE 25 MG: 25 TABLET, EXTENDED RELEASE ORAL at 20:59

## 2025-04-12 RX ADMIN — ASPIRIN 81 MG: 81 TABLET, COATED ORAL at 08:54

## 2025-04-12 RX ADMIN — APIXABAN 2.5 MG: 5 TABLET, FILM COATED ORAL at 20:59

## 2025-04-12 RX ADMIN — TAMSULOSIN HYDROCHLORIDE 0.4 MG: 0.4 CAPSULE ORAL at 08:54

## 2025-04-12 RX ADMIN — POTASSIUM CHLORIDE 20 MEQ: 1500 TABLET, EXTENDED RELEASE ORAL at 08:54

## 2025-04-12 RX ADMIN — SODIUM CHLORIDE, PRESERVATIVE FREE 10 ML: 5 INJECTION INTRAVENOUS at 20:59

## 2025-04-12 RX ADMIN — SODIUM CHLORIDE, PRESERVATIVE FREE 10 ML: 5 INJECTION INTRAVENOUS at 08:56

## 2025-04-12 RX ADMIN — Medication 10 MG: at 20:59

## 2025-04-12 RX ADMIN — METOPROLOL SUCCINATE 25 MG: 25 TABLET, EXTENDED RELEASE ORAL at 08:54

## 2025-04-12 RX ADMIN — APIXABAN 2.5 MG: 5 TABLET, FILM COATED ORAL at 08:54

## 2025-04-12 ASSESSMENT — PAIN SCALES - GENERAL
PAINLEVEL_OUTOF10: 0
PAINLEVEL_OUTOF10: 0

## 2025-04-12 NOTE — PLAN OF CARE
Problem: Safety - Adult  Goal: Free from fall injury  Outcome: Progressing  Flowsheets (Taken 4/12/2025 1443)  Free From Fall Injury: Instruct family/caregiver on patient safety     Problem: Chronic Conditions and Co-morbidities  Goal: Patient's chronic conditions and co-morbidity symptoms are monitored and maintained or improved  Outcome: Progressing  Flowsheets (Taken 4/12/2025 1443)  Care Plan - Patient's Chronic Conditions and Co-Morbidity Symptoms are Monitored and Maintained or Improved: Monitor and assess patient's chronic conditions and comorbid symptoms for stability, deterioration, or improvement     Problem: Pain  Goal: Verbalizes/displays adequate comfort level or baseline comfort level  Outcome: Progressing  Flowsheets (Taken 4/12/2025 1443)  Verbalizes/displays adequate comfort level or baseline comfort level:   Encourage patient to monitor pain and request assistance   Assess pain using appropriate pain scale

## 2025-04-12 NOTE — PLAN OF CARE
Problem: Safety - Adult  Goal: Free from fall injury  4/12/2025 1936 by Ismael Bajwa RN  Outcome: Progressing  4/12/2025 1443 by Kym Quan RN  Outcome: Progressing  Flowsheets (Taken 4/12/2025 1443)  Free From Fall Injury: Instruct family/caregiver on patient safety     Problem: Chronic Conditions and Co-morbidities  Goal: Patient's chronic conditions and co-morbidity symptoms are monitored and maintained or improved  4/12/2025 1936 by Ismael Bajwa RN  Outcome: Progressing  4/12/2025 1443 by Kym Quan RN  Outcome: Progressing  Flowsheets (Taken 4/12/2025 1443)  Care Plan - Patient's Chronic Conditions and Co-Morbidity Symptoms are Monitored and Maintained or Improved: Monitor and assess patient's chronic conditions and comorbid symptoms for stability, deterioration, or improvement     Problem: Pain  Goal: Verbalizes/displays adequate comfort level or baseline comfort level  4/12/2025 1936 by Ismael Bajwa RN  Outcome: Progressing  4/12/2025 1443 by Kym Quan RN  Outcome: Progressing  Flowsheets (Taken 4/12/2025 1443)  Verbalizes/displays adequate comfort level or baseline comfort level:   Encourage patient to monitor pain and request assistance   Assess pain using appropriate pain scale     Problem: ABCDS Injury Assessment  Goal: Absence of physical injury  Outcome: Progressing

## 2025-04-13 LAB
ANION GAP SERPL CALCULATED.3IONS-SCNC: 7 MMOL/L (ref 3–16)
BUN SERPL-MCNC: 24 MG/DL (ref 7–20)
CALCIUM SERPL-MCNC: 8.1 MG/DL (ref 8.3–10.6)
CHLORIDE SERPL-SCNC: 99 MMOL/L (ref 99–110)
CO2 SERPL-SCNC: 26 MMOL/L (ref 21–32)
CREAT SERPL-MCNC: 1.2 MG/DL (ref 0.6–1.2)
GFR SERPLBLD CREATININE-BSD FMLA CKD-EPI: 42 ML/MIN/{1.73_M2}
GLUCOSE SERPL-MCNC: 100 MG/DL (ref 70–99)
MAGNESIUM SERPL-MCNC: 1.85 MG/DL (ref 1.8–2.4)
PHOSPHATE SERPL-MCNC: 3.1 MG/DL (ref 2.5–4.9)
POTASSIUM SERPL-SCNC: 4.2 MMOL/L (ref 3.5–5.1)
SODIUM SERPL-SCNC: 132 MMOL/L (ref 136–145)

## 2025-04-13 PROCEDURE — 36415 COLL VENOUS BLD VENIPUNCTURE: CPT

## 2025-04-13 PROCEDURE — 6370000000 HC RX 637 (ALT 250 FOR IP): Performed by: NURSE PRACTITIONER

## 2025-04-13 PROCEDURE — 6370000000 HC RX 637 (ALT 250 FOR IP)

## 2025-04-13 PROCEDURE — 80048 BASIC METABOLIC PNL TOTAL CA: CPT

## 2025-04-13 PROCEDURE — 2500000003 HC RX 250 WO HCPCS

## 2025-04-13 PROCEDURE — 1200000000 HC SEMI PRIVATE

## 2025-04-13 PROCEDURE — 83735 ASSAY OF MAGNESIUM: CPT

## 2025-04-13 PROCEDURE — 84100 ASSAY OF PHOSPHORUS: CPT

## 2025-04-13 RX ADMIN — POTASSIUM CHLORIDE 20 MEQ: 1500 TABLET, EXTENDED RELEASE ORAL at 08:45

## 2025-04-13 RX ADMIN — Medication 10 MG: at 20:35

## 2025-04-13 RX ADMIN — APIXABAN 2.5 MG: 5 TABLET, FILM COATED ORAL at 20:35

## 2025-04-13 RX ADMIN — APIXABAN 2.5 MG: 5 TABLET, FILM COATED ORAL at 08:45

## 2025-04-13 RX ADMIN — TAMSULOSIN HYDROCHLORIDE 0.4 MG: 0.4 CAPSULE ORAL at 08:45

## 2025-04-13 RX ADMIN — ASPIRIN 81 MG: 81 TABLET, COATED ORAL at 08:45

## 2025-04-13 RX ADMIN — DILTIAZEM HYDROCHLORIDE 180 MG: 180 CAPSULE, EXTENDED RELEASE ORAL at 08:45

## 2025-04-13 RX ADMIN — METOPROLOL SUCCINATE 25 MG: 25 TABLET, EXTENDED RELEASE ORAL at 20:35

## 2025-04-13 RX ADMIN — SODIUM CHLORIDE, PRESERVATIVE FREE 10 ML: 5 INJECTION INTRAVENOUS at 08:45

## 2025-04-13 RX ADMIN — SODIUM CHLORIDE, PRESERVATIVE FREE 10 ML: 5 INJECTION INTRAVENOUS at 20:35

## 2025-04-13 RX ADMIN — METOPROLOL SUCCINATE 25 MG: 25 TABLET, EXTENDED RELEASE ORAL at 08:45

## 2025-04-13 ASSESSMENT — PAIN SCALES - GENERAL
PAINLEVEL_OUTOF10: 0
PAINLEVEL_OUTOF10: 0

## 2025-04-13 NOTE — PLAN OF CARE
Problem: Safety - Adult  Goal: Free from fall injury  4/13/2025 0517 by Ismael Bajwa RN  Outcome: Progressing  4/12/2025 1936 by Ismael Bajwa RN  Outcome: Progressing     Problem: Chronic Conditions and Co-morbidities  Goal: Patient's chronic conditions and co-morbidity symptoms are monitored and maintained or improved  4/13/2025 0517 by Ismael Bajwa RN  Outcome: Progressing  4/12/2025 1936 by Ismael Bajwa RN  Outcome: Progressing     Problem: Pain  Goal: Verbalizes/displays adequate comfort level or baseline comfort level  4/13/2025 0517 by Ismael Bajwa RN  Outcome: Progressing  4/12/2025 1936 by Ismael Bajwa RN  Outcome: Progressing     Problem: ABCDS Injury Assessment  Goal: Absence of physical injury  4/13/2025 0517 by Ismael Bajwa RN  Outcome: Progressing  4/12/2025 1936 by Ismael Bajwa RN  Outcome: Progressing

## 2025-04-13 NOTE — PLAN OF CARE
Problem: Safety - Adult  Goal: Free from fall injury  4/13/2025 1923 by Ismael Bajwa RN  Outcome: Progressing  4/13/2025 1514 by Kym Quan RN  Outcome: Progressing  Flowsheets (Taken 4/13/2025 1514)  Free From Fall Injury: Instruct family/caregiver on patient safety     Problem: Chronic Conditions and Co-morbidities  Goal: Patient's chronic conditions and co-morbidity symptoms are monitored and maintained or improved  4/13/2025 1923 by Ismael Bajwa RN  Outcome: Progressing  4/13/2025 1514 by Kym Quan RN  Outcome: Progressing  Flowsheets (Taken 4/13/2025 1514)  Care Plan - Patient's Chronic Conditions and Co-Morbidity Symptoms are Monitored and Maintained or Improved: Monitor and assess patient's chronic conditions and comorbid symptoms for stability, deterioration, or improvement     Problem: Pain  Goal: Verbalizes/displays adequate comfort level or baseline comfort level  4/13/2025 1923 by Ismael Bajwa RN  Outcome: Progressing  4/13/2025 1514 by Kym Quan RN  Outcome: Progressing  Flowsheets (Taken 4/13/2025 1514)  Verbalizes/displays adequate comfort level or baseline comfort level:   Encourage patient to monitor pain and request assistance   Assess pain using appropriate pain scale     Problem: ABCDS Injury Assessment  Goal: Absence of physical injury  Outcome: Progressing

## 2025-04-13 NOTE — PLAN OF CARE
Problem: Safety - Adult  Goal: Free from fall injury  4/13/2025 1514 by Kym Quan RN  Outcome: Progressing  Flowsheets (Taken 4/13/2025 1514)  Free From Fall Injury: Instruct family/caregiver on patient safety     Problem: Chronic Conditions and Co-morbidities  Goal: Patient's chronic conditions and co-morbidity symptoms are monitored and maintained or improved  4/13/2025 1514 by Kym Quan RN  Outcome: Progressing  Flowsheets (Taken 4/13/2025 1514)  Care Plan - Patient's Chronic Conditions and Co-Morbidity Symptoms are Monitored and Maintained or Improved: Monitor and assess patient's chronic conditions and comorbid symptoms for stability, deterioration, or improvement     Problem: Pain  Goal: Verbalizes/displays adequate comfort level or baseline comfort level  4/13/2025 1514 by Kym Quan RN  Outcome: Progressing  Flowsheets (Taken 4/13/2025 1514)  Verbalizes/displays adequate comfort level or baseline comfort level:   Encourage patient to monitor pain and request assistance   Assess pain using appropriate pain scale  4/13/2025 0517 by Ismael Bajwa RN  Outcome: Progressing

## 2025-04-14 ENCOUNTER — APPOINTMENT (OUTPATIENT)
Dept: CT IMAGING | Age: 89
DRG: 644 | End: 2025-04-14
Payer: MEDICARE

## 2025-04-14 ENCOUNTER — APPOINTMENT (OUTPATIENT)
Dept: VASCULAR LAB | Age: 89
DRG: 644 | End: 2025-04-14
Payer: MEDICARE

## 2025-04-14 LAB
ANION GAP SERPL CALCULATED.3IONS-SCNC: 8 MMOL/L (ref 3–16)
BUN SERPL-MCNC: 19 MG/DL (ref 7–20)
CALCIUM SERPL-MCNC: 8.3 MG/DL (ref 8.3–10.6)
CHLORIDE SERPL-SCNC: 100 MMOL/L (ref 99–110)
CO2 SERPL-SCNC: 25 MMOL/L (ref 21–32)
CREAT SERPL-MCNC: 1 MG/DL (ref 0.6–1.2)
ECHO BSA: 1.63 M2
GFR SERPLBLD CREATININE-BSD FMLA CKD-EPI: 52 ML/MIN/{1.73_M2}
GLUCOSE SERPL-MCNC: 90 MG/DL (ref 70–99)
POTASSIUM SERPL-SCNC: 4.3 MMOL/L (ref 3.5–5.1)
SODIUM SERPL-SCNC: 133 MMOL/L (ref 136–145)
VAS LEFT ABI: 0.33
VAS LEFT ARM BP: 154 MMHG
VAS LEFT CALF PRESSURE: 67 MMHG
VAS LEFT DORSALIS PEDIS BP: 53 MMHG
VAS LEFT HIGH THIGH PRESSURE: 90 MMHG
VAS LEFT LOW THIGH PRESSURE: 69 MMHG
VAS LEFT PTA BP: 53 MMHG
VAS RIGHT ABI: 0.32
VAS RIGHT ARM BP: 160 MMHG
VAS RIGHT CALF PRESSURE: 47 MMHG
VAS RIGHT DORSALIS PEDIS BP: 51 MMHG
VAS RIGHT HIGH THIGH PRESSURE: 65 MMHG
VAS RIGHT LOW THIGH PRESSURE: 52 MMHG
VAS RIGHT PTA BP: 47 MMHG

## 2025-04-14 PROCEDURE — 93923 UPR/LXTR ART STDY 3+ LVLS: CPT

## 2025-04-14 PROCEDURE — 2500000003 HC RX 250 WO HCPCS

## 2025-04-14 PROCEDURE — 75635 CT ANGIO ABDOMINAL ARTERIES: CPT

## 2025-04-14 PROCEDURE — 93923 UPR/LXTR ART STDY 3+ LVLS: CPT | Performed by: INTERNAL MEDICINE

## 2025-04-14 PROCEDURE — 6370000000 HC RX 637 (ALT 250 FOR IP): Performed by: INTERNAL MEDICINE

## 2025-04-14 PROCEDURE — 36415 COLL VENOUS BLD VENIPUNCTURE: CPT

## 2025-04-14 PROCEDURE — 6360000004 HC RX CONTRAST MEDICATION: Performed by: SURGERY

## 2025-04-14 PROCEDURE — 6370000000 HC RX 637 (ALT 250 FOR IP): Performed by: NURSE PRACTITIONER

## 2025-04-14 PROCEDURE — 1200000000 HC SEMI PRIVATE

## 2025-04-14 PROCEDURE — 6370000000 HC RX 637 (ALT 250 FOR IP)

## 2025-04-14 PROCEDURE — 80048 BASIC METABOLIC PNL TOTAL CA: CPT

## 2025-04-14 PROCEDURE — 93922 UPR/L XTREMITY ART 2 LEVELS: CPT

## 2025-04-14 PROCEDURE — 99222 1ST HOSP IP/OBS MODERATE 55: CPT | Performed by: SURGERY

## 2025-04-14 RX ORDER — TORSEMIDE 20 MG/1
20 TABLET ORAL DAILY
Qty: 30 TABLET | Refills: 1 | Status: SHIPPED | OUTPATIENT
Start: 2025-04-14

## 2025-04-14 RX ORDER — ROPINIROLE 0.5 MG/1
0.25 TABLET, FILM COATED ORAL NIGHTLY
Status: DISCONTINUED | OUTPATIENT
Start: 2025-04-14 | End: 2025-04-15

## 2025-04-14 RX ORDER — ROPINIROLE 0.25 MG/1
0.25 TABLET, FILM COATED ORAL NIGHTLY
Qty: 90 TABLET | Refills: 3 | Status: SHIPPED | OUTPATIENT
Start: 2025-04-14

## 2025-04-14 RX ORDER — IOPAMIDOL 755 MG/ML
110 INJECTION, SOLUTION INTRAVASCULAR
Status: COMPLETED | OUTPATIENT
Start: 2025-04-14 | End: 2025-04-14

## 2025-04-14 RX ADMIN — POTASSIUM CHLORIDE 20 MEQ: 1500 TABLET, EXTENDED RELEASE ORAL at 08:28

## 2025-04-14 RX ADMIN — SODIUM CHLORIDE, PRESERVATIVE FREE 10 ML: 5 INJECTION INTRAVENOUS at 08:29

## 2025-04-14 RX ADMIN — DILTIAZEM HYDROCHLORIDE 180 MG: 180 CAPSULE, EXTENDED RELEASE ORAL at 08:27

## 2025-04-14 RX ADMIN — ROPINIROLE HYDROCHLORIDE 0.25 MG: 0.5 TABLET, FILM COATED ORAL at 21:41

## 2025-04-14 RX ADMIN — Medication 10 MG: at 21:40

## 2025-04-14 RX ADMIN — APIXABAN 2.5 MG: 5 TABLET, FILM COATED ORAL at 21:40

## 2025-04-14 RX ADMIN — METOPROLOL SUCCINATE 25 MG: 25 TABLET, EXTENDED RELEASE ORAL at 21:41

## 2025-04-14 RX ADMIN — SODIUM CHLORIDE, PRESERVATIVE FREE 10 ML: 5 INJECTION INTRAVENOUS at 21:43

## 2025-04-14 RX ADMIN — METOPROLOL SUCCINATE 25 MG: 25 TABLET, EXTENDED RELEASE ORAL at 08:27

## 2025-04-14 RX ADMIN — TAMSULOSIN HYDROCHLORIDE 0.4 MG: 0.4 CAPSULE ORAL at 08:27

## 2025-04-14 RX ADMIN — APIXABAN 2.5 MG: 5 TABLET, FILM COATED ORAL at 08:28

## 2025-04-14 RX ADMIN — ASPIRIN 81 MG: 81 TABLET, COATED ORAL at 08:27

## 2025-04-14 RX ADMIN — IOPAMIDOL 110 ML: 755 INJECTION, SOLUTION INTRAVENOUS at 16:50

## 2025-04-14 NOTE — DISCHARGE INSTR - COC
Continuity of Care Form    Patient Name: Muna Izaguirre   :  1929  MRN:  [id number]    Admit date:  2025  Discharge date:  2025    Code Status Order: Full Code   Advance Directives:     Admitting Physician:  Jada Felder DO  PCP: Robby Castaneda MD    Discharging Nurse: Lyn Cain RN  Discharging Hospital Unit/Room#: 0347/0347-01  Discharging Unit Phone Number: [phone]    Emergency Contact:   Extended Emergency Contact Information  Primary Emergency Contact: KINDRA IZAGUIRRE  Home Phone: [phone]  Relation: Child  Secondary Emergency Contact: Libertad Meneses  Address: [address] [place] [address]  Home Phone: [phone]  Work Phone: [phone]  Mobile Phone: [phone]  Relation: Child    Past Surgical History:  Past Surgical History:   Procedure Laterality Date    COLONOSCOPY      tics    EYE SURGERY      HEMORRHOID SURGERY      HYSTERECTOMY (CERVIX STATUS UNKNOWN)         Immunization History:     There is no immunization history on file for this patient.    Active Problems:  Patient Active Problem List   Diagnosis Code    Atrial fibrillation with rapid ventricular response ([facility]) I48.91    Essential hypertension I10    Amaurosis fugax of right eye G45.3    Chronic diastolic CHF (congestive heart failure), NYHA class 3 ([facility]) I50.32    PAF (paroxysmal atrial fibrillation) ([facility]) I48.0    History of TIA (transient ischemic attack) Z86.73    SOB (shortness of breath) R06.02    Heart failure, diastolic, with acute decompensation ([facility]) I50.33    Hyponatremia E87.1    TEREZA (acute kidney injury) N17.9    CHF (congestive heart failure) ([facility]) I50.9    Atrial fibrillation with RVR ([facility]) I48.91    Stage 3a chronic kidney disease ([facility]) N18.31       Isolation/Infection:   Isolation            No Isolation          Patient Infection Status    None to display              Nurse Assessment:  Last Vital Signs: BP (!) 143/65   Pulse 77   Temp 98.2

## 2025-04-14 NOTE — DISCHARGE INSTRUCTIONS
Follow up with Primary Care Physician within 1-2 weeks.  Follow up with vascular surgery per their instructions.   Fluid restriction 1500 ml a day.

## 2025-04-14 NOTE — PLAN OF CARE
Problem: Safety - Adult  Goal: Free from fall injury  Outcome: Progressing  Flowsheets (Taken 4/13/2025 1514 by Kym Quan RN)  Free From Fall Injury: Instruct family/caregiver on patient safety     Problem: Chronic Conditions and Co-morbidities  Goal: Patient's chronic conditions and co-morbidity symptoms are monitored and maintained or improved  Outcome: Progressing  Flowsheets (Taken 4/13/2025 1514 by Kym Quan RN)  Care Plan - Patient's Chronic Conditions and Co-Morbidity Symptoms are Monitored and Maintained or Improved: Monitor and assess patient's chronic conditions and comorbid symptoms for stability, deterioration, or improvement     Problem: Pain  Goal: Verbalizes/displays adequate comfort level or baseline comfort level  Outcome: Progressing  Flowsheets (Taken 4/14/2025 1126)  Verbalizes/displays adequate comfort level or baseline comfort level:   Encourage patient to monitor pain and request assistance   Assess pain using appropriate pain scale   Administer analgesics based on type and severity of pain and evaluate response   Implement non-pharmacological measures as appropriate and evaluate response     Problem: ABCDS Injury Assessment  Goal: Absence of physical injury  Outcome: Progressing  Flowsheets (Taken 4/14/2025 1126)  Absence of Physical Injury: Implement safety measures based on patient assessment

## 2025-04-15 LAB
ANION GAP SERPL CALCULATED.3IONS-SCNC: 8 MMOL/L (ref 3–16)
BUN SERPL-MCNC: 19 MG/DL (ref 7–20)
CALCIUM SERPL-MCNC: 8.5 MG/DL (ref 8.3–10.6)
CHLORIDE SERPL-SCNC: 99 MMOL/L (ref 99–110)
CHOLEST SERPL-MCNC: 207 MG/DL (ref 0–199)
CO2 SERPL-SCNC: 26 MMOL/L (ref 21–32)
CREAT SERPL-MCNC: 1 MG/DL (ref 0.6–1.2)
EST. AVERAGE GLUCOSE BLD GHB EST-MCNC: 114 MG/DL
GFR SERPLBLD CREATININE-BSD FMLA CKD-EPI: 52 ML/MIN/{1.73_M2}
GLUCOSE SERPL-MCNC: 90 MG/DL (ref 70–99)
HBA1C MFR BLD: 5.6 %
HDLC SERPL-MCNC: 54 MG/DL (ref 40–60)
LDLC SERPL CALC-MCNC: 124 MG/DL
POTASSIUM SERPL-SCNC: 4.4 MMOL/L (ref 3.5–5.1)
SODIUM SERPL-SCNC: 133 MMOL/L (ref 136–145)
TRIGL SERPL-MCNC: 143 MG/DL (ref 0–150)
VLDLC SERPL CALC-MCNC: 29 MG/DL

## 2025-04-15 PROCEDURE — 6370000000 HC RX 637 (ALT 250 FOR IP)

## 2025-04-15 PROCEDURE — 2500000003 HC RX 250 WO HCPCS

## 2025-04-15 PROCEDURE — 80048 BASIC METABOLIC PNL TOTAL CA: CPT

## 2025-04-15 PROCEDURE — 6370000000 HC RX 637 (ALT 250 FOR IP): Performed by: NURSE PRACTITIONER

## 2025-04-15 PROCEDURE — 83036 HEMOGLOBIN GLYCOSYLATED A1C: CPT

## 2025-04-15 PROCEDURE — 80061 LIPID PANEL: CPT

## 2025-04-15 PROCEDURE — 1200000000 HC SEMI PRIVATE

## 2025-04-15 PROCEDURE — 6370000000 HC RX 637 (ALT 250 FOR IP): Performed by: STUDENT IN AN ORGANIZED HEALTH CARE EDUCATION/TRAINING PROGRAM

## 2025-04-15 PROCEDURE — 36415 COLL VENOUS BLD VENIPUNCTURE: CPT

## 2025-04-15 RX ORDER — REGADENOSON 0.08 MG/ML
0.4 INJECTION, SOLUTION INTRAVENOUS
Status: COMPLETED | OUTPATIENT
Start: 2025-04-15 | End: 2025-04-16

## 2025-04-15 RX ORDER — CARVEDILOL 3.12 MG/1
3.12 TABLET ORAL 2 TIMES DAILY
Status: DISCONTINUED | OUTPATIENT
Start: 2025-04-15 | End: 2025-04-18 | Stop reason: HOSPADM

## 2025-04-15 RX ORDER — CARVEDILOL 3.12 MG/1
3.12 TABLET ORAL 2 TIMES DAILY
Status: DISCONTINUED | OUTPATIENT
Start: 2025-04-15 | End: 2025-04-15

## 2025-04-15 RX ADMIN — SODIUM CHLORIDE, PRESERVATIVE FREE 10 ML: 5 INJECTION INTRAVENOUS at 21:06

## 2025-04-15 RX ADMIN — ASPIRIN 81 MG: 81 TABLET, COATED ORAL at 07:54

## 2025-04-15 RX ADMIN — SODIUM CHLORIDE, PRESERVATIVE FREE 10 ML: 5 INJECTION INTRAVENOUS at 07:55

## 2025-04-15 RX ADMIN — METOPROLOL SUCCINATE 25 MG: 25 TABLET, EXTENDED RELEASE ORAL at 07:54

## 2025-04-15 RX ADMIN — APIXABAN 2.5 MG: 5 TABLET, FILM COATED ORAL at 07:54

## 2025-04-15 RX ADMIN — CARVEDILOL 3.12 MG: 3.12 TABLET, FILM COATED ORAL at 21:05

## 2025-04-15 RX ADMIN — POLYETHYLENE GLYCOL 3350 17 G: 17 POWDER, FOR SOLUTION ORAL at 21:05

## 2025-04-15 RX ADMIN — TAMSULOSIN HYDROCHLORIDE 0.4 MG: 0.4 CAPSULE ORAL at 07:54

## 2025-04-15 RX ADMIN — Medication 10 MG: at 21:05

## 2025-04-15 RX ADMIN — APIXABAN 2.5 MG: 5 TABLET, FILM COATED ORAL at 21:05

## 2025-04-15 RX ADMIN — POTASSIUM CHLORIDE 20 MEQ: 1500 TABLET, EXTENDED RELEASE ORAL at 07:54

## 2025-04-15 RX ADMIN — DILTIAZEM HYDROCHLORIDE 180 MG: 180 CAPSULE, EXTENDED RELEASE ORAL at 07:54

## 2025-04-15 ASSESSMENT — PAIN SCALES - GENERAL
PAINLEVEL_OUTOF10: 0
PAINLEVEL_OUTOF10: 0

## 2025-04-15 NOTE — PLAN OF CARE
Problem: Safety - Adult  Goal: Free from fall injury  4/14/2025 2305 by Janine Garnett RN  Outcome: Progressing     Problem: Chronic Conditions and Co-morbidities  Goal: Patient's chronic conditions and co-morbidity symptoms are monitored and maintained or improved  4/14/2025 2305 by Janine Garnett, RN  Outcome: Progressing     Problem: Pain  Goal: Verbalizes/displays adequate comfort level or baseline comfort level  4/14/2025 2305 by Janine Garnett, RN  Outcome: Progressing     Problem: ABCDS Injury Assessment  Goal: Absence of physical injury  4/14/2025 2305 by Janine Garnett, RN  Outcome: Progressing

## 2025-04-15 NOTE — PLAN OF CARE
Problem: Safety - Adult  Goal: Free from fall injury  4/15/2025 0949 by Mary Gross RN  Outcome: Progressing  Flowsheets (Taken 4/13/2025 1514 by Kym Quan RN)  Free From Fall Injury: Instruct family/caregiver on patient safety     Problem: Chronic Conditions and Co-morbidities  Goal: Patient's chronic conditions and co-morbidity symptoms are monitored and maintained or improved  4/15/2025 0949 by Mary Gross RN  Outcome: Progressing  Flowsheets (Taken 4/13/2025 1514 by Kym Quan RN)  Care Plan - Patient's Chronic Conditions and Co-Morbidity Symptoms are Monitored and Maintained or Improved: Monitor and assess patient's chronic conditions and comorbid symptoms for stability, deterioration, or improvement     Problem: Pain  Goal: Verbalizes/displays adequate comfort level or baseline comfort level  4/15/2025 0949 by Mary Gross RN  Outcome: Progressing  Flowsheets (Taken 4/15/2025 0949)  Verbalizes/displays adequate comfort level or baseline comfort level:   Encourage patient to monitor pain and request assistance   Assess pain using appropriate pain scale   Administer analgesics based on type and severity of pain and evaluate response     Problem: ABCDS Injury Assessment  Goal: Absence of physical injury  4/15/2025 0949 by Mary Gross RN  Outcome: Progressing  Flowsheets (Taken 4/15/2025 0949)  Absence of Physical Injury: Implement safety measures based on patient assessment

## 2025-04-16 ENCOUNTER — CLINICAL DOCUMENTATION (OUTPATIENT)
Age: 89
End: 2025-04-16

## 2025-04-16 ENCOUNTER — APPOINTMENT (OUTPATIENT)
Age: 89
DRG: 644 | End: 2025-04-16
Attending: STUDENT IN AN ORGANIZED HEALTH CARE EDUCATION/TRAINING PROGRAM
Payer: MEDICARE

## 2025-04-16 ENCOUNTER — APPOINTMENT (OUTPATIENT)
Dept: NUCLEAR MEDICINE | Age: 89
DRG: 644 | End: 2025-04-16
Attending: STUDENT IN AN ORGANIZED HEALTH CARE EDUCATION/TRAINING PROGRAM
Payer: MEDICARE

## 2025-04-16 LAB
ECHO BSA: 1.63 M2
NUC STRESS EJECTION FRACTION: 83 %
NUC STRESS LV EDV: 36 ML (ref 56–104)
NUC STRESS LV ESV: 6 ML (ref 19–49)
NUC STRESS LV MASS: 73 G
STRESS BASELINE DIAS BP: 89 MMHG
STRESS BASELINE HR: 100 BPM
STRESS BASELINE SYS BP: 210 MMHG
STRESS ESTIMATED WORKLOAD: 1 METS
STRESS PEAK DIAS BP: 89 MMHG
STRESS PEAK SYS BP: 210 MMHG
STRESS PERCENT HR ACHIEVED: 92 %
STRESS POST PEAK HR: 116 BPM
STRESS RATE PRESSURE PRODUCT: ABNORMAL BPM*MMHG
STRESS TARGET HR: 126 BPM

## 2025-04-16 PROCEDURE — A9502 TC99M TETROFOSMIN: HCPCS | Performed by: INTERNAL MEDICINE

## 2025-04-16 PROCEDURE — 6370000000 HC RX 637 (ALT 250 FOR IP)

## 2025-04-16 PROCEDURE — A9502 TC99M TETROFOSMIN: HCPCS | Performed by: STUDENT IN AN ORGANIZED HEALTH CARE EDUCATION/TRAINING PROGRAM

## 2025-04-16 PROCEDURE — 6370000000 HC RX 637 (ALT 250 FOR IP): Performed by: STUDENT IN AN ORGANIZED HEALTH CARE EDUCATION/TRAINING PROGRAM

## 2025-04-16 PROCEDURE — 3430000000 HC RX DIAGNOSTIC RADIOPHARMACEUTICAL: Performed by: STUDENT IN AN ORGANIZED HEALTH CARE EDUCATION/TRAINING PROGRAM

## 2025-04-16 PROCEDURE — 6370000000 HC RX 637 (ALT 250 FOR IP): Performed by: INTERNAL MEDICINE

## 2025-04-16 PROCEDURE — 6360000002 HC RX W HCPCS: Performed by: STUDENT IN AN ORGANIZED HEALTH CARE EDUCATION/TRAINING PROGRAM

## 2025-04-16 PROCEDURE — 6370000000 HC RX 637 (ALT 250 FOR IP): Performed by: NURSE PRACTITIONER

## 2025-04-16 PROCEDURE — 1200000000 HC SEMI PRIVATE

## 2025-04-16 PROCEDURE — 93017 CV STRESS TEST TRACING ONLY: CPT

## 2025-04-16 PROCEDURE — 6360000002 HC RX W HCPCS: Performed by: NURSE PRACTITIONER

## 2025-04-16 PROCEDURE — 2500000003 HC RX 250 WO HCPCS

## 2025-04-16 PROCEDURE — 78452 HT MUSCLE IMAGE SPECT MULT: CPT

## 2025-04-16 PROCEDURE — 3430000000 HC RX DIAGNOSTIC RADIOPHARMACEUTICAL: Performed by: INTERNAL MEDICINE

## 2025-04-16 PROCEDURE — 51798 US URINE CAPACITY MEASURE: CPT

## 2025-04-16 RX ORDER — ROSUVASTATIN CALCIUM 10 MG/1
20 TABLET, COATED ORAL NIGHTLY
Status: DISCONTINUED | OUTPATIENT
Start: 2025-04-16 | End: 2025-04-18 | Stop reason: HOSPADM

## 2025-04-16 RX ORDER — AMLODIPINE BESYLATE 5 MG/1
5 TABLET ORAL DAILY
Status: DISCONTINUED | OUTPATIENT
Start: 2025-04-16 | End: 2025-04-18 | Stop reason: HOSPADM

## 2025-04-16 RX ORDER — HYDROXYZINE HYDROCHLORIDE 50 MG/ML
10 INJECTION, SOLUTION INTRAMUSCULAR ONCE
Status: COMPLETED | OUTPATIENT
Start: 2025-04-16 | End: 2025-04-16

## 2025-04-16 RX ADMIN — SODIUM CHLORIDE, PRESERVATIVE FREE 10 ML: 5 INJECTION INTRAVENOUS at 22:08

## 2025-04-16 RX ADMIN — SODIUM CHLORIDE, PRESERVATIVE FREE 10 ML: 5 INJECTION INTRAVENOUS at 14:34

## 2025-04-16 RX ADMIN — APIXABAN 2.5 MG: 5 TABLET, FILM COATED ORAL at 14:33

## 2025-04-16 RX ADMIN — CARVEDILOL 3.12 MG: 3.12 TABLET, FILM COATED ORAL at 22:07

## 2025-04-16 RX ADMIN — Medication 10 MG: at 22:07

## 2025-04-16 RX ADMIN — TAMSULOSIN HYDROCHLORIDE 0.4 MG: 0.4 CAPSULE ORAL at 14:32

## 2025-04-16 RX ADMIN — REGADENOSON 0.4 MG: 0.08 INJECTION, SOLUTION INTRAVENOUS at 12:48

## 2025-04-16 RX ADMIN — TETROFOSMIN 10.8 MILLICURIE: 1.38 INJECTION, POWDER, LYOPHILIZED, FOR SOLUTION INTRAVENOUS at 10:59

## 2025-04-16 RX ADMIN — ASPIRIN 81 MG: 81 TABLET, COATED ORAL at 14:32

## 2025-04-16 RX ADMIN — ROSUVASTATIN CALCIUM 20 MG: 10 TABLET, FILM COATED ORAL at 22:07

## 2025-04-16 RX ADMIN — CARVEDILOL 3.12 MG: 3.12 TABLET, FILM COATED ORAL at 14:33

## 2025-04-16 RX ADMIN — APIXABAN 2.5 MG: 5 TABLET, FILM COATED ORAL at 22:07

## 2025-04-16 RX ADMIN — AMLODIPINE BESYLATE 5 MG: 5 TABLET ORAL at 14:33

## 2025-04-16 RX ADMIN — HYDROXYZINE HYDROCHLORIDE 10 MG: 50 INJECTION, SOLUTION INTRAMUSCULAR at 03:40

## 2025-04-16 RX ADMIN — TETROFOSMIN 32.3 MILLICURIE: 1.38 INJECTION, POWDER, LYOPHILIZED, FOR SOLUTION INTRAVENOUS at 12:48

## 2025-04-16 RX ADMIN — POTASSIUM CHLORIDE 20 MEQ: 1500 TABLET, EXTENDED RELEASE ORAL at 14:32

## 2025-04-16 RX ADMIN — TORSEMIDE 20 MG: 20 TABLET ORAL at 14:32

## 2025-04-16 RX ADMIN — DILTIAZEM HYDROCHLORIDE 180 MG: 180 CAPSULE, EXTENDED RELEASE ORAL at 14:32

## 2025-04-16 ASSESSMENT — PAIN SCALES - GENERAL
PAINLEVEL_OUTOF10: 0

## 2025-04-16 NOTE — PLAN OF CARE
Urology consulted for renal mass  Patient was off the floor for testing during rounds on 4/16/25  Will see patient during rounds on 4/17/25    Lisa Jerez PA-C  [facility]

## 2025-04-16 NOTE — PLAN OF CARE
Problem: Safety - Adult  Goal: Free from fall injury  4/15/2025 2249 by Kaylie Harrison RN  Outcome: Progressing  4/15/2025 0949 by Mary Gross RN  Outcome: Progressing  Flowsheets (Taken 4/13/2025 1514 by Kym Quan RN)  Free From Fall Injury: Instruct family/caregiver on patient safety     Problem: Chronic Conditions and Co-morbidities  Goal: Patient's chronic conditions and co-morbidity symptoms are monitored and maintained or improved  4/15/2025 2249 by Kaylie Harrison RN  Outcome: Progressing  4/15/2025 0949 by Mary Gross RN  Outcome: Progressing  Flowsheets (Taken 4/13/2025 1514 by Kym Quan RN)  Care Plan - Patient's Chronic Conditions and Co-Morbidity Symptoms are Monitored and Maintained or Improved: Monitor and assess patient's chronic conditions and comorbid symptoms for stability, deterioration, or improvement     Problem: Pain  Goal: Verbalizes/displays adequate comfort level or baseline comfort level  4/15/2025 2249 by Kaylie Harrison RN  Outcome: Progressing  4/15/2025 0949 by Mary Gross RN  Outcome: Progressing  Flowsheets (Taken 4/15/2025 0949)  Verbalizes/displays adequate comfort level or baseline comfort level:   Encourage patient to monitor pain and request assistance   Assess pain using appropriate pain scale   Administer analgesics based on type and severity of pain and evaluate response     Problem: ABCDS Injury Assessment  Goal: Absence of physical injury  4/15/2025 2249 by Kaylie Harrison RN  Outcome: Progressing  4/15/2025 0949 by Mary Gross RN  Outcome: Progressing  Flowsheets (Taken 4/15/2025 0949)  Absence of Physical Injury: Implement safety measures based on patient assessment      Patient states she had 80% decrease in pain.  She would like to have the 2nd block done asap.

## 2025-04-16 NOTE — PLAN OF CARE
Problem: Safety - Adult  Goal: Free from fall injury  Outcome: Progressing  Flowsheets (Taken 4/16/2025 1659)  Free From Fall Injury:   Instruct family/caregiver on patient safety   Based on caregiver fall risk screen, instruct family/caregiver to ask for assistance with transferring infant if caregiver noted to have fall risk factors     Problem: Pain  Goal: Verbalizes/displays adequate comfort level or baseline comfort level  Outcome: Progressing  Flowsheets (Taken 4/16/2025 3479)  Verbalizes/displays adequate comfort level or baseline comfort level:   Encourage patient to monitor pain and request assistance   Assess pain using appropriate pain scale   Implement non-pharmacological measures as appropriate and evaluate response   Administer analgesics based on type and severity of pain and evaluate response     Problem: ABCDS Injury Assessment  Goal: Absence of physical injury  Outcome: Progressing  Flowsheets (Taken 4/15/2025 0949 by Mary Gross, RN)  Absence of Physical Injury: Implement safety measures based on patient assessment

## 2025-04-16 NOTE — CONSULTS
CARDIOLOGY CONSULTATION        Patient Name: Muna Simon  Date of admission: 4/11/2025  1:47 PM  Admission Dx: Hyponatremia [E87.1]  Other chronic pain [G89.29]  Other fatigue [R53.83]  Requesting Physician: Jada Felder DO  Primary Care physician: Robby Castaneda MD    Reason for Consultation/Chief Complaint: Preoperative cardiovascular risk stratification for aortobifemoral bypass  Patient follows with Dr. Cortez Sánchez- [facility]    History of Present Illness:     Muna Simon is a 89 y.o. patient with past med history of atrial fibrillation, PACs, amaurosis fugax of right eye CKD, hyperlipidemia, hypertension, lumbosacral spondylosis without myelopathy, osteoarthritis, former smoker approximate 70-pack-year history quit 10 years ago who presented to the hospital with complaints of lower extremity pain/claudication.  Patient was found to have severe PAD by ABIs.  CTA abdominal aorta with runoff revealed extensive inflow disease involving the bilateral common iliac, external iliac, common femoral and superficial femoral arteries.  Vascular surgery was consulted and plan is for aortofemoral bypass sometime next week per discussion with Dr. Metz.  Additionally, there is an incidental finding of an enhancing right renal lesion mixed solid/cystic component representing renal cell carcinoma.    Patient's son also at bedside. Patient states over the last 6 months she was having limiting claudication of both legs described as cramping with exertion.  Improved with rest.  She states this affected her activities of daily living.  She states she used to exercise and is no longer able to do this.  She denies chest pain, shortness of breath, palpitations, dizziness, lightheadedness, near-syncope or syncope.  Denies lower extreme edema or weight gain.  Patient denies hematuria or other bleeding.      Past Medical History:   has a past medical history of Arthritis, Atrial 
     Urology Consult Note      Reason for Consultation: renal mass     Chief Complaint: \"No urinary complaints\"  HPI:  Muna is a 89 y.o. female who presented to the hospital with complaint of cramping in her legs.  She is admitted for hyponatremia and PAD.  Imaging obtained during admission revealed enhancing mixed solid and cystic right   renal lesion which measures 2.5 x 2.3 x 2.6 cm concerning for renal cell carcinoma.  Urology is consulted for recommendations regarding this finding.  THe patient denies prior  history, she denies hematuria or flank pain, she denies any urgency or frequency.  No prior abdominal imaging to review       Past Medical History:   Diagnosis Date    Arthritis     Atrial fibrillation (HCC)     Back fracture     stress fracture active 4/17/15    CHF (congestive heart failure) (HCC)     HTN (hypertension)     Hyperlipidemia     Panic attack        Past Surgical History:   Procedure Laterality Date    COLONOSCOPY  2015    tics    EYE SURGERY      HEMORRHOID SURGERY      HYSTERECTOMY (CERVIX STATUS UNKNOWN)         Medication List reviewed:      Current Facility-Administered Medications   Medication Dose Route Frequency Provider Last Rate Last Admin    carvedilol (COREG) tablet 3.125 mg  3.125 mg Oral BID Brendan Shea DO   3.125 mg at 04/15/25 2105    regadenoson (LEXISCAN) injection 0.4 mg  0.4 mg IntraVENous ONCE PRN Brendan Shea DO        apixaban (ELIQUIS) tablet 2.5 mg  2.5 mg Oral BID Jada Felder DO   2.5 mg at 04/15/25 2105    aspirin EC tablet 81 mg  81 mg Oral Daily Jada Felder DO   81 mg at 04/15/25 0754    dilTIAZem (CARDIZEM CD) extended release capsule 180 mg  180 mg Oral Daily Jada Felder DO   180 mg at 04/15/25 0754    melatonin disintegrating tablet 10 mg  10 mg Oral Nightly Jada Felder DO   10 mg at 04/15/25 2105    tamsulosin (FLOMAX) capsule 0.4 mg  0.4 mg Oral Daily Jada Felder DO   0.4 mg at 04/15/25 0754    torsemide 
  [facility]           Vascular Surgery Consultation  Muna Simon  Medical Record #: [id number]  YOB: 1929      Date of Admission:  4/11/2025  1:47 PM  Date of Consultation:  4/14/2025      PCP:  Robby Castaneda MD       Chief Complaint: extreme weakness and bilat lower extremity pain and cramping    History of Present Illness:   We are asked to see this patient in consultation by SHIELA Haile MD  regarding claudication with decreased CIRILO.     Muna Simon is a 89 y.o. female with a past medical history of paroxysmal atrial fibrillation (on OAC), congestive heart failure and hypertension who presented to [facility] ED for cramping and pain in her lower extremities. She stated this cramping has been going on for quite some time, about 6 years, has has progressively been increasing in intensity and frequency.  She lives alone in her house, cooks, cleans but has found it difficult cooking for prolonged standing.  In the ED she was noted to have an elevated BNP, troponin and was hyponatremic. She was admitted for further evaluation and treatment. Today she had a lower extremity arterial duplex which demonstrated severe bilateral arterial insufficiency. Vascular surgery was consulted.       Past Medical History:   Diagnosis Date    Arthritis     Atrial fibrillation (HCC)     Back fracture     stress fracture active 4/17/15    CHF (congestive heart failure) (HCC)     HTN (hypertension)     Hyperlipidemia     Panic attack    CKD 3b  Iron deficiency anemia  Chronic nausea  Hyponatremia  Depression  Diverticulosis    Past Surgical History:   Procedure Laterality Date    COLONOSCOPY  2015    tics    EYE SURGERY      HEMORRHOID SURGERY      HYSTERECTOMY (CERVIX STATUS UNKNOWN)         Home Medications:   Prior to Admission medications    Medication Sig Start Date End Date Taking? Authorizing Provider   rOPINIRole (REQUIP) 0.25 MG tablet Take 1 tablet by mouth nightly 
Consult Call Back    Who:Brendan Shea, DO   Date:4/15/2025,  Time:12:47 PM    Electronically signed by Nicolasa Leon on 4/15/25 at 12:47 PM EDT     
Consult Placed     Who: Dr. Metz  Date: 4/14/2025  Time: 12:27 PM       Electronically signed by Nicolasa Leon on 4/14/2025 at 12:27 PM  
Consult Placed     Who: Lisa Jerez  Date: 4/15/2025  Time: 12:46 PM       Electronically signed by Nicolasa Leon on 4/15/2025 at 12:45 PM  
Consult Placed   Consult called to Elizabeth  Who: MMA Card  Date: 4/15/2025  Time: 8:35 AM       Electronically signed by Nicolasa Leon on 4/15/2025 at 8:35 AM  
file   Tobacco Use    Smoking status: Former    Smokeless tobacco: Never   Vaping Use    Vaping status: Never Used   Substance and Sexual Activity    Alcohol use: Not Currently    Drug use: Never    Sexual activity: Not Currently     Partners: Male   Other Topics Concern    Not on file   Social History Narrative    Not on file     Social Drivers of Health     Financial Resource Strain: Not on file   Food Insecurity: Not At Risk (4/25/2024)    [facility]    Food Insecurities     Within the past 12 months, did you worry that your food would run out before you got money to buy more?: No     Within the past 12 months, did the food you bought just not last and you didn't have money to get more?: No   Transportation Needs: Not At Risk (4/25/2024)    [facility] [email or web address]    Transportation     Within the past 12 months, has a lack of transportation kept you from medical appointments or from doing things needed for daily living?: No   Physical Activity: Not on file   Stress: Not on file   Social Connections: Not on file   Intimate Partner Violence: Not At Risk (4/25/2024)    Received from [facility] [email or web address] [facility]    Interpersonal Safety     Do you feel physically or emotionally unsafe where you currently live?: No     Within the past 12 months, have you been hit, slapped, kicked or otherwise physically hurt by anyone? : No     Within the past 12 months, have you been hit, slapped, kicked or otherwise physically hurt by anyone? : No   Housing Stability: Not At Risk (4/25/2024)    Received from [facility]    Housing/Utilities     Are you worried about losing your housing? : No     Within the past 12 months, have you ever stayed: outside, in a car, in a tent, in an overnight shelter, or temporarily in someone else's home (i.e. couch-surfing)?: No     Within the past 12 months, have you been unable to get

## 2025-04-17 ENCOUNTER — TELEPHONE (OUTPATIENT)
Dept: CARDIOLOGY CLINIC | Age: 89
End: 2025-04-17

## 2025-04-17 ENCOUNTER — APPOINTMENT (OUTPATIENT)
Age: 89
DRG: 644 | End: 2025-04-17
Attending: STUDENT IN AN ORGANIZED HEALTH CARE EDUCATION/TRAINING PROGRAM
Payer: MEDICARE

## 2025-04-17 LAB
ABO/RH: NORMAL
ANTIBODY SCREEN: NORMAL
ECHO AO ASC DIAM: 2.3 CM
ECHO AO ASCENDING AORTA INDEX: 1.42 CM/M2
ECHO AO ROOT DIAM: 2.6 CM
ECHO AO ROOT INDEX: 1.6 CM/M2
ECHO AV AREA PEAK VELOCITY: 1.9 CM2
ECHO AV AREA VTI: 2 CM2
ECHO AV AREA/BSA PEAK VELOCITY: 1.2 CM2/M2
ECHO AV AREA/BSA VTI: 1.2 CM2/M2
ECHO AV CUSP MM: 1.3 CM
ECHO AV MEAN GRADIENT: 7 MMHG
ECHO AV MEAN VELOCITY: 1.2 M/S
ECHO AV PEAK GRADIENT: 12 MMHG
ECHO AV PEAK VELOCITY: 1.7 M/S
ECHO AV VELOCITY RATIO: 0.82
ECHO AV VTI: 33.4 CM
ECHO BSA: 1.63 M2
ECHO EST RA PRESSURE: 3 MMHG
ECHO LA AREA 2C: 24 CM2
ECHO LA AREA 4C: 23.4 CM2
ECHO LA DIAMETER INDEX: 2.96 CM/M2
ECHO LA DIAMETER: 4.8 CM
ECHO LA MAJOR AXIS: 5.8 CM
ECHO LA MINOR AXIS: 5.5 CM
ECHO LA TO AORTIC ROOT RATIO: 1.85
ECHO LA VOL BP: 82 ML (ref 22–52)
ECHO LA VOL MOD A2C: 85 ML (ref 22–52)
ECHO LA VOL MOD A4C: 74 ML (ref 22–52)
ECHO LA VOL/BSA BIPLANE: 51 ML/M2 (ref 16–34)
ECHO LA VOLUME INDEX MOD A2C: 52 ML/M2 (ref 16–34)
ECHO LA VOLUME INDEX MOD A4C: 46 ML/M2 (ref 16–34)
ECHO LV E' LATERAL VELOCITY: 9.16 CM/S
ECHO LV E' SEPTAL VELOCITY: 7.46 CM/S
ECHO LV EDV A2C: 49 ML
ECHO LV EDV A4C: 43 ML
ECHO LV EDV INDEX A4C: 27 ML/M2
ECHO LV EDV NDEX A2C: 30 ML/M2
ECHO LV EF PHYSICIAN: 58 %
ECHO LV EJECTION FRACTION A2C: 56 %
ECHO LV EJECTION FRACTION A4C: 57 %
ECHO LV EJECTION FRACTION BIPLANE: 57 % (ref 55–100)
ECHO LV ESV A2C: 22 ML
ECHO LV ESV A4C: 19 ML
ECHO LV ESV INDEX A2C: 14 ML/M2
ECHO LV ESV INDEX A4C: 12 ML/M2
ECHO LV FRACTIONAL SHORTENING: 33 % (ref 28–44)
ECHO LV INTERNAL DIMENSION DIASTOLE INDEX: 2.22 CM/M2
ECHO LV INTERNAL DIMENSION DIASTOLIC: 3.6 CM (ref 3.9–5.3)
ECHO LV INTERNAL DIMENSION SYSTOLIC INDEX: 1.48 CM/M2
ECHO LV INTERNAL DIMENSION SYSTOLIC: 2.4 CM
ECHO LV ISOVOLUMETRIC RELAXATION TIME (IVRT): 63 MS
ECHO LV IVSD: 0.9 CM (ref 0.6–0.9)
ECHO LV MASS 2D: 92.8 G (ref 67–162)
ECHO LV MASS INDEX 2D: 57.3 G/M2 (ref 43–95)
ECHO LV POSTERIOR WALL DIASTOLIC: 0.9 CM (ref 0.6–0.9)
ECHO LV RELATIVE WALL THICKNESS RATIO: 0.5
ECHO LVOT AREA: 2.3 CM2
ECHO LVOT AV VTI INDEX: 0.87
ECHO LVOT DIAM: 1.7 CM
ECHO LVOT MEAN GRADIENT: 5 MMHG
ECHO LVOT PEAK GRADIENT: 8 MMHG
ECHO LVOT PEAK VELOCITY: 1.4 M/S
ECHO LVOT STROKE VOLUME INDEX: 40.5 ML/M2
ECHO LVOT SV: 65.6 ML
ECHO LVOT VTI: 28.9 CM
ECHO MV A VELOCITY: 0.69 M/S
ECHO MV AREA VTI: 2.5 CM2
ECHO MV E DECELERATION TIME (DT): 214 MS
ECHO MV E VELOCITY: 0.96 M/S
ECHO MV E/A RATIO: 1.39
ECHO MV E/E' LATERAL: 10.48
ECHO MV E/E' RATIO (AVERAGED): 11.67
ECHO MV E/E' SEPTAL: 12.87
ECHO MV LVOT VTI INDEX: 0.91
ECHO MV MAX VELOCITY: 1.2 M/S
ECHO MV MEAN GRADIENT: 2 MMHG
ECHO MV MEAN VELOCITY: 0.7 M/S
ECHO MV PEAK GRADIENT: 6 MMHG
ECHO MV VTI: 26.2 CM
ECHO RA AREA 4C: 8 CM2
ECHO RA END SYSTOLIC VOLUME APICAL 4 CHAMBER INDEX BSA: 7 ML/M2
ECHO RA VOLUME: 11 ML
ECHO RV BASAL DIMENSION: 2.8 CM
ECHO RV FREE WALL PEAK S': 15.7 CM/S
ECHO RV LONGITUDINAL DIMENSION: 6.1 CM
ECHO RV MID DIMENSION: 2.1 CM
ECHO RV TAPSE: 2.2 CM (ref 1.7–?)

## 2025-04-17 PROCEDURE — 97116 GAIT TRAINING THERAPY: CPT

## 2025-04-17 PROCEDURE — 6370000000 HC RX 637 (ALT 250 FOR IP): Performed by: INTERNAL MEDICINE

## 2025-04-17 PROCEDURE — 86901 BLOOD TYPING SEROLOGIC RH(D): CPT

## 2025-04-17 PROCEDURE — 97530 THERAPEUTIC ACTIVITIES: CPT

## 2025-04-17 PROCEDURE — 93306 TTE W/DOPPLER COMPLETE: CPT

## 2025-04-17 PROCEDURE — 6370000000 HC RX 637 (ALT 250 FOR IP): Performed by: NURSE PRACTITIONER

## 2025-04-17 PROCEDURE — 6370000000 HC RX 637 (ALT 250 FOR IP): Performed by: STUDENT IN AN ORGANIZED HEALTH CARE EDUCATION/TRAINING PROGRAM

## 2025-04-17 PROCEDURE — 86900 BLOOD TYPING SEROLOGIC ABO: CPT

## 2025-04-17 PROCEDURE — 86850 RBC ANTIBODY SCREEN: CPT

## 2025-04-17 PROCEDURE — 1200000000 HC SEMI PRIVATE

## 2025-04-17 PROCEDURE — 6370000000 HC RX 637 (ALT 250 FOR IP)

## 2025-04-17 PROCEDURE — 2500000003 HC RX 250 WO HCPCS

## 2025-04-17 PROCEDURE — 36415 COLL VENOUS BLD VENIPUNCTURE: CPT

## 2025-04-17 PROCEDURE — 97110 THERAPEUTIC EXERCISES: CPT

## 2025-04-17 RX ORDER — ASPIRIN 81 MG/1
81 TABLET ORAL DAILY
Qty: 30 TABLET | Refills: 3 | Status: SHIPPED | OUTPATIENT
Start: 2025-04-18

## 2025-04-17 RX ORDER — AMLODIPINE BESYLATE 5 MG/1
5 TABLET ORAL DAILY
Qty: 30 TABLET | Refills: 3 | Status: SHIPPED | OUTPATIENT
Start: 2025-04-18

## 2025-04-17 RX ORDER — ROSUVASTATIN CALCIUM 20 MG/1
20 TABLET, COATED ORAL NIGHTLY
Qty: 30 TABLET | Refills: 3 | Status: SHIPPED | OUTPATIENT
Start: 2025-04-17 | End: 2025-04-18

## 2025-04-17 RX ORDER — CARVEDILOL 3.12 MG/1
3.12 TABLET ORAL 2 TIMES DAILY
Qty: 60 TABLET | Refills: 3 | Status: SHIPPED | OUTPATIENT
Start: 2025-04-17

## 2025-04-17 RX ADMIN — CARVEDILOL 3.12 MG: 3.12 TABLET, FILM COATED ORAL at 11:35

## 2025-04-17 RX ADMIN — DILTIAZEM HYDROCHLORIDE 180 MG: 180 CAPSULE, EXTENDED RELEASE ORAL at 11:35

## 2025-04-17 RX ADMIN — POLYETHYLENE GLYCOL 3350 17 G: 17 POWDER, FOR SOLUTION ORAL at 12:49

## 2025-04-17 RX ADMIN — ASPIRIN 81 MG: 81 TABLET, COATED ORAL at 12:49

## 2025-04-17 RX ADMIN — Medication 10 MG: at 20:18

## 2025-04-17 RX ADMIN — AMLODIPINE BESYLATE 5 MG: 5 TABLET ORAL at 11:35

## 2025-04-17 RX ADMIN — ROSUVASTATIN CALCIUM 20 MG: 10 TABLET, FILM COATED ORAL at 20:19

## 2025-04-17 RX ADMIN — SODIUM CHLORIDE, PRESERVATIVE FREE 10 ML: 5 INJECTION INTRAVENOUS at 20:19

## 2025-04-17 RX ADMIN — APIXABAN 2.5 MG: 5 TABLET, FILM COATED ORAL at 12:49

## 2025-04-17 RX ADMIN — TORSEMIDE 20 MG: 20 TABLET ORAL at 11:35

## 2025-04-17 RX ADMIN — SODIUM CHLORIDE, PRESERVATIVE FREE 10 ML: 5 INJECTION INTRAVENOUS at 11:38

## 2025-04-17 RX ADMIN — CARVEDILOL 3.12 MG: 3.12 TABLET, FILM COATED ORAL at 20:18

## 2025-04-17 RX ADMIN — POTASSIUM CHLORIDE 20 MEQ: 1500 TABLET, EXTENDED RELEASE ORAL at 11:35

## 2025-04-17 RX ADMIN — APIXABAN 2.5 MG: 5 TABLET, FILM COATED ORAL at 20:18

## 2025-04-17 RX ADMIN — TAMSULOSIN HYDROCHLORIDE 0.4 MG: 0.4 CAPSULE ORAL at 11:35

## 2025-04-17 ASSESSMENT — PAIN SCALES - GENERAL
PAINLEVEL_OUTOF10: 0

## 2025-04-17 NOTE — PLAN OF CARE
Problem: Safety - Adult  Goal: Free from fall injury  4/17/2025 0057 by Kaylie Harrison RN  Outcome: Progressing  4/16/2025 1659 by Britany Ojeda RN  Outcome: Progressing  Flowsheets (Taken 4/16/2025 1659)  Free From Fall Injury:   Instruct family/caregiver on patient safety   Based on caregiver fall risk screen, instruct family/caregiver to ask for assistance with transferring infant if caregiver noted to have fall risk factors     Problem: Chronic Conditions and Co-morbidities  Goal: Patient's chronic conditions and co-morbidity symptoms are monitored and maintained or improved  Outcome: Progressing     Problem: Pain  Goal: Verbalizes/displays adequate comfort level or baseline comfort level  4/17/2025 0057 by Kaylie Harrison RN  Outcome: Progressing  4/16/2025 1659 by Britany Ojeda RN  Outcome: Progressing  Flowsheets (Taken 4/16/2025 1659)  Verbalizes/displays adequate comfort level or baseline comfort level:   Encourage patient to monitor pain and request assistance   Assess pain using appropriate pain scale   Implement non-pharmacological measures as appropriate and evaluate response   Administer analgesics based on type and severity of pain and evaluate response     Problem: ABCDS Injury Assessment  Goal: Absence of physical injury  4/17/2025 0057 by Kaylie Harrison RN  Outcome: Progressing  4/16/2025 1659 by Britany Ojeda RN  Outcome: Progressing  Flowsheets (Taken 4/15/2025 0949 by Mary Gross RN)  Absence of Physical Injury: Implement safety measures based on patient assessment

## 2025-04-17 NOTE — PLAN OF CARE
Problem: Pain  Goal: Verbalizes/displays adequate comfort level or baseline comfort level  4/17/2025 1908 by Lyn Cain, RN  Outcome: Progressing  4/17/2025 1347 by Lyn Cain, RN  Outcome: Progressing     Problem: Safety - Adult  Goal: Free from fall injury  4/17/2025 1908 by Lyn Cain, RN  Outcome: Progressing  4/17/2025 1347 by Lyn Cain, RN  Outcome: Progressing

## 2025-04-17 NOTE — PLAN OF CARE
Problem: Pain  Goal: Verbalizes/displays adequate comfort level or baseline comfort level  4/17/2025 1347 by Lyn Cain RN  Outcome: Progressing  4/17/2025 0057 by Kaylie Harrison RN  Outcome: Progressing     Problem: Safety - Adult  Goal: Free from fall injury  4/17/2025 1347 by Lyn Cain RN  Outcome: Progressing  4/17/2025 0057 by Kaylie Harrison RN  Outcome: Progressing     Problem: ABCDS Injury Assessment  Goal: Absence of physical injury  4/17/2025 1347 by Lyn Cain RN  Outcome: Progressing  4/17/2025 0057 by Kaylie Harrison RN  Outcome: Progressing

## 2025-04-17 NOTE — TELEPHONE ENCOUNTER
[facility][birth year][facility]1929[facility] Rm 347 please call [facility] Lyn [phone] - ty mary dos santos

## 2025-04-18 VITALS
DIASTOLIC BLOOD PRESSURE: 56 MMHG | TEMPERATURE: 97.9 F | RESPIRATION RATE: 18 BRPM | BODY MASS INDEX: 23.39 KG/M2 | OXYGEN SATURATION: 97 % | WEIGHT: 132 LBS | HEART RATE: 79 BPM | HEIGHT: 63 IN | SYSTOLIC BLOOD PRESSURE: 137 MMHG

## 2025-04-18 LAB
ALBUMIN SERPL-MCNC: 3.8 G/DL (ref 3.4–5)
ANION GAP SERPL CALCULATED.3IONS-SCNC: 10 MMOL/L (ref 3–16)
BUN SERPL-MCNC: 21 MG/DL (ref 7–20)
CALCIUM SERPL-MCNC: 8.5 MG/DL (ref 8.3–10.6)
CHLORIDE SERPL-SCNC: 95 MMOL/L (ref 99–110)
CO2 SERPL-SCNC: 25 MMOL/L (ref 21–32)
CREAT SERPL-MCNC: 1.1 MG/DL (ref 0.6–1.2)
GFR SERPLBLD CREATININE-BSD FMLA CKD-EPI: 46 ML/MIN/{1.73_M2}
GLUCOSE SERPL-MCNC: 97 MG/DL (ref 70–99)
PHOSPHATE SERPL-MCNC: 3.3 MG/DL (ref 2.5–4.9)
POTASSIUM SERPL-SCNC: 4.3 MMOL/L (ref 3.5–5.1)
SODIUM SERPL-SCNC: 130 MMOL/L (ref 136–145)

## 2025-04-18 PROCEDURE — 2500000003 HC RX 250 WO HCPCS

## 2025-04-18 PROCEDURE — 6370000000 HC RX 637 (ALT 250 FOR IP): Performed by: INTERNAL MEDICINE

## 2025-04-18 PROCEDURE — 80069 RENAL FUNCTION PANEL: CPT

## 2025-04-18 PROCEDURE — 6370000000 HC RX 637 (ALT 250 FOR IP)

## 2025-04-18 PROCEDURE — 36415 COLL VENOUS BLD VENIPUNCTURE: CPT

## 2025-04-18 PROCEDURE — 6370000000 HC RX 637 (ALT 250 FOR IP): Performed by: STUDENT IN AN ORGANIZED HEALTH CARE EDUCATION/TRAINING PROGRAM

## 2025-04-18 PROCEDURE — 6370000000 HC RX 637 (ALT 250 FOR IP): Performed by: NURSE PRACTITIONER

## 2025-04-18 RX ORDER — ROSUVASTATIN CALCIUM 10 MG/1
10 TABLET, COATED ORAL NIGHTLY
Qty: 30 TABLET | Refills: 0 | Status: SHIPPED | OUTPATIENT
Start: 2025-04-18

## 2025-04-18 RX ADMIN — SODIUM CHLORIDE, PRESERVATIVE FREE 10 ML: 5 INJECTION INTRAVENOUS at 09:01

## 2025-04-18 RX ADMIN — TAMSULOSIN HYDROCHLORIDE 0.4 MG: 0.4 CAPSULE ORAL at 09:01

## 2025-04-18 RX ADMIN — CARVEDILOL 3.12 MG: 3.12 TABLET, FILM COATED ORAL at 09:01

## 2025-04-18 RX ADMIN — APIXABAN 2.5 MG: 5 TABLET, FILM COATED ORAL at 09:01

## 2025-04-18 RX ADMIN — POTASSIUM CHLORIDE 20 MEQ: 1500 TABLET, EXTENDED RELEASE ORAL at 09:01

## 2025-04-18 RX ADMIN — POLYETHYLENE GLYCOL 3350 17 G: 17 POWDER, FOR SOLUTION ORAL at 09:00

## 2025-04-18 RX ADMIN — ASPIRIN 81 MG: 81 TABLET, COATED ORAL at 09:01

## 2025-04-18 RX ADMIN — DILTIAZEM HYDROCHLORIDE 180 MG: 180 CAPSULE, EXTENDED RELEASE ORAL at 09:00

## 2025-04-18 RX ADMIN — AMLODIPINE BESYLATE 5 MG: 5 TABLET ORAL at 09:01

## 2025-04-18 RX ADMIN — TORSEMIDE 20 MG: 20 TABLET ORAL at 09:01

## 2025-04-18 NOTE — CARE COORDINATION
CASE MANAGEMENT DISCHARGE SUMMARY      Discharge to: home with [facility]      IMM given: 4/18/25        Transportation: PVT     Confirmed discharge plan with:     Patient: yes     Family:  yes     Facility/Agency, name:  [facility] faxed pulled per [facility]       RN, name: nicole Toribio, RN        
    CASE MANAGEMENT DISCHARGE SUMMARY      Discharge to: home with [facility] SN/PT/OT    IMM given: 4/16/25    New Durable Medical Equipment ordered/agency: none    Transportation: PVT     Confirmed discharge plan with:     Patient: yes      Family:  yes     Facility/Agency, name:  [facility] faxed pulled per [facility] Dr Orozco is PCP        RN, name: Lyn Toribio RN          
Chart reviewed day 4. Care managed by vascular, cards, urology and IM. Need cardiac clearance for any vascular intervention. Plan for lexiscan stress and ECHO. Urology consult pending. Pending work up, may consider d/c a bring back in near future for vascular repair. From home alone. IPTA hs support of kids. [facility] following for any DCP needs.   
Chart reviewed day 5. Spoke with patient and son bedside. Plan for cardiac clearance prior to vascular bypass. Therapy with recs for [facility], [facility] following.Josesito Toribio RN    
needed at discharge: N/A            Potential DME:    Patient expects to discharge to: House  Plan for transportation at discharge:      Financial    Payor: MEDICARE / Plan: MEDICARE PART A AND B / Product Type: *No Product type* /     Does insurance require precert for SNF: No    Potential assistance Purchasing Medications:    Meds-to-Beds request:        [facility] [id number] - [place], [place] - [address] - P [phone] - F [phone]  [address] [address]  Phone: [phone] Fax: [phone]      Notes:    Factors facilitating achievement of predicted outcomes: Family support, Motivated, Cooperative, Pleasant, Sense of humor, Good insight into deficits, and Has needed Durable Medical Equipment at home    Barriers to discharge: Limited family support and Decreased endurance    Additional Case Management Notes: spoke with patient and son. Stated lives in ranch style home alone. 2 OTTO. Uses no DME although has a walker and w/c in the home. IADL's. Kids assists with transportation. Vascular consult pending. Agreeable to [facility] no preference of agency. Referral to [facility]. Can accept. Josesito Toribio RN      The Plan for Transition of Care is related to the following treatment goals of Hyponatremia [E87.1]  Other chronic pain [G89.29]  Other fatigue [R53.83]    IF APPLICABLE: The Patient and/or patient representative Muna and her family were provided with a choice of provider and agrees with the discharge plan. Freedom of choice list with basic dialogue that supports the patient's individualized plan of care/goals and shares the quality data associated with the providers was provided to:     Patient Representative Name:       The Patient and/or Patient Representative Agree with the Discharge Plan?      Josesito Toribio RN  Case Management Department

## 2025-04-18 NOTE — TELEPHONE ENCOUNTER
Appointment made - please inform it has to be at [facility] due to [facility] not having office at [place] unless can make an appt with a NP.

## 2025-04-18 NOTE — DISCHARGE SUMMARY
PROGRESS NOTE - the patient was not discharged on this day because we were awaiting CTA results.  Please see my note from the following day.     Name:  Muna Simon /Age/Sex: 1929 (89 y.o. female)   Admit Date: 2025  Discharge Date: ?   MRN & CSN:  [id number] & [id number] Encounter Date and Time 25 8:16 AM EDT    Attending:  Adelaida Haile MD Discharging Provider: ADELAIDA HAILE MD       Hospital Course:       \"Patient is a 89-year-old female with a past medical history of atrial fibrillation, congestive heart failure and hypertension who presents today for cramping and pain in her lower extremities.\"  It looks like she has been evaluated by multiple PCPs and multiple ED and hospital medicine providers over the years for this, has remained idiopathic.  She ended up getting admitted because of hyponatremia.      Hyponatremia.  Suspect SIADH.  Improved with fluid restriction per nephrology.  Torsemide continued and spironolactone stopped per nephrology.     Diffuse cramps, chronic pain syndrome.  Will offer low-dose ropinirole for possible RLS. I do think that anxiety and dementia might be affecting the way in which she experiences these symptoms.   - symptoms are worse with walking.  CIRILO's from this weekend were low.  Vascular consult is pending at the time of this note.  I will make an addendum if there are any important updates or changes to the plan.     Afib.  Apixaban, metoprolol, dilt.     PT/OT rec'd 24-hr supervision/assistance.         Discharge Diagnosis:   Hyponatremia      Discharge Instructions:     Follow up with PCP within 1-2 weeks.      Diet: ADULT DIET; Regular; 1500 ml  ADULT ORAL NUTRITION SUPPLEMENT; Breakfast, Lunch, HS Snack; Standard High Calorie/High Protein Oral Supplement  Activity: activity as tolerated  Discharged to:  home  Condition on discharge: Stable    Objective Findings at Discharge:   BP (!) 143/65   Pulse 77   Temp 98.2 °F (36.8 °C) (Oral)   Resp 
  Hospital Medicine Discharge Summary    Patient: Muna Simon   : 1929     Hospital:  [facility]  Admit Date: 2025   Discharge Date:   ***  Disposition:  {Dispo Location:38415} {SNF Choice:75423}  Code status:  {Code Status:04081}  Condition at Discharge: {Discharge Condition:98055}  Primary Care Provider: Robby Castaneda MD    Admitting Provider: Jada Felder DO  Discharge Provider: Jada Felder DO     Discharge Diagnoses:      Active Hospital Problems    Diagnosis     Hyponatremia [E87.1]        Presenting Admission History:      ***     Assessment/Plan:      ***    Physical Exam Performed:      BP (!) 137/56   Pulse 79   Temp 97.9 °F (36.6 °C) (Oral)   Resp 18   Ht 1.6 m (5' 3\")   Wt 59.9 kg (132 lb)   SpO2 97%   BMI 23.38 kg/m²       General appearance:  No apparent distress, appears stated age and cooperative.  Respiratory:  Normal respiratory effort.   Cardiovascular:  Regular rate and rhythm.  Abdomen:  Soft, non-tender, non-distended.  Musculoskeletal:  No edema  Neurologic:  Non-focal  Psychiatric:  Alert and oriented    Patient Discharge Instructions:      Follow up:    1.  Primary Care Provider Robby Castaneda MD in the next 1-2 weeks.      The patient was seen and examined on day of discharge and this discharge summary is in conjunction with any daily progress note from day of discharge. Time spent on discharge: 3*** minutes in the examination, evaluation, counseling and review of medications and discharge plan.    ------------------------------------------------------------------------------------------------------------------------------------------------------    Discharge Medications:   Current Discharge Medication List        START taking these medications    Details   rosuvastatin (CRESTOR) 20 MG tablet Take 1 tablet by mouth nightly  Qty: 30 tablet, Refills: 3      carvedilol (COREG) 3.125 MG tablet Take 1 tablet by mouth 2 times daily  Qty: 60 
  multivitamin-iron-minerals-folic acid chewable tablet     potassium chloride 20 MEQ extended release tablet  Commonly known as: KLOR-CON M  Take 1 tablet by mouth daily     tamsulosin 0.4 MG capsule  Commonly known as: FLOMAX  Take 1 capsule by mouth daily     torsemide 20 MG tablet  Commonly known as: DEMADEX  Take 1 tablet by mouth daily     vitamin D 25 MCG (1000 UT) Tabs tablet  Commonly known as: CHOLECALCIFEROL            STOP taking these medications      atorvastatin 20 MG tablet  Commonly known as: Lipitor     dilTIAZem 180 MG extended release capsule  Commonly known as: CARDIZEM CD      MG Caps     escitalopram 10 MG tablet  Commonly known as: LEXAPRO     HYDROcodone-acetaminophen 5-325 MG per tablet  Commonly known as: NORCO     magnesium 30 MG tablet     melatonin 10 MG Caps capsule     metoprolol succinate 25 MG extended release tablet  Commonly known as: TOPROL XL     multivitamin tablet     polyethylene glycol 17 GM/SCOOP powder  Commonly known as: GLYCOLAX     Simethicone 125 MG Caps     spironolactone 25 MG tablet  Commonly known as: ALDACTONE     thiamine 100 MG tablet               Where to Get Your Medications        These medications were sent to [facility] - [address] - P [phone] - F [phone]  [address] [place] [address]      Phone: [phone]   amLODIPine 5 MG tablet  aspirin 81 MG EC tablet  carvedilol 3.125 MG tablet  rOPINIRole 0.25 MG tablet  rosuvastatin 20 MG tablet  torsemide 20 MG tablet                Labs and Imaging   Echo (TTE) complete (PRN contrast/bubble/strain/3D)  Result Date: 4/17/2025    Left Ventricle: Normal left ventricular systolic function with a visually estimated EF of 55 - 60%. Left ventricle size is normal. Normal wall thickness. Normal wall motion. Normal diastolic function.   Left Atrium: Left atrium is severely dilated.   Right Ventricle: Right ventricle size is normal. Normal systolic

## 2025-04-18 NOTE — PROGRESS NOTES
Nephrology Progress Note   [facility]      Sub/interval history  Patient complains of feeling tired  She reports that she had cramps   and was awful        ROS: No chest pain/shortness of breath/fever/nausea/vomiting  PSFH: No visitor    Scheduled Meds:   rOPINIRole  0.25 mg Oral Nightly    apixaban  2.5 mg Oral BID    aspirin  81 mg Oral Daily    dilTIAZem  180 mg Oral Daily    melatonin  10 mg Oral Nightly    metoprolol succinate  25 mg Oral BID    [Held by provider] spironolactone  25 mg Oral Daily    tamsulosin  0.4 mg Oral Daily    [Held by provider] torsemide  20 mg Oral Daily    sodium chloride flush  5-40 mL IntraVENous 2 times per day    potassium chloride  20 mEq Oral Daily     Continuous Infusions:   sodium chloride       PRN Meds:.sodium chloride flush, sodium chloride, ondansetron **OR** ondansetron, polyethylene glycol, acetaminophen **OR** acetaminophen    Objective/     Vitals:    04/14/25 1645 04/14/25 2003 04/15/25 0002 04/15/25 0753   BP: (!) 153/68 (!) 162/70 134/63 (!) 163/83   Pulse: 90 79 69 77   Resp: 16 17 16 18   Temp: 97.7 °F (36.5 °C) 97.7 °F (36.5 °C) 97.5 °F (36.4 °C) 97.9 °F (36.6 °C)   TempSrc: Oral Oral Oral Oral   SpO2: 95% 96% 95% 93%   Weight:       Height:         24HR INTAKE/OUTPUT:    Intake/Output Summary (Last 24 hours) at 4/15/2025 0911  Last data filed at 4/15/2025 0658  Gross per 24 hour   Intake 120 ml   Output 800 ml   Net -680 ml     Gen: alert, awake  Neck: No JVD  Skin: Unremarkable  Cardiovascular:  S1, S2 without m/r/g   Respiratory: CTA B without w/r/r; respiratory effort normal  Abdomen:  soft, nt, nd,   Extremities: no lower extremity edema  Neuro/Psy: AAoriented times 3 ; moves all 4 ext    Data/  No results for input(s): \"WBC\", \"HGB\", \"HCT\", \"MCV\", \"PLT\" in the last 72 hours.    Recent Labs     04/13/25  0529 04/14/25  0631 04/15/25  0518   * 133* 133*   K 4.2 4.3 4.4   CL 99 100 99   CO2 26 25 26   GLUCOSE 100* 90 90   PHOS 3.1  --   --    MG 
        [facility]   [facility]      Sub/interval history  Patient is resting on recliner and feels much better than yesterday  Receiving normal saline at 65 mL/h  Sodium 128 this morning    Last 24 h uop 1.25 l    ROS: No chest pain/shortness of breath/fever/nausea/vomiting  PSFH: No visitor    Scheduled Meds:   apixaban  2.5 mg Oral BID    aspirin  81 mg Oral Daily    dilTIAZem  180 mg Oral Daily    melatonin  10 mg Oral Nightly    metoprolol succinate  25 mg Oral BID    [Held by provider] spironolactone  25 mg Oral Daily    tamsulosin  0.4 mg Oral Daily    [Held by provider] torsemide  20 mg Oral Daily    sodium chloride flush  5-40 mL IntraVENous 2 times per day    potassium chloride  20 mEq Oral Daily     Continuous Infusions:   sodium chloride 65 mL/hr at 04/11/25 1753    sodium chloride       PRN Meds:.HYDROcodone-acetaminophen, sodium chloride flush, sodium chloride, ondansetron **OR** ondansetron, polyethylene glycol, acetaminophen **OR** acetaminophen    Objective/     Vitals:    04/11/25 2125 04/11/25 2210 04/12/25 0120 04/12/25 0732   BP: (!) 143/52  123/66 (!) 148/67   Pulse: 82  70 70   Resp:  16 16 16   Temp:   97.7 °F (36.5 °C) 98.2 °F (36.8 °C)   TempSrc:   Oral Oral   SpO2:   97% 97%   Weight:       Height:         24HR INTAKE/OUTPUT:    Intake/Output Summary (Last 24 hours) at 4/12/2025 0846  Last data filed at 4/12/2025 0652  Gross per 24 hour   Intake 420 ml   Output 1250 ml   Net -830 ml     Gen: alert, awake  Neck: No JVD  Skin: Unremarkable  Cardiovascular:  S1, S2 without m/r/g   Respiratory: CTA B without w/r/r; respiratory effort normal  Abdomen:  soft, nt, nd,   Extremities: no lower extremity edema  Neuro/Psy: AAoriented times 3 ; moves all 4 ext    Data/  Recent Labs     04/11/25  1443 04/12/25  0522   WBC 9.3 7.2   HGB 12.4 11.7*   HCT 35.8* 33.6*   MCV 89.3 89.7    315     Recent Labs     04/11/25  1443 04/11/25  1535 04/12/25  0522   *  --  128*   K 4.2  
        Nephrology Progress Note   [facility]      Sub/interval history  Patient complains of feeling tired  Bp on the higher side      ROS: No chest pain/shortness of breath/fever/nausea/vomiting  PSFH: No visitor    Scheduled Meds:   amLODIPine  5 mg Oral Daily    rosuvastatin  20 mg Oral Nightly    carvedilol  3.125 mg Oral BID    apixaban  2.5 mg Oral BID    aspirin  81 mg Oral Daily    dilTIAZem  180 mg Oral Daily    melatonin  10 mg Oral Nightly    tamsulosin  0.4 mg Oral Daily    torsemide  20 mg Oral Daily    sodium chloride flush  5-40 mL IntraVENous 2 times per day    potassium chloride  20 mEq Oral Daily     Continuous Infusions:   sodium chloride       PRN Meds:.sodium chloride flush, sodium chloride, ondansetron **OR** ondansetron, polyethylene glycol, acetaminophen **OR** acetaminophen    Objective/     Vitals:    04/17/25 1430 04/17/25 2012 04/17/25 2351 04/18/25 0533   BP: (!) 122/52 122/61 (!) 141/56 (!) 133/56   Pulse: 79 78 76 73   Resp: 16 18 18 18   Temp: 97.7 °F (36.5 °C) 97.7 °F (36.5 °C) 98.2 °F (36.8 °C) 98.1 °F (36.7 °C)   TempSrc: Oral Oral Oral Oral   SpO2: 95% 94% 95% 98%   Weight:       Height:         24HR INTAKE/OUTPUT:    Intake/Output Summary (Last 24 hours) at 4/18/2025 0825  Last data filed at 4/18/2025 0533  Gross per 24 hour   Intake 450 ml   Output 850 ml   Net -400 ml       Gen: alert, awake  Neck: No JVD  Skin: Unremarkable  Cardiovascular:  S1, S2 without m/r/g   Respiratory: CTA B without w/r/r; respiratory effort normal  Abdomen:  soft, nt, nd,   Extremities: no lower extremity edema  Neuro/Psy: AAoriented times 3 ; moves all 4 ext    Data/  No results for input(s): \"WBC\", \"HGB\", \"HCT\", \"MCV\", \"PLT\" in the last 72 hours.    No results for input(s): \"NA\", \"K\", \"CL\", \"CO2\", \"GLUCOSE\", \"PHOS\", \"MG\", \"BUN\", \"CREATININE\", \"LABGLOM\", \"GFRAA\" in the last 72 hours.    Invalid input(s): \"CA\"    UA bland  Urine sodium 30 creatinine 15 osmolality 164  Cortisol a.m. of 4.8  TSH of 
        Nephrology Progress Note   [facility]      Sub/interval history  Patient complains of feeling tired  Bp on the higher side      ROS: No chest pain/shortness of breath/fever/nausea/vomiting  PSFH: No visitor    Scheduled Meds:   amLODIPine  5 mg Oral Daily    rosuvastatin  20 mg Oral Nightly    carvedilol  3.125 mg Oral BID    apixaban  2.5 mg Oral BID    aspirin  81 mg Oral Daily    dilTIAZem  180 mg Oral Daily    melatonin  10 mg Oral Nightly    tamsulosin  0.4 mg Oral Daily    torsemide  20 mg Oral Daily    sodium chloride flush  5-40 mL IntraVENous 2 times per day    potassium chloride  20 mEq Oral Daily     Continuous Infusions:   sodium chloride       PRN Meds:.sodium chloride flush, sodium chloride, ondansetron **OR** ondansetron, polyethylene glycol, acetaminophen **OR** acetaminophen    Objective/     Vitals:    04/16/25 2204 04/16/25 2354 04/17/25 0427 04/17/25 0839   BP: 133/68 (!) 129/59 (!) 138/59 129/66   Pulse: 82 80 76 76   Resp: 18 18 19 18   Temp: 98.6 °F (37 °C) 97.2 °F (36.2 °C) 97.9 °F (36.6 °C) 98.1 °F (36.7 °C)   TempSrc: Oral Axillary Oral Oral   SpO2: 94% 93% 93% 96%   Weight:       Height:         24HR INTAKE/OUTPUT:  No intake or output data in the 24 hours ending 04/17/25 0847    Gen: alert, awake  Neck: No JVD  Skin: Unremarkable  Cardiovascular:  S1, S2 without m/r/g   Respiratory: CTA B without w/r/r; respiratory effort normal  Abdomen:  soft, nt, nd,   Extremities: no lower extremity edema  Neuro/Psy: AAoriented times 3 ; moves all 4 ext    Data/  No results for input(s): \"WBC\", \"HGB\", \"HCT\", \"MCV\", \"PLT\" in the last 72 hours.    Recent Labs     04/15/25  0518   *   K 4.4   CL 99   CO2 26   GLUCOSE 90   BUN 19   CREATININE 1.0   LABGLOM 52*     UA bland  Urine sodium 30 creatinine 15 osmolality 164  Cortisol a.m. of 4.8  TSH of 2.6    Assessment/   -Hyponatremia likely related to increased fluid intake including beer and presence of Lexapro.  She reports that she drinks 
        Nephrology Progress Note   [facility]      Sub/interval history  Patient complains of feeling tired  She reports that she had cramps yesterday and was awful and does not want that anymore      ROS: No chest pain/shortness of breath/fever/nausea/vomiting  PSFH: No visitor    Scheduled Meds:   apixaban  2.5 mg Oral BID    aspirin  81 mg Oral Daily    dilTIAZem  180 mg Oral Daily    melatonin  10 mg Oral Nightly    metoprolol succinate  25 mg Oral BID    [Held by provider] spironolactone  25 mg Oral Daily    tamsulosin  0.4 mg Oral Daily    [Held by provider] torsemide  20 mg Oral Daily    sodium chloride flush  5-40 mL IntraVENous 2 times per day    potassium chloride  20 mEq Oral Daily     Continuous Infusions:   sodium chloride       PRN Meds:.HYDROcodone-acetaminophen, sodium chloride flush, sodium chloride, ondansetron **OR** ondansetron, polyethylene glycol, acetaminophen **OR** acetaminophen    Objective/     Vitals:    04/12/25 2058 04/13/25 0006 04/13/25 0316 04/13/25 0808   BP: (!) 127/52 136/61  (!) 142/85   Pulse: 81 89  83   Resp: 16 16  17   Temp: 98.4 °F (36.9 °C) 98.2 °F (36.8 °C)  98.6 °F (37 °C)   TempSrc: Oral Oral  Oral   SpO2: 95% 93% 96% 100%   Weight:       Height:         24HR INTAKE/OUTPUT:    Intake/Output Summary (Last 24 hours) at 4/13/2025 0941  Last data filed at 4/13/2025 0600  Gross per 24 hour   Intake 300 ml   Output 700 ml   Net -400 ml     Gen: alert, awake  Neck: No JVD  Skin: Unremarkable  Cardiovascular:  S1, S2 without m/r/g   Respiratory: CTA B without w/r/r; respiratory effort normal  Abdomen:  soft, nt, nd,   Extremities: no lower extremity edema  Neuro/Psy: AAoriented times 3 ; moves all 4 ext    Data/  Recent Labs     04/11/25  1443 04/12/25  0522   WBC 9.3 7.2   HGB 12.4 11.7*   HCT 35.8* 33.6*   MCV 89.3 89.7    315     Recent Labs     04/11/25  1443 04/11/25  1535 04/12/25  0522 04/13/25  0529   *  --  128* 132*   K 4.2  --  4.1 4.2   CL 87*  --  
      CARDIOLOGY Progress Note        Patient Name: Muna Simon  Date of admission: 4/11/2025  1:47 PM  Admission Dx: Hyponatremia [E87.1]  Other chronic pain [G89.29]  Other fatigue [R53.83]  Requesting Physician: Jada Felder DO  Primary Care physician: Robby Castaneda MD    Reason for Consultation/Chief Complaint: Preoperative cardiovascular risk stratification for aortobifemoral bypass  Patient follows with Dr. Cortez Sánchez- [facility]    Subjective:  Patient seen and examined.  Reports she is just anxious given the findings of her severe PAD and likely renal cell carcinoma.  Denies chest pain shortness of breath.  Blood pressure uncontrolled with this morning.    History of Present Illness:     Muna Simon is a 89 y.o. patient with past med history of atrial fibrillation, PACs, amaurosis fugax of right eye CKD, hyperlipidemia, hypertension, lumbosacral spondylosis without myelopathy, osteoarthritis, former smoker approximate 70-pack-year history quit 10 years ago who presented to the hospital with complaints of lower extremity pain/claudication.  Patient was found to have severe PAD by ABIs.  CTA abdominal aorta with runoff revealed extensive inflow disease involving the bilateral common iliac, external iliac, common femoral and superficial femoral arteries.  Vascular surgery was consulted and plan is for aortofemoral bypass sometime next week per discussion with Dr. Metz.  Additionally, there is an incidental finding of an enhancing right renal lesion mixed solid/cystic component representing renal cell carcinoma.    Patient's son also at bedside. Patient states over the last 6 months she was having limiting claudication of both legs described as cramping with exertion.  Improved with rest.  She states this affected her activities of daily living.  She states she used to exercise and is no longer able to do this.  She denies chest pain, shortness of breath, 
      CARDIOLOGY Progress Note        Patient Name: Muna Simon  Date of admission: 4/11/2025  1:47 PM  Admission Dx: Hyponatremia [E87.1]  Other chronic pain [G89.29]  Other fatigue [R53.83]  Requesting Physician: Jdaa Felder DO  Primary Care physician: Robby Castaneda MD    Reason for Consultation/Chief Complaint: Preoperative cardiovascular risk stratification for aortobifemoral bypass  Patient follows with Dr. Cortez S[facility]chez- [facility]    Subjective:  Patient seen and examined.  Patient feeling less anxious.  Patient is planned for outpatient aortic bypass.  She will have surveillance of the renal cell mass/carcinoma.    Patient denies any chest pain shortness of breath.  Discussed findings of stress test and echo with patient, her son, daughter.    History of Present Illness:     Muna Simon is a 89 y.o. patient with past med history of atrial fibrillation, PACs, amaurosis fugax of right eye CKD, hyperlipidemia, hypertension, lumbosacral spondylosis without myelopathy, osteoarthritis, former smoker approximate 70-pack-year history quit 10 years ago who presented to the hospital with complaints of lower extremity pain/claudication.  Patient was found to have severe PAD by ABIs.  CTA abdominal aorta with runoff revealed extensive inflow disease involving the bilateral common iliac, external iliac, common femoral and superficial femoral arteries.  Vascular surgery was consulted and plan is for aortofemoral bypass sometime next week per discussion with Dr. Metz.  Additionally, there is an incidental finding of an enhancing right renal lesion mixed solid/cystic component representing renal cell carcinoma.    Patient's son also at bedside. Patient states over the last 6 months she was having limiting claudication of both legs described as cramping with exertion.  Improved with rest.  She states this affected her activities of daily living.  She states she used to 
     Low Risk Nutrition Note     Reason for Visit:   Length of Stay    Nutrition Assessment:  Patient seen with family in room. Reports appetite is good, eats small frequent meals at baseline. No issues chewing or swallowing. No recent weight changes. Patient and family with additional fluid/sodium restriction questions.     Nutrition Education:  Educated on sodium/fluid restriction   Learners: Patient and Family  Readiness: Eager  Method: Explanation and Handout  Response: Verbalizes Understanding  Discussed using fresh herbs/spices to season meats, decreasing coffee intake to x2 4oz cups daily, being mindful of soup fluid intake, reading nutrition labels for sodium content. Pt and family with no other questions at this time. Pt likely to discharge today.     Current Nutrition Therapies:    ADULT DIET; Regular  ADULT ORAL NUTRITION SUPPLEMENT; PM Snack; Standard High Calorie/High Protein Oral Supplement    Anthropometrics:   Current Height: 160 cm (5' 3\")  Current Weight - Scale: 59.9 kg (132 lb)      Monitoring and Evaluation:  No nutrition diagnosis. Patient will be monitored per nutrition standards of care.     Consult Dietitian if nutrition intervention essential to patient care is needed.     Discharge Planning:  No needs    Radha Topete RD     
  4 Eyes Skin Assessment and Patient belongings     The patient is being assess for  Admission    I agree that 2 Nurses have performed a thorough Head to Toe Skin Assessment on the patient. ALL assessment sites listed below have been assessed.       Areas assessed by both nurses: Ismael HERRERA / Braulio RN   [x]   Head, Face, and Ears   [x]   Shoulders, Back, and Chest  [x]   Arms, Elbows, and Hands   [x]   Coccyx, Sacrum, and IschIum  [x]   Legs, Feet, and Heels        Does the Patient have Skin Breakdown?  No         Philip Prevention initiated:  NA   Wound Care Orders initiated:  NA      [facility] nurse consulted for Pressure Injury (Stage 3,4, Unstageable, DTI, NWPT, and Complex wounds), New and Established Ostomies:  NA      I agree that 2 Nurses have reviewed patient belongings with the patient/family and documented in the flowsheet upon admission or transfer to the unit.     Belongings  Dental Appliances: None  Vision - Corrective Lenses: None  Hearing Aid: None  Clothing: Pants, Shirt, Undergarments, Footwear, Jacket/Coat  Jewelry: None  Electronic Devices: None  Weapons (Notify Protective Services/Security): None  Home Medications: None  Valuables Given To: Patient  Provide Name(s) of Who Valuable(s) Were Given To: Ruth       Nurse 1 eSignature: Electronically signed by Ismael Bajwa RN on 4/12/25 at 3:54 AM EDT    **SHARE this note so that the co-signing nurse is able to place an eSignature**    Nurse 2 eSignature: {Esignature:[id number]}  
  [facility] Medicine Progress Note  V 1.6      Date of Admission: 4/11/2025    Hospital Day: 2      Chief Admission Complaint:  Cramping and Pain in Lower Extremities     Subjective:  Patient seen and examined. She states that her cramping has improved.     Presenting Admission History:       Patient is a 89-year-old female with a past medical history of atrial fibrillation, congestive heart failure and hypertension who presents today for cramping and pain in her lower extremities. She states this cramping has been going on for quite some time. She states that sometimes it is so bad that she cries. She also endorses drinking the occasional beer. She states that she has not had any in 2 weeks. ED workup was remarkable for an elevated BNP, elevated troponin, hyponatremia. We were consulted for admission.     Assessment/Plan:      Current Principal Problem:  Hyponatremia    Acute on chronic hyponatremia  - Etiology unclear   - Improved   - Typically between 125-129  - Nephrology consulted - d/c fluids after current bag, fluid restriction, encourage protein intake      Lower extremity cramping  - Etiology unclear - PAD?  - Patient states that pain worsens with movement   - Make sure electrolytes are replaced  - ABIs ordered      Hypertension   - Typically controlled on home regimen   - Continue metoprolol, diltiazem      Atrial Fibrillation   - Chronic paroxysmal, of unspecified and clinically unable to determine etiology.    - Rate Controlled   - Anticoagulated at baseline on home Eliquis - continued.       HFpEF  - Does not appear to be an acute exacerbation at this time  - Patient appears euvolemic on exam   - Last Echo completed in 7/2021 revealed an EF of 55%  - GDMT: metoprolol - continued     Ongoing threat to life and/or bodily function without ongoing treatment due to:  Hyponatremia    Consults:      IP CONSULT TO NEPHROLOGY      Nephrology consulted and available notes from yesterday and today were reviewed on 
  Hospital Medicine Progress Note      Subjective:  She is feeling OK.  Leg cramps continue.  Long discussion with patient and son.  We discussed the arterial and R renal findings in detail.  They aren't sure how they want to proceed.       General appearance: No apparent distress, appears stated age and cooperative.  HEENT: Pupils equal, round.  Conjunctivae/corneas clear.  Neck: No increased jugular venous distention.   Respiratory:  Normal respiratory effort.  Bilaterally without Rales/Wheezes/Rhonchi.  Cardiovascular: Normal rate and regular rhythm with normal S1/S2 without murmurs, rubs or gallops.  Abdomen: Soft, non-tender, non-distended with normal bowel sounds.  Musculoskeletal: No edema.  Without deformity.  Skin: No jaundice.  No rashes or lesions.  Neurologic:  Neurovascularly intact without any focal sensory/motor deficits. Cranial nerves: II-XII intact.  Psychiatric: Alert and oriented, normal insight.      Presenting Admission History:  \"Patient is a 89-year-old female with a past medical history of atrial fibrillation, congestive heart failure and hypertension who presents today for cramping and pain in her lower extremities.\"  It looks like she has been evaluated by multiple PCPs and multiple ED and hospital medicine providers over the years for this, has remained idiopathic.  She ended up getting admitted because of hyponatremia.  Then we found severe PAD and possible R RCC.      Assessment/Plan:        Hyponatremia.  Suspect SIADH.  Improved with fluid restriction per nephrology.  Torsemide continued and spironolactone stopped per nephrology.      BLE extensive proximal arterial inflow disease.  Vascular recommended aortobifemoral bypass.  Patient and family considering.     Incidental finding of possible 2.6 cm R renal cell carcinoma on CT.  Urology consulted to provide options.     Afib.  Apixaban, metoprolol, dilt.       DVT Prophylaxis: anticoagulation as above  Disposition: PT/OT rec'd 24-hr 
  Hospital Medicine Progress Note      Subjective:  She is feeling OK.  Nervous about the stress test.  She and her son seem to have decided to go ahead with the vascular surgery, pending cardiology clearance.       General appearance: No apparent distress, appears stated age and cooperative.  HEENT: Pupils equal, round.  Conjunctivae/corneas clear.  Neck: No increased jugular venous distention.   Respiratory:  Normal respiratory effort.  Bilaterally without Rales/Wheezes/Rhonchi.  Cardiovascular: Normal rate and regular rhythm with normal S1/S2 without murmurs, rubs or gallops.  Abdomen: Soft, non-tender, non-distended with normal bowel sounds.  Musculoskeletal: No edema.  Without deformity.  Skin: No jaundice.  No rashes or lesions.  Neurologic:  Neurovascularly intact without any focal sensory/motor deficits. Cranial nerves: II-XII intact.  Psychiatric: Alert and oriented, normal insight.      Presenting Admission History:  \"Patient is a 89-year-old female with a past medical history of atrial fibrillation, congestive heart failure and hypertension who presents today for cramping and pain in her lower extremities.\"  It looks like she has been evaluated by multiple PCPs and multiple ED and hospital medicine providers over the years for this, has remained idiopathic.  She ended up getting admitted because of hyponatremia.  Then we found severe PAD and possible R RCC.      Assessment/Plan:        Hyponatremia.  Suspect SIADH.  Improved with fluid restriction per nephrology.  Torsemide continued and spironolactone stopped per nephrology.      BLE extensive proximal arterial inflow disease.  Vascular recommended aortobifemoral bypass.  Patient and family are willing, pending cardiology clearance (f/u stress and TTE).     Incidental finding of possible 2.6 cm R renal cell carcinoma on CT.  Urology consulted to provide options.     Afib.  Apixaban, metoprolol changed to carvedilol per cardiology, dilt.       DVT 
  Hospital Medicine Progress Note  V 1.6      Date of Admission: 4/11/2025    Hospital Day: 3      Chief Admission Complaint:  Cramping and Pain in Lower Extremities     Subjective:  Patient seen and examined. She states that her cramping has improved.     Presenting Admission History:       Patient is a 89-year-old female with a past medical history of atrial fibrillation, congestive heart failure and hypertension who presents today for cramping and pain in her lower extremities. She states this cramping has been going on for quite some time. She states that sometimes it is so bad that she cries. She also endorses drinking the occasional beer. She states that she has not had any in 2 weeks. ED workup was remarkable for an elevated BNP, elevated troponin, hyponatremia. We were consulted for admission.     Assessment/Plan:      Current Principal Problem:  Hyponatremia    Acute on chronic hyponatremia  - Etiology unclear   - Improved   - Typically between 125-129  - Nephrology consulted - fluid restriction, encourage protein intake      Lower extremity cramping  - Etiology unclear - PAD?  - Patient states that pain worsens with movement   - Make sure electrolytes are replaced  - ABIs ordered      Hypertension   - Typically controlled on home regimen   - Continue metoprolol, diltiazem      Atrial Fibrillation   - Chronic paroxysmal, of unspecified and clinically unable to determine etiology.    - Rate Controlled   - Anticoagulated at baseline on home Eliquis - continued.       HFpEF  - Does not appear to be an acute exacerbation at this time  - Patient appears euvolemic on exam   - Last Echo completed in 7/2021 revealed an EF of 55%  - GDMT: metoprolol - continued     Ongoing threat to life and/or bodily function without ongoing treatment due to:  Hyponatremia    Consults:      IP CONSULT TO NEPHROLOGY      Nephrology consulted and available notes from yesterday and today were reviewed on 4/13/2025, w/ plan for 
Alvaro KELLY that vascular called with critical results to review from [facility] screening.   
Discharge instructions and medications reviewed with patient and her daughters. All questions answered. Pt left in stable condition via WC to private car.  
Nutrition Education    Provided pt and family with diet education for fluid restriction. Discusses what counts as a fluid, tips on thirst quenching, and tracking fluid intake. Pt and family expressed understanding.     Educated on Fluid restriction  Learners: Patient and Family  Readiness: Eager  Method: Explanation and Handout  Response: Verbalizes Understanding  Contact name and number provided.    Richelle Cruz RD, LD  Contact Number: [phone]    
Occupational Therapy  Facility/Department: [facility] C3 TELE/MED SURG/ONC  Occupational Therapy Initial /discharge Assessment    Name: Muna Simon  : 1929  MRN: [id number]  Date of Service: 2025    Discharge Recommendations:  24 hour supervision or assist (initially)          Patient Diagnosis(es): The primary encounter diagnosis was Hyponatremia. Diagnoses of Other fatigue, Other chronic pain, and Pain in both lower extremities were also pertinent to this visit.  Past Medical History:  has a past medical history of Arthritis, Atrial fibrillation (HCC), Back fracture, CHF (congestive heart failure) (HCC), HTN (hypertension), Hyperlipidemia, and Panic attack.  Past Surgical History:  has a past surgical history that includes Hysterectomy; Hemorrhoid surgery; Colonoscopy (); and eye surgery.           Assessment  Assessment: pt from home alone, normally independent with BADL's & functional mobility without AD, no reports of falls, admitted for hyponatremia, Igor LE pain with inability to walk; pt supervision with bathroom/functional mobility in room without AD, independent with LE self care, toileting; supervision standing ADL's, independent with bed mobility;  REQUIRES OT FOLLOW-UP: No  Activity Tolerance  Activity Tolerance: Patient Tolerated treatment well  Activity Tolerance Comments: vitals stable on RA: sitting in chair: BP = 139/59, HR 97, 91 % O 2 sats     Plan  Occupational Therapy Plan  Times Per Week: OT eval & treat    Restrictions  Restrictions/Precautions  Restrictions/Precautions: General Precautions, Fall Risk  Position Activity Restriction  Other Position/Activity Restrictions: IV,    Subjective  General  Chart Reviewed: Yes  Patient assessed for rehabilitation services?: Yes  Family / Caregiver Present: No  Referring Practitioner: Jada Felder DO  Diagnosis: hyponatremia, igor LE weakness  Subjective  Subjective: denies pain at rest or with bathroom mobility  General 
Physical Therapy    Pt out for stress test procedure.  Will attempt again at later point and continue to follow.  Hao Ramirez PTA  
Physical Therapy  Facility/Department: [facility] C3 TELE/MED SURG/ONC  Physical Therapy Initial Assessment    Name: Muna Simon  : 1929  MRN: [id number]  Date of Service: 2025    Discharge Recommendations:  24 hour supervision or assist   PT Equipment Recommendations  Equipment Needed: No  Other: pt owns SPC that she uses PRN, do not anticipate any additional DME.      Patient Diagnosis(es): The primary encounter diagnosis was Hyponatremia. Diagnoses of Other fatigue, Other chronic pain, and Pain in both lower extremities were also pertinent to this visit.  Past Medical History:  has a past medical history of Arthritis, Atrial fibrillation (HCC), Back fracture, CHF (congestive heart failure) (HCC), HTN (hypertension), Hyperlipidemia, and Panic attack.  Past Surgical History:  has a past surgical history that includes Hysterectomy; Hemorrhoid surgery; Colonoscopy (); and eye surgery.    Assessment  Assessment: Pt seen in conjunction with OT to maximize pt safety and mobility and safely assess pt maximal level of mobility.  Pt presents to [facility] with primary diagnosis of hyponatremia.  PTA pt lived at home alone with 2 OTTO, states children can be availible for 24 hour assist, reports IND with mobility without AD, states she will occasionally use SPC PRN.  Currently pt demonstrates MOD IND for bed mobility, SUP for functional transfers without AD, SUP for ambulation up to 75 feet without AD, SBA to ascend and descend 4 steps with one HR.  Pt demonstrates slightly decreased endurance, mobility, and activity tolerance below their baseline and would benefit from skilled PT to address the above stated deficits during their stay in the acute care setting.  Recommend DC to home with initial 24 hour assist.  Treatment Diagnosis: slight deficits in endurance, mobility, and activity tolerance.  Therapy Prognosis: Good  Decision Making: Low Complexity  Requires PT Follow-Up: Yes  Activity Tolerance  Activity 
Physical Therapy  Facility/Department: [facility] C3 TELE/MED SURG/ONC  Daily Treatment Note  NAME: Muna Simon  : 1929  MRN: [id number]    Date of Service: 2025    Discharge Recommendations:  24 hour supervision or assist, Home with Home health PT   PT Equipment Recommendations  Equipment Needed: No  Other: pt owns SPC that she uses PRN, do not anticipate any additional DME.    Patient Diagnosis(es): The primary encounter diagnosis was Hyponatremia. Diagnoses of Other fatigue, Other chronic pain, Pain in both lower extremities, Elevated troponin, Abnormal electrocardiography, and Pre-operative cardiovascular examination were also pertinent to this visit.    Assessment  Assessment: Pt tolerated treatment session well w/ a focus on bed mobility, functional transfers, ambulation, and BLE strengthening. Pt currently demos bed mobility Mod I, functional transfers req SBA w/o AD, ambulation req SBA w/o AD, and stair navigation req SBA w/ BHR assist. Pt ambulates 150ft in hallway but req seated rest break between bouts. Pt navigates 12 6in steps w/ BHR assist reciprocal ascending and step to descending. Pt tolerated BLE strengthening well in sitting and standing. Pt pleasant and cooperative throughout session. Pt is currently below their baseline function and will continue to benefit from skilled PT intervention in order to address deficits, reach goals, and d/c safely. PT is rec home w/ 24hr assist and HHPT upon d/c due to deficits.  Activity Tolerance: Patient tolerated treatment well;Patient limited by fatigue;Patient limited by endurance  Equipment Needed: No  Other: pt owns SPC that she uses PRN, do not anticipate any additional DME.    Plan  Physical Therapy Plan  General Plan: 3-5 times per week  Current Treatment Recommendations: Strengthening;Balance training;Functional mobility training;Transfer training;Endurance training;Gait training;Stair training;Neuromuscular re-education;Home exercise 
Pt a/o. Admitted to the unit in a stab;e condition , daughters at bedside , only has complaints of cramping. No other concerns noted. IV fluid in progress. Will continue to monitor.  
Pt alert and orientated. Call light within reach. No complaints at this time.  Family is at bedside.Lyn Cain RN   
Pt assessment completed and charted. VSS. Pt a/ox4. Pt denies pain today. Pt tolerating diet. Pt ambulates w/ little assistance to bathroom. Pt denies any other needs at this time.  Pt calls out appropriately.       Pt is a fall risk;  -Bed in lowest position and wheels locked.  -Call light within reach.   -Bedside table within reach.   -Non-skid footwear in place.  -bed check in place.   
Pt assessment completed and charted. VSS. Pt a/ox4. Pt denies pain. Pt tolerating diet. Pt reports she feels bloated and needs to have BM, offered pt PRN laxative, pt denied wanting. Pt nervous about all the test and surgery. Talked w/ pt, educated on breathing exercise and placed lavender sent in room to help with anxiety. Pt denies any other needs at this time.  Pt calls out appropriately.       Pt is a fall risk;  -Bed in lowest position and wheels locked.  -Call light within reach.   -Bedside table within reach.   -Non-skid footwear in place.    
Pt complained on feeling dizzy.Lyn Cain RN   
Pt unable to verify meds , daughters will bring updated list tomorrow.   
Pt was sitting up in chair and all of a sudden started to feel dizzy. Pt laid back in bed and Dr. Mic geller served. Discharge canceled.Lyn Cain RN   
Received patient sitting on the chair, alert and oriented x4. Vitals are stable. Denies pain nor dizziness at the time of assessment. Take pills whole.    Call bell within reach. Daughter at bedside. Maintained a calm and comfortable environment. Shift assessment updated and documented.  
Vascular    Discussed possible Aortobifem bypass with patient and family present.  They will discuss amongst themselves and decide if they wish to proceed.  Cardiology to see and Urology given finding of renal mass on CTA.    
APRN  4/17/2025  1:41 PM

## 2025-04-18 NOTE — TELEPHONE ENCOUNTER
Called Janine who is the hospital RN today; she said it will print out on her AVS upon discharge.

## 2025-04-18 NOTE — PLAN OF CARE
Problem: Safety - Adult  Goal: Free from fall injury  4/18/2025 0006 by Mohsen Abreu RN  Outcome: Progressing  Flowsheets (Taken 4/16/2025 1659 by Britany Ojeda RN)  Free From Fall Injury:   Instruct family/caregiver on patient safety   Based on caregiver fall risk screen, instruct family/caregiver to ask for assistance with transferring infant if caregiver noted to have fall risk factors     Problem: Chronic Conditions and Co-morbidities  Goal: Patient's chronic conditions and co-morbidity symptoms are monitored and maintained or improved  4/18/2025 0006 by Mohsen Abreu RN  Outcome: Progressing  Flowsheets (Taken 4/13/2025 1514 by Kym Quan RN)  Care Plan - Patient's Chronic Conditions and Co-Morbidity Symptoms are Monitored and Maintained or Improved: Monitor and assess patient's chronic conditions and comorbid symptoms for stability, deterioration, or improvement     Problem: Pain  Goal: Verbalizes/displays adequate comfort level or baseline comfort level  4/18/2025 0006 by Mohsen Abreu RN  Outcome: Progressing  Flowsheets (Taken 4/16/2025 1659 by Britany Ojeda RN)  Verbalizes/displays adequate comfort level or baseline comfort level:   Encourage patient to monitor pain and request assistance   Assess pain using appropriate pain scale   Implement non-pharmacological measures as appropriate and evaluate response   Administer analgesics based on type and severity of pain and evaluate response     Problem: ABCDS Injury Assessment  Goal: Absence of physical injury  4/18/2025 0006 by Mohsen Abreu RN  Outcome: Progressing  Flowsheets (Taken 4/15/2025 0949 by Mary Gross, RN)  Absence of Physical Injury: Implement safety measures based on patient assessment

## 2025-04-22 ENCOUNTER — PREP FOR PROCEDURE (OUTPATIENT)
Dept: VASCULAR SURGERY | Age: 89
End: 2025-04-22

## 2025-04-22 DIAGNOSIS — I70.0 ATHEROSCLEROSIS OF ABDOMINAL AORTA: ICD-10-CM

## 2025-04-22 RX ORDER — SODIUM CHLORIDE 0.9 % (FLUSH) 0.9 %
5-40 SYRINGE (ML) INJECTION PRN
Status: CANCELLED | OUTPATIENT
Start: 2025-04-22

## 2025-04-22 RX ORDER — SODIUM CHLORIDE 9 MG/ML
INJECTION, SOLUTION INTRAVENOUS PRN
Status: CANCELLED | OUTPATIENT
Start: 2025-04-22

## 2025-04-22 RX ORDER — SODIUM CHLORIDE 0.9 % (FLUSH) 0.9 %
5-40 SYRINGE (ML) INJECTION EVERY 12 HOURS SCHEDULED
Status: CANCELLED | OUTPATIENT
Start: 2025-04-22

## 2025-04-25 ENCOUNTER — TELEPHONE (OUTPATIENT)
Dept: VASCULAR SURGERY | Age: 89
End: 2025-04-25

## 2025-04-25 RX ORDER — FOLIC ACID 0.8 MG
800 TABLET ORAL DAILY
Status: ON HOLD | COMMUNITY
End: 2025-05-19 | Stop reason: HOSPADM

## 2025-04-25 NOTE — TELEPHONE ENCOUNTER
Called and spoke with daughter Vianca regarding May 5, 2025 surgery. She is to arrive at 5:30am and npo after midnight. Eliquis hold 48 hrs. She was given all instructions. Breanna

## 2025-05-01 ENCOUNTER — TELEPHONE (OUTPATIENT)
Dept: VASCULAR SURGERY | Age: 89
End: 2025-05-01

## 2025-05-01 NOTE — TELEPHONE ENCOUNTER
Called patient daughter as reminder of surgery for Monday May 5, 2025 at 5:30am arrival for 7:00am surgery. She is good to go and will be at [facility] on May 5, 2025.marilin

## 2025-05-02 ENCOUNTER — ANESTHESIA EVENT (OUTPATIENT)
Dept: OPERATING ROOM | Age: 89
End: 2025-05-02
Payer: MEDICARE

## 2025-05-05 ENCOUNTER — HOSPITAL ENCOUNTER (INPATIENT)
Age: 89
LOS: 14 days | Discharge: HOSPICE | DRG: 270 | End: 2025-05-19
Attending: SURGERY | Admitting: SURGERY
Payer: MEDICARE

## 2025-05-05 ENCOUNTER — APPOINTMENT (OUTPATIENT)
Dept: GENERAL RADIOLOGY | Age: 89
DRG: 270 | End: 2025-05-05
Attending: SURGERY
Payer: MEDICARE

## 2025-05-05 ENCOUNTER — ANESTHESIA (OUTPATIENT)
Dept: OPERATING ROOM | Age: 89
End: 2025-05-05
Payer: MEDICARE

## 2025-05-05 DIAGNOSIS — N18.31 STAGE 3A CHRONIC KIDNEY DISEASE (HCC): ICD-10-CM

## 2025-05-05 DIAGNOSIS — R06.02 SOB (SHORTNESS OF BREATH): ICD-10-CM

## 2025-05-05 DIAGNOSIS — I48.91 ATRIAL FIBRILLATION WITH RAPID VENTRICULAR RESPONSE (HCC): ICD-10-CM

## 2025-05-05 DIAGNOSIS — N17.9 AKI (ACUTE KIDNEY INJURY): ICD-10-CM

## 2025-05-05 DIAGNOSIS — I48.19 PERSISTENT ATRIAL FIBRILLATION WITH RAPID VENTRICULAR RESPONSE (HCC): Primary | ICD-10-CM

## 2025-05-05 PROBLEM — I73.9 PVD (PERIPHERAL VASCULAR DISEASE): Status: ACTIVE | Noted: 2025-05-05

## 2025-05-05 PROBLEM — I99.8 ISCHEMIC REST PAIN OF LOWER EXTREMITY: Status: ACTIVE | Noted: 2025-05-05

## 2025-05-05 PROBLEM — M79.606 ISCHEMIC REST PAIN OF LOWER EXTREMITY: Status: ACTIVE | Noted: 2025-05-05

## 2025-05-05 LAB
ABO/RH: NORMAL
ANION GAP SERPL CALCULATED.3IONS-SCNC: 8 MMOL/L (ref 3–16)
ANTIBODY SCREEN: NORMAL
BASE EXCESS BLDA CALC-SCNC: -2 MMOL/L (ref -3–3)
BASE EXCESS BLDA CALC-SCNC: 1 MMOL/L (ref -3–3)
BASE EXCESS BLDA CALC-SCNC: 3 MMOL/L (ref -3–3)
BUN SERPL-MCNC: 20 MG/DL (ref 7–20)
CA-I BLD-SCNC: 1.04 MMOL/L (ref 1.12–1.32)
CA-I BLD-SCNC: 1.09 MMOL/L (ref 1.12–1.32)
CALCIUM SERPL-MCNC: 8.7 MG/DL (ref 8.3–10.6)
CHLORIDE SERPL-SCNC: 104 MMOL/L (ref 99–110)
CO2 BLDA-SCNC: 22 MMOL/L
CO2 BLDA-SCNC: 26 MMOL/L
CO2 BLDA-SCNC: 28 MMOL/L
CO2 SERPL-SCNC: 23 MMOL/L (ref 21–32)
CREAT SERPL-MCNC: 1.2 MG/DL (ref 0.6–1.2)
DEPRECATED RDW RBC AUTO: 13.6 % (ref 12.4–15.4)
GFR SERPLBLD CREATININE-BSD FMLA CKD-EPI: 42 ML/MIN/{1.73_M2}
GLUCOSE BLD-MCNC: 129 MG/DL (ref 70–99)
GLUCOSE BLD-MCNC: 130 MG/DL (ref 70–99)
GLUCOSE SERPL-MCNC: 176 MG/DL (ref 70–99)
HCO3 BLDA-SCNC: 21.3 MMOL/L (ref 21–29)
HCO3 BLDA-SCNC: 25.4 MMOL/L (ref 21–29)
HCO3 BLDA-SCNC: 26.8 MMOL/L (ref 21–29)
HCT VFR BLD AUTO: 23 % (ref 36–48)
HCT VFR BLD AUTO: 24 % (ref 36–48)
HCT VFR BLD AUTO: 28.6 % (ref 36–48)
HGB BLD CALC-MCNC: 7.7 GM/DL (ref 12–16)
HGB BLD CALC-MCNC: 8.1 GM/DL (ref 12–16)
HGB BLD-MCNC: 9.8 G/DL (ref 12–16)
LACTATE BLD-SCNC: 0.62 MMOL/L (ref 0.4–2)
MCH RBC QN AUTO: 30.7 PG (ref 26–34)
MCHC RBC AUTO-ENTMCNC: 34.2 G/DL (ref 31–36)
MCV RBC AUTO: 89.5 FL (ref 80–100)
PCO2 BLDA: 29.2 MM HG (ref 35–45)
PCO2 BLDA: 31.1 MM HG (ref 35–45)
PCO2 BLDA: 49.8 MM HG (ref 35–45)
PERFORMED ON: ABNORMAL
PH BLDA: 7.34 [PH] (ref 7.35–7.45)
PH BLDA: 7.47 [PH] (ref 7.35–7.45)
PH BLDA: 7.52 [PH] (ref 7.35–7.45)
PLATELET # BLD AUTO: 357 K/UL (ref 135–450)
PMV BLD AUTO: 7.1 FL (ref 5–10.5)
PO2 BLDA: 119.7 MM HG (ref 75–108)
PO2 BLDA: 125.7 MM HG (ref 75–108)
PO2 BLDA: 132.8 MM HG (ref 75–108)
POC SAMPLE TYPE: ABNORMAL
POTASSIUM BLD-SCNC: 3.1 MMOL/L (ref 3.5–5.1)
POTASSIUM BLD-SCNC: 3.1 MMOL/L (ref 3.5–5.1)
POTASSIUM SERPL-SCNC: 3.7 MMOL/L (ref 3.5–5.1)
RBC # BLD AUTO: 3.2 M/UL (ref 4–5.2)
REASON FOR REJECTION: NORMAL
REJECTED TEST: NORMAL
SAO2 % BLDA: 99 % (ref 93–100)
SODIUM BLD-SCNC: 132 MMOL/L (ref 136–145)
SODIUM BLD-SCNC: 135 MMOL/L (ref 136–145)
SODIUM SERPL-SCNC: 135 MMOL/L (ref 136–145)
WBC # BLD AUTO: 15.5 K/UL (ref 4–11)

## 2025-05-05 PROCEDURE — 2580000003 HC RX 258: Performed by: NURSE ANESTHETIST, CERTIFIED REGISTERED

## 2025-05-05 PROCEDURE — 36556 INSERT NON-TUNNEL CV CATH: CPT

## 2025-05-05 PROCEDURE — 2000000000 HC ICU R&B

## 2025-05-05 PROCEDURE — 6360000002 HC RX W HCPCS: Performed by: ANESTHESIOLOGY

## 2025-05-05 PROCEDURE — 2580000003 HC RX 258: Performed by: SURGERY

## 2025-05-05 PROCEDURE — 3700000000 HC ANESTHESIA ATTENDED CARE: Performed by: SURGERY

## 2025-05-05 PROCEDURE — 37799 UNLISTED PX VASCULAR SURGERY: CPT

## 2025-05-05 PROCEDURE — 84132 ASSAY OF SERUM POTASSIUM: CPT

## 2025-05-05 PROCEDURE — 3600000015 HC SURGERY LEVEL 5 ADDTL 15MIN: Performed by: SURGERY

## 2025-05-05 PROCEDURE — 94761 N-INVAS EAR/PLS OXIMETRY MLT: CPT

## 2025-05-05 PROCEDURE — 86900 BLOOD TYPING SEROLOGIC ABO: CPT

## 2025-05-05 PROCEDURE — 2580000003 HC RX 258: Performed by: STUDENT IN AN ORGANIZED HEALTH CARE EDUCATION/TRAINING PROGRAM

## 2025-05-05 PROCEDURE — 2500000003 HC RX 250 WO HCPCS: Performed by: SURGERY

## 2025-05-05 PROCEDURE — 86901 BLOOD TYPING SEROLOGIC RH(D): CPT

## 2025-05-05 PROCEDURE — 2500000003 HC RX 250 WO HCPCS: Performed by: NURSE ANESTHETIST, CERTIFIED REGISTERED

## 2025-05-05 PROCEDURE — 80048 BASIC METABOLIC PNL TOTAL CA: CPT

## 2025-05-05 PROCEDURE — 35646 BPG AORTOBIFEMORAL: CPT | Performed by: SURGERY

## 2025-05-05 PROCEDURE — 6360000002 HC RX W HCPCS: Performed by: NURSE ANESTHETIST, CERTIFIED REGISTERED

## 2025-05-05 PROCEDURE — C1713 ANCHOR/SCREW BN/BN,TIS/BN: HCPCS | Performed by: SURGERY

## 2025-05-05 PROCEDURE — C2628 CATHETER, OCCLUSION: HCPCS | Performed by: SURGERY

## 2025-05-05 PROCEDURE — 84295 ASSAY OF SERUM SODIUM: CPT

## 2025-05-05 PROCEDURE — 6360000002 HC RX W HCPCS

## 2025-05-05 PROCEDURE — 04CK0ZZ EXTIRPATION OF MATTER FROM RIGHT FEMORAL ARTERY, OPEN APPROACH: ICD-10-PCS | Performed by: SURGERY

## 2025-05-05 PROCEDURE — 04100JK BYPASS ABDOMINAL AORTA TO BILATERAL FEMORAL ARTERIES WITH SYNTHETIC SUBSTITUTE, OPEN APPROACH: ICD-10-PCS | Performed by: SURGERY

## 2025-05-05 PROCEDURE — 2709999900 HC NON-CHARGEABLE SUPPLY: Performed by: SURGERY

## 2025-05-05 PROCEDURE — 2700000000 HC OXYGEN THERAPY PER DAY

## 2025-05-05 PROCEDURE — 85027 COMPLETE CBC AUTOMATED: CPT

## 2025-05-05 PROCEDURE — 04UK0JZ SUPPLEMENT RIGHT FEMORAL ARTERY WITH SYNTHETIC SUBSTITUTE, OPEN APPROACH: ICD-10-PCS | Performed by: SURGERY

## 2025-05-05 PROCEDURE — 2720000010 HC SURG SUPPLY STERILE: Performed by: SURGERY

## 2025-05-05 PROCEDURE — 71045 X-RAY EXAM CHEST 1 VIEW: CPT

## 2025-05-05 PROCEDURE — C1768 GRAFT, VASCULAR: HCPCS | Performed by: SURGERY

## 2025-05-05 PROCEDURE — 2580000003 HC RX 258

## 2025-05-05 PROCEDURE — 82803 BLOOD GASES ANY COMBINATION: CPT

## 2025-05-05 PROCEDURE — 82947 ASSAY GLUCOSE BLOOD QUANT: CPT

## 2025-05-05 PROCEDURE — 3600000005 HC SURGERY LEVEL 5 BASE: Performed by: SURGERY

## 2025-05-05 PROCEDURE — 6360000002 HC RX W HCPCS: Performed by: SURGERY

## 2025-05-05 PROCEDURE — 85014 HEMATOCRIT: CPT

## 2025-05-05 PROCEDURE — 2580000003 HC RX 258: Performed by: CLINICAL NURSE SPECIALIST

## 2025-05-05 PROCEDURE — 86850 RBC ANTIBODY SCREEN: CPT

## 2025-05-05 PROCEDURE — 82330 ASSAY OF CALCIUM: CPT

## 2025-05-05 PROCEDURE — C1894 INTRO/SHEATH, NON-LASER: HCPCS | Performed by: SURGERY

## 2025-05-05 PROCEDURE — 3700000001 HC ADD 15 MINUTES (ANESTHESIA): Performed by: SURGERY

## 2025-05-05 PROCEDURE — 64488 TAP BLOCK BI INJECTION: CPT | Performed by: ANESTHESIOLOGY

## 2025-05-05 PROCEDURE — 83605 ASSAY OF LACTIC ACID: CPT

## 2025-05-05 DEVICE — HEMASHIELD GOLD KNITTED MICROVEL BIFURCATED DOUBLE VELOUR VASCULAR GRAFT WITH GRAFT SIZER ACCESSORY
Type: IMPLANTABLE DEVICE | Site: ABDOMEN | Status: FUNCTIONAL
Brand: HEMASHIELD

## 2025-05-05 RX ORDER — OXYCODONE HYDROCHLORIDE 5 MG/1
10 TABLET ORAL PRN
Status: DISCONTINUED | OUTPATIENT
Start: 2025-05-05 | End: 2025-05-05

## 2025-05-05 RX ORDER — SODIUM CHLORIDE, SODIUM LACTATE, POTASSIUM CHLORIDE, CALCIUM CHLORIDE 600; 310; 30; 20 MG/100ML; MG/100ML; MG/100ML; MG/100ML
INJECTION, SOLUTION INTRAVENOUS CONTINUOUS
Status: DISCONTINUED | OUTPATIENT
Start: 2025-05-05 | End: 2025-05-07

## 2025-05-05 RX ORDER — SODIUM CHLORIDE, SODIUM LACTATE, POTASSIUM CHLORIDE, CALCIUM CHLORIDE 600; 310; 30; 20 MG/100ML; MG/100ML; MG/100ML; MG/100ML
INJECTION, SOLUTION INTRAVENOUS
Status: DISCONTINUED | OUTPATIENT
Start: 2025-05-05 | End: 2025-05-05 | Stop reason: SDUPTHER

## 2025-05-05 RX ORDER — POTASSIUM CHLORIDE 29.8 MG/ML
20 INJECTION INTRAVENOUS PRN
Status: DISCONTINUED | OUTPATIENT
Start: 2025-05-05 | End: 2025-05-06

## 2025-05-05 RX ORDER — FENTANYL CITRATE 50 UG/ML
INJECTION, SOLUTION INTRAMUSCULAR; INTRAVENOUS
Status: DISCONTINUED | OUTPATIENT
Start: 2025-05-05 | End: 2025-05-05 | Stop reason: SDUPTHER

## 2025-05-05 RX ORDER — SODIUM CHLORIDE 9 MG/ML
INJECTION, SOLUTION INTRAVENOUS PRN
Status: DISCONTINUED | OUTPATIENT
Start: 2025-05-05 | End: 2025-05-05 | Stop reason: HOSPADM

## 2025-05-05 RX ORDER — NALOXONE HYDROCHLORIDE 0.4 MG/ML
INJECTION, SOLUTION INTRAMUSCULAR; INTRAVENOUS; SUBCUTANEOUS PRN
Status: DISCONTINUED | OUTPATIENT
Start: 2025-05-05 | End: 2025-05-06

## 2025-05-05 RX ORDER — ONDANSETRON 2 MG/ML
4 INJECTION INTRAMUSCULAR; INTRAVENOUS EVERY 6 HOURS PRN
Status: DISCONTINUED | OUTPATIENT
Start: 2025-05-05 | End: 2025-05-19 | Stop reason: HOSPADM

## 2025-05-05 RX ORDER — NOREPINEPHRINE BITARTRATE 0.06 MG/ML
1-100 INJECTION, SOLUTION INTRAVENOUS CONTINUOUS
Status: DISCONTINUED | OUTPATIENT
Start: 2025-05-05 | End: 2025-05-05

## 2025-05-05 RX ORDER — SODIUM CHLORIDE 9 MG/ML
INJECTION, SOLUTION INTRAVENOUS PRN
Status: DISCONTINUED | OUTPATIENT
Start: 2025-05-05 | End: 2025-05-19 | Stop reason: HOSPADM

## 2025-05-05 RX ORDER — MORPHINE SULFATE 2 MG/ML
2 INJECTION, SOLUTION INTRAMUSCULAR; INTRAVENOUS
Refills: 0 | Status: DISCONTINUED | OUTPATIENT
Start: 2025-05-05 | End: 2025-05-16

## 2025-05-05 RX ORDER — SODIUM CHLORIDE 0.9 % (FLUSH) 0.9 %
5-40 SYRINGE (ML) INJECTION PRN
Status: DISCONTINUED | OUTPATIENT
Start: 2025-05-05 | End: 2025-05-19 | Stop reason: HOSPADM

## 2025-05-05 RX ORDER — LIDOCAINE HYDROCHLORIDE 20 MG/ML
INJECTION, SOLUTION INFILTRATION; PERINEURAL
Status: DISCONTINUED | OUTPATIENT
Start: 2025-05-05 | End: 2025-05-05 | Stop reason: SDUPTHER

## 2025-05-05 RX ORDER — SODIUM CHLORIDE 9 MG/ML
INJECTION, SOLUTION INTRAVENOUS CONTINUOUS
Status: DISCONTINUED | OUTPATIENT
Start: 2025-05-05 | End: 2025-05-06

## 2025-05-05 RX ORDER — LIDOCAINE HYDROCHLORIDE 10 MG/ML
1 INJECTION, SOLUTION EPIDURAL; INFILTRATION; INTRACAUDAL; PERINEURAL
Status: DISCONTINUED | OUTPATIENT
Start: 2025-05-05 | End: 2025-05-05 | Stop reason: HOSPADM

## 2025-05-05 RX ORDER — NALOXONE HYDROCHLORIDE 0.4 MG/ML
INJECTION, SOLUTION INTRAMUSCULAR; INTRAVENOUS; SUBCUTANEOUS PRN
Status: DISCONTINUED | OUTPATIENT
Start: 2025-05-05 | End: 2025-05-05

## 2025-05-05 RX ORDER — ROCURONIUM BROMIDE 10 MG/ML
INJECTION, SOLUTION INTRAVENOUS
Status: DISCONTINUED | OUTPATIENT
Start: 2025-05-05 | End: 2025-05-05 | Stop reason: SDUPTHER

## 2025-05-05 RX ORDER — OXYCODONE HYDROCHLORIDE 5 MG/1
5 TABLET ORAL PRN
Status: DISCONTINUED | OUTPATIENT
Start: 2025-05-05 | End: 2025-05-05

## 2025-05-05 RX ORDER — CALCIUM GLUCONATE 94 MG/ML
INJECTION, SOLUTION INTRAVENOUS
Status: DISCONTINUED | OUTPATIENT
Start: 2025-05-05 | End: 2025-05-05 | Stop reason: SDUPTHER

## 2025-05-05 RX ORDER — NOREPINEPHRINE BITARTRATE 0.06 MG/ML
1-100 INJECTION, SOLUTION INTRAVENOUS CONTINUOUS PRN
Status: DISCONTINUED | OUTPATIENT
Start: 2025-05-05 | End: 2025-05-12 | Stop reason: SDUPTHER

## 2025-05-05 RX ORDER — SODIUM CHLORIDE 9 MG/ML
INJECTION, SOLUTION INTRAVENOUS
Status: DISCONTINUED | OUTPATIENT
Start: 2025-05-05 | End: 2025-05-05 | Stop reason: SDUPTHER

## 2025-05-05 RX ORDER — SODIUM CHLORIDE 0.9 % (FLUSH) 0.9 %
5-40 SYRINGE (ML) INJECTION PRN
Status: DISCONTINUED | OUTPATIENT
Start: 2025-05-05 | End: 2025-05-05 | Stop reason: HOSPADM

## 2025-05-05 RX ORDER — DIPHENHYDRAMINE HYDROCHLORIDE 50 MG/ML
12.5 INJECTION, SOLUTION INTRAMUSCULAR; INTRAVENOUS
Status: DISCONTINUED | OUTPATIENT
Start: 2025-05-05 | End: 2025-05-05

## 2025-05-05 RX ORDER — HEPARIN SODIUM 1000 [USP'U]/ML
INJECTION, SOLUTION INTRAVENOUS; SUBCUTANEOUS
Status: DISCONTINUED | OUTPATIENT
Start: 2025-05-05 | End: 2025-05-05 | Stop reason: SDUPTHER

## 2025-05-05 RX ORDER — METOPROLOL TARTRATE 1 MG/ML
5 INJECTION, SOLUTION INTRAVENOUS EVERY 6 HOURS
Status: DISCONTINUED | OUTPATIENT
Start: 2025-05-05 | End: 2025-05-07

## 2025-05-05 RX ORDER — CEFAZOLIN SODIUM 1 G/3ML
INJECTION, POWDER, FOR SOLUTION INTRAMUSCULAR; INTRAVENOUS
Status: DISCONTINUED | OUTPATIENT
Start: 2025-05-05 | End: 2025-05-05 | Stop reason: SDUPTHER

## 2025-05-05 RX ORDER — SODIUM CHLORIDE 0.9 % (FLUSH) 0.9 %
5-40 SYRINGE (ML) INJECTION EVERY 12 HOURS SCHEDULED
Status: DISCONTINUED | OUTPATIENT
Start: 2025-05-05 | End: 2025-05-05 | Stop reason: HOSPADM

## 2025-05-05 RX ORDER — 0.9 % SODIUM CHLORIDE 0.9 %
500 INTRAVENOUS SOLUTION INTRAVENOUS ONCE
Status: DISCONTINUED | OUTPATIENT
Start: 2025-05-05 | End: 2025-05-05

## 2025-05-05 RX ORDER — LABETALOL HYDROCHLORIDE 5 MG/ML
5 INJECTION, SOLUTION INTRAVENOUS EVERY 10 MIN PRN
Status: DISCONTINUED | OUTPATIENT
Start: 2025-05-05 | End: 2025-05-05

## 2025-05-05 RX ORDER — SODIUM CHLORIDE 0.9 % (FLUSH) 0.9 %
5-40 SYRINGE (ML) INJECTION EVERY 12 HOURS SCHEDULED
Status: DISCONTINUED | OUTPATIENT
Start: 2025-05-05 | End: 2025-05-05

## 2025-05-05 RX ORDER — MAGNESIUM SULFATE 1 G/100ML
1000 INJECTION INTRAVENOUS PRN
Status: DISCONTINUED | OUTPATIENT
Start: 2025-05-05 | End: 2025-05-08

## 2025-05-05 RX ORDER — ONDANSETRON 2 MG/ML
4 INJECTION INTRAMUSCULAR; INTRAVENOUS
Status: DISCONTINUED | OUTPATIENT
Start: 2025-05-05 | End: 2025-05-05

## 2025-05-05 RX ORDER — PHENYLEPHRINE HCL IN 0.9% NACL 1 MG/10 ML
SYRINGE (ML) INTRAVENOUS
Status: DISCONTINUED | OUTPATIENT
Start: 2025-05-05 | End: 2025-05-05 | Stop reason: SDUPTHER

## 2025-05-05 RX ORDER — DEXAMETHASONE SODIUM PHOSPHATE 4 MG/ML
INJECTION, SOLUTION INTRA-ARTICULAR; INTRALESIONAL; INTRAMUSCULAR; INTRAVENOUS; SOFT TISSUE
Status: DISCONTINUED | OUTPATIENT
Start: 2025-05-05 | End: 2025-05-05 | Stop reason: SDUPTHER

## 2025-05-05 RX ORDER — PROMETHAZINE HYDROCHLORIDE 25 MG/1
12.5 TABLET ORAL EVERY 6 HOURS PRN
Status: DISCONTINUED | OUTPATIENT
Start: 2025-05-05 | End: 2025-05-19 | Stop reason: HOSPADM

## 2025-05-05 RX ORDER — MANNITOL 250 MG/ML
12.5 INJECTION, SOLUTION INTRAVENOUS ONCE
Status: COMPLETED | OUTPATIENT
Start: 2025-05-05 | End: 2025-05-05

## 2025-05-05 RX ORDER — MORPHINE SULFATE 4 MG/ML
4 INJECTION, SOLUTION INTRAMUSCULAR; INTRAVENOUS
Refills: 0 | Status: DISCONTINUED | OUTPATIENT
Start: 2025-05-05 | End: 2025-05-16

## 2025-05-05 RX ORDER — SODIUM CHLORIDE, SODIUM LACTATE, POTASSIUM CHLORIDE, CALCIUM CHLORIDE 600; 310; 30; 20 MG/100ML; MG/100ML; MG/100ML; MG/100ML
INJECTION, SOLUTION INTRAVENOUS CONTINUOUS
Status: DISCONTINUED | OUTPATIENT
Start: 2025-05-05 | End: 2025-05-05 | Stop reason: HOSPADM

## 2025-05-05 RX ORDER — SODIUM CHLORIDE 0.9 % (FLUSH) 0.9 %
5-40 SYRINGE (ML) INJECTION EVERY 12 HOURS SCHEDULED
Status: DISCONTINUED | OUTPATIENT
Start: 2025-05-05 | End: 2025-05-19 | Stop reason: HOSPADM

## 2025-05-05 RX ORDER — MORPHINE SULFATE/0.9% NACL/PF 1 MG/ML
SYRINGE (ML) INJECTION CONTINUOUS
Status: DISCONTINUED | OUTPATIENT
Start: 2025-05-05 | End: 2025-05-06

## 2025-05-05 RX ORDER — SODIUM CHLORIDE 0.9 % (FLUSH) 0.9 %
5-40 SYRINGE (ML) INJECTION PRN
Status: DISCONTINUED | OUTPATIENT
Start: 2025-05-05 | End: 2025-05-05

## 2025-05-05 RX ORDER — MIDAZOLAM HYDROCHLORIDE 1 MG/ML
2 INJECTION, SOLUTION INTRAMUSCULAR; INTRAVENOUS
Status: DISCONTINUED | OUTPATIENT
Start: 2025-05-05 | End: 2025-05-05 | Stop reason: HOSPADM

## 2025-05-05 RX ORDER — NITROGLYCERIN 20 MG/100ML
5-200 INJECTION INTRAVENOUS CONTINUOUS PRN
Status: DISCONTINUED | OUTPATIENT
Start: 2025-05-05 | End: 2025-05-16

## 2025-05-05 RX ORDER — HYDRALAZINE HYDROCHLORIDE 20 MG/ML
10 INJECTION INTRAMUSCULAR; INTRAVENOUS
Status: DISCONTINUED | OUTPATIENT
Start: 2025-05-05 | End: 2025-05-16

## 2025-05-05 RX ORDER — 0.9 % SODIUM CHLORIDE 0.9 %
500 INTRAVENOUS SOLUTION INTRAVENOUS ONCE
Status: COMPLETED | OUTPATIENT
Start: 2025-05-05 | End: 2025-05-05

## 2025-05-05 RX ORDER — ONDANSETRON 2 MG/ML
INJECTION INTRAMUSCULAR; INTRAVENOUS
Status: DISCONTINUED | OUTPATIENT
Start: 2025-05-05 | End: 2025-05-05 | Stop reason: SDUPTHER

## 2025-05-05 RX ORDER — SODIUM CHLORIDE 9 MG/ML
INJECTION, SOLUTION INTRAVENOUS PRN
Status: DISCONTINUED | OUTPATIENT
Start: 2025-05-05 | End: 2025-05-05

## 2025-05-05 RX ORDER — BUPIVACAINE HYDROCHLORIDE 2.5 MG/ML
INJECTION, SOLUTION EPIDURAL; INFILTRATION; INTRACAUDAL; PERINEURAL
Status: DISCONTINUED | OUTPATIENT
Start: 2025-05-05 | End: 2025-05-05 | Stop reason: SDUPTHER

## 2025-05-05 RX ORDER — PROTAMINE SULFATE 10 MG/ML
INJECTION, SOLUTION INTRAVENOUS
Status: DISCONTINUED | OUTPATIENT
Start: 2025-05-05 | End: 2025-05-05 | Stop reason: SDUPTHER

## 2025-05-05 RX ORDER — PROPOFOL 10 MG/ML
INJECTION, EMULSION INTRAVENOUS
Status: DISCONTINUED | OUTPATIENT
Start: 2025-05-05 | End: 2025-05-05 | Stop reason: SDUPTHER

## 2025-05-05 RX ADMIN — CEFAZOLIN 2 G: 1 INJECTION, POWDER, FOR SOLUTION INTRAMUSCULAR; INTRAVENOUS at 07:35

## 2025-05-05 RX ADMIN — FENTANYL CITRATE 25 MCG: 50 INJECTION, SOLUTION INTRAMUSCULAR; INTRAVENOUS at 09:21

## 2025-05-05 RX ADMIN — DEXAMETHASONE SODIUM PHOSPHATE 4 MG: 4 INJECTION, SOLUTION INTRAMUSCULAR; INTRAVENOUS at 07:08

## 2025-05-05 RX ADMIN — LIDOCAINE HYDROCHLORIDE 60 MG: 20 INJECTION, SOLUTION INFILTRATION; PERINEURAL at 07:08

## 2025-05-05 RX ADMIN — Medication 100 MCG: at 07:53

## 2025-05-05 RX ADMIN — Medication 100 MCG: at 07:29

## 2025-05-05 RX ADMIN — PROPOFOL 20 MG: 10 INJECTION, EMULSION INTRAVENOUS at 07:46

## 2025-05-05 RX ADMIN — SODIUM CHLORIDE, SODIUM LACTATE, POTASSIUM CHLORIDE, AND CALCIUM CHLORIDE: .6; .31; .03; .02 INJECTION, SOLUTION INTRAVENOUS at 18:40

## 2025-05-05 RX ADMIN — BUPIVACAINE HYDROCHLORIDE 20 ML: 2.5 INJECTION, SOLUTION EPIDURAL; INFILTRATION; INTRACAUDAL; PERINEURAL at 10:30

## 2025-05-05 RX ADMIN — SODIUM CHLORIDE 500 ML: 0.9 INJECTION, SOLUTION INTRAVENOUS at 14:47

## 2025-05-05 RX ADMIN — PROPOFOL 30 MG: 10 INJECTION, EMULSION INTRAVENOUS at 09:49

## 2025-05-05 RX ADMIN — FENTANYL CITRATE 25 MCG: 50 INJECTION, SOLUTION INTRAMUSCULAR; INTRAVENOUS at 08:27

## 2025-05-05 RX ADMIN — Medication 100 MCG: at 09:44

## 2025-05-05 RX ADMIN — PROPOFOL 20 MG: 10 INJECTION, EMULSION INTRAVENOUS at 09:48

## 2025-05-05 RX ADMIN — CALCIUM GLUCONATE 1 G: 98 INJECTION, SOLUTION INTRAVENOUS at 08:38

## 2025-05-05 RX ADMIN — PROPOFOL 50 MG: 10 INJECTION, EMULSION INTRAVENOUS at 07:08

## 2025-05-05 RX ADMIN — Medication 200 MCG: at 07:32

## 2025-05-05 RX ADMIN — Medication 100 MCG: at 08:31

## 2025-05-05 RX ADMIN — ROCURONIUM BROMIDE 20 MG: 50 INJECTION, SOLUTION INTRAVENOUS at 08:02

## 2025-05-05 RX ADMIN — ROCURONIUM BROMIDE 10 MG: 50 INJECTION, SOLUTION INTRAVENOUS at 08:46

## 2025-05-05 RX ADMIN — MANNITOL 12.5 G: 250 INJECTION, SOLUTION INTRAVENOUS at 08:24

## 2025-05-05 RX ADMIN — PROPOFOL 20 MG: 10 INJECTION, EMULSION INTRAVENOUS at 08:55

## 2025-05-05 RX ADMIN — METOPROLOL TARTRATE 5 MG: 5 INJECTION INTRAVENOUS at 16:52

## 2025-05-05 RX ADMIN — PHENYLEPHRINE HYDROCHLORIDE 10 MCG/MIN: 10 INJECTION INTRAVENOUS at 07:29

## 2025-05-05 RX ADMIN — SODIUM CHLORIDE, PRESERVATIVE FREE 10 ML: 5 INJECTION INTRAVENOUS at 15:39

## 2025-05-05 RX ADMIN — FENTANYL CITRATE 25 MCG: 50 INJECTION, SOLUTION INTRAMUSCULAR; INTRAVENOUS at 09:51

## 2025-05-05 RX ADMIN — FENTANYL CITRATE 25 MCG: 50 INJECTION, SOLUTION INTRAMUSCULAR; INTRAVENOUS at 07:47

## 2025-05-05 RX ADMIN — SODIUM CHLORIDE: 9 INJECTION, SOLUTION INTRAVENOUS at 07:40

## 2025-05-05 RX ADMIN — SODIUM CHLORIDE: 9 INJECTION, SOLUTION INTRAVENOUS at 09:03

## 2025-05-05 RX ADMIN — METOPROLOL TARTRATE 5 MG: 5 INJECTION INTRAVENOUS at 12:01

## 2025-05-05 RX ADMIN — FENTANYL CITRATE 25 MCG: 50 INJECTION, SOLUTION INTRAMUSCULAR; INTRAVENOUS at 09:47

## 2025-05-05 RX ADMIN — PROTAMINE SULFATE 50 MG: 10 INJECTION, SOLUTION INTRAVENOUS at 09:50

## 2025-05-05 RX ADMIN — FENTANYL CITRATE 25 MCG: 50 INJECTION, SOLUTION INTRAMUSCULAR; INTRAVENOUS at 08:02

## 2025-05-05 RX ADMIN — SODIUM CHLORIDE: 9 INJECTION, SOLUTION INTRAVENOUS at 07:02

## 2025-05-05 RX ADMIN — PROPOFOL 30 MG: 10 INJECTION, EMULSION INTRAVENOUS at 08:03

## 2025-05-05 RX ADMIN — ONDANSETRON 4 MG: 2 INJECTION INTRAMUSCULAR; INTRAVENOUS at 10:06

## 2025-05-05 RX ADMIN — SODIUM CHLORIDE, SODIUM LACTATE, POTASSIUM CHLORIDE, AND CALCIUM CHLORIDE: .6; .31; .03; .02 INJECTION, SOLUTION INTRAVENOUS at 09:59

## 2025-05-05 RX ADMIN — MORPHINE SULFATE 4 MG: 4 INJECTION, SOLUTION INTRAMUSCULAR; INTRAVENOUS at 11:12

## 2025-05-05 RX ADMIN — PROPOFOL 30 MG: 10 INJECTION, EMULSION INTRAVENOUS at 08:54

## 2025-05-05 RX ADMIN — FENTANYL CITRATE 25 MCG: 50 INJECTION, SOLUTION INTRAMUSCULAR; INTRAVENOUS at 10:34

## 2025-05-05 RX ADMIN — PROPOFOL 20 MG: 10 INJECTION, EMULSION INTRAVENOUS at 08:06

## 2025-05-05 RX ADMIN — PROPOFOL 30 MG: 10 INJECTION, EMULSION INTRAVENOUS at 07:44

## 2025-05-05 RX ADMIN — BUPIVACAINE 20 ML: 13.3 INJECTION, SUSPENSION, LIPOSOMAL INFILTRATION at 10:30

## 2025-05-05 RX ADMIN — METOPROLOL TARTRATE 5 MG: 5 INJECTION INTRAVENOUS at 23:25

## 2025-05-05 RX ADMIN — ROCURONIUM BROMIDE 50 MG: 50 INJECTION, SOLUTION INTRAVENOUS at 07:08

## 2025-05-05 RX ADMIN — SODIUM CHLORIDE: 0.9 INJECTION, SOLUTION INTRAVENOUS at 16:52

## 2025-05-05 RX ADMIN — ROCURONIUM BROMIDE 10 MG: 50 INJECTION, SOLUTION INTRAVENOUS at 09:21

## 2025-05-05 RX ADMIN — HEPARIN SODIUM 5000 UNITS: 1000 INJECTION INTRAVENOUS; SUBCUTANEOUS at 08:23

## 2025-05-05 RX ADMIN — SODIUM CHLORIDE, SODIUM LACTATE, POTASSIUM CHLORIDE, AND CALCIUM CHLORIDE: .6; .31; .03; .02 INJECTION, SOLUTION INTRAVENOUS at 11:32

## 2025-05-05 RX ADMIN — SUGAMMADEX 200 MG: 100 INJECTION, SOLUTION INTRAVENOUS at 10:45

## 2025-05-05 RX ADMIN — FENTANYL CITRATE 25 MCG: 50 INJECTION, SOLUTION INTRAMUSCULAR; INTRAVENOUS at 08:05

## 2025-05-05 RX ADMIN — MORPHINE SULFATE: 1 INJECTION INTRAVENOUS at 11:46

## 2025-05-05 RX ADMIN — PROPOFOL 30 MG: 10 INJECTION, EMULSION INTRAVENOUS at 09:47

## 2025-05-05 RX ADMIN — SUGAMMADEX 200 MG: 100 INJECTION, SOLUTION INTRAVENOUS at 10:37

## 2025-05-05 ASSESSMENT — PAIN DESCRIPTION - LOCATION
LOCATION: ABDOMEN;BACK
LOCATION: BACK
LOCATION: LEG

## 2025-05-05 ASSESSMENT — PAIN DESCRIPTION - DESCRIPTORS: DESCRIPTORS: CRAMPING

## 2025-05-05 ASSESSMENT — PAIN SCALES - GENERAL
PAINLEVEL_OUTOF10: 0
PAINLEVEL_OUTOF10: 7

## 2025-05-05 ASSESSMENT — ENCOUNTER SYMPTOMS: SHORTNESS OF BREATH: 1

## 2025-05-05 ASSESSMENT — PAIN DESCRIPTION - ORIENTATION: ORIENTATION: LOWER

## 2025-05-05 NOTE — ANESTHESIA PROCEDURE NOTES
Peripheral Block    Patient location during procedure: pre-op  Reason for block: post-op pain management and at surgeon's request  Start time: 5/5/2025 10:30 AM  End time: 5/5/2025 10:37 AM  Staffing  Performed: anesthesiologist   Anesthesiologist: Gianfranco Ennis MD  Performed by: Gianfranco Ennis MD  Authorized by: Gianfranco Ennis MD    Preanesthetic Checklist  Completed: patient identified, IV checked, site marked, risks and benefits discussed, surgical/procedural consents, equipment checked, pre-op evaluation, timeout performed, anesthesia consent given, oxygen available and monitors applied/VS acknowledged  Peripheral Block   Patient position: supine  Prep: ChloraPrep  Provider prep: mask and sterile gloves  Patient monitoring: cardiac monitor, continuous pulse ox, frequent blood pressure checks and IV access  Block type: TAP  Laterality: bilateral  Injection technique: single-shot  Guidance: ultrasound guided  Local infiltration: lidocaine  Infiltration strength: 1 %  Local infiltration: lidocaine  Dose: 3 mL    Needle   Needle gauge: 21 G  Needle localization: ultrasound guidance  Needle length: 10 cm  Assessment   Injection assessment: negative aspiration for heme, no paresthesia on injection and local visualized surrounding nerve on ultrasound  Paresthesia pain: none  Slow fractionated injection: yes  Hemodynamics: stable    Additional Notes  Immediately prior to procedure a \"time out\" was called to verify the correct patient, allergies, laterality, procedure and equipment.       External Oblique muscle, Internal Oblique muscle, Transversus Abdominis muscle are identified; the tip of the needle and the spread of the local anesthetic between the Internal Oblique muscle and the Transversus Abdominis muscles are visualized. The muscles appeared to be anatomically normal and there were no abnormal pathologically findings seen.         Medications Administered  BUPivacaine liposome (EXPAREL) injection

## 2025-05-05 NOTE — BRIEF OP NOTE
Brief Postoperative Note      Patient: Muna Simon  YOB: 1929  MRN: [id number]    Date of Procedure: 5/5/2025    Pre-Op Diagnosis Codes:      * Atherosclerosis of abdominal aorta [I70.0]  Ischemic rest pain    Post-Op Diagnosis: Same       Procedure(s):  AORTOBIFEMORAL BYPASS GRAFT    Surgeon(s):  Kobe Metz MD    Assistant:  Surgical Assistant: Leonel Purdy    Anesthesia: General    Estimated Blood Loss (mL): 300     Complications: None    Specimens:   * No specimens in log *    Implants:  Implant Name Type Inv. Item Serial No.  Lot No. LRB No. Used Action   GRAFT VASC L400MM [id number] BRANCH DIA6MM [facility] DBL KEELY - [id number] Vascular grafts GRAFT VASC L400MM [id number] BRANCH DIA6MM UNIV [facility] DBL KEELY [id number] [employer]-WD [id number] N/A 1 Implanted         Drains:   NG/OG/NJ/NE Tube Nasogastric 16 fr Left nostril (Active)       Urinary Catheter 05/05/25 Nelson-Temperature (Active)       [REMOVED] External Urinary Catheter (Removed)   Site Assessment Clean,dry & intact 04/11/25 1852   Catheter Care Catheter/Wick replaced;Suction Canister/Tubing changed 04/11/25 1852   Perineal Care Yes 04/11/25 1852   Suction 40 mmgHg continuous 04/11/25 1852   Urine Color Yellow 04/11/25 1852   Urine Appearance Clear 04/11/25 1852       Findings:  Infection Present At Time Of Surgery (PATOS) (choose all levels that have infection present):  No infection present      Electronically signed by Kobe Metz MD on 5/5/2025 at 10:32 AM

## 2025-05-05 NOTE — ANESTHESIA PRE PROCEDURE
Frequency Provider Last Rate Last Admin   • lidocaine PF 1 % injection 1 mL  1 mL IntraDERmal Once PRN Diogo Lo MD       • lactated ringers infusion   IntraVENous Continuous Diogo Lo MD       • sodium chloride flush 0.9 % injection 5-40 mL  5-40 mL IntraVENous 2 times per day Diogo Lo MD       • sodium chloride flush 0.9 % injection 5-40 mL  5-40 mL IntraVENous PRN Diogo Lo MD       • 0.9 % sodium chloride infusion   IntraVENous PRN Diogo Lo MD       • midazolam (VERSED) injection 2 mg  2 mg IntraVENous Once PRN Diogo Lo MD       • sodium chloride flush 0.9 % injection 5-40 mL  5-40 mL IntraVENous 2 times per day Kobe Metz MD       • sodium chloride flush 0.9 % injection 5-40 mL  5-40 mL IntraVENous PRN Kobe Metz MD       • 0.9 % sodium chloride infusion   IntraVENous PRN Kobe Metz MD       • ceFAZolin (ANCEF) 2,000 mg in sterile water 20 mL IV syringe  2,000 mg IntraVENous On Call to OR Kobe Metz MD           Allergies:    Allergies   Allergen Reactions   • Lisinopril Other (See Comments)     Hyponatremia / SIADH       Problem List:    Patient Active Problem List   Diagnosis Code   • Atrial fibrillation with rapid ventricular response ([facility]) I48.91   • Essential hypertension I10   • Amaurosis fugax of right eye G45.3   • Chronic diastolic CHF (congestive heart failure), NYHA class 3 ([facility]) I50.32   • PAF (paroxysmal atrial fibrillation) ([facility]) I48.0   • History of TIA (transient ischemic attack) Z86.73   • SOB (shortness of breath) R06.02   • Heart failure, diastolic, with acute decompensation ([facility]) I50.33   • Hyponatremia E87.1   • TEREZA (acute kidney injury) N17.9   • CHF (congestive heart failure) ([facility]) I50.9   • Atrial fibrillation with RVR ([facility]) I48.91   • Stage 3a chronic kidney disease ([facility]) N18.31   • Atherosclerosis of abdominal aorta I70.0       Past Medical History:        Diagnosis Date   • Arthritis    • Atrial fibrillation ([facility])    • Back

## 2025-05-05 NOTE — ANESTHESIA PROCEDURE NOTES
Central Venous Line:    A central venous line was placed using surface landmarks, in the OR for the following indication(s): central venous access and CVP monitoring.5/5/2025 7:15 AM5/5/2025 7:20 AM    Sterility preparation included the following: provider used sterile gloves, gown, hat and mask and maximum sterile barriers used during central venous catheter insertion.    The patient was placed in Trendelenburg position.The right internal jugular vein was prepped.    The site was prepped with Chloraprep.  A 7 Fr (size), 16 (length), introducer triple lumen was placed.    During the procedure, the following specific steps were taken: target vein identified, needle advanced into vein and blood aspirated and guidewire advanced into vein.    Intravenous verification was obtained by venous blood return.    Post insertion care included: all ports aspirated, all ports flushed easily, guidewire removed intact, Biopatch applied, line sutured in place and dressing applied.    During the procedure the patient experienced: patient tolerated procedure well with no complications and EBL < 5mL.      Outcomes: uncomplicated and patient tolerated procedure wellNo  Anesthesia type: general..No  Staffing  Performed: Anesthesiologist   Anesthesiologist: Gianfranco Ennis MD  Performed by: Gianfranco Ennis MD  Authorized by: Gianfranco Ennis MD    Preanesthetic Checklist  Completed: patient identified, timeout performed and monitors applied/VS acknowledged

## 2025-05-05 NOTE — H&P
I have reviewed the history and physical (See note dated 4/14/2025) and examined the patient and find no relevant changes.   I have reviewed with the patient and/or family the risks, benefits, and alternatives to the procedure.

## 2025-05-06 LAB
ANION GAP SERPL CALCULATED.3IONS-SCNC: 6 MMOL/L (ref 3–16)
BUN SERPL-MCNC: 17 MG/DL (ref 7–20)
CALCIUM SERPL-MCNC: 8.5 MG/DL (ref 8.3–10.6)
CHLORIDE SERPL-SCNC: 108 MMOL/L (ref 99–110)
CO2 SERPL-SCNC: 25 MMOL/L (ref 21–32)
CREAT SERPL-MCNC: 1.1 MG/DL (ref 0.6–1.2)
DEPRECATED RDW RBC AUTO: 13.6 % (ref 12.4–15.4)
GFR SERPLBLD CREATININE-BSD FMLA CKD-EPI: 46 ML/MIN/{1.73_M2}
GLUCOSE SERPL-MCNC: 110 MG/DL (ref 70–99)
HCT VFR BLD AUTO: 23.9 % (ref 36–48)
HGB BLD-MCNC: 8.2 G/DL (ref 12–16)
MCH RBC QN AUTO: 30.9 PG (ref 26–34)
MCHC RBC AUTO-ENTMCNC: 34.2 G/DL (ref 31–36)
MCV RBC AUTO: 90.4 FL (ref 80–100)
PLATELET # BLD AUTO: 321 K/UL (ref 135–450)
PMV BLD AUTO: 7.3 FL (ref 5–10.5)
POTASSIUM SERPL-SCNC: 3.9 MMOL/L (ref 3.5–5.1)
RBC # BLD AUTO: 2.64 M/UL (ref 4–5.2)
SODIUM SERPL-SCNC: 139 MMOL/L (ref 136–145)
WBC # BLD AUTO: 8.2 K/UL (ref 4–11)

## 2025-05-06 PROCEDURE — 2500000003 HC RX 250 WO HCPCS: Performed by: SURGERY

## 2025-05-06 PROCEDURE — 2000000000 HC ICU R&B

## 2025-05-06 PROCEDURE — 2580000003 HC RX 258: Performed by: SURGERY

## 2025-05-06 PROCEDURE — 6360000002 HC RX W HCPCS: Performed by: SURGERY

## 2025-05-06 PROCEDURE — 2580000003 HC RX 258: Performed by: CLINICAL NURSE SPECIALIST

## 2025-05-06 PROCEDURE — 94761 N-INVAS EAR/PLS OXIMETRY MLT: CPT

## 2025-05-06 PROCEDURE — 80048 BASIC METABOLIC PNL TOTAL CA: CPT

## 2025-05-06 PROCEDURE — 6370000000 HC RX 637 (ALT 250 FOR IP): Performed by: SURGERY

## 2025-05-06 PROCEDURE — 6360000002 HC RX W HCPCS: Performed by: CLINICAL NURSE SPECIALIST

## 2025-05-06 PROCEDURE — 99024 POSTOP FOLLOW-UP VISIT: CPT | Performed by: CLINICAL NURSE SPECIALIST

## 2025-05-06 PROCEDURE — 2700000000 HC OXYGEN THERAPY PER DAY

## 2025-05-06 PROCEDURE — 85027 COMPLETE CBC AUTOMATED: CPT

## 2025-05-06 RX ORDER — MUPIROCIN 20 MG/G
OINTMENT TOPICAL 2 TIMES DAILY
Status: DISPENSED | OUTPATIENT
Start: 2025-05-06 | End: 2025-05-11

## 2025-05-06 RX ORDER — FUROSEMIDE 10 MG/ML
20 INJECTION INTRAMUSCULAR; INTRAVENOUS ONCE
Status: COMPLETED | OUTPATIENT
Start: 2025-05-06 | End: 2025-05-06

## 2025-05-06 RX ADMIN — METOPROLOL TARTRATE 5 MG: 5 INJECTION INTRAVENOUS at 12:48

## 2025-05-06 RX ADMIN — SODIUM CHLORIDE, SODIUM LACTATE, POTASSIUM CHLORIDE, AND CALCIUM CHLORIDE: .6; .31; .03; .02 INJECTION, SOLUTION INTRAVENOUS at 01:48

## 2025-05-06 RX ADMIN — SODIUM CHLORIDE, PRESERVATIVE FREE 10 ML: 5 INJECTION INTRAVENOUS at 08:54

## 2025-05-06 RX ADMIN — MUPIROCIN: 20 OINTMENT TOPICAL at 22:20

## 2025-05-06 RX ADMIN — SODIUM CHLORIDE, SODIUM LACTATE, POTASSIUM CHLORIDE, AND CALCIUM CHLORIDE: .6; .31; .03; .02 INJECTION, SOLUTION INTRAVENOUS at 23:57

## 2025-05-06 RX ADMIN — METOPROLOL TARTRATE 5 MG: 5 INJECTION INTRAVENOUS at 17:15

## 2025-05-06 RX ADMIN — MUPIROCIN: 20 OINTMENT TOPICAL at 08:53

## 2025-05-06 RX ADMIN — SODIUM CHLORIDE, PRESERVATIVE FREE 10 ML: 5 INJECTION INTRAVENOUS at 22:20

## 2025-05-06 RX ADMIN — FUROSEMIDE 20 MG: 10 INJECTION, SOLUTION INTRAMUSCULAR; INTRAVENOUS at 08:53

## 2025-05-06 RX ADMIN — SODIUM CHLORIDE: 0.9 INJECTION, SOLUTION INTRAVENOUS at 03:55

## 2025-05-06 RX ADMIN — SODIUM CHLORIDE, SODIUM LACTATE, POTASSIUM CHLORIDE, AND CALCIUM CHLORIDE: .6; .31; .03; .02 INJECTION, SOLUTION INTRAVENOUS at 09:58

## 2025-05-06 RX ADMIN — METOPROLOL TARTRATE 5 MG: 5 INJECTION INTRAVENOUS at 23:53

## 2025-05-06 RX ADMIN — MORPHINE SULFATE 4 MG: 4 INJECTION, SOLUTION INTRAMUSCULAR; INTRAVENOUS at 22:20

## 2025-05-06 RX ADMIN — METOPROLOL TARTRATE 5 MG: 5 INJECTION INTRAVENOUS at 05:22

## 2025-05-06 RX ADMIN — MORPHINE SULFATE 2 MG: 2 INJECTION, SOLUTION INTRAMUSCULAR; INTRAVENOUS at 19:27

## 2025-05-06 ASSESSMENT — PAIN SCALES - GENERAL
PAINLEVEL_OUTOF10: 4
PAINLEVEL_OUTOF10: 0
PAINLEVEL_OUTOF10: 8
PAINLEVEL_OUTOF10: 3
PAINLEVEL_OUTOF10: 3
PAINLEVEL_OUTOF10: 10

## 2025-05-06 ASSESSMENT — PAIN DESCRIPTION - DESCRIPTORS: DESCRIPTORS: ACHING

## 2025-05-06 ASSESSMENT — PAIN DESCRIPTION - LOCATION
LOCATION: BACK
LOCATION: OTHER (COMMENT)

## 2025-05-06 ASSESSMENT — PAIN DESCRIPTION - ORIENTATION: ORIENTATION: LOWER

## 2025-05-06 NOTE — OP NOTE
[address] [address]                            OPERATIVE REPORT      PATIENT NAME: ANTHONY IZAGUIRRE              : 1929  MED REC NO: [id number]                      ROOM: 0229  ACCOUNT NO: [id number]                       ADMIT DATE: 2025  PROVIDER: Kobe Metz MD      DATE OF PROCEDURE:  2025    SURGEON:  Kobe Metz MD    PREOPERATIVE DIAGNOSES:    1. 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  2. Ischemic rest pain in lower extremities.    POSTOPERATIVE DIAGNOSES:    1. 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  2. Ischemic rest pain in lower extremities.    PROCEDURE PERFORMED:  Aortobifemoral bypass using a 12 x 6 mm bifurcated Dacron graft.    ANESTHESIA:  General endotracheal.    INDICATIONS:  The patient has a history of ischemic rest pain of the lower extremities.  CT angiogram was performed showing dense calcific disease of the infrarenal aorta, bilateral iliac arteries, and bilateral femoral bifurcations.  There are bilateral iliac artery occlusions.  The patient is brought to the operating room at this time to undergo aortobifemoral bypass.    DESCRIPTION OF PROCEDURE:  The patient was brought to the operating room, placed in supine position, general endotracheal anesthesia induced.  After adequate anesthesia, the abdomen and pelvic regions were prepped and draped in sterile fashion.  Bilateral small transverse groin incisions were made, dissecting the common, deep, and superficial femoral arteries.  These arteries were densely calcified.  They were encircled with vessel loops.  A midline abdominal incision was then made.  The small bowel was retracted to the right side of the abdomen and the transverse colon retracted superiorly.  The retroperitoneal tissue overlying the aorta was incised, and

## 2025-05-06 NOTE — ANESTHESIA POSTPROCEDURE EVALUATION
Department of Anesthesiology  Postprocedure Note    Patient: Muna Simon  MRN: [id number]  YOB: 1929  Date of evaluation: 5/6/2025    Procedure Summary       Date: 05/05/25 Room / Location: [place] [address] ([facility]) / [facility]    Anesthesia Start: 0702 Anesthesia Stop: 1105    Procedure: AORTOBIFEMORAL BYPASS GRAFT (Abdomen) Diagnosis:       Atherosclerosis of abdominal aorta      (Atherosclerosis of abdominal aorta [I70.0])    Surgeons: Kobe Metz MD Responsible Provider: Gianfranco Ennis MD    Anesthesia Type: General ASA Status: 4            Anesthesia Type: General    Neisha Phase I: Neisha Score: 10    Neisha Phase II:      Anesthesia Post Evaluation    Patient location during evaluation: PACU  Patient participation: complete - patient participated  Level of consciousness: awake and alert  Pain score: 0  Airway patency: patent  Nausea & Vomiting: no nausea and no vomiting  Cardiovascular status: blood pressure returned to baseline  Respiratory status: acceptable  Hydration status: stable  Pain management: adequate    No notable events documented.

## 2025-05-07 PROBLEM — I51.7 LAE (LEFT ATRIAL ENLARGEMENT): Status: ACTIVE | Noted: 2025-05-07

## 2025-05-07 PROBLEM — I34.0 NONRHEUMATIC MITRAL VALVE REGURGITATION: Status: ACTIVE | Noted: 2025-05-07

## 2025-05-07 PROBLEM — I48.19 PERSISTENT ATRIAL FIBRILLATION WITH RAPID VENTRICULAR RESPONSE (HCC): Status: ACTIVE | Noted: 2025-05-07

## 2025-05-07 LAB
ALBUMIN SERPL-MCNC: 3.1 G/DL (ref 3.4–5)
ANION GAP SERPL CALCULATED.3IONS-SCNC: 10 MMOL/L (ref 3–16)
ANION GAP SERPL CALCULATED.3IONS-SCNC: 12 MMOL/L (ref 3–16)
ANTI-XA UNFRAC HEPARIN: 0.71 IU/ML (ref 0.3–0.7)
BUN SERPL-MCNC: 18 MG/DL (ref 7–20)
BUN SERPL-MCNC: 19 MG/DL (ref 7–20)
CALCIUM SERPL-MCNC: 8.2 MG/DL (ref 8.3–10.6)
CALCIUM SERPL-MCNC: 8.3 MG/DL (ref 8.3–10.6)
CHLORIDE SERPL-SCNC: 103 MMOL/L (ref 99–110)
CHLORIDE SERPL-SCNC: 104 MMOL/L (ref 99–110)
CO2 SERPL-SCNC: 23 MMOL/L (ref 21–32)
CO2 SERPL-SCNC: 23 MMOL/L (ref 21–32)
CREAT SERPL-MCNC: 1.1 MG/DL (ref 0.6–1.2)
CREAT SERPL-MCNC: 1.2 MG/DL (ref 0.6–1.2)
DEPRECATED RDW RBC AUTO: 13.4 % (ref 12.4–15.4)
EKG DIAGNOSIS: NORMAL
EKG Q-T INTERVAL: 310 MS
EKG QRS DURATION: 76 MS
EKG QTC CALCULATION (BAZETT): 454 MS
EKG R AXIS: 5 DEGREES
EKG T AXIS: 180 DEGREES
EKG VENTRICULAR RATE: 129 BPM
GFR SERPLBLD CREATININE-BSD FMLA CKD-EPI: 42 ML/MIN/{1.73_M2}
GFR SERPLBLD CREATININE-BSD FMLA CKD-EPI: 46 ML/MIN/{1.73_M2}
GLUCOSE SERPL-MCNC: 131 MG/DL (ref 70–99)
GLUCOSE SERPL-MCNC: 91 MG/DL (ref 70–99)
HCT VFR BLD AUTO: 26 % (ref 36–48)
HGB BLD-MCNC: 8.9 G/DL (ref 12–16)
MAGNESIUM SERPL-MCNC: 1.54 MG/DL (ref 1.8–2.4)
MAGNESIUM SERPL-MCNC: 2.34 MG/DL (ref 1.8–2.4)
MCH RBC QN AUTO: 30.9 PG (ref 26–34)
MCHC RBC AUTO-ENTMCNC: 34.1 G/DL (ref 31–36)
MCV RBC AUTO: 90.4 FL (ref 80–100)
PHOSPHATE SERPL-MCNC: 3 MG/DL (ref 2.5–4.9)
PLATELET # BLD AUTO: 359 K/UL (ref 135–450)
PMV BLD AUTO: 7.1 FL (ref 5–10.5)
POTASSIUM SERPL-SCNC: 3.3 MMOL/L (ref 3.5–5.1)
POTASSIUM SERPL-SCNC: 4.8 MMOL/L (ref 3.5–5.1)
RBC # BLD AUTO: 2.87 M/UL (ref 4–5.2)
SODIUM SERPL-SCNC: 136 MMOL/L (ref 136–145)
SODIUM SERPL-SCNC: 139 MMOL/L (ref 136–145)
WBC # BLD AUTO: 11.7 K/UL (ref 4–11)

## 2025-05-07 PROCEDURE — 85520 HEPARIN ASSAY: CPT

## 2025-05-07 PROCEDURE — 2000000000 HC ICU R&B

## 2025-05-07 PROCEDURE — 99223 1ST HOSP IP/OBS HIGH 75: CPT | Performed by: INTERNAL MEDICINE

## 2025-05-07 PROCEDURE — 6370000000 HC RX 637 (ALT 250 FOR IP): Performed by: CLINICAL NURSE SPECIALIST

## 2025-05-07 PROCEDURE — 99024 POSTOP FOLLOW-UP VISIT: CPT | Performed by: CLINICAL NURSE SPECIALIST

## 2025-05-07 PROCEDURE — 2500000003 HC RX 250 WO HCPCS: Performed by: SURGERY

## 2025-05-07 PROCEDURE — 85027 COMPLETE CBC AUTOMATED: CPT

## 2025-05-07 PROCEDURE — 93010 ELECTROCARDIOGRAM REPORT: CPT | Performed by: INTERNAL MEDICINE

## 2025-05-07 PROCEDURE — 6360000002 HC RX W HCPCS: Performed by: CLINICAL NURSE SPECIALIST

## 2025-05-07 PROCEDURE — 2500000003 HC RX 250 WO HCPCS: Performed by: CLINICAL NURSE SPECIALIST

## 2025-05-07 PROCEDURE — 80069 RENAL FUNCTION PANEL: CPT

## 2025-05-07 PROCEDURE — 93005 ELECTROCARDIOGRAM TRACING: CPT | Performed by: SURGERY

## 2025-05-07 PROCEDURE — 94761 N-INVAS EAR/PLS OXIMETRY MLT: CPT

## 2025-05-07 PROCEDURE — 2700000000 HC OXYGEN THERAPY PER DAY

## 2025-05-07 PROCEDURE — 6360000002 HC RX W HCPCS: Performed by: SURGERY

## 2025-05-07 PROCEDURE — 83735 ASSAY OF MAGNESIUM: CPT

## 2025-05-07 RX ORDER — OXYCODONE HYDROCHLORIDE 5 MG/1
5 TABLET ORAL EVERY 4 HOURS PRN
Refills: 0 | Status: DISCONTINUED | OUTPATIENT
Start: 2025-05-07 | End: 2025-05-16

## 2025-05-07 RX ORDER — OXYCODONE HYDROCHLORIDE 5 MG/1
10 TABLET ORAL EVERY 4 HOURS PRN
Refills: 0 | Status: DISCONTINUED | OUTPATIENT
Start: 2025-05-07 | End: 2025-05-16

## 2025-05-07 RX ORDER — HEPARIN SODIUM 1000 [USP'U]/ML
60 INJECTION, SOLUTION INTRAVENOUS; SUBCUTANEOUS ONCE
Status: COMPLETED | OUTPATIENT
Start: 2025-05-07 | End: 2025-05-07

## 2025-05-07 RX ORDER — HEPARIN SODIUM 1000 [USP'U]/ML
60 INJECTION, SOLUTION INTRAVENOUS; SUBCUTANEOUS PRN
Status: DISCONTINUED | OUTPATIENT
Start: 2025-05-07 | End: 2025-05-08

## 2025-05-07 RX ORDER — HEPARIN SODIUM 10000 [USP'U]/100ML
5-30 INJECTION, SOLUTION INTRAVENOUS CONTINUOUS
Status: DISPENSED | OUTPATIENT
Start: 2025-05-07 | End: 2025-05-08

## 2025-05-07 RX ORDER — POTASSIUM CHLORIDE 29.8 MG/ML
20 INJECTION INTRAVENOUS PRN
Status: DISCONTINUED | OUTPATIENT
Start: 2025-05-07 | End: 2025-05-08

## 2025-05-07 RX ORDER — TAMSULOSIN HYDROCHLORIDE 0.4 MG/1
0.4 CAPSULE ORAL DAILY
Status: DISCONTINUED | OUTPATIENT
Start: 2025-05-07 | End: 2025-05-19 | Stop reason: HOSPADM

## 2025-05-07 RX ORDER — CARVEDILOL 3.12 MG/1
3.12 TABLET ORAL 2 TIMES DAILY
Status: DISCONTINUED | OUTPATIENT
Start: 2025-05-07 | End: 2025-05-19 | Stop reason: HOSPADM

## 2025-05-07 RX ORDER — FUROSEMIDE 10 MG/ML
10 INJECTION INTRAMUSCULAR; INTRAVENOUS ONCE
Status: COMPLETED | OUTPATIENT
Start: 2025-05-07 | End: 2025-05-07

## 2025-05-07 RX ORDER — HEPARIN SODIUM 1000 [USP'U]/ML
30 INJECTION, SOLUTION INTRAVENOUS; SUBCUTANEOUS PRN
Status: DISCONTINUED | OUTPATIENT
Start: 2025-05-07 | End: 2025-05-08

## 2025-05-07 RX ORDER — POTASSIUM CHLORIDE 1500 MG/1
20 TABLET, EXTENDED RELEASE ORAL DAILY
Status: DISCONTINUED | OUTPATIENT
Start: 2025-05-07 | End: 2025-05-08

## 2025-05-07 RX ORDER — ROSUVASTATIN CALCIUM 10 MG/1
10 TABLET, COATED ORAL NIGHTLY
Status: DISCONTINUED | OUTPATIENT
Start: 2025-05-07 | End: 2025-05-19 | Stop reason: HOSPADM

## 2025-05-07 RX ADMIN — HEPARIN SODIUM 3800 UNITS: 1000 INJECTION INTRAVENOUS; SUBCUTANEOUS at 11:08

## 2025-05-07 RX ADMIN — POTASSIUM CHLORIDE 20 MEQ: 1500 TABLET, EXTENDED RELEASE ORAL at 09:07

## 2025-05-07 RX ADMIN — OXYCODONE 5 MG: 5 TABLET ORAL at 17:42

## 2025-05-07 RX ADMIN — MORPHINE SULFATE 4 MG: 4 INJECTION, SOLUTION INTRAMUSCULAR; INTRAVENOUS at 03:42

## 2025-05-07 RX ADMIN — OXYCODONE 10 MG: 5 TABLET ORAL at 22:20

## 2025-05-07 RX ADMIN — POTASSIUM CHLORIDE 20 MEQ: 29.8 INJECTION INTRAVENOUS at 08:38

## 2025-05-07 RX ADMIN — CARVEDILOL 3.12 MG: 3.12 TABLET, FILM COATED ORAL at 07:26

## 2025-05-07 RX ADMIN — CARVEDILOL 3.12 MG: 3.12 TABLET, FILM COATED ORAL at 20:42

## 2025-05-07 RX ADMIN — MORPHINE SULFATE 4 MG: 4 INJECTION, SOLUTION INTRAMUSCULAR; INTRAVENOUS at 19:44

## 2025-05-07 RX ADMIN — HEPARIN SODIUM 12 UNITS/KG/HR: 10000 INJECTION, SOLUTION INTRAVENOUS at 11:08

## 2025-05-07 RX ADMIN — AMIODARONE HYDROCHLORIDE 0.5 MG/MIN: 1.8 INJECTION, SOLUTION INTRAVENOUS at 16:28

## 2025-05-07 RX ADMIN — OXYCODONE 5 MG: 5 TABLET ORAL at 12:38

## 2025-05-07 RX ADMIN — AMIODARONE HYDROCHLORIDE 1 MG/MIN: 1.8 INJECTION, SOLUTION INTRAVENOUS at 10:57

## 2025-05-07 RX ADMIN — SODIUM CHLORIDE, PRESERVATIVE FREE 10 ML: 5 INJECTION INTRAVENOUS at 19:46

## 2025-05-07 RX ADMIN — TAMSULOSIN HYDROCHLORIDE 0.4 MG: 0.4 CAPSULE ORAL at 09:07

## 2025-05-07 RX ADMIN — MAGNESIUM SULFATE HEPTAHYDRATE 1000 MG: 1 INJECTION, SOLUTION INTRAVENOUS at 06:56

## 2025-05-07 RX ADMIN — METOPROLOL TARTRATE 5 MG: 5 INJECTION INTRAVENOUS at 05:45

## 2025-05-07 RX ADMIN — HEPARIN SODIUM 3800 UNITS: 1000 INJECTION INTRAVENOUS; SUBCUTANEOUS at 11:04

## 2025-05-07 RX ADMIN — MAGNESIUM SULFATE HEPTAHYDRATE 1000 MG: 1 INJECTION, SOLUTION INTRAVENOUS at 08:12

## 2025-05-07 RX ADMIN — MUPIROCIN: 20 OINTMENT TOPICAL at 20:43

## 2025-05-07 RX ADMIN — FUROSEMIDE 10 MG: 10 INJECTION, SOLUTION INTRAMUSCULAR; INTRAVENOUS at 16:35

## 2025-05-07 RX ADMIN — ROSUVASTATIN CALCIUM 10 MG: 10 TABLET, FILM COATED ORAL at 20:42

## 2025-05-07 RX ADMIN — POTASSIUM CHLORIDE 20 MEQ: 29.8 INJECTION INTRAVENOUS at 07:30

## 2025-05-07 RX ADMIN — FUROSEMIDE 10 MG: 10 INJECTION, SOLUTION INTRAMUSCULAR; INTRAVENOUS at 12:38

## 2025-05-07 ASSESSMENT — PAIN SCALES - GENERAL
PAINLEVEL_OUTOF10: 0
PAINLEVEL_OUTOF10: 0
PAINLEVEL_OUTOF10: 4
PAINLEVEL_OUTOF10: 0
PAINLEVEL_OUTOF10: 7
PAINLEVEL_OUTOF10: 3
PAINLEVEL_OUTOF10: 7
PAINLEVEL_OUTOF10: 0
PAINLEVEL_OUTOF10: 2
PAINLEVEL_OUTOF10: 0
PAINLEVEL_OUTOF10: 10
PAINLEVEL_OUTOF10: 4
PAINLEVEL_OUTOF10: 0
PAINLEVEL_OUTOF10: 5
PAINLEVEL_OUTOF10: 0
PAINLEVEL_OUTOF10: 8
PAINLEVEL_OUTOF10: 0
PAINLEVEL_OUTOF10: 10

## 2025-05-07 ASSESSMENT — PAIN DESCRIPTION - DESCRIPTORS
DESCRIPTORS: BURNING
DESCRIPTORS: BURNING
DESCRIPTORS: ACHING;DISCOMFORT;SHARP;PRESSURE
DESCRIPTORS: ACHING
DESCRIPTORS: BURNING

## 2025-05-07 ASSESSMENT — PAIN DESCRIPTION - PAIN TYPE
TYPE: SURGICAL PAIN

## 2025-05-07 ASSESSMENT — PAIN DESCRIPTION - ORIENTATION
ORIENTATION: UPPER
ORIENTATION: UPPER
ORIENTATION: LOWER;MID
ORIENTATION: UPPER
ORIENTATION: UPPER
ORIENTATION: LOWER;MID

## 2025-05-07 ASSESSMENT — PAIN DESCRIPTION - ONSET
ONSET: AWAKENED FROM SLEEP
ONSET: GRADUAL

## 2025-05-07 ASSESSMENT — PAIN DESCRIPTION - FREQUENCY
FREQUENCY: INTERMITTENT
FREQUENCY: INTERMITTENT

## 2025-05-07 ASSESSMENT — PAIN DESCRIPTION - LOCATION
LOCATION: ABDOMEN
LOCATION: OTHER (COMMENT)
LOCATION: ABDOMEN
LOCATION: ABDOMEN

## 2025-05-07 ASSESSMENT — PAIN - FUNCTIONAL ASSESSMENT
PAIN_FUNCTIONAL_ASSESSMENT: ACTIVITIES ARE NOT PREVENTED
PAIN_FUNCTIONAL_ASSESSMENT: ACTIVITIES ARE NOT PREVENTED

## 2025-05-08 ENCOUNTER — APPOINTMENT (OUTPATIENT)
Dept: GENERAL RADIOLOGY | Age: 89
DRG: 270 | End: 2025-05-08
Attending: SURGERY
Payer: MEDICARE

## 2025-05-08 ENCOUNTER — ANESTHESIA EVENT (OUTPATIENT)
Dept: CARDIAC CATH/INVASIVE PROCEDURES | Age: 89
End: 2025-05-08
Payer: MEDICARE

## 2025-05-08 ENCOUNTER — ANESTHESIA (OUTPATIENT)
Dept: CARDIAC CATH/INVASIVE PROCEDURES | Age: 89
End: 2025-05-08
Payer: MEDICARE

## 2025-05-08 ENCOUNTER — HOSPITAL ENCOUNTER (INPATIENT)
Dept: CARDIAC CATH/INVASIVE PROCEDURES | Age: 89
Discharge: HOME | DRG: 270 | End: 2025-05-10
Attending: INTERNAL MEDICINE
Payer: MEDICARE

## 2025-05-08 LAB
ANION GAP SERPL CALCULATED.3IONS-SCNC: 8 MMOL/L (ref 3–16)
ANTI-XA UNFRAC HEPARIN: 0.59 IU/ML (ref 0.3–0.7)
ANTI-XA UNFRAC HEPARIN: 0.72 IU/ML (ref 0.3–0.7)
ANTI-XA UNFRAC HEPARIN: >1.1 IU/ML (ref 0.3–0.7)
BUN SERPL-MCNC: 23 MG/DL (ref 7–20)
CALCIUM SERPL-MCNC: 8.6 MG/DL (ref 8.3–10.6)
CHLORIDE SERPL-SCNC: 99 MMOL/L (ref 99–110)
CK SERPL-CCNC: 154 U/L (ref 26–192)
CO2 SERPL-SCNC: 25 MMOL/L (ref 21–32)
CREAT SERPL-MCNC: 1.5 MG/DL (ref 0.6–1.2)
DEPRECATED RDW RBC AUTO: 13.9 % (ref 12.4–15.4)
GFR SERPLBLD CREATININE-BSD FMLA CKD-EPI: 32 ML/MIN/{1.73_M2}
GLUCOSE SERPL-MCNC: 173 MG/DL (ref 70–99)
HCT VFR BLD AUTO: 28.2 % (ref 36–48)
HGB BLD-MCNC: 9.7 G/DL (ref 12–16)
MAGNESIUM SERPL-MCNC: 2.06 MG/DL (ref 1.8–2.4)
MCH RBC QN AUTO: 31.1 PG (ref 26–34)
MCHC RBC AUTO-ENTMCNC: 34.5 G/DL (ref 31–36)
MCV RBC AUTO: 90.4 FL (ref 80–100)
PLATELET # BLD AUTO: 433 K/UL (ref 135–450)
PMV BLD AUTO: 7.6 FL (ref 5–10.5)
POTASSIUM SERPL-SCNC: 4.4 MMOL/L (ref 3.5–5.1)
RBC # BLD AUTO: 3.12 M/UL (ref 4–5.2)
SODIUM SERPL-SCNC: 132 MMOL/L (ref 136–145)
WBC # BLD AUTO: 15.5 K/UL (ref 4–11)

## 2025-05-08 PROCEDURE — 2580000003 HC RX 258: Performed by: SURGERY

## 2025-05-08 PROCEDURE — 3700000000 HC ANESTHESIA ATTENDED CARE

## 2025-05-08 PROCEDURE — 6370000000 HC RX 637 (ALT 250 FOR IP): Performed by: NURSE PRACTITIONER

## 2025-05-08 PROCEDURE — 2580000003 HC RX 258: Performed by: NURSE ANESTHETIST, CERTIFIED REGISTERED

## 2025-05-08 PROCEDURE — 2500000003 HC RX 250 WO HCPCS: Performed by: CLINICAL NURSE SPECIALIST

## 2025-05-08 PROCEDURE — 92960 CARDIOVERSION ELECTRIC EXT: CPT | Performed by: INTERNAL MEDICINE

## 2025-05-08 PROCEDURE — 99233 SBSQ HOSP IP/OBS HIGH 50: CPT | Performed by: NURSE PRACTITIONER

## 2025-05-08 PROCEDURE — 93312 ECHO TRANSESOPHAGEAL: CPT

## 2025-05-08 PROCEDURE — 36592 COLLECT BLOOD FROM PICC: CPT

## 2025-05-08 PROCEDURE — 6370000000 HC RX 637 (ALT 250 FOR IP): Performed by: CLINICAL NURSE SPECIALIST

## 2025-05-08 PROCEDURE — 71045 X-RAY EXAM CHEST 1 VIEW: CPT

## 2025-05-08 PROCEDURE — 83735 ASSAY OF MAGNESIUM: CPT

## 2025-05-08 PROCEDURE — 82550 ASSAY OF CK (CPK): CPT

## 2025-05-08 PROCEDURE — 6370000000 HC RX 637 (ALT 250 FOR IP): Performed by: SURGERY

## 2025-05-08 PROCEDURE — B24BZZ4 ULTRASONOGRAPHY OF HEART WITH AORTA, TRANSESOPHAGEAL: ICD-10-PCS | Performed by: INTERNAL MEDICINE

## 2025-05-08 PROCEDURE — 5A2204Z RESTORATION OF CARDIAC RHYTHM, SINGLE: ICD-10-PCS | Performed by: INTERNAL MEDICINE

## 2025-05-08 PROCEDURE — 94761 N-INVAS EAR/PLS OXIMETRY MLT: CPT

## 2025-05-08 PROCEDURE — 51798 US URINE CAPACITY MEASURE: CPT

## 2025-05-08 PROCEDURE — 5A0945A ASSISTANCE WITH RESPIRATORY VENTILATION, 24-96 CONSECUTIVE HOURS, HIGH NASAL FLOW/VELOCITY: ICD-10-PCS | Performed by: SURGERY

## 2025-05-08 PROCEDURE — 85027 COMPLETE CBC AUTOMATED: CPT

## 2025-05-08 PROCEDURE — 2700000000 HC OXYGEN THERAPY PER DAY

## 2025-05-08 PROCEDURE — 6360000002 HC RX W HCPCS: Performed by: NURSE ANESTHETIST, CERTIFIED REGISTERED

## 2025-05-08 PROCEDURE — 2000000000 HC ICU R&B

## 2025-05-08 PROCEDURE — 2500000003 HC RX 250 WO HCPCS: Performed by: SURGERY

## 2025-05-08 PROCEDURE — 85520 HEPARIN ASSAY: CPT

## 2025-05-08 PROCEDURE — 3700000001 HC ADD 15 MINUTES (ANESTHESIA)

## 2025-05-08 PROCEDURE — 80048 BASIC METABOLIC PNL TOTAL CA: CPT

## 2025-05-08 RX ORDER — SODIUM CHLORIDE 0.9 % (FLUSH) 0.9 %
5-40 SYRINGE (ML) INJECTION EVERY 12 HOURS SCHEDULED
Status: CANCELLED | OUTPATIENT
Start: 2025-05-08

## 2025-05-08 RX ORDER — PANTOPRAZOLE SODIUM 40 MG/1
40 TABLET, DELAYED RELEASE ORAL
Status: DISCONTINUED | OUTPATIENT
Start: 2025-05-08 | End: 2025-05-11

## 2025-05-08 RX ORDER — SODIUM CHLORIDE 9 MG/ML
INJECTION, SOLUTION INTRAVENOUS PRN
Status: CANCELLED | OUTPATIENT
Start: 2025-05-08

## 2025-05-08 RX ORDER — 0.9 % SODIUM CHLORIDE 0.9 %
500 INTRAVENOUS SOLUTION INTRAVENOUS ONCE
Status: COMPLETED | OUTPATIENT
Start: 2025-05-08 | End: 2025-05-08

## 2025-05-08 RX ORDER — SODIUM CHLORIDE 0.9 % (FLUSH) 0.9 %
5-40 SYRINGE (ML) INJECTION PRN
Status: CANCELLED | OUTPATIENT
Start: 2025-05-08

## 2025-05-08 RX ORDER — PHENYLEPHRINE HCL IN 0.9% NACL 1 MG/10 ML
SYRINGE (ML) INTRAVENOUS
Status: DISCONTINUED | OUTPATIENT
Start: 2025-05-08 | End: 2025-05-08 | Stop reason: SDUPTHER

## 2025-05-08 RX ORDER — SODIUM CHLORIDE 9 MG/ML
INJECTION, SOLUTION INTRAVENOUS CONTINUOUS
Status: DISCONTINUED | OUTPATIENT
Start: 2025-05-08 | End: 2025-05-09

## 2025-05-08 RX ORDER — PROPOFOL 10 MG/ML
INJECTION, EMULSION INTRAVENOUS
Status: DISCONTINUED | OUTPATIENT
Start: 2025-05-08 | End: 2025-05-08 | Stop reason: SDUPTHER

## 2025-05-08 RX ORDER — SODIUM CHLORIDE, SODIUM LACTATE, POTASSIUM CHLORIDE, CALCIUM CHLORIDE 600; 310; 30; 20 MG/100ML; MG/100ML; MG/100ML; MG/100ML
INJECTION, SOLUTION INTRAVENOUS
Status: DISCONTINUED | OUTPATIENT
Start: 2025-05-08 | End: 2025-05-08 | Stop reason: SDUPTHER

## 2025-05-08 RX ADMIN — SODIUM CHLORIDE, SODIUM LACTATE, POTASSIUM CHLORIDE, AND CALCIUM CHLORIDE: .6; .31; .03; .02 INJECTION, SOLUTION INTRAVENOUS at 12:45

## 2025-05-08 RX ADMIN — MUPIROCIN: 20 OINTMENT TOPICAL at 20:57

## 2025-05-08 RX ADMIN — ROSUVASTATIN CALCIUM 10 MG: 10 TABLET, FILM COATED ORAL at 20:56

## 2025-05-08 RX ADMIN — POTASSIUM CHLORIDE 20 MEQ: 1500 TABLET, EXTENDED RELEASE ORAL at 09:01

## 2025-05-08 RX ADMIN — PROPOFOL 30 MG: 10 INJECTION, EMULSION INTRAVENOUS at 12:48

## 2025-05-08 RX ADMIN — Medication 300 MCG: at 12:49

## 2025-05-08 RX ADMIN — AMIODARONE HYDROCHLORIDE 0.5 MG/MIN: 1.8 INJECTION, SOLUTION INTRAVENOUS at 04:37

## 2025-05-08 RX ADMIN — SODIUM CHLORIDE 500 ML: 0.9 INJECTION, SOLUTION INTRAVENOUS at 06:55

## 2025-05-08 RX ADMIN — OXYCODONE 10 MG: 5 TABLET ORAL at 09:01

## 2025-05-08 RX ADMIN — MUPIROCIN: 20 OINTMENT TOPICAL at 09:02

## 2025-05-08 RX ADMIN — Medication 100 MCG: at 12:58

## 2025-05-08 RX ADMIN — TAMSULOSIN HYDROCHLORIDE 0.4 MG: 0.4 CAPSULE ORAL at 09:01

## 2025-05-08 RX ADMIN — APIXABAN 5 MG: 5 TABLET, FILM COATED ORAL at 20:56

## 2025-05-08 RX ADMIN — Medication 300 MCG: at 12:56

## 2025-05-08 RX ADMIN — OXYCODONE 10 MG: 5 TABLET ORAL at 20:56

## 2025-05-08 RX ADMIN — Medication 300 MCG: at 12:52

## 2025-05-08 RX ADMIN — CARVEDILOL 3.12 MG: 3.12 TABLET, FILM COATED ORAL at 20:56

## 2025-05-08 RX ADMIN — OXYCODONE 10 MG: 5 TABLET ORAL at 05:06

## 2025-05-08 RX ADMIN — PROMETHAZINE HYDROCHLORIDE 12.5 MG: 25 TABLET ORAL at 00:08

## 2025-05-08 RX ADMIN — CARVEDILOL 3.12 MG: 3.12 TABLET, FILM COATED ORAL at 09:01

## 2025-05-08 RX ADMIN — PANTOPRAZOLE SODIUM 40 MG: 40 TABLET, DELAYED RELEASE ORAL at 06:56

## 2025-05-08 RX ADMIN — Medication 200 MCG: at 12:54

## 2025-05-08 RX ADMIN — APIXABAN 5 MG: 5 TABLET, FILM COATED ORAL at 12:30

## 2025-05-08 RX ADMIN — SODIUM CHLORIDE: 0.9 INJECTION, SOLUTION INTRAVENOUS at 09:08

## 2025-05-08 RX ADMIN — AMIODARONE HYDROCHLORIDE 0.5 MG/MIN: 1.8 INJECTION, SOLUTION INTRAVENOUS at 16:13

## 2025-05-08 RX ADMIN — SODIUM CHLORIDE, PRESERVATIVE FREE 10 ML: 5 INJECTION INTRAVENOUS at 20:57

## 2025-05-08 ASSESSMENT — PAIN DESCRIPTION - ONSET
ONSET: AWAKENED FROM SLEEP
ONSET: GRADUAL

## 2025-05-08 ASSESSMENT — PAIN DESCRIPTION - LOCATION
LOCATION: ABDOMEN

## 2025-05-08 ASSESSMENT — PAIN SCALES - GENERAL
PAINLEVEL_OUTOF10: 10
PAINLEVEL_OUTOF10: 0
PAINLEVEL_OUTOF10: 0
PAINLEVEL_OUTOF10: 4
PAINLEVEL_OUTOF10: 9
PAINLEVEL_OUTOF10: 7

## 2025-05-08 ASSESSMENT — PAIN DESCRIPTION - FREQUENCY
FREQUENCY: INTERMITTENT
FREQUENCY: INTERMITTENT

## 2025-05-08 ASSESSMENT — PAIN DESCRIPTION - DESCRIPTORS
DESCRIPTORS: CRUSHING
DESCRIPTORS: ACHING;CRAMPING
DESCRIPTORS: ACHING;SHARP;DISCOMFORT;CRAMPING

## 2025-05-08 ASSESSMENT — PAIN DESCRIPTION - ORIENTATION
ORIENTATION: MID
ORIENTATION: MID;LOWER
ORIENTATION: LOWER;MID

## 2025-05-08 ASSESSMENT — PAIN DESCRIPTION - PAIN TYPE
TYPE: SURGICAL PAIN
TYPE: SURGICAL PAIN

## 2025-05-08 NOTE — PROCEDURES
PROCEDURE NOTE  Date: 5/8/2025   Name: Muna Simon  YOB: 1929    Procedures    5/8/2025    PROCEDURE PERFORMED:     1. A Complete Transesophageal echocardiogram with color and spectral doppler and 3-D imaging was performed.   2. Direct current cardioversion.    INDICATIONS: Symptomatic Atrial fibrillation.     PROCEDURE: The patient was brought to the lab in fasting state. The patient received adequate sedation with the assistance of anesthesia for the case. After ensuring adequate sedation, the esophagus was intubated and a complete transesophageal echocardiogram was completed. There were no complications related to the study. Following clearing the left atrial appendage/left atrium of thrombus, the patient was prepped for cardioversion. A synchronized shock delivering 200 Joules energy was given. The patient was successfully cardioverted to normal sinus rhythm.  The patient tolerated the procedure well. Post-procedure examination notable for stable vitals with hemodynamic and neurovascular recovery.    POST PROCEDURE EKG: Sinus rhythm    CONCLUSION: Successful direct current cardioversion.     PLAN:   1. Continue systemic anticoagulation.  Please keep patient on Eliquis 5 mg twice daily for 1 month and then she can be decreased to 2.5 mg twice daily.  Heparin can be turned off at 2:30 PM  2. Continue current cardiac medications.  In addition, amiodarone can be transition to 200 mg twice daily for 2 weeks followed by 200 mg daily.  3.  She needs a 30-day event monitor prior to discharge and a follow-up with me in 3 months.    Zaynab Meraz DO   [facility] [place]   [phone] [place] office   [phone] [facility]

## 2025-05-08 NOTE — ANESTHESIA POSTPROCEDURE EVALUATION
Department of Anesthesiology  Postprocedure Note    Patient: Muna Simon  MRN: [id number]  YOB: 1929  Date of evaluation: 5/8/2025    Procedure Summary       Date: 05/08/25 Room / Location: [facility]    Anesthesia Start: 1245 Anesthesia Stop: 1310    Procedure: LUCIA W/ POSSIBLE CARDIOVERSION (PRN CONTRAST/BUBBLE/3D) Diagnosis: Atrial fibrillation with rapid ventricular response (HCC)    Scheduled Providers:  Responsible Provider:     Anesthesia Type: MAC ASA Status: 4            Anesthesia Type: No value filed.    Neisha Phase I:      Neisha Phase II:      Anesthesia Post Evaluation    Patient location during evaluation: PACU  Level of consciousness: awake  Airway patency: patent  Nausea & Vomiting: no vomiting  Cardiovascular status: blood pressure returned to baseline  Respiratory status: acceptable  Hydration status: stable  Pain management: adequate    No notable events documented.

## 2025-05-08 NOTE — ANESTHESIA PRE PROCEDURE
05:12 AM    LABGLOM 42 12/14/2023 05:30 PM    GLUCOSE 173 05/08/2025 05:12 AM    CALCIUM 8.6 05/08/2025 05:12 AM    BILITOT <0.2 04/11/2025 02:43 PM    ALKPHOS 73 04/11/2025 02:43 PM    AST 16 04/11/2025 02:43 PM    ALT 12 04/11/2025 02:43 PM       POC Tests: No results for input(s): \"POCGLU\", \"POCNA\", \"POCK\", \"POCCL\", \"POCBUN\", \"POCHEMO\", \"POCHCT\" in the last 72 hours.    Coags:   Lab Results   Component Value Date/Time    PROTIME 16.2 07/09/2021 06:57 AM    INR 1.41 07/09/2021 06:57 AM    APTT 37.1 07/08/2021 05:44 AM       HCG (If Applicable): No results found for: \"PREGTESTUR\", \"PREGSERUM\", \"HCG\", \"HCGQUANT\"     ABGs:   Lab Results   Component Value Date/Time    PHART 7.339 05/05/2025 11:11 AM    PO2ART 125.7 05/05/2025 11:11 AM    MNB0XLO 49.8 05/05/2025 11:11 AM    UTN8PEK 26.8 05/05/2025 11:11 AM    BEART 1 05/05/2025 11:11 AM    H4TXWAGE 99 05/05/2025 11:11 AM        Type & Screen (If Applicable):  Lab Results   Component Value Date    ABORH O POS 05/05/2025    LABANTI NEG 05/05/2025       Drug/Infectious Status (If Applicable):  No results found for: \"HIV\", \"HEPCAB\"    COVID-19 Screening (If Applicable):   Lab Results   Component Value Date/Time    COVID19 NOT DETECTED 04/11/2025 02:43 PM           Anesthesia Evaluation    Airway: Mallampati: II          Dental:    (+) partials      Pulmonary:   (+)       decreased breath sounds                               Cardiovascular:    (+) dysrhythmias: atrial fibrillation        Rhythm: irregular  Rate: abnormal                    Neuro/Psych:               GI/Hepatic/Renal:             Endo/Other:                     Abdominal:             Vascular:          Other Findings:       Anesthesia Plan      MAC     ASA 4                               Jt Patel MD   5/8/2025

## 2025-05-09 ENCOUNTER — APPOINTMENT (OUTPATIENT)
Dept: GENERAL RADIOLOGY | Age: 89
DRG: 270 | End: 2025-05-09
Attending: SURGERY
Payer: MEDICARE

## 2025-05-09 LAB
ALBUMIN SERPL-MCNC: 2.3 G/DL (ref 3.4–5)
ALBUMIN SERPL-MCNC: 2.6 G/DL (ref 3.4–5)
ANION GAP SERPL CALCULATED.3IONS-SCNC: 10 MMOL/L (ref 3–16)
ANION GAP SERPL CALCULATED.3IONS-SCNC: 11 MMOL/L (ref 3–16)
ANION GAP SERPL CALCULATED.3IONS-SCNC: 9 MMOL/L (ref 3–16)
APTT BLD: 152.4 SEC (ref 22.1–36.4)
APTT BLD: >180 SEC (ref 22.1–36.4)
BASE EXCESS BLDA CALC-SCNC: -2 MMOL/L (ref -3–3)
BASE EXCESS BLDA CALC-SCNC: -5 MMOL/L (ref -3–3)
BUN SERPL-MCNC: 29 MG/DL (ref 7–20)
BUN SERPL-MCNC: 31 MG/DL (ref 7–20)
BUN SERPL-MCNC: 32 MG/DL (ref 7–20)
CA-I BLD-SCNC: 1.01 MMOL/L (ref 1.12–1.32)
CA-I BLD-SCNC: 1.06 MMOL/L (ref 1.12–1.32)
CA-I BLD-SCNC: 1.15 MMOL/L (ref 1.12–1.32)
CALCIUM SERPL-MCNC: 7.3 MG/DL (ref 8.3–10.6)
CALCIUM SERPL-MCNC: 7.3 MG/DL (ref 8.3–10.6)
CALCIUM SERPL-MCNC: 7.7 MG/DL (ref 8.3–10.6)
CHLORIDE SERPL-SCNC: 100 MMOL/L (ref 99–110)
CHLORIDE SERPL-SCNC: 99 MMOL/L (ref 99–110)
CHLORIDE SERPL-SCNC: 99 MMOL/L (ref 99–110)
CO2 BLDA-SCNC: 21.1 MMOL/L
CO2 BLDA-SCNC: 25 MMOL/L
CO2 SERPL-SCNC: 22 MMOL/L (ref 21–32)
CO2 SERPL-SCNC: 23 MMOL/L (ref 21–32)
CO2 SERPL-SCNC: 24 MMOL/L (ref 21–32)
COHGB MFR BLDA: 0.6 % (ref 0–1.5)
CREAT SERPL-MCNC: 1.6 MG/DL (ref 0.6–1.2)
CREAT SERPL-MCNC: 2 MG/DL (ref 0.6–1.2)
CREAT SERPL-MCNC: 2.1 MG/DL (ref 0.6–1.2)
DEPRECATED RDW RBC AUTO: 14 % (ref 12.4–15.4)
ECHO BSA: 1.61 M2
ECHO LV EF PHYSICIAN: 55 %
GFR SERPLBLD CREATININE-BSD FMLA CKD-EPI: 21 ML/MIN/{1.73_M2}
GFR SERPLBLD CREATININE-BSD FMLA CKD-EPI: 23 ML/MIN/{1.73_M2}
GFR SERPLBLD CREATININE-BSD FMLA CKD-EPI: 30 ML/MIN/{1.73_M2}
GLUCOSE BLD-MCNC: 143 MG/DL (ref 70–99)
GLUCOSE SERPL-MCNC: 120 MG/DL (ref 70–99)
GLUCOSE SERPL-MCNC: 125 MG/DL (ref 70–99)
GLUCOSE SERPL-MCNC: 156 MG/DL (ref 70–99)
HCO3 BLDA-SCNC: 20 MMOL/L (ref 21–29)
HCO3 BLDA-SCNC: 23.5 MMOL/L (ref 21–29)
HCT VFR BLD AUTO: 25 % (ref 36–48)
HCT VFR BLD AUTO: 27 % (ref 36–48)
HGB BLD CALC-MCNC: 9.2 GM/DL (ref 12–16)
HGB BLD-MCNC: 8.5 G/DL (ref 12–16)
HGB BLDA-MCNC: 9.4 G/DL (ref 12–16)
INR PPP: 2.27 (ref 0.85–1.15)
INR PPP: 3.38 (ref 0.85–1.15)
LACTATE BLD-SCNC: 0.83 MMOL/L (ref 0.4–2)
MAGNESIUM SERPL-MCNC: 1.97 MG/DL (ref 1.8–2.4)
MAGNESIUM SERPL-MCNC: 2 MG/DL (ref 1.8–2.4)
MCH RBC QN AUTO: 30.5 PG (ref 26–34)
MCHC RBC AUTO-ENTMCNC: 34 G/DL (ref 31–36)
MCV RBC AUTO: 89.9 FL (ref 80–100)
METHGB MFR BLDA: 0.3 %
NT-PROBNP SERPL-MCNC: ABNORMAL PG/ML (ref 0–449)
O2 THERAPY: ABNORMAL
PCO2 BLDA: 36.7 MMHG (ref 35–45)
PCO2 BLDA: 42.1 MM HG (ref 35–45)
PERFORMED ON: ABNORMAL
PH BLDA: 7.35 [PH] (ref 7.35–7.45)
PH BLDA: 7.36 [PH] (ref 7.35–7.45)
PH BLDV: 7.35 [PH] (ref 7.35–7.45)
PH BLDV: 7.36 [PH] (ref 7.35–7.45)
PHOSPHATE SERPL-MCNC: 2.8 MG/DL (ref 2.5–4.9)
PHOSPHATE SERPL-MCNC: 3.2 MG/DL (ref 2.5–4.9)
PLATELET # BLD AUTO: 372 K/UL (ref 135–450)
PMV BLD AUTO: 7.6 FL (ref 5–10.5)
PO2 BLDA: 40.4 MM HG (ref 75–108)
PO2 BLDA: 55 MMHG (ref 75–108)
POC SAMPLE TYPE: ABNORMAL
POTASSIUM BLD-SCNC: 4.8 MMOL/L (ref 3.5–5.1)
POTASSIUM SERPL-SCNC: 4.3 MMOL/L (ref 3.5–5.1)
POTASSIUM SERPL-SCNC: 4.5 MMOL/L (ref 3.5–5.1)
POTASSIUM SERPL-SCNC: 5.1 MMOL/L (ref 3.5–5.1)
PROTHROMBIN TIME: 25 SEC (ref 11.9–14.9)
PROTHROMBIN TIME: 34 SEC (ref 11.9–14.9)
RBC # BLD AUTO: 2.79 M/UL (ref 4–5.2)
SAO2 % BLDA: 73 % (ref 93–100)
SAO2 % BLDA: 85.9 %
SODIUM BLD-SCNC: 130 MMOL/L (ref 136–145)
SODIUM SERPL-SCNC: 132 MMOL/L (ref 136–145)
SODIUM SERPL-SCNC: 132 MMOL/L (ref 136–145)
SODIUM SERPL-SCNC: 133 MMOL/L (ref 136–145)
WBC # BLD AUTO: 11.1 K/UL (ref 4–11)

## 2025-05-09 PROCEDURE — 85730 THROMBOPLASTIN TIME PARTIAL: CPT

## 2025-05-09 PROCEDURE — 84132 ASSAY OF SERUM POTASSIUM: CPT

## 2025-05-09 PROCEDURE — 83880 ASSAY OF NATRIURETIC PEPTIDE: CPT

## 2025-05-09 PROCEDURE — 02HV33Z INSERTION OF INFUSION DEVICE INTO SUPERIOR VENA CAVA, PERCUTANEOUS APPROACH: ICD-10-PCS | Performed by: SURGERY

## 2025-05-09 PROCEDURE — 2580000003 HC RX 258: Performed by: INTERNAL MEDICINE

## 2025-05-09 PROCEDURE — 99291 CRITICAL CARE FIRST HOUR: CPT | Performed by: INTERNAL MEDICINE

## 2025-05-09 PROCEDURE — 2500000003 HC RX 250 WO HCPCS: Performed by: CLINICAL NURSE SPECIALIST

## 2025-05-09 PROCEDURE — 6370000000 HC RX 637 (ALT 250 FOR IP): Performed by: CLINICAL NURSE SPECIALIST

## 2025-05-09 PROCEDURE — 94640 AIRWAY INHALATION TREATMENT: CPT

## 2025-05-09 PROCEDURE — 85014 HEMATOCRIT: CPT

## 2025-05-09 PROCEDURE — 93320 DOPPLER ECHO COMPLETE: CPT | Performed by: INTERNAL MEDICINE

## 2025-05-09 PROCEDURE — 94660 CPAP INITIATION&MGMT: CPT

## 2025-05-09 PROCEDURE — 94669 MECHANICAL CHEST WALL OSCILL: CPT

## 2025-05-09 PROCEDURE — 82803 BLOOD GASES ANY COMBINATION: CPT

## 2025-05-09 PROCEDURE — 82947 ASSAY GLUCOSE BLOOD QUANT: CPT

## 2025-05-09 PROCEDURE — 83605 ASSAY OF LACTIC ACID: CPT

## 2025-05-09 PROCEDURE — 3E1M39Z IRRIGATION OF PERITONEAL CAVITY USING DIALYSATE, PERCUTANEOUS APPROACH: ICD-10-PCS | Performed by: SURGERY

## 2025-05-09 PROCEDURE — 71045 X-RAY EXAM CHEST 1 VIEW: CPT

## 2025-05-09 PROCEDURE — 93325 DOPPLER ECHO COLOR FLOW MAPG: CPT | Performed by: INTERNAL MEDICINE

## 2025-05-09 PROCEDURE — 6360000002 HC RX W HCPCS: Performed by: SURGERY

## 2025-05-09 PROCEDURE — 2000000000 HC ICU R&B

## 2025-05-09 PROCEDURE — 90945 DIALYSIS ONE EVALUATION: CPT

## 2025-05-09 PROCEDURE — 2700000000 HC OXYGEN THERAPY PER DAY

## 2025-05-09 PROCEDURE — 6370000000 HC RX 637 (ALT 250 FOR IP): Performed by: INTERNAL MEDICINE

## 2025-05-09 PROCEDURE — 85610 PROTHROMBIN TIME: CPT

## 2025-05-09 PROCEDURE — 80069 RENAL FUNCTION PANEL: CPT

## 2025-05-09 PROCEDURE — 6370000000 HC RX 637 (ALT 250 FOR IP): Performed by: NURSE PRACTITIONER

## 2025-05-09 PROCEDURE — 93312 ECHO TRANSESOPHAGEAL: CPT | Performed by: INTERNAL MEDICINE

## 2025-05-09 PROCEDURE — 36592 COLLECT BLOOD FROM PICC: CPT

## 2025-05-09 PROCEDURE — 6370000000 HC RX 637 (ALT 250 FOR IP): Performed by: SURGERY

## 2025-05-09 PROCEDURE — 83735 ASSAY OF MAGNESIUM: CPT

## 2025-05-09 PROCEDURE — 99232 SBSQ HOSP IP/OBS MODERATE 35: CPT | Performed by: NURSE PRACTITIONER

## 2025-05-09 PROCEDURE — 82330 ASSAY OF CALCIUM: CPT

## 2025-05-09 PROCEDURE — 84295 ASSAY OF SERUM SODIUM: CPT

## 2025-05-09 PROCEDURE — 2500000003 HC RX 250 WO HCPCS: Performed by: SURGERY

## 2025-05-09 PROCEDURE — 6360000002 HC RX W HCPCS: Performed by: INTERNAL MEDICINE

## 2025-05-09 PROCEDURE — 94761 N-INVAS EAR/PLS OXIMETRY MLT: CPT

## 2025-05-09 PROCEDURE — 6360000002 HC RX W HCPCS: Performed by: NURSE PRACTITIONER

## 2025-05-09 PROCEDURE — 5A09457 ASSISTANCE WITH RESPIRATORY VENTILATION, 24-96 CONSECUTIVE HOURS, CONTINUOUS POSITIVE AIRWAY PRESSURE: ICD-10-PCS | Performed by: SURGERY

## 2025-05-09 PROCEDURE — 85027 COMPLETE CBC AUTOMATED: CPT

## 2025-05-09 PROCEDURE — 2580000003 HC RX 258: Performed by: SURGERY

## 2025-05-09 RX ORDER — HEPARIN SODIUM 1000 [USP'U]/ML
2000 INJECTION, SOLUTION INTRAVENOUS; SUBCUTANEOUS PRN
Status: DISCONTINUED | OUTPATIENT
Start: 2025-05-09 | End: 2025-05-14

## 2025-05-09 RX ORDER — IPRATROPIUM BROMIDE AND ALBUTEROL SULFATE 2.5; .5 MG/3ML; MG/3ML
1 SOLUTION RESPIRATORY (INHALATION) EVERY 4 HOURS PRN
Status: DISCONTINUED | OUTPATIENT
Start: 2025-05-09 | End: 2025-05-14

## 2025-05-09 RX ORDER — ACETYLCYSTEINE 200 MG/ML
600 SOLUTION ORAL; RESPIRATORY (INHALATION)
Status: ACTIVE | OUTPATIENT
Start: 2025-05-09 | End: 2025-05-11

## 2025-05-09 RX ORDER — AMIODARONE HYDROCHLORIDE 200 MG/1
400 TABLET ORAL 2 TIMES DAILY
Status: DISCONTINUED | OUTPATIENT
Start: 2025-05-09 | End: 2025-05-19 | Stop reason: HOSPADM

## 2025-05-09 RX ORDER — SODIUM CHLORIDE FOR INHALATION 3 %
4 VIAL, NEBULIZER (ML) INHALATION
Status: DISCONTINUED | OUTPATIENT
Start: 2025-05-09 | End: 2025-05-14

## 2025-05-09 RX ORDER — HEPARIN SODIUM 1000 [USP'U]/ML
4000 INJECTION, SOLUTION INTRAVENOUS; SUBCUTANEOUS ONCE
Status: COMPLETED | OUTPATIENT
Start: 2025-05-09 | End: 2025-05-09

## 2025-05-09 RX ORDER — HEPARIN SODIUM 1000 [USP'U]/ML
INJECTION, SOLUTION INTRAVENOUS; SUBCUTANEOUS PRN
Status: DISCONTINUED | OUTPATIENT
Start: 2025-05-09 | End: 2025-05-19 | Stop reason: HOSPADM

## 2025-05-09 RX ORDER — MAGNESIUM SULFATE 1 G/100ML
1000 INJECTION INTRAVENOUS PRN
Status: DISCONTINUED | OUTPATIENT
Start: 2025-05-09 | End: 2025-05-13

## 2025-05-09 RX ORDER — FUROSEMIDE 10 MG/ML
20 INJECTION INTRAMUSCULAR; INTRAVENOUS ONCE
Status: COMPLETED | OUTPATIENT
Start: 2025-05-09 | End: 2025-05-09

## 2025-05-09 RX ORDER — CALCIUM GLUCONATE 20 MG/ML
2000 INJECTION, SOLUTION INTRAVENOUS PRN
Status: DISCONTINUED | OUTPATIENT
Start: 2025-05-09 | End: 2025-05-13

## 2025-05-09 RX ORDER — HEPARIN SODIUM 1000 [USP'U]/ML
4000 INJECTION, SOLUTION INTRAVENOUS; SUBCUTANEOUS PRN
Status: DISCONTINUED | OUTPATIENT
Start: 2025-05-09 | End: 2025-05-14

## 2025-05-09 RX ORDER — CALCIUM GLUCONATE 20 MG/ML
1000 INJECTION, SOLUTION INTRAVENOUS PRN
Status: DISCONTINUED | OUTPATIENT
Start: 2025-05-09 | End: 2025-05-13

## 2025-05-09 RX ORDER — HEPARIN SODIUM 10000 [USP'U]/100ML
5-30 INJECTION, SOLUTION INTRAVENOUS CONTINUOUS
Status: DISCONTINUED | OUTPATIENT
Start: 2025-05-09 | End: 2025-05-14

## 2025-05-09 RX ORDER — FUROSEMIDE 10 MG/ML
80 INJECTION INTRAMUSCULAR; INTRAVENOUS ONCE
Status: COMPLETED | OUTPATIENT
Start: 2025-05-09 | End: 2025-05-09

## 2025-05-09 RX ORDER — POTASSIUM CHLORIDE 29.8 MG/ML
20 INJECTION INTRAVENOUS PRN
Status: DISCONTINUED | OUTPATIENT
Start: 2025-05-09 | End: 2025-05-13

## 2025-05-09 RX ADMIN — FUROSEMIDE 10 MG/HR: 10 INJECTION, SOLUTION INTRAMUSCULAR; INTRAVENOUS at 09:19

## 2025-05-09 RX ADMIN — OXYCODONE 10 MG: 5 TABLET ORAL at 18:41

## 2025-05-09 RX ADMIN — CALCIUM GLUCONATE 1000 MG: 20 INJECTION, SOLUTION INTRAVENOUS at 17:13

## 2025-05-09 RX ADMIN — SODIUM CHLORIDE: 0.9 INJECTION, SOLUTION INTRAVENOUS at 21:56

## 2025-05-09 RX ADMIN — Medication 3 MG: at 01:07

## 2025-05-09 RX ADMIN — IPRATROPIUM BROMIDE AND ALBUTEROL SULFATE 1 DOSE: 2.5; .5 SOLUTION RESPIRATORY (INHALATION) at 15:28

## 2025-05-09 RX ADMIN — HEPARIN SODIUM 10 UNITS/KG/HR: 10000 INJECTION, SOLUTION INTRAVENOUS at 15:38

## 2025-05-09 RX ADMIN — ACETYLCYSTEINE 600 MG: 200 INHALANT RESPIRATORY (INHALATION) at 21:13

## 2025-05-09 RX ADMIN — CALCIUM GLUCONATE 1000 MG: 20 INJECTION, SOLUTION INTRAVENOUS at 21:58

## 2025-05-09 RX ADMIN — Medication: at 14:32

## 2025-05-09 RX ADMIN — ACETYLCYSTEINE 600 MG: 200 INHALANT RESPIRATORY (INHALATION) at 08:31

## 2025-05-09 RX ADMIN — PANTOPRAZOLE SODIUM 40 MG: 40 TABLET, DELAYED RELEASE ORAL at 06:31

## 2025-05-09 RX ADMIN — SODIUM CHLORIDE, PRESERVATIVE FREE 10 ML: 5 INJECTION INTRAVENOUS at 21:27

## 2025-05-09 RX ADMIN — IPRATROPIUM BROMIDE AND ALBUTEROL SULFATE 1 DOSE: 2.5; .5 SOLUTION RESPIRATORY (INHALATION) at 11:36

## 2025-05-09 RX ADMIN — SODIUM CHLORIDE SOLN NEBU 3% 4 ML: 3 NEBU SOLN at 15:28

## 2025-05-09 RX ADMIN — AMIODARONE HYDROCHLORIDE 0.5 MG/MIN: 1.8 INJECTION, SOLUTION INTRAVENOUS at 03:58

## 2025-05-09 RX ADMIN — IPRATROPIUM BROMIDE AND ALBUTEROL SULFATE 1 DOSE: 2.5; .5 SOLUTION RESPIRATORY (INHALATION) at 21:13

## 2025-05-09 RX ADMIN — MUPIROCIN: 20 OINTMENT TOPICAL at 21:27

## 2025-05-09 RX ADMIN — CARVEDILOL 3.12 MG: 3.12 TABLET, FILM COATED ORAL at 21:27

## 2025-05-09 RX ADMIN — FUROSEMIDE 20 MG: 10 INJECTION, SOLUTION INTRAMUSCULAR; INTRAVENOUS at 05:06

## 2025-05-09 RX ADMIN — HEPARIN SODIUM 4000 UNITS: 1000 INJECTION INTRAVENOUS; SUBCUTANEOUS at 15:35

## 2025-05-09 RX ADMIN — FUROSEMIDE 80 MG: 10 INJECTION, SOLUTION INTRAMUSCULAR; INTRAVENOUS at 08:46

## 2025-05-09 RX ADMIN — ROSUVASTATIN CALCIUM 10 MG: 10 TABLET, FILM COATED ORAL at 21:27

## 2025-05-09 RX ADMIN — SODIUM CHLORIDE SOLN NEBU 3% 4 ML: 3 NEBU SOLN at 08:31

## 2025-05-09 RX ADMIN — IPRATROPIUM BROMIDE AND ALBUTEROL SULFATE 1 DOSE: 2.5; .5 SOLUTION RESPIRATORY (INHALATION) at 08:31

## 2025-05-09 RX ADMIN — AMIODARONE HYDROCHLORIDE 400 MG: 200 TABLET ORAL at 15:40

## 2025-05-09 ASSESSMENT — PAIN SCALES - GENERAL
PAINLEVEL_OUTOF10: 8
PAINLEVEL_OUTOF10: 0
PAINLEVEL_OUTOF10: 0

## 2025-05-09 NOTE — PROCEDURES
PROCEDURE NOTE  Date: 5/9/2025   Name: Muna Simon  YOB: 1929    Temporary VasCath    Date/Time: 5/9/2025 12:43 PM    Performed by: John Adams APRN - NP  Authorized by: John Adams APRN - NP  Consent: Verbal consent obtained.  Risks and benefits: risks, benefits and alternatives were discussed  Consent given by: patient and power of   Required items: required blood products, implants, devices, and special equipment available  Patient identity confirmed: verbally with patient, arm band and hospital-assigned identification number  Time out: Immediately prior to procedure a \"time out\" was called to verify the correct patient, procedure, equipment, support staff and site/side marked as required.  Indications: CRRT.    Anesthesia:  Local Anesthetic: lidocaine 1% without epinephrine  Anesthetic total: 2 mL    Sedation:  Patient sedated: no    Preparation: skin prepped with ChloraPrep  Skin prep agent dried: skin prep agent completely dried prior to procedure  Sterile barriers: all five maximum sterile barriers used - cap, mask, sterile gown, sterile gloves, and large sterile sheet  Hand hygiene: hand hygiene performed prior to central venous catheter insertion  Location details: left internal jugular  Site selection rationale: already had RIJ  Patient position: flat  Catheter type: triple lumen  Catheter size: 13f.  Pre-procedure: landmarks identified  Ultrasound guidance: yes  Sterile ultrasound techniques: sterile gel and sterile probe covers were used  Number of attempts: 1  Successful placement: yes  Post-procedure: line sutured and dressing applied  Assessment: blood return through all ports and placement verified by x-ray  Patient tolerance: patient tolerated the procedure well with no immediate complications  Comments: EBL 20cc

## 2025-05-10 ENCOUNTER — APPOINTMENT (OUTPATIENT)
Dept: GENERAL RADIOLOGY | Age: 89
DRG: 270 | End: 2025-05-10
Attending: SURGERY
Payer: MEDICARE

## 2025-05-10 LAB
ALBUMIN SERPL-MCNC: 2.3 G/DL (ref 3.4–5)
ALBUMIN SERPL-MCNC: 2.5 G/DL (ref 3.4–5)
ALBUMIN SERPL-MCNC: 2.8 G/DL (ref 3.4–5)
ANION GAP SERPL CALCULATED.3IONS-SCNC: 10 MMOL/L (ref 3–16)
ANION GAP SERPL CALCULATED.3IONS-SCNC: 11 MMOL/L (ref 3–16)
ANION GAP SERPL CALCULATED.3IONS-SCNC: 9 MMOL/L (ref 3–16)
APTT BLD: 110.5 SEC (ref 22.1–36.4)
APTT BLD: 42.5 SEC (ref 22.1–36.4)
APTT BLD: 70.3 SEC (ref 22.1–36.4)
BILIRUB UR QL STRIP.AUTO: NEGATIVE
BUN SERPL-MCNC: 19 MG/DL (ref 7–20)
BUN SERPL-MCNC: 19 MG/DL (ref 7–20)
BUN SERPL-MCNC: 25 MG/DL (ref 7–20)
CA-I BLD-SCNC: 1.06 MMOL/L (ref 1.12–1.32)
CA-I BLD-SCNC: 1.08 MMOL/L (ref 1.12–1.32)
CA-I BLD-SCNC: 1.1 MMOL/L (ref 1.12–1.32)
CALCIUM SERPL-MCNC: 7.6 MG/DL (ref 8.3–10.6)
CALCIUM SERPL-MCNC: 8 MG/DL (ref 8.3–10.6)
CALCIUM SERPL-MCNC: 8.3 MG/DL (ref 8.3–10.6)
CHLORIDE SERPL-SCNC: 100 MMOL/L (ref 99–110)
CHLORIDE SERPL-SCNC: 100 MMOL/L (ref 99–110)
CHLORIDE SERPL-SCNC: 102 MMOL/L (ref 99–110)
CLARITY UR: CLEAR
CO2 SERPL-SCNC: 24 MMOL/L (ref 21–32)
CO2 SERPL-SCNC: 25 MMOL/L (ref 21–32)
CO2 SERPL-SCNC: 25 MMOL/L (ref 21–32)
COLOR UR: YELLOW
CREAT SERPL-MCNC: 1.1 MG/DL (ref 0.6–1.2)
CREAT SERPL-MCNC: 1.2 MG/DL (ref 0.6–1.2)
CREAT SERPL-MCNC: 1.5 MG/DL (ref 0.6–1.2)
DEPRECATED RDW RBC AUTO: 13.8 % (ref 12.4–15.4)
EPI CELLS #/AREA URNS HPF: NORMAL /HPF (ref 0–5)
GFR SERPLBLD CREATININE-BSD FMLA CKD-EPI: 32 ML/MIN/{1.73_M2}
GFR SERPLBLD CREATININE-BSD FMLA CKD-EPI: 42 ML/MIN/{1.73_M2}
GFR SERPLBLD CREATININE-BSD FMLA CKD-EPI: 46 ML/MIN/{1.73_M2}
GLUCOSE SERPL-MCNC: 110 MG/DL (ref 70–99)
GLUCOSE SERPL-MCNC: 117 MG/DL (ref 70–99)
GLUCOSE SERPL-MCNC: 148 MG/DL (ref 70–99)
GLUCOSE UR STRIP.AUTO-MCNC: NEGATIVE MG/DL
HCT VFR BLD AUTO: 21 % (ref 36–48)
HGB BLD-MCNC: 7.5 G/DL (ref 12–16)
HGB UR QL STRIP.AUTO: NEGATIVE
KETONES UR STRIP.AUTO-MCNC: NEGATIVE MG/DL
LEUKOCYTE ESTERASE UR QL STRIP.AUTO: NEGATIVE
MAGNESIUM SERPL-MCNC: 2.11 MG/DL (ref 1.8–2.4)
MAGNESIUM SERPL-MCNC: 2.22 MG/DL (ref 1.8–2.4)
MAGNESIUM SERPL-MCNC: 2.24 MG/DL (ref 1.8–2.4)
MCH RBC QN AUTO: 31.5 PG (ref 26–34)
MCHC RBC AUTO-ENTMCNC: 35.6 G/DL (ref 31–36)
MCV RBC AUTO: 88.5 FL (ref 80–100)
NITRITE UR QL STRIP.AUTO: NEGATIVE
PH BLDV: 7.33 [PH] (ref 7.35–7.45)
PH BLDV: 7.36 [PH] (ref 7.35–7.45)
PH BLDV: 7.38 [PH] (ref 7.35–7.45)
PH UR STRIP.AUTO: 5.5 [PH] (ref 5–8)
PHOSPHATE SERPL-MCNC: 2.2 MG/DL (ref 2.5–4.9)
PHOSPHATE SERPL-MCNC: 2.4 MG/DL (ref 2.5–4.9)
PHOSPHATE SERPL-MCNC: 2.5 MG/DL (ref 2.5–4.9)
PLATELET # BLD AUTO: 323 K/UL (ref 135–450)
PMV BLD AUTO: 7.6 FL (ref 5–10.5)
POTASSIUM SERPL-SCNC: 4.1 MMOL/L (ref 3.5–5.1)
POTASSIUM SERPL-SCNC: 4.2 MMOL/L (ref 3.5–5.1)
POTASSIUM SERPL-SCNC: 4.2 MMOL/L (ref 3.5–5.1)
POTASSIUM SERPL-SCNC: 4.3 MMOL/L (ref 3.5–5.1)
PROT UR STRIP.AUTO-MCNC: NEGATIVE MG/DL
RBC # BLD AUTO: 2.37 M/UL (ref 4–5.2)
RBC #/AREA URNS HPF: NORMAL /HPF (ref 0–4)
SODIUM SERPL-SCNC: 134 MMOL/L (ref 136–145)
SODIUM SERPL-SCNC: 135 MMOL/L (ref 136–145)
SODIUM SERPL-SCNC: 137 MMOL/L (ref 136–145)
SODIUM UR-SCNC: 39 MMOL/L
SP GR UR STRIP.AUTO: 1.01 (ref 1–1.03)
UA DIPSTICK W REFLEX MICRO PNL UR: NORMAL
URN SPEC COLLECT METH UR: NORMAL
UROBILINOGEN UR STRIP-ACNC: 0.2 E.U./DL
WBC # BLD AUTO: 8.4 K/UL (ref 4–11)
WBC #/AREA URNS HPF: NORMAL /HPF (ref 0–5)

## 2025-05-10 PROCEDURE — 85730 THROMBOPLASTIN TIME PARTIAL: CPT

## 2025-05-10 PROCEDURE — 82330 ASSAY OF CALCIUM: CPT

## 2025-05-10 PROCEDURE — 81001 URINALYSIS AUTO W/SCOPE: CPT

## 2025-05-10 PROCEDURE — 31645 BRNCHSC W/THER ASPIR 1ST: CPT | Performed by: INTERNAL MEDICINE

## 2025-05-10 PROCEDURE — 80069 RENAL FUNCTION PANEL: CPT

## 2025-05-10 PROCEDURE — 87077 CULTURE AEROBIC IDENTIFY: CPT

## 2025-05-10 PROCEDURE — 83735 ASSAY OF MAGNESIUM: CPT

## 2025-05-10 PROCEDURE — 0BCB8ZZ EXTIRPATION OF MATTER FROM LEFT LOWER LOBE BRONCHUS, VIA NATURAL OR ARTIFICIAL OPENING ENDOSCOPIC: ICD-10-PCS | Performed by: INTERNAL MEDICINE

## 2025-05-10 PROCEDURE — 90945 DIALYSIS ONE EVALUATION: CPT

## 2025-05-10 PROCEDURE — 0BC88ZZ EXTIRPATION OF MATTER FROM LEFT UPPER LOBE BRONCHUS, VIA NATURAL OR ARTIFICIAL OPENING ENDOSCOPIC: ICD-10-PCS | Performed by: INTERNAL MEDICINE

## 2025-05-10 PROCEDURE — 85027 COMPLETE CBC AUTOMATED: CPT

## 2025-05-10 PROCEDURE — 31622 DX BRONCHOSCOPE/WASH: CPT

## 2025-05-10 PROCEDURE — 2500000003 HC RX 250 WO HCPCS: Performed by: NURSE PRACTITIONER

## 2025-05-10 PROCEDURE — 0B9G8ZX DRAINAGE OF LEFT UPPER LUNG LOBE, VIA NATURAL OR ARTIFICIAL OPENING ENDOSCOPIC, DIAGNOSTIC: ICD-10-PCS | Performed by: INTERNAL MEDICINE

## 2025-05-10 PROCEDURE — 94640 AIRWAY INHALATION TREATMENT: CPT

## 2025-05-10 PROCEDURE — 94761 N-INVAS EAR/PLS OXIMETRY MLT: CPT

## 2025-05-10 PROCEDURE — 87186 SC STD MICRODIL/AGAR DIL: CPT

## 2025-05-10 PROCEDURE — 2700000000 HC OXYGEN THERAPY PER DAY

## 2025-05-10 PROCEDURE — 99291 CRITICAL CARE FIRST HOUR: CPT | Performed by: INTERNAL MEDICINE

## 2025-05-10 PROCEDURE — 87205 SMEAR GRAM STAIN: CPT

## 2025-05-10 PROCEDURE — 94660 CPAP INITIATION&MGMT: CPT

## 2025-05-10 PROCEDURE — 36592 COLLECT BLOOD FROM PICC: CPT

## 2025-05-10 PROCEDURE — 2000000000 HC ICU R&B

## 2025-05-10 PROCEDURE — 6370000000 HC RX 637 (ALT 250 FOR IP): Performed by: INTERNAL MEDICINE

## 2025-05-10 PROCEDURE — 2500000003 HC RX 250 WO HCPCS: Performed by: INTERNAL MEDICINE

## 2025-05-10 PROCEDURE — 31624 DX BRONCHOSCOPE/LAVAGE: CPT | Performed by: INTERNAL MEDICINE

## 2025-05-10 PROCEDURE — 87070 CULTURE OTHR SPECIMN AEROBIC: CPT

## 2025-05-10 PROCEDURE — 6360000002 HC RX W HCPCS: Performed by: INTERNAL MEDICINE

## 2025-05-10 PROCEDURE — 2580000003 HC RX 258: Performed by: SURGERY

## 2025-05-10 PROCEDURE — 2500000003 HC RX 250 WO HCPCS: Performed by: SURGERY

## 2025-05-10 PROCEDURE — 0B9M8ZZ DRAINAGE OF BILATERAL LUNGS, VIA NATURAL OR ARTIFICIAL OPENING ENDOSCOPIC: ICD-10-PCS | Performed by: INTERNAL MEDICINE

## 2025-05-10 PROCEDURE — 6360000002 HC RX W HCPCS: Performed by: NURSE PRACTITIONER

## 2025-05-10 PROCEDURE — 2580000003 HC RX 258: Performed by: INTERNAL MEDICINE

## 2025-05-10 PROCEDURE — 84300 ASSAY OF URINE SODIUM: CPT

## 2025-05-10 PROCEDURE — 71045 X-RAY EXAM CHEST 1 VIEW: CPT

## 2025-05-10 PROCEDURE — 0BC78ZZ EXTIRPATION OF MATTER FROM LEFT MAIN BRONCHUS, VIA NATURAL OR ARTIFICIAL OPENING ENDOSCOPIC: ICD-10-PCS | Performed by: INTERNAL MEDICINE

## 2025-05-10 PROCEDURE — 87633 RESP VIRUS 12-25 TARGETS: CPT

## 2025-05-10 RX ORDER — VANCOMYCIN 1.75 G/350ML
1250 INJECTION, SOLUTION INTRAVENOUS ONCE
Status: DISCONTINUED | OUTPATIENT
Start: 2025-05-10 | End: 2025-05-10 | Stop reason: DRUGHIGH

## 2025-05-10 RX ORDER — MIDAZOLAM HYDROCHLORIDE 1 MG/ML
5 INJECTION, SOLUTION INTRAMUSCULAR; INTRAVENOUS
Status: DISCONTINUED | OUTPATIENT
Start: 2025-05-10 | End: 2025-05-12

## 2025-05-10 RX ADMIN — Medication: at 10:13

## 2025-05-10 RX ADMIN — Medication 10 ML: at 15:55

## 2025-05-10 RX ADMIN — HEPARIN SODIUM 4000 UNITS: 1000 INJECTION INTRAVENOUS; SUBCUTANEOUS at 14:25

## 2025-05-10 RX ADMIN — Medication: at 10:12

## 2025-05-10 RX ADMIN — HEPARIN SODIUM 2000 UNITS: 1000 INJECTION INTRAVENOUS; SUBCUTANEOUS at 21:33

## 2025-05-10 RX ADMIN — AMIODARONE HYDROCHLORIDE 0.5 MG/MIN: 1.8 INJECTION, SOLUTION INTRAVENOUS at 17:20

## 2025-05-10 RX ADMIN — CALCIUM GLUCONATE 1000 MG: 20 INJECTION, SOLUTION INTRAVENOUS at 21:41

## 2025-05-10 RX ADMIN — IPRATROPIUM BROMIDE AND ALBUTEROL SULFATE 1 DOSE: 2.5; .5 SOLUTION RESPIRATORY (INHALATION) at 03:22

## 2025-05-10 RX ADMIN — MUPIROCIN: 20 OINTMENT TOPICAL at 09:36

## 2025-05-10 RX ADMIN — CEFEPIME 2000 MG: 2 INJECTION, POWDER, FOR SOLUTION INTRAVENOUS at 23:13

## 2025-05-10 RX ADMIN — Medication: at 00:11

## 2025-05-10 RX ADMIN — Medication 10 ML: at 17:21

## 2025-05-10 RX ADMIN — Medication: at 20:02

## 2025-05-10 RX ADMIN — SODIUM CHLORIDE SOLN NEBU 3% 4 ML: 3 NEBU SOLN at 21:17

## 2025-05-10 RX ADMIN — SODIUM CHLORIDE: 0.9 INJECTION, SOLUTION INTRAVENOUS at 05:55

## 2025-05-10 RX ADMIN — CALCIUM GLUCONATE 1000 MG: 20 INJECTION, SOLUTION INTRAVENOUS at 15:58

## 2025-05-10 RX ADMIN — MIDAZOLAM 4 MG: 1 INJECTION INTRAMUSCULAR; INTRAVENOUS at 13:53

## 2025-05-10 RX ADMIN — VANCOMYCIN HYDROCHLORIDE 1500 MG: 10 INJECTION, POWDER, LYOPHILIZED, FOR SOLUTION INTRAVENOUS at 15:54

## 2025-05-10 RX ADMIN — CEFEPIME 2000 MG: 2 INJECTION, POWDER, FOR SOLUTION INTRAVENOUS at 15:25

## 2025-05-10 RX ADMIN — ACETYLCYSTEINE 600 MG: 200 INHALANT RESPIRATORY (INHALATION) at 07:44

## 2025-05-10 RX ADMIN — AMIODARONE HYDROCHLORIDE 0.5 MG/MIN: 1.8 INJECTION, SOLUTION INTRAVENOUS at 09:00

## 2025-05-10 RX ADMIN — IPRATROPIUM BROMIDE AND ALBUTEROL SULFATE 1 DOSE: 2.5; .5 SOLUTION RESPIRATORY (INHALATION) at 07:43

## 2025-05-10 RX ADMIN — MUPIROCIN: 20 OINTMENT TOPICAL at 23:07

## 2025-05-10 RX ADMIN — CALCIUM GLUCONATE 1000 MG: 20 INJECTION, SOLUTION INTRAVENOUS at 05:57

## 2025-05-10 RX ADMIN — Medication: at 20:00

## 2025-05-10 RX ADMIN — SODIUM CHLORIDE SOLN NEBU 3% 4 ML: 3 NEBU SOLN at 07:44

## 2025-05-10 RX ADMIN — SODIUM PHOSPHATE, MONOBASIC, MONOHYDRATE AND SODIUM PHOSPHATE, DIBASIC, ANHYDROUS 6 MMOL: 142; 276 INJECTION, SOLUTION INTRAVENOUS at 07:08

## 2025-05-10 RX ADMIN — SODIUM CHLORIDE, PRESERVATIVE FREE 10 ML: 5 INJECTION INTRAVENOUS at 10:16

## 2025-05-10 RX ADMIN — IPRATROPIUM BROMIDE AND ALBUTEROL SULFATE 1 DOSE: 2.5; .5 SOLUTION RESPIRATORY (INHALATION) at 21:17

## 2025-05-10 ASSESSMENT — PAIN SCALES - GENERAL: PAINLEVEL_OUTOF10: 0

## 2025-05-10 NOTE — PROCEDURES
PROCEDURE NOTE  Date: 5/10/2025   Name: Muna Simon  YOB: 1929    Procedures    PRE-PROCEDURE  DIAGNOSIS: Mucous plugging of the airways, respiratory failure, left lung collapse  POST-PROCEDURE  DIAGNOSIS: Mucous plugging of the airways, respiratory failure, left lung collapse    PRE-PROCEDURE ASSESSMENT AND CONSENT:   A full history and physical was performed. The patient is a 89-year-old female patient with hypoxic respiratory failure, left lung collapse consolidation and suspected mucous plugging.  The patient's medications, allergies and sensitivities have been reviewed. The risks and benefits of the procedure were discussed with the patient and her family. All questions were answered and informed consent was obtained.     PHYSICAL EXAM PRIOR TO PROCEDURE:  Airway Examination: Mallampati Class could not be assessed.  Decreased air entry on the left side.  No concerning findings on cardiovascular exam. ASA Grade Assessment: 5.     After reviewing the risks and benefits, the patient was deemed in satisfactory condition to undergo the procedure (benefits: more diagnostic info, risks: bleeding, infection, pneumothorax, death). The anesthesia plan was to use moderate sedation.     Immediately prior to administration of medications, the patient was re-assessed for adequacy to receive sedatives. The heart rate, respiratory rate, oxygen saturations, blood pressure, adequacy of pulmonary ventilation, and response to care were monitored throughout the procedure.     MEDICATIONS:   Endobronchial normal saline: 150 ml.   Mucomyst 20% 8 ml.  Lidocaine 2%, 15 ml  Midazolam 4 mg, Fentanyl 0 mcg,    Sedation time between 2 PM and 2:20 PM     PROCEDURE AND FINDINGS:  A time-out was called verifying the patient's identification immediately before the procedure, then, the scope was advanced through the BiPAP mask/oropharyngeal route to the trachea and lower airways.  Several splashes of lidocaine to the lower

## 2025-05-11 ENCOUNTER — APPOINTMENT (OUTPATIENT)
Dept: GENERAL RADIOLOGY | Age: 89
DRG: 270 | End: 2025-05-11
Attending: SURGERY
Payer: MEDICARE

## 2025-05-11 LAB
ALBUMIN SERPL-MCNC: 2.4 G/DL (ref 3.4–5)
ALBUMIN SERPL-MCNC: 2.5 G/DL (ref 3.4–5)
ALBUMIN SERPL-MCNC: 3 G/DL (ref 3.4–5)
ANION GAP SERPL CALCULATED.3IONS-SCNC: 10 MMOL/L (ref 3–16)
ANION GAP SERPL CALCULATED.3IONS-SCNC: 8 MMOL/L (ref 3–16)
ANION GAP SERPL CALCULATED.3IONS-SCNC: 9 MMOL/L (ref 3–16)
APPEARANCE FLUID: CLEAR
APTT BLD: 66.8 SEC (ref 22.1–36.4)
APTT BLD: 71.3 SEC (ref 22.1–36.4)
APTT BLD: 73.6 SEC (ref 22.1–36.4)
APTT BLD: 84.7 SEC (ref 22.1–36.4)
BASE EXCESS BLDA CALC-SCNC: 4 MMOL/L (ref -3–3)
BDY FLUID QUALITY: NORMAL
BUN SERPL-MCNC: 15 MG/DL (ref 7–20)
BUN SERPL-MCNC: 15 MG/DL (ref 7–20)
BUN SERPL-MCNC: 17 MG/DL (ref 7–20)
CA-I BLD-SCNC: 1.1 MMOL/L (ref 1.12–1.32)
CA-I BLD-SCNC: 1.12 MMOL/L (ref 1.12–1.32)
CA-I BLD-SCNC: 1.12 MMOL/L (ref 1.12–1.32)
CALCIUM SERPL-MCNC: 8 MG/DL (ref 8.3–10.6)
CALCIUM SERPL-MCNC: 8.2 MG/DL (ref 8.3–10.6)
CALCIUM SERPL-MCNC: 8.4 MG/DL (ref 8.3–10.6)
CELL COUNT FLUID TYPE: NORMAL
CHLORIDE SERPL-SCNC: 102 MMOL/L (ref 99–110)
CHLORIDE SERPL-SCNC: 102 MMOL/L (ref 99–110)
CHLORIDE SERPL-SCNC: 105 MMOL/L (ref 99–110)
CO2 BLDA-SCNC: 30 MMOL/L
CO2 SERPL-SCNC: 25 MMOL/L (ref 21–32)
CO2 SERPL-SCNC: 26 MMOL/L (ref 21–32)
CO2 SERPL-SCNC: 26 MMOL/L (ref 21–32)
COLOR FLUID: COLORLESS
CREAT SERPL-MCNC: 0.9 MG/DL (ref 0.6–1.2)
CREAT SERPL-MCNC: 1 MG/DL (ref 0.6–1.2)
CREAT SERPL-MCNC: 1.2 MG/DL (ref 0.6–1.2)
DEPRECATED RDW RBC AUTO: 14 % (ref 12.4–15.4)
GFR SERPLBLD CREATININE-BSD FMLA CKD-EPI: 42 ML/MIN/{1.73_M2}
GFR SERPLBLD CREATININE-BSD FMLA CKD-EPI: 52 ML/MIN/{1.73_M2}
GFR SERPLBLD CREATININE-BSD FMLA CKD-EPI: 59 ML/MIN/{1.73_M2}
GLUCOSE SERPL-MCNC: 120 MG/DL (ref 70–99)
GLUCOSE SERPL-MCNC: 125 MG/DL (ref 70–99)
GLUCOSE SERPL-MCNC: 146 MG/DL (ref 70–99)
HCO3 BLDA-SCNC: 28.6 MMOL/L (ref 21–29)
HCT VFR BLD AUTO: 21.5 % (ref 36–48)
HCT VFR BLD AUTO: 22.9 % (ref 36–48)
HGB BLD-MCNC: 7.4 G/DL (ref 12–16)
LDH SERPL L TO P-CCNC: 198 U/L (ref 100–190)
LYMPHOCYTES NFR FLD: 4 %
MACROPHAGES # FLD: 39 %
MAGNESIUM SERPL-MCNC: 2.34 MG/DL (ref 1.8–2.4)
MAGNESIUM SERPL-MCNC: 2.43 MG/DL (ref 1.8–2.4)
MAGNESIUM SERPL-MCNC: 2.59 MG/DL (ref 1.8–2.4)
MCH RBC QN AUTO: 30.9 PG (ref 26–34)
MCHC RBC AUTO-ENTMCNC: 34.4 G/DL (ref 31–36)
MCV RBC AUTO: 89.7 FL (ref 80–100)
NEUTROPHIL, FLUID: 57 %
NUC CELL # FLD: 221 /CUMM
ORGANISM: ABNORMAL
PCO2 BLDA: 47.8 MM HG (ref 35–45)
PERFORMED ON: ABNORMAL
PH BLDA: 7.38 [PH] (ref 7.35–7.45)
PH BLDV: 7.32 [PH] (ref 7.35–7.45)
PH BLDV: 7.35 [PH] (ref 7.35–7.45)
PH BLDV: 7.35 [PH] (ref 7.35–7.45)
PHOSPHATE SERPL-MCNC: 1.9 MG/DL (ref 2.5–4.9)
PHOSPHATE SERPL-MCNC: 2.1 MG/DL (ref 2.5–4.9)
PHOSPHATE SERPL-MCNC: 2.1 MG/DL (ref 2.5–4.9)
PLATELET # BLD AUTO: 349 K/UL (ref 135–450)
PMV BLD AUTO: 7.9 FL (ref 5–10.5)
PO2 BLDA: 43.8 MM HG (ref 75–108)
POC SAMPLE TYPE: ABNORMAL
POTASSIUM SERPL-SCNC: 4.1 MMOL/L (ref 3.5–5.1)
POTASSIUM SERPL-SCNC: 4.3 MMOL/L (ref 3.5–5.1)
PROT SERPL-MCNC: 5.5 G/DL (ref 6.4–8.2)
RBC # BLD AUTO: 2.4 M/UL (ref 4–5.2)
RBC FLUID: 500 /CUMM
REPORT: NORMAL
RESP PATH DNA+RNA PNL L RESP NAA+NON-PRB: ABNORMAL
SAO2 % BLDA: 78 % (ref 93–100)
SODIUM SERPL-SCNC: 136 MMOL/L (ref 136–145)
SODIUM SERPL-SCNC: 137 MMOL/L (ref 136–145)
SODIUM SERPL-SCNC: 140 MMOL/L (ref 136–145)
TOTAL CELLS COUNTED FLD: 100
VANCOMYCIN SERPL-MCNC: 11.1 UG/ML
WBC # BLD AUTO: 13 K/UL (ref 4–11)

## 2025-05-11 PROCEDURE — 6360000002 HC RX W HCPCS: Performed by: INTERNAL MEDICINE

## 2025-05-11 PROCEDURE — 82945 GLUCOSE OTHER FLUID: CPT

## 2025-05-11 PROCEDURE — 2700000000 HC OXYGEN THERAPY PER DAY

## 2025-05-11 PROCEDURE — 80202 ASSAY OF VANCOMYCIN: CPT

## 2025-05-11 PROCEDURE — 85730 THROMBOPLASTIN TIME PARTIAL: CPT

## 2025-05-11 PROCEDURE — 2500000003 HC RX 250 WO HCPCS: Performed by: INTERNAL MEDICINE

## 2025-05-11 PROCEDURE — 36592 COLLECT BLOOD FROM PICC: CPT

## 2025-05-11 PROCEDURE — 31500 INSERT EMERGENCY AIRWAY: CPT

## 2025-05-11 PROCEDURE — 97535 SELF CARE MNGMENT TRAINING: CPT

## 2025-05-11 PROCEDURE — 97530 THERAPEUTIC ACTIVITIES: CPT

## 2025-05-11 PROCEDURE — 80069 RENAL FUNCTION PANEL: CPT

## 2025-05-11 PROCEDURE — 0BC78ZZ EXTIRPATION OF MATTER FROM LEFT MAIN BRONCHUS, VIA NATURAL OR ARTIFICIAL OPENING ENDOSCOPIC: ICD-10-PCS | Performed by: INTERNAL MEDICINE

## 2025-05-11 PROCEDURE — 6370000000 HC RX 637 (ALT 250 FOR IP): Performed by: INTERNAL MEDICINE

## 2025-05-11 PROCEDURE — 82465 ASSAY BLD/SERUM CHOLESTEROL: CPT

## 2025-05-11 PROCEDURE — 90945 DIALYSIS ONE EVALUATION: CPT

## 2025-05-11 PROCEDURE — 0BCB8ZZ EXTIRPATION OF MATTER FROM LEFT LOWER LOBE BRONCHUS, VIA NATURAL OR ARTIFICIAL OPENING ENDOSCOPIC: ICD-10-PCS | Performed by: INTERNAL MEDICINE

## 2025-05-11 PROCEDURE — 2580000003 HC RX 258: Performed by: SURGERY

## 2025-05-11 PROCEDURE — 97167 OT EVAL HIGH COMPLEX 60 MIN: CPT

## 2025-05-11 PROCEDURE — 97110 THERAPEUTIC EXERCISES: CPT

## 2025-05-11 PROCEDURE — 31624 DX BRONCHOSCOPE/LAVAGE: CPT | Performed by: INTERNAL MEDICINE

## 2025-05-11 PROCEDURE — 94660 CPAP INITIATION&MGMT: CPT

## 2025-05-11 PROCEDURE — 82803 BLOOD GASES ANY COMBINATION: CPT

## 2025-05-11 PROCEDURE — 83735 ASSAY OF MAGNESIUM: CPT

## 2025-05-11 PROCEDURE — 94002 VENT MGMT INPAT INIT DAY: CPT

## 2025-05-11 PROCEDURE — 0B9M8ZZ DRAINAGE OF BILATERAL LUNGS, VIA NATURAL OR ARTIFICIAL OPENING ENDOSCOPIC: ICD-10-PCS | Performed by: INTERNAL MEDICINE

## 2025-05-11 PROCEDURE — 2580000003 HC RX 258: Performed by: NURSE PRACTITIONER

## 2025-05-11 PROCEDURE — 6360000002 HC RX W HCPCS: Performed by: NURSE PRACTITIONER

## 2025-05-11 PROCEDURE — 84155 ASSAY OF PROTEIN SERUM: CPT

## 2025-05-11 PROCEDURE — 2500000003 HC RX 250 WO HCPCS: Performed by: SURGERY

## 2025-05-11 PROCEDURE — 2500000003 HC RX 250 WO HCPCS: Performed by: NURSE PRACTITIONER

## 2025-05-11 PROCEDURE — 82330 ASSAY OF CALCIUM: CPT

## 2025-05-11 PROCEDURE — 99152 MOD SED SAME PHYS/QHP 5/>YRS: CPT | Performed by: INTERNAL MEDICINE

## 2025-05-11 PROCEDURE — 83615 LACTATE (LD) (LDH) ENZYME: CPT

## 2025-05-11 PROCEDURE — 99292 CRITICAL CARE ADDL 30 MIN: CPT | Performed by: INTERNAL MEDICINE

## 2025-05-11 PROCEDURE — 87070 CULTURE OTHR SPECIMN AEROBIC: CPT

## 2025-05-11 PROCEDURE — 0B9J8ZX DRAINAGE OF LEFT LOWER LUNG LOBE, VIA NATURAL OR ARTIFICIAL OPENING ENDOSCOPIC, DIAGNOSTIC: ICD-10-PCS | Performed by: INTERNAL MEDICINE

## 2025-05-11 PROCEDURE — 89051 BODY FLUID CELL COUNT: CPT

## 2025-05-11 PROCEDURE — 0BC88ZZ EXTIRPATION OF MATTER FROM LEFT UPPER LOBE BRONCHUS, VIA NATURAL OR ARTIFICIAL OPENING ENDOSCOPIC: ICD-10-PCS | Performed by: INTERNAL MEDICINE

## 2025-05-11 PROCEDURE — 71045 X-RAY EXAM CHEST 1 VIEW: CPT

## 2025-05-11 PROCEDURE — 94761 N-INVAS EAR/PLS OXIMETRY MLT: CPT

## 2025-05-11 PROCEDURE — 94640 AIRWAY INHALATION TREATMENT: CPT

## 2025-05-11 PROCEDURE — 31646 BRNCHSC W/THER ASPIR SBSQ: CPT | Performed by: INTERNAL MEDICINE

## 2025-05-11 PROCEDURE — 2580000003 HC RX 258: Performed by: INTERNAL MEDICINE

## 2025-05-11 PROCEDURE — 88305 TISSUE EXAM BY PATHOLOGIST: CPT

## 2025-05-11 PROCEDURE — 87075 CULTR BACTERIA EXCEPT BLOOD: CPT

## 2025-05-11 PROCEDURE — 99291 CRITICAL CARE FIRST HOUR: CPT | Performed by: INTERNAL MEDICINE

## 2025-05-11 PROCEDURE — 0BH17EZ INSERTION OF ENDOTRACHEAL AIRWAY INTO TRACHEA, VIA NATURAL OR ARTIFICIAL OPENING: ICD-10-PCS | Performed by: SURGERY

## 2025-05-11 PROCEDURE — 82150 ASSAY OF AMYLASE: CPT

## 2025-05-11 PROCEDURE — 0W9B3ZZ DRAINAGE OF LEFT PLEURAL CAVITY, PERCUTANEOUS APPROACH: ICD-10-PCS | Performed by: INTERNAL MEDICINE

## 2025-05-11 PROCEDURE — 88112 CYTOPATH CELL ENHANCE TECH: CPT

## 2025-05-11 PROCEDURE — 87205 SMEAR GRAM STAIN: CPT

## 2025-05-11 PROCEDURE — 5A1945Z RESPIRATORY VENTILATION, 24-96 CONSECUTIVE HOURS: ICD-10-PCS | Performed by: SURGERY

## 2025-05-11 PROCEDURE — 94669 MECHANICAL CHEST WALL OSCILL: CPT

## 2025-05-11 PROCEDURE — 97163 PT EVAL HIGH COMPLEX 45 MIN: CPT

## 2025-05-11 PROCEDURE — 85014 HEMATOCRIT: CPT

## 2025-05-11 PROCEDURE — 84157 ASSAY OF PROTEIN OTHER: CPT

## 2025-05-11 PROCEDURE — 32555 ASPIRATE PLEURA W/ IMAGING: CPT | Performed by: INTERNAL MEDICINE

## 2025-05-11 PROCEDURE — 85027 COMPLETE CBC AUTOMATED: CPT

## 2025-05-11 PROCEDURE — 2000000000 HC ICU R&B

## 2025-05-11 RX ORDER — FENTANYL CITRATE-0.9 % NACL/PF 10 MCG/ML
25-200 PLASTIC BAG, INJECTION (ML) INTRAVENOUS CONTINUOUS
Refills: 0 | Status: DISCONTINUED | OUTPATIENT
Start: 2025-05-11 | End: 2025-05-12

## 2025-05-11 RX ORDER — NOREPINEPHRINE BITARTRATE 0.06 MG/ML
1-100 INJECTION, SOLUTION INTRAVENOUS CONTINUOUS
Status: DISCONTINUED | OUTPATIENT
Start: 2025-05-11 | End: 2025-05-14

## 2025-05-11 RX ORDER — ACETYLCYSTEINE 200 MG/ML
600 SOLUTION ORAL; RESPIRATORY (INHALATION)
Status: COMPLETED | OUTPATIENT
Start: 2025-05-11 | End: 2025-05-12

## 2025-05-11 RX ORDER — PANTOPRAZOLE SODIUM 40 MG/10ML
40 INJECTION, POWDER, LYOPHILIZED, FOR SOLUTION INTRAVENOUS DAILY
Status: DISCONTINUED | OUTPATIENT
Start: 2025-05-11 | End: 2025-05-15

## 2025-05-11 RX ORDER — PROPOFOL 10 MG/ML
5-50 INJECTION, EMULSION INTRAVENOUS CONTINUOUS
Status: DISCONTINUED | OUTPATIENT
Start: 2025-05-11 | End: 2025-05-13

## 2025-05-11 RX ADMIN — PANTOPRAZOLE SODIUM 40 MG: 40 INJECTION, POWDER, FOR SOLUTION INTRAVENOUS at 09:43

## 2025-05-11 RX ADMIN — CEFEPIME 2000 MG: 2 INJECTION, POWDER, FOR SOLUTION INTRAVENOUS at 22:59

## 2025-05-11 RX ADMIN — SODIUM CHLORIDE: 0.9 INJECTION, SOLUTION INTRAVENOUS at 15:18

## 2025-05-11 RX ADMIN — CALCIUM GLUCONATE 1000 MG: 20 INJECTION, SOLUTION INTRAVENOUS at 16:49

## 2025-05-11 RX ADMIN — IPRATROPIUM BROMIDE AND ALBUTEROL SULFATE 1 DOSE: 2.5; .5 SOLUTION RESPIRATORY (INHALATION) at 19:31

## 2025-05-11 RX ADMIN — AMIODARONE HYDROCHLORIDE 0.5 MG/MIN: 1.8 INJECTION, SOLUTION INTRAVENOUS at 06:01

## 2025-05-11 RX ADMIN — FENTANYL CITRATE 25 MCG/HR: 0.05 INJECTION, SOLUTION INTRAMUSCULAR; INTRAVENOUS at 21:01

## 2025-05-11 RX ADMIN — Medication: at 05:00

## 2025-05-11 RX ADMIN — Medication: at 16:34

## 2025-05-11 RX ADMIN — NOREPINEPHRINE BITARTRATE 5 MCG/MIN: 64 SOLUTION INTRAVENOUS at 21:26

## 2025-05-11 RX ADMIN — PROPOFOL 5 MCG/KG/MIN: 10 INJECTION, EMULSION INTRAVENOUS at 21:07

## 2025-05-11 RX ADMIN — CEFEPIME 2000 MG: 2 INJECTION, POWDER, FOR SOLUTION INTRAVENOUS at 07:48

## 2025-05-11 RX ADMIN — SODIUM CHLORIDE, PRESERVATIVE FREE 10 ML: 5 INJECTION INTRAVENOUS at 09:11

## 2025-05-11 RX ADMIN — ACETYLCYSTEINE 600 MG: 200 SOLUTION ORAL; RESPIRATORY (INHALATION) at 19:31

## 2025-05-11 RX ADMIN — ACETYLCYSTEINE 600 MG: 200 SOLUTION ORAL; RESPIRATORY (INHALATION) at 11:07

## 2025-05-11 RX ADMIN — HEPARIN SODIUM 2000 UNITS: 1000 INJECTION INTRAVENOUS; SUBCUTANEOUS at 09:38

## 2025-05-11 RX ADMIN — IPRATROPIUM BROMIDE AND ALBUTEROL SULFATE 1 DOSE: 2.5; .5 SOLUTION RESPIRATORY (INHALATION) at 11:07

## 2025-05-11 RX ADMIN — SODIUM CHLORIDE SOLN NEBU 3% 4 ML: 3 NEBU SOLN at 07:16

## 2025-05-11 RX ADMIN — CEFEPIME 2000 MG: 2 INJECTION, POWDER, FOR SOLUTION INTRAVENOUS at 15:45

## 2025-05-11 RX ADMIN — SODIUM PHOSPHATE, MONOBASIC, MONOHYDRATE AND SODIUM PHOSPHATE, DIBASIC, ANHYDROUS 6 MMOL: 142; 276 INJECTION, SOLUTION INTRAVENOUS at 08:06

## 2025-05-11 RX ADMIN — AMIODARONE HYDROCHLORIDE 0.5 MG/MIN: 1.8 INJECTION, SOLUTION INTRAVENOUS at 16:49

## 2025-05-11 RX ADMIN — Medication: at 16:35

## 2025-05-11 RX ADMIN — Medication 10 ML: at 16:51

## 2025-05-11 RX ADMIN — HEPARIN SODIUM 9 UNITS/KG/HR: 10000 INJECTION, SOLUTION INTRAVENOUS at 14:09

## 2025-05-11 RX ADMIN — SODIUM CHLORIDE: 0.9 INJECTION, SOLUTION INTRAVENOUS at 16:47

## 2025-05-11 RX ADMIN — SODIUM CHLORIDE SOLN NEBU 3% 4 ML: 3 NEBU SOLN at 15:24

## 2025-05-11 RX ADMIN — VANCOMYCIN HYDROCHLORIDE 1000 MG: 1 INJECTION, POWDER, LYOPHILIZED, FOR SOLUTION INTRAVENOUS at 06:04

## 2025-05-11 RX ADMIN — HEPARIN SODIUM 2000 UNITS: 1000 INJECTION INTRAVENOUS; SUBCUTANEOUS at 23:58

## 2025-05-11 RX ADMIN — Medication 10 ML: at 09:44

## 2025-05-11 RX ADMIN — SODIUM CHLORIDE, PRESERVATIVE FREE 10 ML: 5 INJECTION INTRAVENOUS at 09:44

## 2025-05-11 ASSESSMENT — PULMONARY FUNCTION TESTS: PIF_VALUE: 37

## 2025-05-11 NOTE — PROCEDURES
PROCEDURE NOTE  Date: 5/11/2025   Name: Muna Simon  YOB: 1929    Procedures    PROCEDURE NOTE: Left thoracentesis    Diagnosis/Indication: Persistent pleural effusion, parapneumonic, rule out empyema, recurrent lung collapse/consolidation     The patient gave verbal and written consent to thoracentesis after I explained the procedure, discussed the alternatives, discussed the risks and answered all questions.   The procedure was done under sterile conditions.  Ultrasound was used to verify and locate the fluid.  The area was sterilized with chlorhexidine.  I administered local anesthesia with good results.   The needle catheter was introduced into the pleural space.  Pleural fluid was successfully aspirated in the syringe.  The fluid appeared [clear yellow].  The catheter was then further advanced, and the needle removed.  ~500 ml of fluid was collected and sent for analysis.  The catheter was removed, and a dressing was placed over the puncture site.  The patient tolerated the procedure well and there were no complications.     Estimated blood loss less than 5 cc.    Electronically signed by:  Hina Craft MD,FACP         [facility]  [address] [place] [address]., [address], [place] [address]   Phone (office): [phone]

## 2025-05-12 ENCOUNTER — APPOINTMENT (OUTPATIENT)
Dept: GENERAL RADIOLOGY | Age: 89
DRG: 270 | End: 2025-05-12
Attending: SURGERY
Payer: MEDICARE

## 2025-05-12 LAB
ABO/RH: NORMAL
ALBUMIN SERPL-MCNC: 2.4 G/DL (ref 3.4–5)
ALBUMIN SERPL-MCNC: 2.7 G/DL (ref 3.4–5)
AMYLASE FLD-CCNC: 10 U/L
ANION GAP SERPL CALCULATED.3IONS-SCNC: 8 MMOL/L (ref 3–16)
ANION GAP SERPL CALCULATED.3IONS-SCNC: 9 MMOL/L (ref 3–16)
ANTI-XA UNFRAC HEPARIN: >1.1 IU/ML (ref 0.3–0.7)
ANTI-XA UNFRAC HEPARIN: >1.1 IU/ML (ref 0.3–0.7)
ANTIBODY SCREEN: NORMAL
APTT BLD: 104.1 SEC (ref 22.1–36.4)
BACTERIA SPEC RESP CULT: ABNORMAL
BASE EXCESS BLDA CALC-SCNC: 0 MMOL/L (ref -3–3)
BLOOD BANK DISPENSE STATUS: NORMAL
BLOOD BANK PRODUCT CODE: NORMAL
BPU ID: NORMAL
BUN SERPL-MCNC: 14 MG/DL (ref 7–20)
BUN SERPL-MCNC: 15 MG/DL (ref 7–20)
CA-I BLD-SCNC: 1.08 MMOL/L (ref 1.12–1.32)
CA-I BLD-SCNC: 1.1 MMOL/L (ref 1.12–1.32)
CALCIUM SERPL-MCNC: 7.7 MG/DL (ref 8.3–10.6)
CALCIUM SERPL-MCNC: 8 MG/DL (ref 8.3–10.6)
CHLORIDE SERPL-SCNC: 104 MMOL/L (ref 99–110)
CHLORIDE SERPL-SCNC: 104 MMOL/L (ref 99–110)
CHOLEST FLD-MCNC: 8 MG/DL
CHOLEST SERPL-MCNC: 84 MG/DL (ref 0–199)
CO2 BLDA-SCNC: 25.2 MMOL/L
CO2 SERPL-SCNC: 24 MMOL/L (ref 21–32)
CO2 SERPL-SCNC: 25 MMOL/L (ref 21–32)
COHGB MFR BLDA: 0.9 % (ref 0–1.5)
CREAT SERPL-MCNC: 0.9 MG/DL (ref 0.6–1.2)
CREAT SERPL-MCNC: 0.9 MG/DL (ref 0.6–1.2)
DEPRECATED RDW RBC AUTO: 13.9 % (ref 12.4–15.4)
DESCRIPTION BLOOD BANK: NORMAL
GFR SERPLBLD CREATININE-BSD FMLA CKD-EPI: 59 ML/MIN/{1.73_M2}
GFR SERPLBLD CREATININE-BSD FMLA CKD-EPI: 59 ML/MIN/{1.73_M2}
GLUCOSE BLD-MCNC: 110 MG/DL (ref 70–99)
GLUCOSE BLD-MCNC: 126 MG/DL (ref 70–99)
GLUCOSE BLD-MCNC: 127 MG/DL (ref 70–99)
GLUCOSE FLD-MCNC: 129 MG/DL
GLUCOSE SERPL-MCNC: 112 MG/DL (ref 70–99)
GLUCOSE SERPL-MCNC: 198 MG/DL (ref 70–99)
GRAM STN SPEC: ABNORMAL
GRAM STN SPEC: ABNORMAL
HCO3 BLDA-SCNC: 24.1 MMOL/L (ref 21–29)
HCT VFR BLD AUTO: 20.8 % (ref 36–48)
HCT VFR BLD AUTO: 24.5 % (ref 36–48)
HGB BLD-MCNC: 6.9 G/DL (ref 12–16)
HGB BLD-MCNC: 8.4 G/DL (ref 12–16)
HGB BLDA-MCNC: 7.1 G/DL (ref 12–16)
LDH FLD L TO P-CCNC: 66 U/L
MAGNESIUM SERPL-MCNC: 2.39 MG/DL (ref 1.8–2.4)
MAGNESIUM SERPL-MCNC: 2.46 MG/DL (ref 1.8–2.4)
MCH RBC QN AUTO: 29.9 PG (ref 26–34)
MCHC RBC AUTO-ENTMCNC: 33.3 G/DL (ref 31–36)
MCV RBC AUTO: 89.9 FL (ref 80–100)
METHGB MFR BLDA: 0.2 %
O2 THERAPY: ABNORMAL
ORGANISM: ABNORMAL
ORGANISM: ABNORMAL
PCO2 BLDA: 36.5 MMHG (ref 35–45)
PERFORMED ON: ABNORMAL
PH BLDA: 7.44 [PH] (ref 7.35–7.45)
PH BLDV: 7.37 [PH] (ref 7.35–7.45)
PH BLDV: 7.39 [PH] (ref 7.35–7.45)
PHOSPHATE SERPL-MCNC: 1.5 MG/DL (ref 2.5–4.9)
PHOSPHATE SERPL-MCNC: 2.2 MG/DL (ref 2.5–4.9)
PLATELET # BLD AUTO: 349 K/UL (ref 135–450)
PMV BLD AUTO: 7.6 FL (ref 5–10.5)
PO2 BLDA: 198.6 MMHG (ref 75–108)
POTASSIUM SERPL-SCNC: 3.9 MMOL/L (ref 3.5–5.1)
POTASSIUM SERPL-SCNC: 4.2 MMOL/L (ref 3.5–5.1)
PROT FLD-MCNC: 0.9 G/DL
RBC # BLD AUTO: 2.32 M/UL (ref 4–5.2)
SAO2 % BLDA: 99.5 %
SODIUM SERPL-SCNC: 136 MMOL/L (ref 136–145)
SODIUM SERPL-SCNC: 138 MMOL/L (ref 136–145)
SPECIMEN SOURCE FLD: NORMAL
VANCOMYCIN SERPL-MCNC: 10.1 UG/ML
WBC # BLD AUTO: 10.3 K/UL (ref 4–11)

## 2025-05-12 PROCEDURE — 82330 ASSAY OF CALCIUM: CPT

## 2025-05-12 PROCEDURE — 6360000002 HC RX W HCPCS: Performed by: INTERNAL MEDICINE

## 2025-05-12 PROCEDURE — 86901 BLOOD TYPING SEROLOGIC RH(D): CPT

## 2025-05-12 PROCEDURE — 99291 CRITICAL CARE FIRST HOUR: CPT | Performed by: STUDENT IN AN ORGANIZED HEALTH CARE EDUCATION/TRAINING PROGRAM

## 2025-05-12 PROCEDURE — 86850 RBC ANTIBODY SCREEN: CPT

## 2025-05-12 PROCEDURE — 2580000003 HC RX 258: Performed by: INTERNAL MEDICINE

## 2025-05-12 PROCEDURE — 6360000002 HC RX W HCPCS: Performed by: NURSE PRACTITIONER

## 2025-05-12 PROCEDURE — P9016 RBC LEUKOCYTES REDUCED: HCPCS

## 2025-05-12 PROCEDURE — 94640 AIRWAY INHALATION TREATMENT: CPT

## 2025-05-12 PROCEDURE — 85730 THROMBOPLASTIN TIME PARTIAL: CPT

## 2025-05-12 PROCEDURE — 36430 TRANSFUSION BLD/BLD COMPNT: CPT

## 2025-05-12 PROCEDURE — 2500000003 HC RX 250 WO HCPCS: Performed by: INTERNAL MEDICINE

## 2025-05-12 PROCEDURE — 2000000000 HC ICU R&B

## 2025-05-12 PROCEDURE — 85027 COMPLETE CBC AUTOMATED: CPT

## 2025-05-12 PROCEDURE — 2500000003 HC RX 250 WO HCPCS: Performed by: NURSE PRACTITIONER

## 2025-05-12 PROCEDURE — 80202 ASSAY OF VANCOMYCIN: CPT

## 2025-05-12 PROCEDURE — 83735 ASSAY OF MAGNESIUM: CPT

## 2025-05-12 PROCEDURE — 99232 SBSQ HOSP IP/OBS MODERATE 35: CPT

## 2025-05-12 PROCEDURE — 31622 DX BRONCHOSCOPE/WASH: CPT

## 2025-05-12 PROCEDURE — 85014 HEMATOCRIT: CPT

## 2025-05-12 PROCEDURE — 94003 VENT MGMT INPAT SUBQ DAY: CPT

## 2025-05-12 PROCEDURE — 31622 DX BRONCHOSCOPE/WASH: CPT | Performed by: STUDENT IN AN ORGANIZED HEALTH CARE EDUCATION/TRAINING PROGRAM

## 2025-05-12 PROCEDURE — 0BJ08ZZ INSPECTION OF TRACHEOBRONCHIAL TREE, VIA NATURAL OR ARTIFICIAL OPENING ENDOSCOPIC: ICD-10-PCS | Performed by: STUDENT IN AN ORGANIZED HEALTH CARE EDUCATION/TRAINING PROGRAM

## 2025-05-12 PROCEDURE — 86923 COMPATIBILITY TEST ELECTRIC: CPT

## 2025-05-12 PROCEDURE — 36592 COLLECT BLOOD FROM PICC: CPT

## 2025-05-12 PROCEDURE — 2700000000 HC OXYGEN THERAPY PER DAY

## 2025-05-12 PROCEDURE — 85520 HEPARIN ASSAY: CPT

## 2025-05-12 PROCEDURE — 2580000003 HC RX 258: Performed by: SURGERY

## 2025-05-12 PROCEDURE — 90945 DIALYSIS ONE EVALUATION: CPT

## 2025-05-12 PROCEDURE — 94761 N-INVAS EAR/PLS OXIMETRY MLT: CPT

## 2025-05-12 PROCEDURE — 80069 RENAL FUNCTION PANEL: CPT

## 2025-05-12 PROCEDURE — 99024 POSTOP FOLLOW-UP VISIT: CPT | Performed by: CLINICAL NURSE SPECIALIST

## 2025-05-12 PROCEDURE — 6370000000 HC RX 637 (ALT 250 FOR IP): Performed by: INTERNAL MEDICINE

## 2025-05-12 PROCEDURE — 71045 X-RAY EXAM CHEST 1 VIEW: CPT

## 2025-05-12 PROCEDURE — 86900 BLOOD TYPING SEROLOGIC ABO: CPT

## 2025-05-12 PROCEDURE — 2500000003 HC RX 250 WO HCPCS: Performed by: SURGERY

## 2025-05-12 PROCEDURE — 30233N1 TRANSFUSION OF NONAUTOLOGOUS RED BLOOD CELLS INTO PERIPHERAL VEIN, PERCUTANEOUS APPROACH: ICD-10-PCS | Performed by: SURGERY

## 2025-05-12 PROCEDURE — 82803 BLOOD GASES ANY COMBINATION: CPT

## 2025-05-12 PROCEDURE — 85018 HEMOGLOBIN: CPT

## 2025-05-12 RX ORDER — MIDAZOLAM HYDROCHLORIDE 1 MG/ML
2 INJECTION, SOLUTION INTRAMUSCULAR; INTRAVENOUS ONCE
Status: DISCONTINUED | OUTPATIENT
Start: 2025-05-12 | End: 2025-05-15

## 2025-05-12 RX ORDER — FENTANYL CITRATE-0.9 % NACL/PF 10 MCG/ML
25-200 PLASTIC BAG, INJECTION (ML) INTRAVENOUS CONTINUOUS
Status: DISCONTINUED | OUTPATIENT
Start: 2025-05-12 | End: 2025-05-13

## 2025-05-12 RX ORDER — MIDAZOLAM HYDROCHLORIDE 1 MG/ML
INJECTION, SOLUTION INTRAMUSCULAR; INTRAVENOUS
Status: DISCONTINUED
Start: 2025-05-12 | End: 2025-05-12 | Stop reason: WASHOUT

## 2025-05-12 RX ADMIN — SODIUM PHOSPHATE, MONOBASIC, MONOHYDRATE AND SODIUM PHOSPHATE, DIBASIC, ANHYDROUS 6 MMOL: 142; 276 INJECTION, SOLUTION INTRAVENOUS at 00:37

## 2025-05-12 RX ADMIN — Medication: at 13:07

## 2025-05-12 RX ADMIN — PROPOFOL 20 MCG/KG/MIN: 10 INJECTION, EMULSION INTRAVENOUS at 09:55

## 2025-05-12 RX ADMIN — IPRATROPIUM BROMIDE AND ALBUTEROL SULFATE 1 DOSE: 2.5; .5 SOLUTION RESPIRATORY (INHALATION) at 07:51

## 2025-05-12 RX ADMIN — SODIUM PHOSPHATE, MONOBASIC, MONOHYDRATE AND SODIUM PHOSPHATE, DIBASIC, ANHYDROUS 12 MMOL: 142; 276 INJECTION, SOLUTION INTRAVENOUS at 20:04

## 2025-05-12 RX ADMIN — PROPOFOL 20 MCG/KG/MIN: 10 INJECTION, EMULSION INTRAVENOUS at 19:12

## 2025-05-12 RX ADMIN — Medication: at 23:53

## 2025-05-12 RX ADMIN — ACETYLCYSTEINE 600 MG: 200 SOLUTION ORAL; RESPIRATORY (INHALATION) at 07:51

## 2025-05-12 RX ADMIN — SODIUM CHLORIDE SOLN NEBU 3% 4 ML: 3 NEBU SOLN at 00:21

## 2025-05-12 RX ADMIN — PANTOPRAZOLE SODIUM 40 MG: 40 INJECTION, POWDER, FOR SOLUTION INTRAVENOUS at 07:49

## 2025-05-12 RX ADMIN — Medication: at 23:54

## 2025-05-12 RX ADMIN — SODIUM CHLORIDE: 0.9 INJECTION, SOLUTION INTRAVENOUS at 13:24

## 2025-05-12 RX ADMIN — CALCIUM GLUCONATE 1000 MG: 20 INJECTION, SOLUTION INTRAVENOUS at 05:48

## 2025-05-12 RX ADMIN — ACETYLCYSTEINE 600 MG: 200 SOLUTION ORAL; RESPIRATORY (INHALATION) at 20:32

## 2025-05-12 RX ADMIN — SODIUM CHLORIDE, PRESERVATIVE FREE 10 ML: 5 INJECTION INTRAVENOUS at 20:26

## 2025-05-12 RX ADMIN — AMIODARONE HYDROCHLORIDE 0.5 MG/MIN: 1.8 INJECTION, SOLUTION INTRAVENOUS at 17:24

## 2025-05-12 RX ADMIN — IPRATROPIUM BROMIDE AND ALBUTEROL SULFATE 1 DOSE: 2.5; .5 SOLUTION RESPIRATORY (INHALATION) at 20:32

## 2025-05-12 RX ADMIN — SODIUM PHOSPHATE, MONOBASIC, MONOHYDRATE AND SODIUM PHOSPHATE, DIBASIC, ANHYDROUS 6 MMOL: 142; 276 INJECTION, SOLUTION INTRAVENOUS at 06:00

## 2025-05-12 RX ADMIN — CEFEPIME 2000 MG: 2 INJECTION, POWDER, FOR SOLUTION INTRAVENOUS at 14:51

## 2025-05-12 RX ADMIN — CALCIUM GLUCONATE 1000 MG: 20 INJECTION, SOLUTION INTRAVENOUS at 18:57

## 2025-05-12 RX ADMIN — SODIUM CHLORIDE SOLN NEBU 3% 4 ML: 3 NEBU SOLN at 15:59

## 2025-05-12 RX ADMIN — AMIODARONE HYDROCHLORIDE 0.5 MG/MIN: 1.8 INJECTION, SOLUTION INTRAVENOUS at 06:18

## 2025-05-12 RX ADMIN — CEFEPIME 2000 MG: 2 INJECTION, POWDER, FOR SOLUTION INTRAVENOUS at 07:17

## 2025-05-12 RX ADMIN — Medication: at 13:09

## 2025-05-12 RX ADMIN — Medication: at 13:05

## 2025-05-12 ASSESSMENT — PULMONARY FUNCTION TESTS
PIF_VALUE: 20
PIF_VALUE: 22
PIF_VALUE: 26
PIF_VALUE: 30
PIF_VALUE: 19
PIF_VALUE: 27

## 2025-05-12 ASSESSMENT — PAIN SCALES - GENERAL: PAINLEVEL_OUTOF10: 0

## 2025-05-12 NOTE — PROCEDURES
PROCEDURE NOTE  Date: 5/11/2025   Name: Muna Simon  YOB: 1929    Procedures    PRE-PROCEDURE  DIAGNOSIS: Mucous plugging, left lung collapse  POST-PROCEDURE  DIAGNOSIS: Mucous plugging, recurrent left lung collapse, respiratory failure with severe hypoxia    PRE-PROCEDURE ASSESSMENT AND CONSENT:   A full history and physical was performed. The patient is 89-year-old female with recurrent left lung collapse and severe hypoxemia.  The patient's medications, allergies and sensitivities have been reviewed. The risks and benefits of the procedure were discussed with the family. All questions were answered and informed consent was obtained.       MEDICATIONS:   Endobronchial normal saline: 150 ml.   Mucomyst 20% 8 ml.  Moderate sedation with fentanyl 50, propofol 15 mcg milligram per minute  Moderate sedation time between 9:40 PM and 10 PM     PROCEDURE AND FINDINGS:  A time-out was called verifying the patient's identification immediately before the procedure, then, the scope was advanced through the ETT to the trachea and lower airways.  Several splashes of lidocaine to the lower airways.     The lower trachea trachea was normal in caliber. The luiz was sharp. The tracheobronchial tree of the bilateral lungs was examined.   No concerning endobronchial lesions.     Extensive amount of mucus and pus was suctioned from the left mainstem bronchus, left upper and lower lobes    the bronchoalveolar lavage (BAL) was collected from the left lower lobe.  100 ml of normal saline instilled and ~30 ml of returned fluid collected.     Endobronchial washings collected from both lungs.     The procedure was accomplished without difficulty. The patient tolerated the procedure well with no immediate complications.  Estimated blood loss was less than 5 ml.       IMPRESSION:   Therapeutic aspiration of mucous plugging and pus completed from left mainstem bronchus, left upper and lower lobes.  BAL obtained from left

## 2025-05-13 ENCOUNTER — APPOINTMENT (OUTPATIENT)
Dept: GENERAL RADIOLOGY | Age: 89
DRG: 270 | End: 2025-05-13
Attending: SURGERY
Payer: MEDICARE

## 2025-05-13 LAB
ALBUMIN SERPL-MCNC: 2.4 G/DL (ref 3.4–5)
ANION GAP SERPL CALCULATED.3IONS-SCNC: 8 MMOL/L (ref 3–16)
ANTI-XA UNFRAC HEPARIN: >1.1 IU/ML (ref 0.3–0.7)
APTT BLD: 39.3 SEC (ref 22.1–36.4)
APTT BLD: 75.2 SEC (ref 22.1–36.4)
APTT BLD: 81.8 SEC (ref 22.1–36.4)
BACTERIA SPEC RESP CULT: NORMAL
BASE EXCESS BLDA CALC-SCNC: 2 MMOL/L (ref -3–3)
BUN SERPL-MCNC: 12 MG/DL (ref 7–20)
CA-I BLD-SCNC: 1.06 MMOL/L (ref 1.12–1.32)
CALCIUM SERPL-MCNC: 7.9 MG/DL (ref 8.3–10.6)
CHLORIDE SERPL-SCNC: 104 MMOL/L (ref 99–110)
CO2 BLDA-SCNC: 26 MMOL/L
CO2 SERPL-SCNC: 24 MMOL/L (ref 21–32)
CREAT SERPL-MCNC: 1 MG/DL (ref 0.6–1.2)
DEPRECATED RDW RBC AUTO: 14.8 % (ref 12.4–15.4)
GFR SERPLBLD CREATININE-BSD FMLA CKD-EPI: 52 ML/MIN/{1.73_M2}
GLUCOSE SERPL-MCNC: 106 MG/DL (ref 70–99)
GRAM STN SPEC: NORMAL
HCO3 BLDA-SCNC: 24.9 MMOL/L (ref 21–29)
HCT VFR BLD AUTO: 24.3 % (ref 36–48)
HGB BLD-MCNC: 8.3 G/DL (ref 12–16)
MAGNESIUM SERPL-MCNC: 2.59 MG/DL (ref 1.8–2.4)
MCH RBC QN AUTO: 30.4 PG (ref 26–34)
MCHC RBC AUTO-ENTMCNC: 34.2 G/DL (ref 31–36)
MCV RBC AUTO: 88.9 FL (ref 80–100)
PCO2 BLDA: 31.4 MM HG (ref 35–45)
PERFORMED ON: ABNORMAL
PH BLDA: 7.51 [PH] (ref 7.35–7.45)
PH BLDV: 7.44 [PH] (ref 7.35–7.45)
PHOSPHATE SERPL-MCNC: 1.7 MG/DL (ref 2.5–4.9)
PLATELET # BLD AUTO: 276 K/UL (ref 135–450)
PMV BLD AUTO: 7.7 FL (ref 5–10.5)
PO2 BLDA: 74.7 MM HG (ref 75–108)
POC SAMPLE TYPE: ABNORMAL
POTASSIUM SERPL-SCNC: 3.8 MMOL/L (ref 3.5–5.1)
RBC # BLD AUTO: 2.73 M/UL (ref 4–5.2)
SAO2 % BLDA: 96 % (ref 93–100)
SODIUM SERPL-SCNC: 136 MMOL/L (ref 136–145)
WBC # BLD AUTO: 7.9 K/UL (ref 4–11)

## 2025-05-13 PROCEDURE — 99291 CRITICAL CARE FIRST HOUR: CPT | Performed by: STUDENT IN AN ORGANIZED HEALTH CARE EDUCATION/TRAINING PROGRAM

## 2025-05-13 PROCEDURE — 94761 N-INVAS EAR/PLS OXIMETRY MLT: CPT

## 2025-05-13 PROCEDURE — 90945 DIALYSIS ONE EVALUATION: CPT

## 2025-05-13 PROCEDURE — 85730 THROMBOPLASTIN TIME PARTIAL: CPT

## 2025-05-13 PROCEDURE — 94660 CPAP INITIATION&MGMT: CPT

## 2025-05-13 PROCEDURE — 2000000000 HC ICU R&B

## 2025-05-13 PROCEDURE — 80069 RENAL FUNCTION PANEL: CPT

## 2025-05-13 PROCEDURE — 83735 ASSAY OF MAGNESIUM: CPT

## 2025-05-13 PROCEDURE — 2500000003 HC RX 250 WO HCPCS: Performed by: NURSE PRACTITIONER

## 2025-05-13 PROCEDURE — 85520 HEPARIN ASSAY: CPT

## 2025-05-13 PROCEDURE — 82803 BLOOD GASES ANY COMBINATION: CPT

## 2025-05-13 PROCEDURE — 6360000002 HC RX W HCPCS: Performed by: STUDENT IN AN ORGANIZED HEALTH CARE EDUCATION/TRAINING PROGRAM

## 2025-05-13 PROCEDURE — 36592 COLLECT BLOOD FROM PICC: CPT

## 2025-05-13 PROCEDURE — 2580000003 HC RX 258: Performed by: INTERNAL MEDICINE

## 2025-05-13 PROCEDURE — 94003 VENT MGMT INPAT SUBQ DAY: CPT

## 2025-05-13 PROCEDURE — 6360000002 HC RX W HCPCS: Performed by: NURSE PRACTITIONER

## 2025-05-13 PROCEDURE — 94640 AIRWAY INHALATION TREATMENT: CPT

## 2025-05-13 PROCEDURE — 6360000002 HC RX W HCPCS: Performed by: INTERNAL MEDICINE

## 2025-05-13 PROCEDURE — 85027 COMPLETE CBC AUTOMATED: CPT

## 2025-05-13 PROCEDURE — 2500000003 HC RX 250 WO HCPCS: Performed by: INTERNAL MEDICINE

## 2025-05-13 PROCEDURE — 2500000003 HC RX 250 WO HCPCS: Performed by: SURGERY

## 2025-05-13 PROCEDURE — 2700000000 HC OXYGEN THERAPY PER DAY

## 2025-05-13 PROCEDURE — 82330 ASSAY OF CALCIUM: CPT

## 2025-05-13 PROCEDURE — 2500000003 HC RX 250 WO HCPCS: Performed by: STUDENT IN AN ORGANIZED HEALTH CARE EDUCATION/TRAINING PROGRAM

## 2025-05-13 PROCEDURE — 99233 SBSQ HOSP IP/OBS HIGH 50: CPT

## 2025-05-13 PROCEDURE — 71045 X-RAY EXAM CHEST 1 VIEW: CPT

## 2025-05-13 RX ORDER — FUROSEMIDE 10 MG/ML
40 INJECTION INTRAMUSCULAR; INTRAVENOUS 2 TIMES DAILY
Status: COMPLETED | OUTPATIENT
Start: 2025-05-13 | End: 2025-05-14

## 2025-05-13 RX ADMIN — Medication: at 10:01

## 2025-05-13 RX ADMIN — PROPOFOL 20 MCG/KG/MIN: 10 INJECTION, EMULSION INTRAVENOUS at 04:55

## 2025-05-13 RX ADMIN — CALCIUM GLUCONATE 1000 MG: 20 INJECTION, SOLUTION INTRAVENOUS at 08:28

## 2025-05-13 RX ADMIN — Medication: at 10:02

## 2025-05-13 RX ADMIN — CEFEPIME 2000 MG: 2 INJECTION, POWDER, FOR SOLUTION INTRAVENOUS at 07:14

## 2025-05-13 RX ADMIN — PANTOPRAZOLE SODIUM 40 MG: 40 INJECTION, POWDER, FOR SOLUTION INTRAVENOUS at 07:56

## 2025-05-13 RX ADMIN — HEPARIN SODIUM 1200 UNITS: 1000 INJECTION INTRAVENOUS; SUBCUTANEOUS at 16:46

## 2025-05-13 RX ADMIN — SODIUM CHLORIDE SOLN NEBU 3% 4 ML: 3 NEBU SOLN at 15:54

## 2025-05-13 RX ADMIN — SODIUM CHLORIDE SOLN NEBU 3% 4 ML: 3 NEBU SOLN at 08:08

## 2025-05-13 RX ADMIN — HEPARIN SODIUM 1200 UNITS: 1000 INJECTION INTRAVENOUS; SUBCUTANEOUS at 16:47

## 2025-05-13 RX ADMIN — SODIUM CHLORIDE SOLN NEBU 3% 4 ML: 3 NEBU SOLN at 03:32

## 2025-05-13 RX ADMIN — WATER 1000 MG: 1 INJECTION INTRAMUSCULAR; INTRAVENOUS; SUBCUTANEOUS at 14:08

## 2025-05-13 RX ADMIN — AMIODARONE HYDROCHLORIDE 0.5 MG/MIN: 1.8 INJECTION, SOLUTION INTRAVENOUS at 15:33

## 2025-05-13 RX ADMIN — AMIODARONE HYDROCHLORIDE 0.5 MG/MIN: 1.8 INJECTION, SOLUTION INTRAVENOUS at 06:04

## 2025-05-13 RX ADMIN — SODIUM CHLORIDE, PRESERVATIVE FREE 10 ML: 5 INJECTION INTRAVENOUS at 07:56

## 2025-05-13 RX ADMIN — HEPARIN SODIUM 8 UNITS/KG/HR: 10000 INJECTION, SOLUTION INTRAVENOUS at 10:24

## 2025-05-13 RX ADMIN — CEFEPIME 2000 MG: 2 INJECTION, POWDER, FOR SOLUTION INTRAVENOUS at 00:19

## 2025-05-13 RX ADMIN — FUROSEMIDE 40 MG: 10 INJECTION, SOLUTION INTRAMUSCULAR; INTRAVENOUS at 15:31

## 2025-05-13 RX ADMIN — SODIUM PHOSPHATE, MONOBASIC, MONOHYDRATE AND SODIUM PHOSPHATE, DIBASIC, ANHYDROUS 12 MMOL: 142; 276 INJECTION, SOLUTION INTRAVENOUS at 09:00

## 2025-05-13 ASSESSMENT — PAIN SCALES - GENERAL
PAINLEVEL_OUTOF10: 0

## 2025-05-13 ASSESSMENT — PULMONARY FUNCTION TESTS
PIF_VALUE: 21
PIF_VALUE: 26
PIF_VALUE: 21
PIF_VALUE: 23
PIF_VALUE: 20
PIF_VALUE: 21
PIF_VALUE: 22
PIF_VALUE: 23
PIF_VALUE: 14
PIF_VALUE: 21
PIF_VALUE: 22

## 2025-05-13 NOTE — ACP (ADVANCE CARE PLANNING)
Advance Care Planning   Healthcare Decision Maker:    Primary Decision Maker: Vianca Simon - Child - [phone]    Secondary Decision Maker: KINDRA SIMON - Child - [phone]    Secondary Decision Maker: Libertad Meneses - Child - [phone]    Secondary Decision Maker: Jair Simon - Child - [phone]    Writer met with pt and son/Jair bedside, they agreed that Vianca should be primary contact.  RAMYA Cool-RN   
desire prayer.      Reason for consult:  ___ Advance Care Planning  ___ Transition of Care Planning  _X_ Psychosocial/Spiritual Support  ___ Symptom Management        I spent 55 minutes with the patient and/or surrogate decision maker discussing the patient's wishes and goals.                                                                                                                                                                                                                                                    Lyudmila Ramos RN        
to convene a zoom (through chaplains) call where out-of-iglesia''s could all speak w/ pt together.    Pts code status amended to DNI.  In light of pts advanced age & co-morbidities, it will be reasonable to advance resuscitation discussion but will defer until pt can be part of the dialogue.

## 2025-05-13 NOTE — PROCEDURES
Bronchoscopy Procedure Note    Date of Operation: 5/12/2025    Pre-op Diagnosis: Acute hypoxic respiratory failure       Post-op Diagnosis: Acute hypoxic respiratory failure    Surgeon: Jed Pierre MD    Anesthesia: General Anesthesia    Operation: Flexible fiberoptic bronchoscopy, diagnostic       Estimated Blood Loss: Minimal    Complications: None      Indications and History:  The patient is a 89 y.o. female with PMHx PAD that presents to [facility] for ischemic lower ext.  The risks, benefits, complications, treatment options and expected outcomes were discussed with the patient's NOK.  The possibilities of reaction to medication, pulmonary aspiration, perforation of a viscus, bleeding, failure to diagnose a condition and creating a complication requiring transfusion or operation were discussed with the patient who freely signed the consent.      Description of Procedure:  A Time Out was held and the above information confirmed.     The scope was then passed into the trachea through the ETT.  Careful inspection of the tracheal lumen was accomplished. The scope was sequentially passed into all airways.     Findings:  Endobronchial findings: None  Trachea: Normal mucosa  Elida: Normal mucosa  Right main bronchus: Normal mucosa  Bronchus intermdius:Normal mucosa  Right upper lobe bronchus: Normal mucosa  Right middle lobe bronchus: Normal mucosa  Right lower lobe bronchus: Normal mucosa  Left main bronchus: Normal mucosa  Left upper lobe bronchus: Normal mucosa  Left lower lobe bronchus: Normal mucosa    Specimen:  None    Assessment:  Acute hypoxic respiratory failure  Mucous plugging    Plan:  - Cont full vent support and abx therapy  - Repeat CXR in the morning    Jed Pierre MD

## 2025-05-14 ENCOUNTER — APPOINTMENT (OUTPATIENT)
Dept: GENERAL RADIOLOGY | Age: 89
DRG: 270 | End: 2025-05-14
Attending: SURGERY
Payer: MEDICARE

## 2025-05-14 LAB
ALBUMIN SERPL-MCNC: 2.5 G/DL (ref 3.4–5)
ANION GAP SERPL CALCULATED.3IONS-SCNC: 11 MMOL/L (ref 3–16)
APTT BLD: 114.4 SEC (ref 22.1–36.4)
APTT BLD: 50.5 SEC (ref 22.1–36.4)
BACTERIA FLD AEROBE CULT: NORMAL
BASE EXCESS BLDA CALC-SCNC: -2.2 MMOL/L (ref -3–3)
BUN SERPL-MCNC: 24 MG/DL (ref 7–20)
CALCIUM SERPL-MCNC: 8.1 MG/DL (ref 8.3–10.6)
CHLORIDE SERPL-SCNC: 103 MMOL/L (ref 99–110)
CO2 BLDA-SCNC: 24.3 MMOL/L
CO2 SERPL-SCNC: 24 MMOL/L (ref 21–32)
COHGB MFR BLDA: 0.1 % (ref 0–1.5)
CREAT SERPL-MCNC: 1.9 MG/DL (ref 0.6–1.2)
DEPRECATED RDW RBC AUTO: 15.1 % (ref 12.4–15.4)
GFR SERPLBLD CREATININE-BSD FMLA CKD-EPI: 24 ML/MIN/{1.73_M2}
GLUCOSE SERPL-MCNC: 85 MG/DL (ref 70–99)
GRAM STN SPEC: NORMAL
HCO3 BLDA-SCNC: 23 MMOL/L (ref 21–29)
HCT VFR BLD AUTO: 26.4 % (ref 36–48)
HGB BLD-MCNC: 9 G/DL (ref 12–16)
HGB BLDA-MCNC: 9.6 G/DL (ref 12–16)
MAGNESIUM SERPL-MCNC: 2.48 MG/DL (ref 1.8–2.4)
MCH RBC QN AUTO: 30.2 PG (ref 26–34)
MCHC RBC AUTO-ENTMCNC: 34.1 G/DL (ref 31–36)
MCV RBC AUTO: 88.6 FL (ref 80–100)
METHGB MFR BLDA: 0.1 %
NT-PROBNP SERPL-MCNC: ABNORMAL PG/ML (ref 0–449)
O2 THERAPY: ABNORMAL
PCO2 BLDA: 41.4 MMHG (ref 35–45)
PH BLDA: 7.36 [PH] (ref 7.35–7.45)
PHOSPHATE SERPL-MCNC: 4.3 MG/DL (ref 2.5–4.9)
PLATELET # BLD AUTO: 287 K/UL (ref 135–450)
PMV BLD AUTO: 7.5 FL (ref 5–10.5)
PO2 BLDA: 112.3 MMHG (ref 75–108)
POTASSIUM SERPL-SCNC: 3.5 MMOL/L (ref 3.5–5.1)
RBC # BLD AUTO: 2.97 M/UL (ref 4–5.2)
SAO2 % BLDA: 98.3 %
SODIUM SERPL-SCNC: 138 MMOL/L (ref 136–145)
WBC # BLD AUTO: 11.6 K/UL (ref 4–11)

## 2025-05-14 PROCEDURE — 6360000002 HC RX W HCPCS: Performed by: NURSE PRACTITIONER

## 2025-05-14 PROCEDURE — 83735 ASSAY OF MAGNESIUM: CPT

## 2025-05-14 PROCEDURE — 97530 THERAPEUTIC ACTIVITIES: CPT

## 2025-05-14 PROCEDURE — 85730 THROMBOPLASTIN TIME PARTIAL: CPT

## 2025-05-14 PROCEDURE — 97168 OT RE-EVAL EST PLAN CARE: CPT

## 2025-05-14 PROCEDURE — 6360000002 HC RX W HCPCS: Performed by: INTERNAL MEDICINE

## 2025-05-14 PROCEDURE — 6370000000 HC RX 637 (ALT 250 FOR IP): Performed by: STUDENT IN AN ORGANIZED HEALTH CARE EDUCATION/TRAINING PROGRAM

## 2025-05-14 PROCEDURE — 97110 THERAPEUTIC EXERCISES: CPT

## 2025-05-14 PROCEDURE — 71045 X-RAY EXAM CHEST 1 VIEW: CPT

## 2025-05-14 PROCEDURE — 6370000000 HC RX 637 (ALT 250 FOR IP): Performed by: NURSE PRACTITIONER

## 2025-05-14 PROCEDURE — 94669 MECHANICAL CHEST WALL OSCILL: CPT

## 2025-05-14 PROCEDURE — 94761 N-INVAS EAR/PLS OXIMETRY MLT: CPT

## 2025-05-14 PROCEDURE — 6370000000 HC RX 637 (ALT 250 FOR IP): Performed by: CLINICAL NURSE SPECIALIST

## 2025-05-14 PROCEDURE — 97164 PT RE-EVAL EST PLAN CARE: CPT

## 2025-05-14 PROCEDURE — 94640 AIRWAY INHALATION TREATMENT: CPT

## 2025-05-14 PROCEDURE — 6370000000 HC RX 637 (ALT 250 FOR IP): Performed by: INTERNAL MEDICINE

## 2025-05-14 PROCEDURE — 94660 CPAP INITIATION&MGMT: CPT

## 2025-05-14 PROCEDURE — 80069 RENAL FUNCTION PANEL: CPT

## 2025-05-14 PROCEDURE — 83880 ASSAY OF NATRIURETIC PEPTIDE: CPT

## 2025-05-14 PROCEDURE — 82803 BLOOD GASES ANY COMBINATION: CPT

## 2025-05-14 PROCEDURE — 99024 POSTOP FOLLOW-UP VISIT: CPT | Performed by: CLINICAL NURSE SPECIALIST

## 2025-05-14 PROCEDURE — 2000000000 HC ICU R&B

## 2025-05-14 PROCEDURE — 99233 SBSQ HOSP IP/OBS HIGH 50: CPT

## 2025-05-14 PROCEDURE — 92610 EVALUATE SWALLOWING FUNCTION: CPT

## 2025-05-14 PROCEDURE — 2700000000 HC OXYGEN THERAPY PER DAY

## 2025-05-14 PROCEDURE — 6370000000 HC RX 637 (ALT 250 FOR IP)

## 2025-05-14 PROCEDURE — 99291 CRITICAL CARE FIRST HOUR: CPT | Performed by: STUDENT IN AN ORGANIZED HEALTH CARE EDUCATION/TRAINING PROGRAM

## 2025-05-14 PROCEDURE — 36600 WITHDRAWAL OF ARTERIAL BLOOD: CPT

## 2025-05-14 PROCEDURE — 6360000002 HC RX W HCPCS: Performed by: STUDENT IN AN ORGANIZED HEALTH CARE EDUCATION/TRAINING PROGRAM

## 2025-05-14 PROCEDURE — 92526 ORAL FUNCTION THERAPY: CPT

## 2025-05-14 PROCEDURE — 2500000003 HC RX 250 WO HCPCS: Performed by: SURGERY

## 2025-05-14 PROCEDURE — 85027 COMPLETE CBC AUTOMATED: CPT

## 2025-05-14 PROCEDURE — 2500000003 HC RX 250 WO HCPCS: Performed by: STUDENT IN AN ORGANIZED HEALTH CARE EDUCATION/TRAINING PROGRAM

## 2025-05-14 PROCEDURE — 2580000003 HC RX 258: Performed by: STUDENT IN AN ORGANIZED HEALTH CARE EDUCATION/TRAINING PROGRAM

## 2025-05-14 PROCEDURE — 2500000003 HC RX 250 WO HCPCS: Performed by: NURSE PRACTITIONER

## 2025-05-14 PROCEDURE — 2580000003 HC RX 258: Performed by: INTERNAL MEDICINE

## 2025-05-14 RX ORDER — SODIUM CHLORIDE FOR INHALATION 3 %
4 VIAL, NEBULIZER (ML) INHALATION
Status: DISCONTINUED | OUTPATIENT
Start: 2025-05-14 | End: 2025-05-16

## 2025-05-14 RX ORDER — BISACODYL 10 MG
10 SUPPOSITORY, RECTAL RECTAL DAILY PRN
Status: DISCONTINUED | OUTPATIENT
Start: 2025-05-14 | End: 2025-05-19 | Stop reason: HOSPADM

## 2025-05-14 RX ORDER — IPRATROPIUM BROMIDE AND ALBUTEROL SULFATE 2.5; .5 MG/3ML; MG/3ML
1 SOLUTION RESPIRATORY (INHALATION)
Status: DISCONTINUED | OUTPATIENT
Start: 2025-05-14 | End: 2025-05-18

## 2025-05-14 RX ORDER — NOREPINEPHRINE BITARTRATE 0.06 MG/ML
1-100 INJECTION, SOLUTION INTRAVENOUS CONTINUOUS PRN
Status: DISCONTINUED | OUTPATIENT
Start: 2025-05-14 | End: 2025-05-16

## 2025-05-14 RX ORDER — POLYETHYLENE GLYCOL 3350 17 G/17G
17 POWDER, FOR SOLUTION ORAL DAILY
Status: DISCONTINUED | OUTPATIENT
Start: 2025-05-14 | End: 2025-05-15

## 2025-05-14 RX ORDER — FUROSEMIDE 10 MG/ML
40 INJECTION INTRAMUSCULAR; INTRAVENOUS 2 TIMES DAILY
Status: COMPLETED | OUTPATIENT
Start: 2025-05-14 | End: 2025-05-15

## 2025-05-14 RX ADMIN — SODIUM CHLORIDE, PRESERVATIVE FREE 10 ML: 5 INJECTION INTRAVENOUS at 21:18

## 2025-05-14 RX ADMIN — HEPARIN SODIUM 4000 UNITS: 1000 INJECTION INTRAVENOUS; SUBCUTANEOUS at 07:43

## 2025-05-14 RX ADMIN — SODIUM CHLORIDE, PRESERVATIVE FREE 10 ML: 5 INJECTION INTRAVENOUS at 10:42

## 2025-05-14 RX ADMIN — AMIODARONE HYDROCHLORIDE 400 MG: 200 TABLET ORAL at 16:50

## 2025-05-14 RX ADMIN — ROSUVASTATIN CALCIUM 10 MG: 10 TABLET, FILM COATED ORAL at 21:18

## 2025-05-14 RX ADMIN — AMIODARONE HYDROCHLORIDE 0.5 MG/MIN: 1.8 INJECTION, SOLUTION INTRAVENOUS at 04:07

## 2025-05-14 RX ADMIN — IPRATROPIUM BROMIDE AND ALBUTEROL SULFATE 1 DOSE: 2.5; .5 SOLUTION RESPIRATORY (INHALATION) at 11:25

## 2025-05-14 RX ADMIN — IPRATROPIUM BROMIDE AND ALBUTEROL SULFATE 1 DOSE: 2.5; .5 SOLUTION RESPIRATORY (INHALATION) at 16:11

## 2025-05-14 RX ADMIN — SODIUM CHLORIDE SOLN NEBU 3% 4 ML: 3 NEBU SOLN at 08:10

## 2025-05-14 RX ADMIN — FUROSEMIDE 40 MG: 10 INJECTION, SOLUTION INTRAMUSCULAR; INTRAVENOUS at 17:52

## 2025-05-14 RX ADMIN — POLYETHYLENE GLYCOL 3350 17 G: 17 POWDER, FOR SOLUTION ORAL at 10:44

## 2025-05-14 RX ADMIN — APIXABAN 2.5 MG: 2.5 TABLET, FILM COATED ORAL at 21:18

## 2025-05-14 RX ADMIN — POTASSIUM BICARBONATE 20 MEQ: 782 TABLET, EFFERVESCENT ORAL at 10:42

## 2025-05-14 RX ADMIN — FUROSEMIDE 40 MG: 10 INJECTION, SOLUTION INTRAMUSCULAR; INTRAVENOUS at 10:42

## 2025-05-14 RX ADMIN — HEPARIN SODIUM 12 UNITS/KG/HR: 10000 INJECTION, SOLUTION INTRAVENOUS at 07:45

## 2025-05-14 RX ADMIN — WATER 1000 MG: 1 INJECTION INTRAMUSCULAR; INTRAVENOUS; SUBCUTANEOUS at 16:51

## 2025-05-14 RX ADMIN — IPRATROPIUM BROMIDE AND ALBUTEROL SULFATE 1 DOSE: 2.5; .5 SOLUTION RESPIRATORY (INHALATION) at 08:10

## 2025-05-14 RX ADMIN — APIXABAN 2.5 MG: 2.5 TABLET, FILM COATED ORAL at 17:52

## 2025-05-14 RX ADMIN — AMIODARONE HYDROCHLORIDE 0.5 MG/MIN: 1.8 INJECTION, SOLUTION INTRAVENOUS at 06:23

## 2025-05-14 RX ADMIN — SODIUM CHLORIDE SOLN NEBU 3% 4 ML: 3 NEBU SOLN at 19:20

## 2025-05-14 RX ADMIN — IPRATROPIUM BROMIDE AND ALBUTEROL SULFATE 1 DOSE: 2.5; .5 SOLUTION RESPIRATORY (INHALATION) at 19:20

## 2025-05-14 RX ADMIN — HYALURONIDASE (HUMAN RECOMBINANT) 15 UNITS: 150 INJECTION, SOLUTION SUBCUTANEOUS at 10:54

## 2025-05-14 RX ADMIN — PANTOPRAZOLE SODIUM 40 MG: 40 INJECTION, POWDER, FOR SOLUTION INTRAVENOUS at 10:42

## 2025-05-14 RX ADMIN — CARVEDILOL 3.12 MG: 3.12 TABLET, FILM COATED ORAL at 21:18

## 2025-05-14 ASSESSMENT — PAIN SCALES - GENERAL
PAINLEVEL_OUTOF10: 0

## 2025-05-15 ENCOUNTER — APPOINTMENT (OUTPATIENT)
Dept: GENERAL RADIOLOGY | Age: 89
DRG: 270 | End: 2025-05-15
Attending: SURGERY
Payer: MEDICARE

## 2025-05-15 LAB
ALBUMIN SERPL-MCNC: 2.3 G/DL (ref 3.4–5)
ANION GAP SERPL CALCULATED.3IONS-SCNC: 11 MMOL/L (ref 3–16)
BASE EXCESS BLDA CALC-SCNC: 0.8 MMOL/L (ref -3–3)
BUN SERPL-MCNC: 31 MG/DL (ref 7–20)
CALCIUM SERPL-MCNC: 7.9 MG/DL (ref 8.3–10.6)
CHLORIDE SERPL-SCNC: 99 MMOL/L (ref 99–110)
CO2 BLDA-SCNC: 27.6 MMOL/L
CO2 SERPL-SCNC: 24 MMOL/L (ref 21–32)
COHGB MFR BLDA: 0.6 % (ref 0–1.5)
CREAT SERPL-MCNC: 2.6 MG/DL (ref 0.6–1.2)
DEPRECATED RDW RBC AUTO: 15.1 % (ref 12.4–15.4)
GFR SERPLBLD CREATININE-BSD FMLA CKD-EPI: 16 ML/MIN/{1.73_M2}
GLUCOSE SERPL-MCNC: 120 MG/DL (ref 70–99)
HCO3 BLDA-SCNC: 26.2 MMOL/L (ref 21–29)
HCT VFR BLD AUTO: 26.8 % (ref 36–48)
HGB BLD-MCNC: 9 G/DL (ref 12–16)
HGB BLDA-MCNC: 11.9 G/DL (ref 12–16)
MAGNESIUM SERPL-MCNC: 2.22 MG/DL (ref 1.8–2.4)
MCH RBC QN AUTO: 29.7 PG (ref 26–34)
MCHC RBC AUTO-ENTMCNC: 33.6 G/DL (ref 31–36)
MCV RBC AUTO: 88.6 FL (ref 80–100)
METHGB MFR BLDA: 0.1 %
O2 THERAPY: ABNORMAL
PCO2 BLDA: 45.2 MMHG (ref 35–45)
PH BLDA: 7.38 [PH] (ref 7.35–7.45)
PHOSPHATE SERPL-MCNC: 4.3 MG/DL (ref 2.5–4.9)
PLATELET # BLD AUTO: 296 K/UL (ref 135–450)
PMV BLD AUTO: 7.4 FL (ref 5–10.5)
PO2 BLDA: 80.5 MMHG (ref 75–108)
POTASSIUM SERPL-SCNC: 3.6 MMOL/L (ref 3.5–5.1)
RBC # BLD AUTO: 3.03 M/UL (ref 4–5.2)
SAO2 % BLDA: 95.9 %
SODIUM SERPL-SCNC: 134 MMOL/L (ref 136–145)
WBC # BLD AUTO: 12.2 K/UL (ref 4–11)

## 2025-05-15 PROCEDURE — 97110 THERAPEUTIC EXERCISES: CPT

## 2025-05-15 PROCEDURE — 6370000000 HC RX 637 (ALT 250 FOR IP): Performed by: NURSE PRACTITIONER

## 2025-05-15 PROCEDURE — 2500000003 HC RX 250 WO HCPCS: Performed by: STUDENT IN AN ORGANIZED HEALTH CARE EDUCATION/TRAINING PROGRAM

## 2025-05-15 PROCEDURE — 2500000003 HC RX 250 WO HCPCS: Performed by: SURGERY

## 2025-05-15 PROCEDURE — 94761 N-INVAS EAR/PLS OXIMETRY MLT: CPT

## 2025-05-15 PROCEDURE — 99233 SBSQ HOSP IP/OBS HIGH 50: CPT | Performed by: STUDENT IN AN ORGANIZED HEALTH CARE EDUCATION/TRAINING PROGRAM

## 2025-05-15 PROCEDURE — 2580000003 HC RX 258: Performed by: STUDENT IN AN ORGANIZED HEALTH CARE EDUCATION/TRAINING PROGRAM

## 2025-05-15 PROCEDURE — 80069 RENAL FUNCTION PANEL: CPT

## 2025-05-15 PROCEDURE — 82803 BLOOD GASES ANY COMBINATION: CPT

## 2025-05-15 PROCEDURE — 6370000000 HC RX 637 (ALT 250 FOR IP): Performed by: STUDENT IN AN ORGANIZED HEALTH CARE EDUCATION/TRAINING PROGRAM

## 2025-05-15 PROCEDURE — 6370000000 HC RX 637 (ALT 250 FOR IP): Performed by: CLINICAL NURSE SPECIALIST

## 2025-05-15 PROCEDURE — P9047 ALBUMIN (HUMAN), 25%, 50ML: HCPCS | Performed by: INTERNAL MEDICINE

## 2025-05-15 PROCEDURE — 94640 AIRWAY INHALATION TREATMENT: CPT

## 2025-05-15 PROCEDURE — 2000000000 HC ICU R&B

## 2025-05-15 PROCEDURE — 6360000002 HC RX W HCPCS: Performed by: STUDENT IN AN ORGANIZED HEALTH CARE EDUCATION/TRAINING PROGRAM

## 2025-05-15 PROCEDURE — 97530 THERAPEUTIC ACTIVITIES: CPT

## 2025-05-15 PROCEDURE — 85027 COMPLETE CBC AUTOMATED: CPT

## 2025-05-15 PROCEDURE — 71045 X-RAY EXAM CHEST 1 VIEW: CPT

## 2025-05-15 PROCEDURE — 36415 COLL VENOUS BLD VENIPUNCTURE: CPT

## 2025-05-15 PROCEDURE — 83735 ASSAY OF MAGNESIUM: CPT

## 2025-05-15 PROCEDURE — 2700000000 HC OXYGEN THERAPY PER DAY

## 2025-05-15 PROCEDURE — 99232 SBSQ HOSP IP/OBS MODERATE 35: CPT

## 2025-05-15 PROCEDURE — 6360000002 HC RX W HCPCS: Performed by: INTERNAL MEDICINE

## 2025-05-15 PROCEDURE — 94660 CPAP INITIATION&MGMT: CPT

## 2025-05-15 PROCEDURE — 99024 POSTOP FOLLOW-UP VISIT: CPT | Performed by: CLINICAL NURSE SPECIALIST

## 2025-05-15 PROCEDURE — 94669 MECHANICAL CHEST WALL OSCILL: CPT

## 2025-05-15 RX ORDER — ALBUMIN (HUMAN) 12.5 G/50ML
25 SOLUTION INTRAVENOUS ONCE
Status: COMPLETED | OUTPATIENT
Start: 2025-05-15 | End: 2025-05-15

## 2025-05-15 RX ORDER — SENNOSIDES A AND B 8.6 MG/1
1 TABLET, FILM COATED ORAL NIGHTLY
Status: DISCONTINUED | OUTPATIENT
Start: 2025-05-15 | End: 2025-05-19 | Stop reason: HOSPADM

## 2025-05-15 RX ORDER — PANTOPRAZOLE SODIUM 40 MG/1
40 TABLET, DELAYED RELEASE ORAL
Status: DISCONTINUED | OUTPATIENT
Start: 2025-05-15 | End: 2025-05-19 | Stop reason: HOSPADM

## 2025-05-15 RX ORDER — ALPRAZOLAM 0.25 MG
0.25 TABLET ORAL 2 TIMES DAILY PRN
Status: DISCONTINUED | OUTPATIENT
Start: 2025-05-15 | End: 2025-05-16

## 2025-05-15 RX ORDER — LACTULOSE 10 G/15ML
20 SOLUTION ORAL ONCE
Status: COMPLETED | OUTPATIENT
Start: 2025-05-15 | End: 2025-05-15

## 2025-05-15 RX ORDER — DIPHENHYDRAMINE HCL 25 MG
25 TABLET ORAL NIGHTLY PRN
Status: DISCONTINUED | OUTPATIENT
Start: 2025-05-15 | End: 2025-05-15

## 2025-05-15 RX ORDER — POLYETHYLENE GLYCOL 3350 17 G/17G
17 POWDER, FOR SOLUTION ORAL DAILY PRN
Status: DISCONTINUED | OUTPATIENT
Start: 2025-05-15 | End: 2025-05-17

## 2025-05-15 RX ORDER — BUMETANIDE 0.25 MG/ML
2 INJECTION, SOLUTION INTRAMUSCULAR; INTRAVENOUS ONCE
Status: COMPLETED | OUTPATIENT
Start: 2025-05-15 | End: 2025-05-15

## 2025-05-15 RX ADMIN — PANTOPRAZOLE SODIUM 40 MG: 40 TABLET, DELAYED RELEASE ORAL at 08:04

## 2025-05-15 RX ADMIN — SODIUM CHLORIDE, PRESERVATIVE FREE 10 ML: 5 INJECTION INTRAVENOUS at 21:54

## 2025-05-15 RX ADMIN — CARVEDILOL 3.12 MG: 3.12 TABLET, FILM COATED ORAL at 08:04

## 2025-05-15 RX ADMIN — ALPRAZOLAM 0.25 MG: 0.25 TABLET ORAL at 13:38

## 2025-05-15 RX ADMIN — TAMSULOSIN HYDROCHLORIDE 0.4 MG: 0.4 CAPSULE ORAL at 08:04

## 2025-05-15 RX ADMIN — APIXABAN 2.5 MG: 2.5 TABLET, FILM COATED ORAL at 08:04

## 2025-05-15 RX ADMIN — IPRATROPIUM BROMIDE AND ALBUTEROL SULFATE 1 DOSE: 2.5; .5 SOLUTION RESPIRATORY (INHALATION) at 15:34

## 2025-05-15 RX ADMIN — LACTULOSE 20 G: 20 SOLUTION ORAL at 08:10

## 2025-05-15 RX ADMIN — IPRATROPIUM BROMIDE AND ALBUTEROL SULFATE 1 DOSE: 2.5; .5 SOLUTION RESPIRATORY (INHALATION) at 11:40

## 2025-05-15 RX ADMIN — AMIODARONE HYDROCHLORIDE 400 MG: 200 TABLET ORAL at 17:07

## 2025-05-15 RX ADMIN — APIXABAN 2.5 MG: 2.5 TABLET, FILM COATED ORAL at 21:53

## 2025-05-15 RX ADMIN — IPRATROPIUM BROMIDE AND ALBUTEROL SULFATE 1 DOSE: 2.5; .5 SOLUTION RESPIRATORY (INHALATION) at 08:29

## 2025-05-15 RX ADMIN — ROSUVASTATIN CALCIUM 10 MG: 10 TABLET, FILM COATED ORAL at 21:53

## 2025-05-15 RX ADMIN — CARVEDILOL 3.12 MG: 3.12 TABLET, FILM COATED ORAL at 21:53

## 2025-05-15 RX ADMIN — FUROSEMIDE 40 MG: 10 INJECTION, SOLUTION INTRAMUSCULAR; INTRAVENOUS at 07:59

## 2025-05-15 RX ADMIN — SODIUM CHLORIDE SOLN NEBU 3% 4 ML: 3 NEBU SOLN at 08:32

## 2025-05-15 RX ADMIN — BUMETANIDE 2 MG: 0.25 INJECTION INTRAMUSCULAR; INTRAVENOUS at 15:00

## 2025-05-15 RX ADMIN — AMIODARONE HYDROCHLORIDE 400 MG: 200 TABLET ORAL at 05:09

## 2025-05-15 RX ADMIN — SENNOSIDES 8.6 MG: 8.6 TABLET, FILM COATED ORAL at 21:53

## 2025-05-15 RX ADMIN — WATER 1000 MG: 1 INJECTION INTRAMUSCULAR; INTRAVENOUS; SUBCUTANEOUS at 13:38

## 2025-05-15 RX ADMIN — Medication 6 MG: at 00:28

## 2025-05-15 RX ADMIN — SODIUM CHLORIDE, PRESERVATIVE FREE 10 ML: 5 INJECTION INTRAVENOUS at 08:00

## 2025-05-15 RX ADMIN — ALBUMIN (HUMAN) 25 G: 0.25 INJECTION, SOLUTION INTRAVENOUS at 14:28

## 2025-05-15 ASSESSMENT — PAIN SCALES - GENERAL
PAINLEVEL_OUTOF10: 0
PAINLEVEL_OUTOF10: 0

## 2025-05-16 ENCOUNTER — APPOINTMENT (OUTPATIENT)
Dept: GENERAL RADIOLOGY | Age: 89
DRG: 270 | End: 2025-05-16
Attending: SURGERY
Payer: MEDICARE

## 2025-05-16 LAB
BACTERIA SPEC ANAEROBE CULT: NORMAL
BASE EXCESS BLDA CALC-SCNC: -4.4 MMOL/L (ref -3–3)
CO2 BLDA-SCNC: 23.1 MMOL/L
COHGB MFR BLDA: 0.6 % (ref 0–1.5)
HCO3 BLDA-SCNC: 21.7 MMOL/L (ref 21–29)
HGB BLDA-MCNC: 9.3 G/DL (ref 12–16)
METHGB MFR BLDA: 0.3 %
O2 THERAPY: ABNORMAL
PCO2 BLDA: 44.9 MMHG (ref 35–45)
PH BLDA: 7.3 [PH] (ref 7.35–7.45)
PO2 BLDA: 63.5 MMHG (ref 75–108)
SAO2 % BLDA: 91 %

## 2025-05-16 PROCEDURE — 2500000003 HC RX 250 WO HCPCS: Performed by: SURGERY

## 2025-05-16 PROCEDURE — 2580000003 HC RX 258: Performed by: STUDENT IN AN ORGANIZED HEALTH CARE EDUCATION/TRAINING PROGRAM

## 2025-05-16 PROCEDURE — 71045 X-RAY EXAM CHEST 1 VIEW: CPT

## 2025-05-16 PROCEDURE — 6370000000 HC RX 637 (ALT 250 FOR IP): Performed by: CLINICAL NURSE SPECIALIST

## 2025-05-16 PROCEDURE — 99291 CRITICAL CARE FIRST HOUR: CPT | Performed by: STUDENT IN AN ORGANIZED HEALTH CARE EDUCATION/TRAINING PROGRAM

## 2025-05-16 PROCEDURE — 2000000000 HC ICU R&B

## 2025-05-16 PROCEDURE — 82803 BLOOD GASES ANY COMBINATION: CPT

## 2025-05-16 PROCEDURE — 6360000002 HC RX W HCPCS: Performed by: INTERNAL MEDICINE

## 2025-05-16 PROCEDURE — 6360000002 HC RX W HCPCS: Performed by: STUDENT IN AN ORGANIZED HEALTH CARE EDUCATION/TRAINING PROGRAM

## 2025-05-16 PROCEDURE — 2700000000 HC OXYGEN THERAPY PER DAY

## 2025-05-16 PROCEDURE — 6360000002 HC RX W HCPCS: Performed by: NURSE PRACTITIONER

## 2025-05-16 PROCEDURE — 6360000002 HC RX W HCPCS: Performed by: SURGERY

## 2025-05-16 PROCEDURE — 94761 N-INVAS EAR/PLS OXIMETRY MLT: CPT

## 2025-05-16 PROCEDURE — 94640 AIRWAY INHALATION TREATMENT: CPT

## 2025-05-16 PROCEDURE — 6370000000 HC RX 637 (ALT 250 FOR IP): Performed by: STUDENT IN AN ORGANIZED HEALTH CARE EDUCATION/TRAINING PROGRAM

## 2025-05-16 RX ORDER — MORPHINE SULFATE 2 MG/ML
2 INJECTION, SOLUTION INTRAMUSCULAR; INTRAVENOUS
Status: DISCONTINUED | OUTPATIENT
Start: 2025-05-16 | End: 2025-05-16

## 2025-05-16 RX ORDER — FUROSEMIDE 10 MG/ML
40 INJECTION INTRAMUSCULAR; INTRAVENOUS ONCE
Status: COMPLETED | OUTPATIENT
Start: 2025-05-16 | End: 2025-05-16

## 2025-05-16 RX ORDER — MORPHINE SULFATE 2 MG/ML
2 INJECTION, SOLUTION INTRAMUSCULAR; INTRAVENOUS EVERY 30 MIN PRN
Status: DISCONTINUED | OUTPATIENT
Start: 2025-05-16 | End: 2025-05-19 | Stop reason: HOSPADM

## 2025-05-16 RX ORDER — BUMETANIDE 0.25 MG/ML
3 INJECTION, SOLUTION INTRAMUSCULAR; INTRAVENOUS 2 TIMES DAILY
Status: DISCONTINUED | OUTPATIENT
Start: 2025-05-16 | End: 2025-05-19 | Stop reason: HOSPADM

## 2025-05-16 RX ORDER — LORAZEPAM 2 MG/ML
1 INJECTION INTRAMUSCULAR
Status: DISCONTINUED | OUTPATIENT
Start: 2025-05-16 | End: 2025-05-19 | Stop reason: HOSPADM

## 2025-05-16 RX ORDER — ACETYLCYSTEINE 200 MG/ML
600 SOLUTION ORAL; RESPIRATORY (INHALATION)
Status: DISCONTINUED | OUTPATIENT
Start: 2025-05-16 | End: 2025-05-16

## 2025-05-16 RX ORDER — LORAZEPAM 2 MG/ML
0.5 INJECTION INTRAMUSCULAR ONCE
Status: DISCONTINUED | OUTPATIENT
Start: 2025-05-16 | End: 2025-05-19 | Stop reason: HOSPADM

## 2025-05-16 RX ADMIN — MORPHINE SULFATE 2 MG: 2 INJECTION, SOLUTION INTRAMUSCULAR; INTRAVENOUS at 17:06

## 2025-05-16 RX ADMIN — SODIUM CHLORIDE SOLN NEBU 3% 4 ML: 3 NEBU SOLN at 09:26

## 2025-05-16 RX ADMIN — LORAZEPAM 1 MG: 2 INJECTION INTRAMUSCULAR; INTRAVENOUS at 10:42

## 2025-05-16 RX ADMIN — Medication 10 ML: at 09:16

## 2025-05-16 RX ADMIN — FUROSEMIDE 40 MG: 10 INJECTION, SOLUTION INTRAMUSCULAR; INTRAVENOUS at 03:35

## 2025-05-16 RX ADMIN — LORAZEPAM 1 MG: 2 INJECTION INTRAMUSCULAR; INTRAVENOUS at 19:26

## 2025-05-16 RX ADMIN — IPRATROPIUM BROMIDE AND ALBUTEROL SULFATE 1 DOSE: 2.5; .5 SOLUTION RESPIRATORY (INHALATION) at 09:26

## 2025-05-16 RX ADMIN — ACETYLCYSTEINE 600 MG: 200 INHALANT RESPIRATORY (INHALATION) at 09:26

## 2025-05-16 RX ADMIN — ALPRAZOLAM 0.25 MG: 0.25 TABLET ORAL at 03:50

## 2025-05-16 RX ADMIN — SODIUM CHLORIDE, PRESERVATIVE FREE 10 ML: 5 INJECTION INTRAVENOUS at 08:13

## 2025-05-16 RX ADMIN — BUMETANIDE 3 MG: 0.25 INJECTION INTRAMUSCULAR; INTRAVENOUS at 17:38

## 2025-05-16 RX ADMIN — Medication 10 ML: at 19:26

## 2025-05-16 RX ADMIN — BUMETANIDE 3 MG: 0.25 INJECTION INTRAMUSCULAR; INTRAVENOUS at 09:41

## 2025-05-16 RX ADMIN — MORPHINE SULFATE 2 MG: 2 INJECTION, SOLUTION INTRAMUSCULAR; INTRAVENOUS at 09:15

## 2025-05-16 NOTE — CONSULTS
Palliative Care Initial Note  Palliative Care Admit date: 5/12/25    ACP/palcare referral noted  
    [facility]  [facility] Consult [facility]           Reason for Consult: TEREZA on CKD 3  Requesting Physician:  Dr. Metz    Chief Complaint:  No chief complaint on file.    History of Present Illness on 5/8/2025:    89 y.o. yo female with PMH of atrial fibrillation, PAD, hyponatremia, chronic kidney disease stage III with baseline creatinine of 1-1.2 who is admitted after aortobifemoral bypass using Dacron graft on 5/5/2025.  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  Since surgery, patient received IV Lasix on 5/6 and 5/7; she was also receiving LR at 75 mL/h which was stopped on 5/7  Patient received 500 mL normal saline on 5/8 and has been continued at 75 mL/h   For A-fib RVR, patient has been on amiodarone drip since 5/7 with persistent rapid ventricular rate  Blood pressure has intermittently dropped in the 80s to 90s and has remained in the 100s recently  Patient complains of passing flatus 1 time, discomfort around the incision site and shortness of breath; she is currently on 4 L of oxygen    Past Medical History:        Diagnosis Date    Arthritis     Atrial fibrillation (HCC)     Back fracture     stress fracture active 4/17/15    CAD (coronary artery disease)     Cancer (HCC)     kidney    CHF (congestive heart failure) (HCC)     HTN (hypertension)     Hyperlipidemia     Panic attack     Wears dentures     upper       Past Surgical History:        Procedure Laterality Date    COLONOSCOPY  2015    tics    EYE SURGERY      HEMORRHOID SURGERY      HYSTERECTOMY (CERVIX STATUS UNKNOWN)      VEIN BYPASS GRAFT,AORTOBIFEMORAL N/A 5/5/2025    AORTOBIFEMORAL BYPASS GRAFT performed by Kobe Metz MD at [facility] OR       Home Medications:    No current facility-administered medications on file prior to encounter.     Current Outpatient 
    Pharmacy to Manage Heparin Infusion per Hospital Nomogram    Dx:AFib  Pt wt = 62.6 kg   Baseline aPTT = ordered     Heparin (weight-based) Infusion: CAD/STEMI/NSTEMI/UA/AFib)   Heparin 60 units/kg IVP bolus (max 4,000 units) followed by Heparin infusion at 12 units/kg/hr (recommended max initial rate: 1000 units/hr).  Recheck anti-Xa (unless aPTT being used) in 6 hours.  Goal anti-Xa 0.3-0.7 IU/mL  Goal aPTT =  seconds.    anti-Xa < 0.1            Heparin 60 units/kg bolus (max 4,000 units)    Increase infusion by 4 units/kg/hr  anti-Xa 0.1-0.29       Heparin 30 units/kg bolus (max 2,000 units)    Increase infusion by 2 units/kg/hr  anti-Xa 0.3-0.7         No bolus                                                           No change   anti-Xa 0.71-0.8       No bolus                                                           Decrease infusion by 1 units/kg/hr  anti-Xa 0.81-0.99     No bolus                                                           Decrease infusion by 2 units/kg/hr  anti-Xa 1 or more     Hold heparin for 1 hour                                    Decrease infusion by 3 units/kg/hr    When Anti-Xa  is within target range for two consecutive times, check Anti-Xa once daily with morning labs.   Ryder Caraballo PharmD, BCPS 5/7/2025 10:48 AM    5/7 @ 1742  Anti-Xa 0.71 at 1700   Decrease Heparin infusion to 11 units/kg/hr  Recheck anti-Xa in 6 hours.  Reynaldo Maier PharmD 5/7/2025 5:42 PM    5/7 2345  Anti-Xa Unfrac Heparin   0.72   IU/mL  - Decrease Heparin infusion to _10_ units/kg/hr.   - Recheck Anti-Xa in 6 hours at 0700  Stanley Heredia Pharm D.5/8/2025 12:23 AM    Anti-Xa 0.59 at 0630   No change to Heparin at this time, continue Heparin infusion at 10 units/kg/hr.  Recheck anti-Xa in 6 hours.  Bob Quick PharmD, BCPS   5/8/2025 7:44 AM    CRRT ordered  Convert DOAC back to UFH  Last Apixaban dose administered 5/8 PM.  Baseline aPTT now.  Heparin 4000 units IVP bolus then Heparin at 10 
  Pharmacy Note  Vancomycin Consult    Muna Simon is a 89 y.o. female started on Vancomycin for CAP X 5days; consult received from Dr. Craft to manage therapy. Also receiving the following antibiotics:  - Cefepime     Allergies:  Lisinopril     Tmax: 98.9    Micro: N/A     Recent Labs     05/10/25  0456 05/10/25  1322   CREATININE 1.5* 1.1       Recent Labs     05/09/25  0424 05/10/25  0456   WBC 11.1* 8.4       Estimated Creatinine Clearance: 29 mL/min (based on SCr of 1.1 mg/dL).      Intake/Output Summary (Last 24 hours) at 5/10/2025 1438  Last data filed at 5/10/2025 1300  Gross per 24 hour   Intake 999.36 ml   Output 3281.4 ml   Net -2282.04 ml       Wt Readings from Last 1 Encounters:   05/10/25 67.4 kg (148 lb 9.4 oz)         Body mass index is 26.32 kg/m².    Loading dose (critically ill or in ICU, require dialysis or renal replacement therapy): Vancomycin 25 mg/kg IVPB x 1 (maximum 2500 mg).  Maintenance dose: 10-20 mg/kg (maximum: 2000 mg/dose and 4500 mg/day) starting at the next dosing interval determined by renal function  Pulse dose: fluctuating renal function, TEREZA, ESRD  CRRT: 7.5-10 mg/kg q12h   Goal Vancomycin trough: 15-20 mcg/mL   Goal Vancomycin AUC: 400-600     Assessment/Plan:  Will initiate Vancomycin with a one time loading dose of 1500mg X1  - currently on CRRT, will pulse dose until renal function is improved .  - Will check level at 12 hrs and dose accordingly .   Thank you for the consult.    Vancomycin Level, Random [[id number]]    Collected: 05/11/25 0310    Updated: 05/11/25 0350    Order Status: Completed    Specimen Type: Blood     Vancomycin Rm 11.1 ug/mL     Recent Labs     05/10/25  0456 05/10/25  1322 05/10/25  2025   CREATININE 1.5* 1.1 1.2       Estimated Creatinine Clearance: 26 mL/min (based on SCr of 1.2 mg/dL).  Give Vancomycin 1000 mg x1  Vanc random 5/12 0600  Stanley Heredia RPH 5/11/2025 4:00 AM    
Duplicate order.  Aware of the transition to comfort measures per notes; code status DNR-CC.  
Nephrology consult has been perfect served.   
Patient: Muna Simon  [id number]  Date: 5/15/2025      Chief Complaint: shortness of breath    History of Present Illness/Hospital Course:  Muna Simon is a 89 year old female with a past medical history significant for arthritis, atrial fibrillation, CAD, CHF, HTN, HLD, and panic attacks who presented to [facility] on 5/5/25 for planned aortobifemoral bypass graft due to atherosclerosis of the abdominal aorta with ischemic rest pain. Post operative course was notable for atrial fibrillation with RVR. Cardiology was consulted and on 5/8 she underwent cardioversion. Nephrology was consulted due to TEREZA on CKD. CRRT was initiated on 5/9. Hospital course also notable for acute hypoxic respiratory failure, mucous plugging, and pneumonia. She continues to have functional deficits below her baseline. Today Muna is seen with family present. She reports feeling short of breath. She also endorses panic attacks. She reports being independent at baseline. She lives alone in a two level house (main floor and basement) with 2 OTTO. Family plans to assist her on discharge.      has a past medical history of Arthritis, Atrial fibrillation (HCC), Back fracture, CAD (coronary artery disease), Cancer (HCC), CHF (congestive heart failure) (HCC), HTN (hypertension), Hyperlipidemia, Panic attack, and Wears dentures.     has a past surgical history that includes Hysterectomy; Hemorrhoid surgery; Colonoscopy (2015); eye surgery; and vein bypass graft,aortobifemoral (N/A, 5/5/2025).     reports that she has quit smoking. She has never used smokeless tobacco. She reports that she does not currently use alcohol. She reports that she does not use drugs.    family history is not on file.      REVIEW OF SYSTEMS:   CONSTITUTIONAL: negative for fevers, chills, diaphoresis, activity change, appetite change, fatigue, night sweats and unexpected weight change.   EYES: negative for blurred vision, eye discharge, visual disturbance and 
pain in the lower extremities secondary to critical bilateral iliac occlusion with severe aortoiliac disease.  Patient was admitted for aortobifemoral bypass surgery which was completed on May 5 successfully.    Postoperatively hospital course was complicated by A-fib with RVR, acute kidney injury, volume overload.  Patient started to have escalating oxygen needs over the last 48 hours ending up on nonrebreather mask this morning and sudden desaturation to the 70s on mouth care.  Pulmonary team consulted emergently.  Patient was in mild distress and was started on BiPAP with oxygen saturations improved to the 90s.  Chest x-ray showed bibasilar atelectasis with left upper lobe consolidation suspicious for mucous plugging.      The following portions of the patient's history were reviewed: past medical history, surgical history, family history, social history, current medications & allergies.     ROS:   Relevant systems reviewed and the only positive symptoms are mentioned in the history present illness.      Objective    Test Results:  Imaging:  I have reviewed radiology images personally.    XR CHEST PORTABLE  Result Date: 5/9/2025  Left upper lobe consolidation. Correlate for pneumonia. Electronically signed by MD Fer York    XR CHEST PORTABLE  Result Date: 5/8/2025  1. Increased bibasilar airspace opacities, likely atelectasis. Suspect small pleural effusions. Electronically signed by Leonel Rodriguez MD         Cardiology:      Labs reviewed:  CBC:   Recent Labs     05/07/25  0423 05/08/25  0512 05/09/25  0424 05/09/25  0756   WBC 11.7* 15.5* 11.1*  --    HGB 8.9* 9.7* 8.5* 9.2*    433 372  --        BMP:   Recent Labs     05/07/25  1015 05/08/25  0512 05/09/25  0424    132* 132*   K 4.8 4.4 5.1    99 99   CO2 23 25 24   PHOS 3.0  --   --    BUN 19 23* 31*   CREATININE 1.2 1.5* 2.1*       LIVER PROFILE: No results for input(s): \"AST\", \"ALT\", \"LIPASE\", \"AMYLASE\", \"BILIDIR\", \"BILITOT\", 
minutes was spent on today's patient encounter.    Zaynab Meraz DO, FHRS, FAC, FAHA  05/07/25  11:18 AM   [facility] [place]   [phone] [facility]   [phone] [facility]

## 2025-05-17 PROCEDURE — 2500000003 HC RX 250 WO HCPCS: Performed by: SURGERY

## 2025-05-17 PROCEDURE — 2500000003 HC RX 250 WO HCPCS: Performed by: STUDENT IN AN ORGANIZED HEALTH CARE EDUCATION/TRAINING PROGRAM

## 2025-05-17 PROCEDURE — 6370000000 HC RX 637 (ALT 250 FOR IP): Performed by: NURSE PRACTITIONER

## 2025-05-17 PROCEDURE — 2000000000 HC ICU R&B

## 2025-05-17 PROCEDURE — 2700000000 HC OXYGEN THERAPY PER DAY

## 2025-05-17 PROCEDURE — 94761 N-INVAS EAR/PLS OXIMETRY MLT: CPT

## 2025-05-17 PROCEDURE — 6360000002 HC RX W HCPCS: Performed by: NURSE PRACTITIONER

## 2025-05-17 PROCEDURE — 6360000002 HC RX W HCPCS: Performed by: INTERNAL MEDICINE

## 2025-05-17 PROCEDURE — APPSS30 APP SPLIT SHARED TIME 16-30 MINUTES: Performed by: NURSE PRACTITIONER

## 2025-05-17 PROCEDURE — 6360000002 HC RX W HCPCS: Performed by: STUDENT IN AN ORGANIZED HEALTH CARE EDUCATION/TRAINING PROGRAM

## 2025-05-17 PROCEDURE — 94640 AIRWAY INHALATION TREATMENT: CPT

## 2025-05-17 PROCEDURE — 6370000000 HC RX 637 (ALT 250 FOR IP): Performed by: CLINICAL NURSE SPECIALIST

## 2025-05-17 PROCEDURE — APPNB30 APP NON BILLABLE TIME 0-30 MINS: Performed by: NURSE PRACTITIONER

## 2025-05-17 PROCEDURE — 6370000000 HC RX 637 (ALT 250 FOR IP): Performed by: STUDENT IN AN ORGANIZED HEALTH CARE EDUCATION/TRAINING PROGRAM

## 2025-05-17 RX ORDER — POLYETHYLENE GLYCOL 3350 17 G/17G
17 POWDER, FOR SOLUTION ORAL DAILY
Status: DISCONTINUED | OUTPATIENT
Start: 2025-05-17 | End: 2025-05-19 | Stop reason: HOSPADM

## 2025-05-17 RX ADMIN — WATER 1000 MG: 1 INJECTION INTRAMUSCULAR; INTRAVENOUS; SUBCUTANEOUS at 14:19

## 2025-05-17 RX ADMIN — IPRATROPIUM BROMIDE AND ALBUTEROL SULFATE 1 DOSE: 2.5; .5 SOLUTION RESPIRATORY (INHALATION) at 16:08

## 2025-05-17 RX ADMIN — TAMSULOSIN HYDROCHLORIDE 0.4 MG: 0.4 CAPSULE ORAL at 09:24

## 2025-05-17 RX ADMIN — MORPHINE SULFATE 2 MG: 2 INJECTION, SOLUTION INTRAMUSCULAR; INTRAVENOUS at 17:50

## 2025-05-17 RX ADMIN — IPRATROPIUM BROMIDE AND ALBUTEROL SULFATE 1 DOSE: 2.5; .5 SOLUTION RESPIRATORY (INHALATION) at 12:00

## 2025-05-17 RX ADMIN — BUMETANIDE 3 MG: 0.25 INJECTION INTRAMUSCULAR; INTRAVENOUS at 17:27

## 2025-05-17 RX ADMIN — SODIUM CHLORIDE, PRESERVATIVE FREE 10 ML: 5 INJECTION INTRAVENOUS at 09:23

## 2025-05-17 RX ADMIN — IPRATROPIUM BROMIDE AND ALBUTEROL SULFATE 1 DOSE: 2.5; .5 SOLUTION RESPIRATORY (INHALATION) at 20:06

## 2025-05-17 RX ADMIN — ROSUVASTATIN CALCIUM 10 MG: 10 TABLET, FILM COATED ORAL at 20:16

## 2025-05-17 RX ADMIN — SENNOSIDES 8.6 MG: 8.6 TABLET, FILM COATED ORAL at 20:16

## 2025-05-17 RX ADMIN — APIXABAN 2.5 MG: 2.5 TABLET, FILM COATED ORAL at 09:23

## 2025-05-17 RX ADMIN — MORPHINE SULFATE 2 MG: 2 INJECTION, SOLUTION INTRAMUSCULAR; INTRAVENOUS at 09:16

## 2025-05-17 RX ADMIN — AMIODARONE HYDROCHLORIDE 400 MG: 200 TABLET ORAL at 17:26

## 2025-05-17 RX ADMIN — MORPHINE SULFATE 2 MG: 2 INJECTION, SOLUTION INTRAMUSCULAR; INTRAVENOUS at 00:17

## 2025-05-17 RX ADMIN — MORPHINE SULFATE 2 MG: 2 INJECTION, SOLUTION INTRAMUSCULAR; INTRAVENOUS at 14:39

## 2025-05-17 RX ADMIN — BUMETANIDE 3 MG: 0.25 INJECTION INTRAMUSCULAR; INTRAVENOUS at 09:16

## 2025-05-17 RX ADMIN — APIXABAN 2.5 MG: 2.5 TABLET, FILM COATED ORAL at 20:17

## 2025-05-17 ASSESSMENT — PAIN SCALES - GENERAL: PAINLEVEL_OUTOF10: 0

## 2025-05-18 ENCOUNTER — APPOINTMENT (OUTPATIENT)
Dept: GENERAL RADIOLOGY | Age: 89
DRG: 270 | End: 2025-05-18
Attending: SURGERY
Payer: MEDICARE

## 2025-05-18 LAB
ANION GAP SERPL CALCULATED.3IONS-SCNC: 13 MMOL/L (ref 3–16)
BUN SERPL-MCNC: 72 MG/DL (ref 7–20)
CALCIUM SERPL-MCNC: 8 MG/DL (ref 8.3–10.6)
CHLORIDE SERPL-SCNC: 96 MMOL/L (ref 99–110)
CO2 SERPL-SCNC: 26 MMOL/L (ref 21–32)
CREAT SERPL-MCNC: 4.1 MG/DL (ref 0.6–1.2)
DEPRECATED RDW RBC AUTO: 15.1 % (ref 12.4–15.4)
GFR SERPLBLD CREATININE-BSD FMLA CKD-EPI: 10 ML/MIN/{1.73_M2}
GLUCOSE SERPL-MCNC: 119 MG/DL (ref 70–99)
HCT VFR BLD AUTO: 24.5 % (ref 36–48)
HGB BLD-MCNC: 8.3 G/DL (ref 12–16)
MAGNESIUM SERPL-MCNC: 2.2 MG/DL (ref 1.8–2.4)
MCH RBC QN AUTO: 29.7 PG (ref 26–34)
MCHC RBC AUTO-ENTMCNC: 33.9 G/DL (ref 31–36)
MCV RBC AUTO: 87.9 FL (ref 80–100)
PLATELET # BLD AUTO: 286 K/UL (ref 135–450)
PMV BLD AUTO: 7.1 FL (ref 5–10.5)
POTASSIUM SERPL-SCNC: 3.3 MMOL/L (ref 3.5–5.1)
RBC # BLD AUTO: 2.79 M/UL (ref 4–5.2)
SODIUM SERPL-SCNC: 135 MMOL/L (ref 136–145)
WBC # BLD AUTO: 13.6 K/UL (ref 4–11)

## 2025-05-18 PROCEDURE — 94761 N-INVAS EAR/PLS OXIMETRY MLT: CPT

## 2025-05-18 PROCEDURE — 71045 X-RAY EXAM CHEST 1 VIEW: CPT

## 2025-05-18 PROCEDURE — 80048 BASIC METABOLIC PNL TOTAL CA: CPT

## 2025-05-18 PROCEDURE — APPSS30 APP SPLIT SHARED TIME 16-30 MINUTES: Performed by: NURSE PRACTITIONER

## 2025-05-18 PROCEDURE — 85027 COMPLETE CBC AUTOMATED: CPT

## 2025-05-18 PROCEDURE — 2700000000 HC OXYGEN THERAPY PER DAY

## 2025-05-18 PROCEDURE — 6360000002 HC RX W HCPCS: Performed by: INTERNAL MEDICINE

## 2025-05-18 PROCEDURE — 2500000003 HC RX 250 WO HCPCS: Performed by: SURGERY

## 2025-05-18 PROCEDURE — 83735 ASSAY OF MAGNESIUM: CPT

## 2025-05-18 PROCEDURE — APPNB30 APP NON BILLABLE TIME 0-30 MINS: Performed by: NURSE PRACTITIONER

## 2025-05-18 PROCEDURE — 6370000000 HC RX 637 (ALT 250 FOR IP): Performed by: NURSE PRACTITIONER

## 2025-05-18 PROCEDURE — 6360000002 HC RX W HCPCS: Performed by: NURSE PRACTITIONER

## 2025-05-18 PROCEDURE — 6360000002 HC RX W HCPCS: Performed by: STUDENT IN AN ORGANIZED HEALTH CARE EDUCATION/TRAINING PROGRAM

## 2025-05-18 PROCEDURE — 2000000000 HC ICU R&B

## 2025-05-18 PROCEDURE — 6370000000 HC RX 637 (ALT 250 FOR IP): Performed by: CLINICAL NURSE SPECIALIST

## 2025-05-18 PROCEDURE — 2500000003 HC RX 250 WO HCPCS: Performed by: STUDENT IN AN ORGANIZED HEALTH CARE EDUCATION/TRAINING PROGRAM

## 2025-05-18 RX ORDER — IPRATROPIUM BROMIDE AND ALBUTEROL SULFATE 2.5; .5 MG/3ML; MG/3ML
1 SOLUTION RESPIRATORY (INHALATION) EVERY 4 HOURS PRN
Status: DISCONTINUED | OUTPATIENT
Start: 2025-05-18 | End: 2025-05-19 | Stop reason: HOSPADM

## 2025-05-18 RX ADMIN — APIXABAN 2.5 MG: 2.5 TABLET, FILM COATED ORAL at 09:01

## 2025-05-18 RX ADMIN — SENNOSIDES 8.6 MG: 8.6 TABLET, FILM COATED ORAL at 20:50

## 2025-05-18 RX ADMIN — SODIUM CHLORIDE, PRESERVATIVE FREE 10 ML: 5 INJECTION INTRAVENOUS at 09:02

## 2025-05-18 RX ADMIN — TAMSULOSIN HYDROCHLORIDE 0.4 MG: 0.4 CAPSULE ORAL at 09:01

## 2025-05-18 RX ADMIN — POLYETHYLENE GLYCOL 3350 17 G: 17 POWDER, FOR SOLUTION ORAL at 09:02

## 2025-05-18 RX ADMIN — MORPHINE SULFATE 2 MG: 2 INJECTION, SOLUTION INTRAMUSCULAR; INTRAVENOUS at 20:47

## 2025-05-18 RX ADMIN — CARVEDILOL 3.12 MG: 3.12 TABLET, FILM COATED ORAL at 20:50

## 2025-05-18 RX ADMIN — BUMETANIDE 3 MG: 0.25 INJECTION INTRAMUSCULAR; INTRAVENOUS at 12:24

## 2025-05-18 RX ADMIN — MORPHINE SULFATE 2 MG: 2 INJECTION, SOLUTION INTRAMUSCULAR; INTRAVENOUS at 00:18

## 2025-05-18 RX ADMIN — CARVEDILOL 3.12 MG: 3.12 TABLET, FILM COATED ORAL at 09:01

## 2025-05-18 RX ADMIN — WATER 1000 MG: 1 INJECTION INTRAMUSCULAR; INTRAVENOUS; SUBCUTANEOUS at 15:50

## 2025-05-18 RX ADMIN — AMIODARONE HYDROCHLORIDE 400 MG: 200 TABLET ORAL at 06:36

## 2025-05-18 RX ADMIN — BUMETANIDE 3 MG: 0.25 INJECTION INTRAMUSCULAR; INTRAVENOUS at 18:04

## 2025-05-18 RX ADMIN — APIXABAN 2.5 MG: 2.5 TABLET, FILM COATED ORAL at 20:50

## 2025-05-18 RX ADMIN — SODIUM CHLORIDE, PRESERVATIVE FREE 10 ML: 5 INJECTION INTRAVENOUS at 20:48

## 2025-05-18 RX ADMIN — ROSUVASTATIN CALCIUM 10 MG: 10 TABLET, FILM COATED ORAL at 20:49

## 2025-05-18 RX ADMIN — LORAZEPAM 1 MG: 2 INJECTION INTRAMUSCULAR; INTRAVENOUS at 14:43

## 2025-05-18 ASSESSMENT — PAIN SCALES - GENERAL: PAINLEVEL_OUTOF10: 0

## 2025-05-19 VITALS
TEMPERATURE: 97.6 F | BODY MASS INDEX: 23.55 KG/M2 | OXYGEN SATURATION: 91 % | RESPIRATION RATE: 18 BRPM | HEIGHT: 63 IN | DIASTOLIC BLOOD PRESSURE: 58 MMHG | WEIGHT: 132.94 LBS | SYSTOLIC BLOOD PRESSURE: 118 MMHG | HEART RATE: 77 BPM

## 2025-05-19 PROCEDURE — 2500000003 HC RX 250 WO HCPCS: Performed by: SURGERY

## 2025-05-19 PROCEDURE — 99024 POSTOP FOLLOW-UP VISIT: CPT | Performed by: CLINICAL NURSE SPECIALIST

## 2025-05-19 PROCEDURE — 6360000002 HC RX W HCPCS: Performed by: NURSE PRACTITIONER

## 2025-05-19 PROCEDURE — 6370000000 HC RX 637 (ALT 250 FOR IP): Performed by: CLINICAL NURSE SPECIALIST

## 2025-05-19 RX ORDER — IPRATROPIUM BROMIDE AND ALBUTEROL SULFATE 2.5; .5 MG/3ML; MG/3ML
3 SOLUTION RESPIRATORY (INHALATION) EVERY 4 HOURS PRN
Qty: 360 ML | Refills: 0 | Status: SHIPPED | OUTPATIENT
Start: 2025-05-19

## 2025-05-19 RX ORDER — MORPHINE SULFATE 100 MG/5ML
10 SOLUTION ORAL
Qty: 18 ML | Refills: 0 | Status: SHIPPED | OUTPATIENT
Start: 2025-05-19 | End: 2025-05-22

## 2025-05-19 RX ORDER — LORAZEPAM 0.5 MG/1
TABLET ORAL
Qty: 30 TABLET | Refills: 0 | Status: SHIPPED | OUTPATIENT
Start: 2025-05-19 | End: 2025-06-18

## 2025-05-19 RX ORDER — AMIODARONE HYDROCHLORIDE 400 MG/1
400 TABLET ORAL 2 TIMES DAILY
Qty: 30 TABLET | Refills: 0 | Status: SHIPPED | OUTPATIENT
Start: 2025-05-19

## 2025-05-19 RX ORDER — HYOSCYAMINE SULFATE 0.12 MG/1
0.12 TABLET SUBLINGUAL EVERY 4 HOURS PRN
Qty: 90 TABLET | Refills: 3 | Status: SHIPPED | OUTPATIENT
Start: 2025-05-19

## 2025-05-19 RX ADMIN — TAMSULOSIN HYDROCHLORIDE 0.4 MG: 0.4 CAPSULE ORAL at 09:33

## 2025-05-19 RX ADMIN — LORAZEPAM 1 MG: 2 INJECTION INTRAMUSCULAR; INTRAVENOUS at 02:52

## 2025-05-19 RX ADMIN — SODIUM CHLORIDE, PRESERVATIVE FREE 10 ML: 5 INJECTION INTRAVENOUS at 12:13

## 2025-05-19 RX ADMIN — CARVEDILOL 3.12 MG: 3.12 TABLET, FILM COATED ORAL at 09:30

## 2025-05-19 RX ADMIN — APIXABAN 2.5 MG: 2.5 TABLET, FILM COATED ORAL at 09:31

## 2025-05-19 NOTE — DISCHARGE SUMMARY
[facility]     DISCHARGE SUMMARY    Patient ID: Muna Simon  MRN:  [id number]  YOB: 1929      Admission Date:  5/5/2025  5:17 AM  Discharge Date:  5/19/2025  4:07 PM     Principle Diagnosis:  Atherosclerosis of abdominal aorta    Secondary Diagnosis:  Principal Problem:    Atherosclerosis of abdominal aorta  Active Problems:    Chronic heart failure with preserved ejection fraction (HCC)    Paroxysmal atrial fibrillation (HCC)    Ischemic rest pain of lower extremity    PAD (peripheral artery disease)    Persistent atrial fibrillation with rapid ventricular response (HCC)    LAE (left atrial enlargement)    Nonrheumatic mitral valve regurgitation  Resolved Problems:    * No resolved hospital problems. *    Procedure:  Aortobifemoral bypass using a 12 x 6 mm bifurcated Dacron graft.       History:  The patient is a 89 y.o.   female who has a history of ischemic rest pain of the lower extremities. CT angiogram was performed showing dense calcific disease of the infrarenal aorta, bilateral iliac arteries, and bilateral femoral bifurcations. There are bilateral iliac artery occlusions. After a long discussion with the patient and family, she wished to undergo aortobifemoral bypass.     Hospital Course:  The patient underwent elective aortobifemoral bypass on May 5, 2025. Her operative course was uncomplicated. She was extubated in the OR prior to transferring to ICU for ongoing care. She was fluid resuscitated on the day of surgery. The NG tube and arterial line was removed on the first postop day. She was assisted to the chair using the skylift. She went into atrial fib with RVR on the 2nd postop day. An amiodarone protocol and heparin infusion was started. Pulmonary wise was quite vinh. She developed acute hypoxic respiratory failure with left upper lobe collapse consolidation which was mostly from mucus plugging. She underwent bronchoscopy and reintubation.

## 2025-05-19 NOTE — PROGRESS NOTES
Surgery Date and Time:  5/5/25 @ 07:00 am    Arrival Time:  05:30 am       The instructions given when a patient needs to stop oral intake prior to surgery varies. Follow the instructions you were     given by your Surgeon or RN during the Pre-op call.      __X__Do not eat or drink anything after Midnight the night before the surgery. NO gum, mints, candy or ice chips the day of surgery.       Only take the following medications with a small sip of water the morning of surgery:  carvedilol, aspirin 81 mg.        Aspirin, Ibuprofen, Advil, Naproxen, Vitamin E and other Anti-inflammatory products and supplements should be stopped       for 5-7days before surgery or as directed by your physician/surgeon. Continue aspirin.         Check with your Surgeon/PCP/Cardiologist regarding stopping your Blood Thinner & follow their instructions:        Medication:     eliquis             Last dose:  5/2/25 pm      - Do not smoke or vape, and do not drink any alcoholic beverages 24 hours prior to surgery, this includes NA Beer. Refrain from using     any recreational drugs, including non-prescribed prescription drugs.     -You may brush your teeth and gargle the morning of surgery.  DO NOT SWALLOW WATER.    -You MUST plan for a responsible adult to stay on site while you are here and take you home after your surgery. You will not be allowed                to leave alone or drive yourself home. It is requested someone stay with you the first 24 hrs. Your surgery will be cancelled if you do not                have a ride home with a responsible adult.    -A parent/legal guardian must accompany a child scheduled for surgery and plan to stay at the hospital until the child                is discharged. Please do not bring other children with you.    -Please wear simple, loose-fitting clothing to the hospital. Do not bring valuables (money, credit cards, checkbooks, etc.)                Do not wear any makeup (including no eye 
    [email or web address]  Nephrology Follow up Note           Reason for Consult: TEREZA on CKD 3  Requesting Physician:  Dr. Metz    Sub/interval history  Comfortable in the bed. On Nasal canula 7 liters.   Status post bronchoscopy 5/10, intubated overnight on 5/11.    ROS:   Blood pressure and heart rate has been stable since cardioversion on 5/8  BP's better, off pressors now.  Denies nausea, vomiting, chest pain, or headache.   Scheduled Meds:   polyethylene glycol  17 g Oral Daily    potassium bicarb-citric acid  20 mEq Oral Once    cefTRIAXone (ROCEPHIN) IV  1,000 mg IntraVENous Q24H    furosemide  40 mg IntraVENous BID    midazolam  2 mg IntraVENous Once    pantoprazole  40 mg IntraVENous Daily    sodium chloride (Inhalant)  4 mL Nebulization Q8H RT    amiodarone  400 mg Oral BID    [Held by provider] carvedilol  3.125 mg Oral BID    rosuvastatin  10 mg Oral Nightly    [Held by provider] tamsulosin  0.4 mg Oral Daily    sodium chloride flush  5-40 mL IntraVENous 2 times per day     Continuous Infusions:   norepinephrine      amiodarone 0.5 mg/min (05/14/25 0623)    heparin (PORCINE) Infusion 12 Units/kg/hr (05/14/25 0745)    sodium chloride Stopped (05/13/25 1013)    nitroGLYCERIN       PRN Meds:.norepinephrine, bisacodyl, ipratropium 0.5 mg-albuterol 2.5 mg, heparin (porcine) **AND** heparin (porcine), heparin (porcine), heparin (porcine), oxyCODONE **OR** oxyCODONE, sodium chloride flush, sodium chloride, hydrALAZINE, nitroGLYCERIN, promethazine **OR** ondansetron, morphine **OR** morphine    History of Present Illness on 5/8/2025:    89 y.o. yo female with PMH of atrial fibrillation, PAD, hyponatremia, chronic kidney disease stage III with baseline creatinine of 1-1.2 who is admitted after aortobifemoral bypass using Dacron graft on 5/5/2025.  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 
    [facility]     Electrophysiology                                     Progress Note    Admission date:  2025    Reason for follow up visit: af    HPI/CC: Muna Simon was admitted on 2025 with bilateral ischemic rest pain.  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  She underwent aortobifemoral bypass.  Postoperatively, she developed rapid atrial fibrillation and EP was consulted.  IV amiodarone was started. On 2025, she had a successful LUCIA guided cardioversion. Rhythm has been AF with RVR..     Subjective: She continues to have shortness of breath. Now on BiPap.     Vitals:  Blood pressure 110/65, pulse 77, temperature 98.2 °F (36.8 °C), temperature source Axillary, resp. rate 19, height 1.6 m (5' 3\"), weight 68.2 kg (150 lb 5.7 oz), SpO2 99%, not currently breastfeeding.  Temp  Av °F (36.7 °C)  Min: 97.8 °F (36.6 °C)  Max: 98.2 °F (36.8 °C)  Pulse  Av.7  Min: 77  Max: 114  BP  Min: 96/52  Max: 133/70  SpO2  Av %  Min: 78 %  Max: 100 %  FiO2   Av %  Min: 100 %  Max: 100 %    24 hour I/O    Intake/Output Summary (Last 24 hours) at 2025 1057  Last data filed at 2025 1010  Gross per 24 hour   Intake 2292.58 ml   Output 234 ml   Net 2058.58 ml     Current Facility-Administered Medications   Medication Dose Route Frequency Provider Last Rate Last Admin    acetylcysteine (MUCOMYST) 20 % solution 600 mg  600 mg Inhalation BID RT Hina Craft MD   600 mg at 25 0831    sodium chloride (Inhalant) 3 % nebulizer solution 4 mL  4 mL Nebulization Q8H RT Hina Craft MD   4 mL at 25 0831    ipratropium 0.5 mg-albuterol 2.5 mg (DUONEB) nebulizer solution 1 Dose  1 Dose Inhalation Q4H PRN Hina Craft MD   1 Dose at 25 0831    furosemide (LASIX) 500 mg in sodium chloride 0.9 % 100 mL infusion  10 mg/hr IntraVENous Continuous Naun Barr MD 2 mL/hr at 25 1010 25 
    [email or web address]  Nephrology Follow up Note           Reason for Consult: TEREZA on CKD 3  Requesting Physician:  Dr. Metz    Sub/interval history  CRRT initiated on 5/9, tolerating 100 mL/h net negative  Patient is off BiPAP and high flow oxygen  She feels better than yesterday  Status post bronchoscopy 5/10   blood pressure and heart rate has been stable since cardioversion on 5/8  Patient has some urine output    Fluid balance since admission: +10--> 8.3 l    ROS: + Shortness of breath  PSFH: +visitors    Scheduled Meds:   acetylcysteine  600 mg Inhalation BID RT    pantoprazole  40 mg IntraVENous Daily    vancomycin (VANCOCIN) intermittent dosing (placeholder)   Other RX Placeholder    cefepime  2,000 mg IntraVENous Q8H    sodium chloride (Inhalant)  4 mL Nebulization Q8H RT    amiodarone  400 mg Oral BID    carvedilol  3.125 mg Oral BID    rosuvastatin  10 mg Oral Nightly    tamsulosin  0.4 mg Oral Daily    sodium chloride flush  5-40 mL IntraVENous 2 times per day     Continuous Infusions:   amiodarone 0.5 mg/min (05/11/25 1200)    furosemide (LASIX) 500 mg in sodium chloride 0.9 % 100 mL infusion Stopped (05/10/25 0359)    prismaSATE BGK 4/2.5 500 mL/hr at 05/11/25 0500    prismaSATE BGK 4/2.5 500 mL/hr at 05/11/25 0500    prismaSATE BGK 4/2.5 500 mL/hr at 05/11/25 0500    heparin (PORCINE) Infusion 9 Units/kg/hr (05/11/25 1200)    sodium chloride 2 mL/hr at 05/11/25 1200    nitroGLYCERIN      norepinephrine       PRN Meds:.midazolam, ipratropium 0.5 mg-albuterol 2.5 mg, heparin (porcine) **AND** heparin (porcine), potassium chloride, magnesium sulfate, calcium gluconate **OR** calcium gluconate **OR** calcium gluconate **OR** calcium gluconate, sodium phosphate 6 mmol in sodium chloride 0.9 % 250 mL IVPB **OR** sodium phosphate 12 mmol in sodium chloride 0.9 % 250 mL IVPB **OR** sodium phosphate 18 mmol in sodium chloride 0.9 % 500 mL IVPB **OR** sodium phosphate 24 mmol in sodium chloride 0.9 % 500 mL IVPB, 
    [facility]     Electrophysiology                                     Progress Note    Admission date:  2025    Reason for follow up visit: Atrial fibrillation    HPI/CC: Muna Simon was admitted on 2025 with bilateral ischemic pain.  Workup revealed bilateral iliac occlusions with severe aortic iliac occlusive disease and common femoral disease.  She underwent aorto by femoral bypass.  Postoperatively she developed rapid atrial fibrillation and EP was consulted.  IV amiodarone was initiated.  2025 patient had successful LUCIA/DCCV 2025 patient underwent left thoracentesis with approximately 500 mL removed.  2025 patient O2 desaturation with mucous plugging.  She required intubation and bronchoscopy overnight.  Patient underwent another bronchoscopy due to mucous plug late during the day.  Palliative care consulted.  Family would like to take day by day for decision making based on patient's clinical status 2025 Patient extubated    Rhythm has been SR.     Subjective: Patient has been extubated.  She is awake and alert X 3 and conversing.  She has 2 of her children at bedside.  Discussed amiodarone drip and as long as patient is able to take her pills this morning as she has passed her swallow evaluation we will discontinue the amiodarone drip later today.  Patient and family verbalized understanding    Vitals:  Blood pressure (!) 120/57, pulse 92, temperature 99.1 °F (37.3 °C), temperature source Axillary, resp. rate 18, height 1.6 m (5' 2.99\"), weight 60.6 kg (133 lb 9.6 oz), SpO2 95%, not currently breastfeeding.  Temp  Av.4 °F (37.4 °C)  Min: 99.1 °F (37.3 °C)  Max: 99.6 °F (37.6 °C)  Pulse  Av.3  Min: 79  Max: 93  BP  Min: 96/61  Max: 137/55  SpO2  Av %  Min: 91 %  Max: 100 %  FiO2   Av %  Min: 100 %  Max: 100 %    24 hour I/O    Intake/Output Summary (Last 24 hours) at 2025 1018  Last data filed at 2025 0946  Gross per 24 hour   Intake 
    [email or web address]  Nephrology Follow up Note           Reason for Consult: TEREZA on CKD 3  Requesting Physician:  Dr. Metz    Sub/interval history  Patient is hypoxic this morning remains oliguric  On BiPAP  Chest x-ray shows whiteout on left upper lung  Blood pressure and heart rate has been stable since cardioversion on 5/8    Last 24 h uop 175 ml  Fluid balance since admission: +10 l    ROS: + Shortness of breath  PSFH: +visitors    Scheduled Meds:   acetylcysteine  600 mg Inhalation BID RT    sodium chloride (Inhalant)  4 mL Nebulization Q8H RT    pantoprazole  40 mg Oral QAM AC    apixaban  5 mg Oral BID    carvedilol  3.125 mg Oral BID    rosuvastatin  10 mg Oral Nightly    tamsulosin  0.4 mg Oral Daily    mupirocin   Topical BID    sodium chloride flush  5-40 mL IntraVENous 2 times per day     Continuous Infusions:   furosemide (LASIX) 500 mg in sodium chloride 0.9 % 100 mL infusion 10 mg/hr (05/09/25 0919)    amiodarone 0.5 mg/min (05/09/25 0813)    sodium chloride      nitroGLYCERIN      norepinephrine       PRN Meds:.ipratropium 0.5 mg-albuterol 2.5 mg, oxyCODONE **OR** oxyCODONE, sodium chloride flush, sodium chloride, hydrALAZINE, nitroGLYCERIN, promethazine **OR** ondansetron, norepinephrine, morphine **OR** morphine    History of Present Illness on 5/8/2025:    89 y.o. yo female with PMH of atrial fibrillation, PAD, hyponatremia, chronic kidney disease stage III with baseline creatinine of 1-1.2 who is admitted after aortobifemoral bypass using Dacron graft on 5/5/2025.  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  Since surgery, patient received IV Lasix on 5/6 and 5/7; she was also receiving LR at 75 mL/h which was stopped on 5/7  Patient received 500 mL normal saline on 5/8 and has been continued at 75 mL/h   For A-fib RVR, 
    [email or web address]  Nephrology Follow up Note           Reason for Consult: TEREZA on CKD 3  Requesting Physician:  Dr. Metz    Sub/interval history  Comfortable in the bed. On Nasal canula 7 liters.      ROS:   Blood pressure and heart rate has been stable since cardioversion on 5/8  BP's better, off pressors now.  Denies nausea, vomiting, chest pain, or headache.   Scheduled Meds:   senna  1 tablet Oral Nightly    pantoprazole  40 mg Oral QAM AC    ipratropium 0.5 mg-albuterol 2.5 mg  1 Dose Inhalation Q4H WA RT    sodium chloride (Inhalant)  4 mL Nebulization BID RT    apixaban  2.5 mg Oral BID    cefTRIAXone (ROCEPHIN) IV  1,000 mg IntraVENous Q24H    amiodarone  400 mg Oral BID    carvedilol  3.125 mg Oral BID    rosuvastatin  10 mg Oral Nightly    tamsulosin  0.4 mg Oral Daily    sodium chloride flush  5-40 mL IntraVENous 2 times per day     Continuous Infusions:   norepinephrine      sodium chloride Stopped (05/13/25 1013)    nitroGLYCERIN       PRN Meds:.polyethylene glycol, ALPRAZolam, norepinephrine, bisacodyl, heparin (porcine) **AND** heparin (porcine), oxyCODONE **OR** oxyCODONE, sodium chloride flush, sodium chloride, hydrALAZINE, nitroGLYCERIN, promethazine **OR** ondansetron, morphine **OR** morphine    History of Present Illness on 5/8/2025:    89 y.o. yo female with PMH of atrial fibrillation, PAD, hyponatremia, chronic kidney disease stage III with baseline creatinine of 1-1.2 who is admitted after aortobifemoral bypass using Dacron graft on 5/5/2025.  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  Since surgery, patient received IV Lasix on 5/6 and 5/7; she was also receiving LR at 75 mL/h which was stopped on 5/7  Patient received 500 mL normal saline on 5/8 and has been continued at 75 mL/h   For A-fib RVR, patient has been on 
    [email or web address]  Nephrology Follow up Note           Reason for Consult: TEREZA on CKD 3  Requesting Physician:  Dr. Metz    Sub/interval history  Comfortable in the bed. On Nasal canula 7 liters.      ROS:   Blood pressure and heart rate has been stable since cardioversion on 5/8  BP's better, off pressors now.  Denies nausea, vomiting, chest pain, or headache.   Scheduled Meds:   LORazepam  0.5 mg IntraVENous Once    acetylcysteine  600 mg Inhalation BID RT    senna  1 tablet Oral Nightly    pantoprazole  40 mg Oral QAM AC    ipratropium 0.5 mg-albuterol 2.5 mg  1 Dose Inhalation Q4H WA RT    sodium chloride (Inhalant)  4 mL Nebulization BID RT    apixaban  2.5 mg Oral BID    cefTRIAXone (ROCEPHIN) IV  1,000 mg IntraVENous Q24H    amiodarone  400 mg Oral BID    carvedilol  3.125 mg Oral BID    rosuvastatin  10 mg Oral Nightly    tamsulosin  0.4 mg Oral Daily    sodium chloride flush  5-40 mL IntraVENous 2 times per day     Continuous Infusions:   sodium chloride Stopped (05/13/25 1013)     PRN Meds:.HYDROmorphone **OR** HYDROmorphone, polyethylene glycol, bisacodyl, heparin (porcine) **AND** heparin (porcine), sodium chloride flush, sodium chloride, promethazine **OR** ondansetron    History of Present Illness on 5/8/2025:    89 y.o. yo female with PMH of atrial fibrillation, PAD, hyponatremia, chronic kidney disease stage III with baseline creatinine of 1-1.2 who is admitted after aortobifemoral bypass using Dacron graft on 5/5/2025.  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  Since surgery, patient received IV Lasix on 5/6 and 5/7; she was also receiving LR at 75 mL/h which was stopped on 5/7  Patient received 500 mL normal saline on 5/8 and has been continued at 75 mL/h   For A-fib RVR, patient has been on amiodarone drip since 5/7 with 
    [facility]  Nephrology Follow up Note           Reason for Consult: TEREZA on CKD 3  Requesting Physician:  Dr. Metz    Sub/interval history  CRRT initiated on 5/9, tolerating 100 mL/h net negative  On BiPAP  Planning to do bronchoscopy 5/10   blood pressure and heart rate has been stable since cardioversion on 5/8    Last 24 h uop 475 ml  Fluid balance since admission: +10--> 8.3 l    ROS: + Shortness of breath  PSFH: +visitors    Scheduled Meds:   acetylcysteine  600 mg Inhalation BID RT    sodium chloride (Inhalant)  4 mL Nebulization Q8H RT    amiodarone  400 mg Oral BID    pantoprazole  40 mg Oral QAM AC    carvedilol  3.125 mg Oral BID    rosuvastatin  10 mg Oral Nightly    tamsulosin  0.4 mg Oral Daily    mupirocin   Topical BID    sodium chloride flush  5-40 mL IntraVENous 2 times per day     Continuous Infusions:   furosemide (LASIX) 500 mg in sodium chloride 0.9 % 100 mL infusion Stopped (05/10/25 0359)    prismaSATE BGK 4/2.5 500 mL/hr at 05/10/25 1013    prismaSATE BGK 4/2.5 500 mL/hr at 05/10/25 1012    prismaSATE BGK 4/2.5 500 mL/hr at 05/10/25 1012    heparin (PORCINE) Infusion 5 Units/kg/hr (05/10/25 1100)    sodium chloride 5 mL/hr at 05/10/25 1100    nitroGLYCERIN      norepinephrine       PRN Meds:.midazolam, ipratropium 0.5 mg-albuterol 2.5 mg, heparin (porcine) **AND** heparin (porcine), potassium chloride, magnesium sulfate, calcium gluconate **OR** calcium gluconate **OR** calcium gluconate **OR** calcium gluconate, sodium phosphate 6 mmol in sodium chloride 0.9 % 250 mL IVPB **OR** sodium phosphate 12 mmol in sodium chloride 0.9 % 250 mL IVPB **OR** sodium phosphate 18 mmol in sodium chloride 0.9 % 500 mL IVPB **OR** sodium phosphate 24 mmol in sodium chloride 0.9 % 500 mL IVPB, heparin (porcine), heparin (porcine), oxyCODONE **OR** oxyCODONE, sodium chloride flush, sodium chloride, hydrALAZINE, nitroGLYCERIN, promethazine **OR** ondansetron, norepinephrine, morphine **OR** 
    [facility]     Electrophysiology                                     Progress Note    Admission date:  2025    Reason for follow up visit: af    HPI/CC: Muna Simon was admitted on 2025 with bilateral ischemic rest pain.  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  She underwent aortobifemoral bypass.  Postoperatively, she developed rapid atrial fibrillation and EP was consulted.  IV amiodarone was started.  Rhythm has been AF with RVR..     Subjective: She complains of shortness of breath, incisional pain and some anxiety over procedure.     Vitals:  Blood pressure 94/68, pulse (!) 131, temperature 98.2 °F (36.8 °C), temperature source Oral, resp. rate 18, height 1.6 m (5' 3\"), weight 60.1 kg (132 lb 7.9 oz), SpO2 96%, not currently breastfeeding.  Temp  Av °F (36.7 °C)  Min: 97.6 °F (36.4 °C)  Max: 98.4 °F (36.9 °C)  Pulse  Av  Min: 80  Max: 139  BP  Min: 86/67  Max: 124/104  SpO2  Av.4 %  Min: 87 %  Max: 100 %    24 hour I/O    Intake/Output Summary (Last 24 hours) at 2025 1125  Last data filed at 2025 0704  Gross per 24 hour   Intake 2370.71 ml   Output 227 ml   Net 2143.71 ml     Current Facility-Administered Medications   Medication Dose Route Frequency Provider Last Rate Last Admin    0.9 % sodium chloride infusion   IntraVENous Continuous Kobe Metz MD 75 mL/hr at 25 0908 New Bag at 25 0908    pantoprazole (PROTONIX) tablet 40 mg  40 mg Oral QAM AC Kobe Metz MD   40 mg at 25 0656    apixaban (ELIQUIS) tablet 5 mg  5 mg Oral BID Lyn De Luna APRN - CNP        oxyCODONE (ROXICODONE) immediate release tablet 5 mg  5 mg Oral Q4H PRN Porsha Holguin APRN - CNP   5 mg at 25 1742    Or    oxyCODONE (ROXICODONE) immediate release tablet 10 mg  10 mg Oral Q4H PRN Porsha Holguin, KRYSTLE - CNP   10 mg at 25 0901    carvedilol (COREG) tablet 3.125 mg  3.125 mg Oral 
    Noted plans for hospice.  Will not actively follow   
    [email or web address]  Nephrology Follow up Note           Reason for Consult: TEREZA on CKD 3  Requesting Physician:  Dr. Metz    Sub/interval history  CRRT initiated on 5/9. Seen on CRRT. Orders confirmed.  Status post bronchoscopy 5/10, intubated overnight on 5/11.  Now plans for extubation today.    ROS:   Blood pressure and heart rate has been stable since cardioversion on 5/8  BP's better, off pressors now.    Scheduled Meds:   cefTRIAXone (ROCEPHIN) IV  1,000 mg IntraVENous Q24H    midazolam  2 mg IntraVENous Once    pantoprazole  40 mg IntraVENous Daily    sodium chloride (Inhalant)  4 mL Nebulization Q8H RT    amiodarone  400 mg Oral BID    [Held by provider] carvedilol  3.125 mg Oral BID    rosuvastatin  10 mg Oral Nightly    [Held by provider] tamsulosin  0.4 mg Oral Daily    sodium chloride flush  5-40 mL IntraVENous 2 times per day     Continuous Infusions:   fentaNYL      propofol 20 mcg/kg/min (05/13/25 0800)    norepinephrine 2 mcg/min (05/13/25 0800)    amiodarone 0.5 mg/min (05/13/25 0800)    prismaSATE BGK 4/2.5 500 mL/hr at 05/12/25 2353    prismaSATE BGK 4/2.5 500 mL/hr at 05/12/25 2354    prismaSATE BGK 4/2.5 500 mL/hr at 05/12/25 2354    heparin (PORCINE) Infusion Stopped (05/13/25 0737)    sodium chloride Stopped (05/12/25 1345)    nitroGLYCERIN       PRN Meds:.ipratropium 0.5 mg-albuterol 2.5 mg, heparin (porcine) **AND** heparin (porcine), potassium chloride, magnesium sulfate, calcium gluconate **OR** calcium gluconate **OR** calcium gluconate **OR** calcium gluconate, sodium phosphate 6 mmol in sodium chloride 0.9 % 250 mL IVPB **OR** sodium phosphate 12 mmol in sodium chloride 0.9 % 250 mL IVPB **OR** sodium phosphate 18 mmol in sodium chloride 0.9 % 500 mL IVPB **OR** sodium phosphate 24 mmol in sodium chloride 0.9 % 500 mL IVPB, heparin (porcine), heparin (porcine), oxyCODONE **OR** oxyCODONE, sodium chloride flush, sodium chloride, hydrALAZINE, nitroGLYCERIN, promethazine **OR** ondansetron, 
   05/09/25 0807   NIV Type   $NIV $Daily Charge   NIV Started/Stopped On   Equipment Type v60   Mode Bilevel   Mask Type Full face mask   Mask Size Medium   Assessment   Pulse 81   Heart Rate Source Monitor   Respirations 27   SpO2 96 %   Level of Consciousness 0   Comfort Level Good   Using Accessory Muscles Yes   Mask Compliance Good   Skin Assessment Clean, dry, & intact   Breath Sounds   Breath Sounds Bilateral Diminished   Settings/Measurements   PIP Observed 23 cm H20   IPAP 20 cmH20   CPAP/EPAP 10 cmH2O   Vt (Measured) 477 mL   Rate Ordered 16   Insp Rise Time (%) 1 %   FiO2  100 %   I Time/ I Time % 1 s   Minute Volume (L/min) 11.1 Liters   Mask Leak (lpm) 18 lpm   Patient's Home Machine No   Alarm Settings   Alarms On Y   Low Pressure (cmH2O) 6 cmH2O   High Pressure (cmH2O) 30 cmH2O   Delay Alarm 20 sec(s)   Apnea (secs) 20 secs   RR Low (bpm) 6   RR High (bpm) 40 br/min   Oxygen Therapy/Pulse Ox   $Oxygen $Daily Charge   O2 Device PAP (positive airway pressure)   $Pulse Oximeter $Spot check (multiple/continuous)     Upon initial assessment, pt was on 15LHF with spo2 of 72%. Replaced NRB but pt sats did not improve. Placed on vapotherm 40L/ 100% but sats did not come above 84%. ABG drawn by SHARON Shultz, placed patient on BiPAP emergently. Dr. Craft to bedside and ordered Volera/ breathing tx. Pt spo2 97% on bipap currently, will wean as tolerated.   
   05/09/25 1550   NIV Type   NIV Started/Stopped (S)  On   Equipment Type v60   Mode Bilevel   Mask Type Full face mask   Mask Size Medium   Assessment   Pulse 86   Heart Rate Source Monitor   Respirations (!) 32   BP 98/79   SpO2 91 %   Level of Consciousness 0   Comfort Level Good   Using Accessory Muscles Yes   Breath Sounds   Breath Sounds Bilateral Diminished   Settings/Measurements   PIP Observed 20 cm H20   IPAP 20 cmH20   CPAP/EPAP 10 cmH2O   Vt (Measured) 520 mL   Rate Ordered 16   Insp Rise Time (%) 1 %   FiO2  100 %   I Time/ I Time % 1 s   Minute Volume (L/min) 14.7 Liters   Mask Leak (lpm) 8 lpm   Alarm Settings   Alarms On Y   Oxygen Therapy/Pulse Ox   O2 Device (S)  PAP (positive airway pressure)       
   05/09/25 1749   Oxygen Therapy/Pulse Ox   O2 Therapy Oxygen humidified   O2 Device (S)  Heated high flow cannula   O2 Flow Rate (L/min) 40 L/min   FiO2  100 %   Pulse 83   Respirations 24   SpO2 98 %   Humidification Source Heated wire   Humidification Temp 33   Circuit Condensation Drained       
   05/09/25 2331   Oxygen Therapy/Pulse Ox   O2 Therapy Oxygen humidified   O2 Device Heated high flow cannula   O2 Flow Rate (L/min) 40 L/min   FiO2  100 %   Pulse 76   Respirations 16   SpO2 97 %   Humidification Source Heated wire   Humidification Temp Measured 33   Pulse Oximeter Device Mode Continuous   Blood Gas  Performed? No       
   05/10/25 0318   NIV Type   NIV Started/Stopped On   Equipment Type v60   Mode Bilevel   Mask Type Full face mask   Mask Size Medium   Assessment   Pulse 71   Respirations (!) 34   SpO2 (!) 87 %   Comfort Level Poor   Using Accessory Muscles No   Mask Compliance Fair   Skin Protection for O2 Device Yes   Location Nose   Intervention(s) Skin Barrier   Settings/Measurements   PIP Observed 7 cm H20  (ramp applied for comfort)   IPAP 16 cmH20   CPAP/EPAP 5 cmH2O   Vt (Measured) 385 mL   Rate Ordered 14   Insp Rise Time (%) 4 %   FiO2  100 %   I Time/ I Time % 1.1 s   Minute Volume (L/min) 13.4 Liters   Mask Leak (lpm) 14 lpm   Patient's Home Machine No   Alarm Settings   Alarms On Y   Low Pressure (cmH2O) 6 cmH2O   High Pressure (cmH2O) 30 cmH2O   Delay Alarm 20 sec(s)   RR Low (bpm) 6   RR High (bpm) 45 br/min       
   05/10/25 1635   NIV Type   NIV Started/Stopped On   Mode Bilevel   Mask Type Full face mask   Mask Size Medium   Assessment   Pulse 79   Respirations 25   /74   SpO2 98 %   Intervention(s) Skin Barrier   Breath Sounds   Breath Sounds Bilateral Diminished   Settings/Measurements   PIP Observed 18 cm H20   IPAP 18 cmH20   CPAP/EPAP 8 cmH2O   Vt (Measured) 500 mL   Rate Ordered 16   FiO2  100 %   I Time/ I Time % 1 s   Minute Volume (L/min) 14 Liters   Mask Leak (lpm) 8 lpm   Alarm Settings   Alarms On Y   Low Pressure (cmH2O) 6 cmH2O   High Pressure (cmH2O) 30 cmH2O   Apnea (secs) 20 secs   RR Low (bpm) 6   RR High (bpm) 40 br/min       
   05/10/25 2117   NIV Type   NIV Started/Stopped On   Mode Bilevel   Mask Type Full face mask   Mask Size Medium   Assessment   Pulse 81   Respirations 23   SpO2 100 %   Intervention(s) Skin Barrier   Breath Sounds   Breath Sounds Bilateral Diminished   Settings/Measurements   PIP Observed 18 cm H20   IPAP 18 cmH20   CPAP/EPAP 8 cmH2O   Vt (Measured) 560 mL   Rate Ordered 16   FiO2  (S)  90 %   I Time/ I Time % 1 s   Minute Volume (L/min) 11 Liters   Mask Leak (lpm) 16 lpm   Alarm Settings   Alarms On Y   Low Pressure (cmH2O) 6 cmH2O   High Pressure (cmH2O) 30 cmH2O   Apnea (secs) 20 secs   RR Low (bpm) 6   RR High (bpm) 40 br/min       
   05/11/25 0010   NIV Type   Equipment Type v60   Mode Bilevel   Mask Type Full face mask   Mask Size Medium   Assessment   Pulse 92   Respirations 19   SpO2 100 %   Comfort Level Good   Using Accessory Muscles No   Mask Compliance Good   Skin Protection for O2 Device Yes   Orientation Middle   Location Nose   Settings/Measurements   IPAP 18 cmH20   CPAP/EPAP 8 cmH2O   Vt (Measured) 732 mL   Rate Ordered 16   FiO2  (S)  75 %   Minute Volume (L/min) 14.1 Liters   Mask Leak (lpm) 19 lpm   Alarm Settings   Alarms On Y   Low Pressure (cmH2O) 6 cmH2O   High Pressure (cmH2O) 30 cmH2O   Delay Alarm 20 sec(s)   RR Low (bpm) 6   RR High (bpm) 45 br/min       
   05/11/25 0411   NIV Type   Equipment Type v60   Mode Bilevel   Mask Type Full face mask   Mask Size Medium   Assessment   Pulse 78   Respirations 17   SpO2 95 %   Comfort Level Good   Using Accessory Muscles No   Mask Compliance Good   Skin Protection for O2 Device Yes   Orientation Middle   Location Nose   Settings/Measurements   IPAP 18 cmH20   CPAP/EPAP 8 cmH2O   Vt (Measured) 501 mL   Rate Ordered 16   FiO2  75 %   Minute Volume (L/min) 8.3 Liters   Mask Leak (lpm) 14 lpm   Alarm Settings   Alarms On Y   Low Pressure (cmH2O) 6 cmH2O   High Pressure (cmH2O) 30 cmH2O   Delay Alarm 20 sec(s)   RR Low (bpm) 6   RR High (bpm) 45 br/min       
   05/11/25 0718   NIV Type   NIV Started/Stopped On   Equipment Type V60   Mode Bilevel   Mask Type Full face mask   Mask Size Medium   Assessment   Pulse 78   Respirations 15   SpO2 100 %   Comfort Level Good   Using Accessory Muscles No   Mask Compliance Good   Skin Assessment Clean, dry, & intact   Skin Protection for O2 Device Yes   Orientation Middle   Location Nose   Intervention(s) Skin Barrier   Settings/Measurements   PIP Observed 16 cm H20   IPAP 18 cmH20   CPAP/EPAP 8 cmH2O   Vt (Measured) 600 mL   Rate Ordered 16   FiO2  75 %   Minute Volume (L/min) 8.8 Liters   Mask Leak (lpm) 18 lpm   Patient's Home Machine No   Alarm Settings   Alarms On Y   Low Pressure (cmH2O) 6 cmH2O   High Pressure (cmH2O) 30 cmH2O   Apnea (secs) 20 secs   RR Low (bpm) 6   RR High (bpm) 45 br/min   Oxygen Therapy/Pulse Ox   O2 Therapy Oxygen   $Oxygen $Daily Charge   O2 Device PAP (positive airway pressure)   $Pulse Oximeter $Spot check (multiple/continuous)       
   05/11/25 1938   NIV Type   NIV Started/Stopped On   Equipment Type v60   Mode Bilevel   Mask Type Full face mask   Mask Size Medium   Settings/Measurements   PIP Observed 17 cm H20   IPAP 18 cmH20   CPAP/EPAP 8 cmH2O   Vt (Measured) 301 mL   Rate Ordered 16   Insp Rise Time (%) 2 %   FiO2  100 %   I Time/ I Time % 1 s   Minute Volume (L/min) 11.5 Liters   Mask Leak (lpm) 3 lpm   Patient's Home Machine No   Alarm Settings   Alarms On Y   Low Pressure (cmH2O) 6 cmH2O   High Pressure (cmH2O) 30 cmH2O   Delay Alarm 20 sec(s)   RR Low (bpm) 6   RR High (bpm) 50 br/min       
   05/11/25 2054   Vent Information   Vent Mode AC/VC   $Ventilation $Initial Day   Ventilator Settings   Vt (Set, mL) 400 mL   Resp Rate (Set) 12 bpm   PEEP/CPAP (cmH2O) 8   FiO2  100 %   Vent Patient Data (Readings)   Vt (Measured) 403 mL   Peak Inspiratory Pressure (cmH2O) 37 cmH2O   Rate Measured 12 br/min   Minute Volume (L/min) 4.8 Liters   Peak Inspiratory Flow (lpm) 55 L/sec   Mean Airway Pressure (cmH2O) 12 cmH20   I:E Ratio 1:5.3   Flow Sensitivity 3 L/min   Backup Apnea On   Backup Rate 12 Breaths Per Minute   Backup Vt 400   Vent Alarm Settings   High Pressure (cmH2O) 50 cmH2O   Low Minute Volume (lpm) 2 L/min   High Minute Volume (lpm) 20 L/min   Low Exhaled Vt (ml) 200 mL   High Exhaled Vt (ml) 1000 mL   RR High (bpm) 40 br/min   Apnea (secs) 20 secs   Additional Respiratoray Assessments   Humidification Source HME   Ambu Bag With Mask At Bedside Yes   Airway Clearance   Suction Device Inline suction catheter   Sputum Method Obtained Endotracheal   Sputum Amount Other (comment)  (none)       
   05/11/25 2203   Cough/Sputum   Sputum How Obtained RT bronchoscopy   Sputum Amount Moderate   Sputum Color Tan   Tenacity Thick   Scope ID [id number]   Start time 2155   Stop time 2200   Time out performed Yes   Reason for procedure BAL   Ordering provider Dr Craft     Distilled 150 ml saline, 8 ml Mucomyst. 30 ml returned.   
   05/12/25 0024   Patient Observation   Pulse 65   Respirations 27   SpO2 100 %   Vent Information   Vent Mode AC/VC   Ventilator Settings   Vt (Set, mL) 400 mL   Resp Rate (Set) 20 bpm  (settings adjusted by Dr Craft at bedside)   PEEP/CPAP (cmH2O) 10  (settings adjusted by Dr Craft at bedside)   FiO2  85 %   Vent Patient Data (Readings)   Vt (Measured) 427 mL   Peak Inspiratory Pressure (cmH2O) 30 cmH2O   Rate Measured 20 br/min   Minute Volume (L/min) 8.5 Liters   Peak Inspiratory Flow (lpm) 55 L/sec   Mean Airway Pressure (cmH2O) 15 cmH20   I:E Ratio 1:2.8   Flow Sensitivity 3 L/min   Backup Apnea On   Backup Rate 20 Breaths Per Minute   Backup Vt 400   Vent Alarm Settings   High Pressure (cmH2O) 40 cmH2O   Low Minute Volume (lpm) 2 L/min   High Minute Volume (lpm) 20 L/min   Low Exhaled Vt (ml) 200 mL   High Exhaled Vt (ml) 1000 mL   RR High (bpm) 40 br/min   Apnea (secs) 20 secs   Additional Respiratoray Assessments   Humidification Source HME   Ambu Bag With Mask At Bedside Yes   ETT    Placement Date/Time: 05/11/25 2045   Present on Admission/Arrival: No  Placed By: RT  Placement Verified By: Capnometry;Direct visualization  Preoxygenation: Yes  Mask Ventilation: (c)   Technique: Video laryngoscopy  Airway Type: Cuffed  Airway Tube ...   Secured At 22 cm   Measured From Lips   ETT Placement Left   Secured By Commercial tube garcia   Site Assessment Dry   Cuff Pressure 30 cm H2O       
   05/12/25 0332   Patient Observation   Respirations 21   Vent Information   Vent Mode AC/VC   Ventilator Settings   Vt (Set, mL) 400 mL   Resp Rate (Set) 20 bpm   PEEP/CPAP (cmH2O) 10   FiO2  65 %   Vent Patient Data (Readings)   Vt (Measured) 420 mL   Peak Inspiratory Pressure (cmH2O) 27 cmH2O   Rate Measured 20 br/min   Minute Volume (L/min) 8.5 Liters   Peak Inspiratory Flow (lpm) 55 L/sec   Mean Airway Pressure (cmH2O) 15 cmH20   I:E Ratio 1:2.8   Flow Sensitivity 3 L/min   Backup Apnea On   Backup Rate 20 Breaths Per Minute   Backup Vt 400   Vent Alarm Settings   High Pressure (cmH2O) 40 cmH2O   Low Minute Volume (lpm) 2 L/min   High Minute Volume (lpm) 20 L/min   Low Exhaled Vt (ml) 200 mL   High Exhaled Vt (ml) 1000 mL   RR High (bpm) 40 br/min   Apnea (secs) 20 secs   Additional Respiratoray Assessments   Humidification Source HME   Ambu Bag With Mask At Bedside Yes   Airway Clearance   Suction Device Inline suction catheter   Sputum Method Obtained Endotracheal   Sputum Amount Small   Sputum Color/Odor White   Sputum Consistency Thick   ETT    Placement Date/Time: 05/11/25 2045   Present on Admission/Arrival: No  Placed By: RT  Placement Verified By: Capnometry;Direct visualization  Preoxygenation: Yes  Mask Ventilation: (c)   Technique: Video laryngoscopy  Airway Type: Cuffed  Airway Tube ...   Secured At 22 cm   Measured From Lips   ETT Placement Right   Secured By Commercial tube garcia   Site Assessment Dry   Cuff Pressure 30 cm H2O       
   05/12/25 0603   Encounter Summary   Encounter Overview/Reason Rituals, Rites and Sacraments   Service Provided For Patient   Referral/Consult From Clergy/   Last Encounter  05/12/25  ( was here last night to give the Sacrament of the Sick.)       
   05/12/25 0751   Patient Observation   Pulse 71   Respirations 20   SpO2 99 %   Breath Sounds   Breath Sounds Bilateral Rhonchi   Vent Information   Equipment Changed HME   Vent Mode AC/VC   Ventilator Settings   Vt (Set, mL) 400 mL   Resp Rate (Set) 20 bpm   PEEP/CPAP (cmH2O) 10   FiO2  40 %   Vent Patient Data (Readings)   Vt (Measured) 448 mL   Peak Inspiratory Pressure (cmH2O) 26 cmH2O   Rate Measured 20 br/min   Minute Volume (L/min) 8.6 Liters   Mean Airway Pressure (cmH2O) 11 cmH20   I:E Ratio 1:2.8   Flow Sensitivity 3 L/min   Backup Apnea On   Vent Alarm Settings   Low Minute Volume (lpm) 2 L/min   High Minute Volume (lpm) 20 L/min   Low Exhaled Vt (ml) 200 mL   High Exhaled Vt (ml) 1000 mL   RR High (bpm) 40 br/min   Apnea (secs) 20 secs   Additional Respiratoray Assessments   Humidification Source HME   Circuit Condensation Drained   Ambu Bag With Mask At Bedside Yes   Airway Clearance   Suction Oral;ET Tube   Subglottic Suction Done Yes   Suction Device Inline suction catheter   Sputum Method Obtained Endotracheal   Sputum Amount Moderate   Sputum Color/Odor Tan   Sputum Consistency Thick   ETT    Placement Date/Time: 05/11/25 2045   Present on Admission/Arrival: No  Placed By: RT  Placement Verified By: Capnometry;Direct visualization  Preoxygenation: Yes  Mask Ventilation: (c)   Technique: Video laryngoscopy  Airway Type: Cuffed  Airway Tube ...   Secured At 22 cm   Measured From Lips   ETT Placement (S)  Center   Secured By Commercial tube garcia   Site Assessment Dry   Treatment   $Treatment Type $Inhaled Therapy/Meds       
   05/12/25 1015   Cough/Sputum   Sputum How Obtained RT bronchoscopy   Sputum Color Cloudy;Clear   Tenacity Thick   Scope ID [id number]   Start time 1015   Stop time 1020   Time out performed Yes   Reason for procedure Wash   Ordering provider Gabby       
   05/12/25 1126   Patient Observation   Pulse 74   Respirations 24   SpO2 100 %   Breath Sounds   Breath Sounds Bilateral Diminished   Vent Information   Equipment Changed HME   Vent Mode AC/VC   Ventilator Settings   Vt (Set, mL) 400 mL   Resp Rate (Set) 20 bpm   PEEP/CPAP (cmH2O) 8   FiO2  40 %   Vent Patient Data (Readings)   Vt (Measured) 414 mL   Peak Inspiratory Pressure (cmH2O) 19 cmH2O   Rate Measured 23 br/min   Minute Volume (L/min) 9.44 Liters   Peak Inspiratory Flow (lpm) 55 L/sec   Mean Airway Pressure (cmH2O) 11 cmH20   I:E Ratio 1:2.8   Flow Sensitivity 3 L/min   Backup Apnea On   Vent Alarm Settings   Low Minute Volume (lpm) 2 L/min   High Minute Volume (lpm) 20 L/min   Low Exhaled Vt (ml) 200 mL   High Exhaled Vt (ml) 1000 mL   RR High (bpm) 40 br/min   Apnea (secs) 20 secs   Additional Respiratoray Assessments   Humidification Source HME   Circuit Condensation Drained   Ambu Bag With Mask At Bedside Yes   Airway Clearance   Suction Oral   Suction Device Miller   Sputum Method Obtained Oral   Sputum Amount Moderate   Sputum Color/Odor Clear   Sputum Consistency Thick   ETT    Placement Date/Time: 05/11/25 2045   Present on Admission/Arrival: No  Placed By: RT  Placement Verified By: Capnometry;Direct visualization  Preoxygenation: Yes  Mask Ventilation: (c)   Technique: Video laryngoscopy  Airway Type: Cuffed  Airway Tube ...   Secured At 22 cm   Measured From Lips   ETT Placement (S)  Right   Secured By Commercial tube garcia   Site Assessment Dry       
   05/12/25 1559   Patient Observation   Pulse 69   Respirations 20   SpO2 100 %   Breath Sounds   Right Upper Lobe Diminished   Right Middle Lobe Diminished   Right Lower Lobe Diminished   Left Upper Lobe Diminished   Left Lower Lobe Diminished   Vent Information   Vent Mode AC/VC   Ventilator Settings   Vt (Set, mL) 400 mL   Resp Rate (Set) 20 bpm   PEEP/CPAP (cmH2O) 5   FiO2  40 %   Vent Patient Data (Readings)   Vt (Measured) 417 mL   Peak Inspiratory Pressure (cmH2O) 20 cmH2O   Rate Measured 20 br/min   Minute Volume (L/min) 8.5 Liters   Mean Airway Pressure (cmH2O) 9.1 cmH20   I:E Ratio 1:2.8   Backup Apnea On   Vent Alarm Settings   Low Pressure (cmH2O) 6 cmH2O   High Pressure (cmH2O) 40 cmH2O   Low Minute Volume (lpm) 2 L/min   High Minute Volume (lpm) 20 L/min   Low Exhaled Vt (ml) 200 mL   High Exhaled Vt (ml) 1000 mL   RR High (bpm) 40 br/min   Apnea (secs) 20 secs   Additional Respiratoray Assessments   Humidification Source HME   Circuit Condensation Drained   Ambu Bag With Mask At Bedside Yes   Airway Clearance   Suction ET Tube   Suction Device Inline suction catheter   Sputum Method Obtained Endotracheal   Sputum Amount Small   Sputum Color/Odor Bloody;Tan   Sputum Consistency Thick   ETT    Placement Date/Time: 05/11/25 2045   Present on Admission/Arrival: No  Placed By: RT  Placement Verified By: Capnometry;Direct visualization  Preoxygenation: Yes  Mask Ventilation: (c)   Technique: Video laryngoscopy  Airway Type: Cuffed  Airway Tube ...   Secured At 22 cm   Measured From Lips   ETT Placement Left   Secured By Commercial tube garcia   Site Assessment Dry   Cuff Pressure 30 cm H2O       
   05/12/25 2035   Patient Observation   Pulse 66   Respirations 20   SpO2 100 %   Vent Information   Vent Mode AC/VC   Ventilator Settings   Vt (Set, mL) 400 mL   Resp Rate (Set) 20 bpm   PEEP/CPAP (cmH2O) 5   FiO2  30 %   Vent Patient Data (Readings)   Vt (Measured) 426 mL   Peak Inspiratory Pressure (cmH2O) 22 cmH2O   Rate Measured 20 br/min   Minute Volume (L/min) 8.54 Liters   Peak Inspiratory Flow (lpm) 55 L/sec   Mean Airway Pressure (cmH2O) 9.8 cmH20   I:E Ratio 1:2.8   Backup Apnea On   Backup Rate 20 Breaths Per Minute   Backup Vt 400   Vent Alarm Settings   High Pressure (cmH2O) 40 cmH2O   Low Minute Volume (lpm) 2 L/min   High Minute Volume (lpm) 20 L/min   Low Exhaled Vt (ml) 200 mL   High Exhaled Vt (ml) 1000 mL   RR High (bpm) 40 br/min   Apnea (secs) 20 secs   Additional Respiratoray Assessments   Humidification Source HME   Circuit Condensation Drained   Ambu Bag With Mask At Bedside Yes   ETT    Placement Date/Time: 05/11/25 2045   Present on Admission/Arrival: No  Placed By: RT  Placement Verified By: Capnometry;Direct visualization  Preoxygenation: Yes  Mask Ventilation: (c)   Technique: Video laryngoscopy  Airway Type: Cuffed  Airway Tube ...   Secured At 22 cm   Measured From Lips   ETT Placement Right   Secured By Commercial tube keita   Site Assessment Dry   Cuff Pressure 30 cm H2O   Tie/Keita Changed No       
   05/13/25 0013   Patient Observation   Pulse 77   SpO2 100 %   Vent Information   Vent Mode AC/VC   $Ventilation $Subsequent Day   Ventilator Settings   Vt (Set, mL) 400 mL   Resp Rate (Set) 20 bpm   PEEP/CPAP (cmH2O) 5   FiO2  30 %   Vent Patient Data (Readings)   Vt (Measured) 424 mL   Peak Inspiratory Pressure (cmH2O) 20 cmH2O   Rate Measured 20 br/min   Minute Volume (L/min) 6.67 Liters   Peak Inspiratory Flow (lpm) 55 L/sec   Mean Airway Pressure (cmH2O) 9.5 cmH20   I:E Ratio 1:2.4   Flow Sensitivity 3 L/min   Backup Apnea On   Backup Rate 20 Breaths Per Minute   Backup Vt 400   Vent Alarm Settings   Low Pressure (cmH2O) 6 cmH2O   High Pressure (cmH2O) 40 cmH2O   Low Minute Volume (lpm) 2 L/min   High Minute Volume (lpm) 20 L/min   Low Exhaled Vt (ml) 200 mL   High Exhaled Vt (ml) 100 mL   RR Low (bpm) 6   RR High (bpm) 40 br/min   Apnea (secs) 20 secs   Additional Respiratoray Assessments   Humidification Source HME   Airway Clearance   Suction ET Tube   Suction Device Inline suction catheter   Sputum Method Obtained Endotracheal   Sputum Amount Small   Sputum Color/Odor Clear   Sputum Consistency Thin   ETT    Placement Date/Time: 05/11/25 2045   Present on Admission/Arrival: No  Placed By: RT  Placement Verified By: Capnometry;Direct visualization  Preoxygenation: Yes  Mask Ventilation: (c)   Technique: Video laryngoscopy  Airway Type: Cuffed  Airway Tube ...   Secured At 22 cm   Measured From Lips   ETT Placement Left   Secured By Commercial tube garcia   Site Assessment Dry   Cuff Pressure 30 cm H2O       
   05/13/25 0335   Patient Observation   Pulse 72   SpO2 96 %   Vent Information   Vent Mode AC/VC   Ventilator Settings   Vt (Set, mL) 400 mL   Resp Rate (Set) 20 bpm   PEEP/CPAP (cmH2O) 5   FiO2  30 %   Pressure Support (cm H2O) 0 cm H2O   Vent Patient Data (Readings)   Vt (Measured) 424 mL   Peak Inspiratory Pressure (cmH2O) 21 cmH2O   Rate Measured 20 br/min   Minute Volume (L/min) 8.51 Liters   Mean Airway Pressure (cmH2O) 9.8 cmH20   Plateau Pressure (cm H2O) 0 cm H2O   Driving Pressure -5   I:E Ratio 1:2.80   Static Compliance (L/cm H2O) 0   Backup Apnea On   Vent Alarm Settings   High Pressure (cmH2O) 40 cmH2O   Low Minute Volume (lpm) 2 L/min   High Minute Volume (lpm) 20 L/min   Low Exhaled Vt (ml) 200 mL   High Exhaled Vt (ml) 1000 mL   RR High (bpm) 40 br/min   Apnea (secs) 20 secs   Additional Respiratoray Assessments   Humidification Source HME   Airway Clearance   Suction ET Tube   Suction Device Inline suction catheter   Sputum Method Obtained Endotracheal   Sputum Amount Small   Sputum Color/Odor Clear   Sputum Consistency Thin   ETT    Placement Date/Time: 05/11/25 2045   Present on Admission/Arrival: No  Placed By: RT  Placement Verified By: Capnometry;Direct visualization  Preoxygenation: Yes  Mask Ventilation: (c)   Technique: Video laryngoscopy  Airway Type: Cuffed  Airway Tube ...   Secured At 22 cm   Measured From Lips   ETT Placement Center   Secured By Commercial tube garcia   Site Assessment Dry   Cuff Pressure 30 cm H2O   Treatment   $Treatment Type $Inhaled Therapy/Meds       
   05/13/25 1554   NIV Type   NIV Started/Stopped On   Equipment Type V60   Mode Bilevel   Mask Type Full face mask   Mask Size Medium   Assessment   Skin Assessment Clean, dry, & intact   Skin Protection for O2 Device Yes   Orientation Middle   Location Nose   Intervention(s) Reposition Device   Breath Sounds   Right Upper Lobe Diminished   Right Middle Lobe Diminished   Right Lower Lobe Diminished   Left Upper Lobe Diminished   Left Lower Lobe Diminished   Settings/Measurements   PIP Observed 22 cm H20   IPAP 20 cmH20   CPAP/EPAP 10 cmH2O   Vt (Measured) 506 mL   FiO2  100 %   Minute Volume (L/min) 10.7 Liters   Mask Leak (lpm) 98 lpm   Patient's Home Machine No   Alarm Settings   Alarms On Y   Low Pressure (cmH2O) 6 cmH2O   High Pressure (cmH2O) 30 cmH2O   Delay Alarm 20 sec(s)   Apnea (secs) 20 secs   RR Low (bpm) 6   RR High (bpm) 40 br/min   Patient Observation   Patient Observations MEPILEX ON NOSE, HHN WITH AEROGEN   Oxygen Therapy/Pulse Ox   O2 Therapy Oxygen   O2 Device PAP (positive airway pressure)       
   05/13/25 1940   NIV Type   NIV Started/Stopped On   Equipment Type V60   Mode Bilevel   Mask Type Full face mask   Mask Size Medium   Assessment   Pulse 81   SpO2 100 %   Comfort Level Fair   Using Accessory Muscles No   Mask Compliance Good   Skin Assessment Clean, dry, & intact   Skin Protection for O2 Device Yes   Orientation Middle   Location Nose   Breath Sounds   Right Upper Lobe Diminished   Right Middle Lobe Diminished   Right Lower Lobe Diminished   Left Upper Lobe Diminished   Left Lower Lobe Diminished   Settings/Measurements   IPAP 20 cmH20   CPAP/EPAP 10 cmH2O   Vt (Measured) 374 mL   Rate Ordered 12   FiO2  100 %   Minute Volume (L/min) 16.6 Liters   Mask Leak (lpm) 33 lpm   Patient's Home Machine No   Alarm Settings   Alarms On Y       
   05/14/25 0333   NIV Type   NIV Started/Stopped On   Equipment Type V60   Mode Bilevel   Mask Type Full face mask   Mask Size Medium   Assessment   Pulse 84   BP (!) 133/54   SpO2 98 %   Comfort Level Fair   Mask Compliance Good   Skin Assessment Clean, dry, & intact   Skin Protection for O2 Device Yes   Intervention(s) Skin Barrier   Breath Sounds   Breath Sounds Bilateral Diminished   Right Upper Lobe Diminished   Right Middle Lobe Diminished   Right Lower Lobe Diminished   Left Upper Lobe Diminished   Left Lower Lobe Diminished   Settings/Measurements   IPAP 20 cmH20   CPAP/EPAP 10 cmH2O   Vt (Measured) 330 mL   Rate Ordered 12   FiO2  100 %   Minute Volume (L/min) 8.8 Liters   Patient's Home Machine No   Alarm Settings   Alarms On Y       
   05/14/25 0807   Oxygen Therapy/Pulse Ox   $Oxygen $Daily Charge   O2 Device High flow nasal cannula   O2 Flow Rate (L/min) 10 L/min   $Pulse Oximeter $Spot check (multiple/continuous)   Blood Gas  Performed? Yes   $ABG $Arterial Puncture   Bret's Test #1 Pos   Site #1 Right Radial   Site Prepped #1 Yes   Number of Attempts #1 1   Pressure Held #1 Yes   Complications #1 None   Post-procedure #1 Standard   Specimen Status #1 To lab   How Tolerated? Tolerated well       
   05/15/25 5975   NIV Type   NIV Started/Stopped On   Equipment Type V60   Mode Bilevel   Mask Type Full face mask   Mask Size Medium   Assessment   Pulse 90   Respirations 16   SpO2 93 %   Comfort Level Good   Using Accessory Muscles No   Mask Compliance Good   Skin Assessment Clean, dry, & intact   Skin Protection for O2 Device Yes   Intervention(s) Skin Barrier   Breath Sounds   Right Upper Lobe Diminished   Right Middle Lobe Diminished   Right Lower Lobe Diminished   Left Upper Lobe Diminished   Left Lower Lobe Diminished   Settings/Measurements   IPAP 20 cmH20   CPAP/EPAP 10 cmH2O   Vt (Measured) 426 mL   Rate Ordered 12   FiO2  100 %   Minute Volume (L/min) 6.8 Liters   Mask Leak (lpm) 41 lpm   Patient's Home Machine No   Alarm Settings   Alarms On Y       
   05/16/25 0408   NIV Type   NIV Started/Stopped On   Equipment Type V60   Mode Bilevel   Mask Type Full face mask   Mask Size Medium   Assessment   Pulse 84   Respirations 28   SpO2 (!) 88 %   Comfort Level Good   Using Accessory Muscles Yes   Mask Compliance Good   Skin Assessment Clean, dry, & intact   Skin Protection for O2 Device Yes   Intervention(s) Skin Barrier   Breath Sounds   Right Upper Lobe Diminished   Right Middle Lobe Diminished   Right Lower Lobe Diminished   Left Upper Lobe Diminished   Left Lower Lobe Diminished   Settings/Measurements   IPAP 20 cmH20   CPAP/EPAP 10 cmH2O   Vt (Measured) 349 mL   Rate Ordered 12   FiO2  100 %   Minute Volume (L/min) 13.9 Liters   Mask Leak (lpm) 21 lpm   Patient's Home Machine No   Alarm Settings   Alarms On Y       
   05/16/25 1040   Encounter Summary   Encounter Overview/Reason Spiritual/Emotional Needs   Service Provided For Patient and family together   Referral/Consult From Nurse   Last Encounter  05/16/25  ( offered pastoral support and prayer for patient and children. DS)   Complexity of Encounter Moderate   Begin Time 1010   End Time  1045   Total Time Calculated 35 min   Spiritual/Emotional needs   Type Spiritual Support   Assessment/Intervention/Outcome   Assessment Calm;Concerns with suffering   Intervention Active listening;Discussed relationship with God;Explored/Affirmed feelings, thoughts, concerns;Prayer (assurance of)/[place]   Outcome Comfort;Expressed feelings, needs, and concerns;Expressed Gratitude;Grieving       
  Pulmonary & Critical Care Medicine ICU Progress Note      Events of Last 24 hours:   Pt has no complaints. She is on/off BiPAP overnight.       Invasive Lines: PICC D#None   CVC D#9  Art Line D#None            Vitals:  BP (!) 124/50   Pulse 79   Temp 99.6 °F (37.6 °C) (Bladder)   Resp (!) 100   Ht 1.6 m (5' 2.99\")   Wt 60.6 kg (133 lb 9.6 oz)   SpO2 97%   BMI 23.67 kg/m²    Tmax:  CVP: CVP (Mean): 8 mmHg      Intake/Output Summary (Last 24 hours) at 5/14/2025 0731  Last data filed at 5/14/2025 0600  Gross per 24 hour   Intake 880.37 ml   Output 987 ml   Net -106.63 ml       EXAM:  Physical Exam  Constitutional:       Appearance: She is ill-appearing.   HENT:      Head: Normocephalic and atraumatic.      Nose: Nose normal.      Mouth/Throat:      Pharynx: No oropharyngeal exudate.   Eyes:      General: No scleral icterus.        Right eye: No discharge.         Left eye: No discharge.   Cardiovascular:      Rate and Rhythm: Normal rate.      Heart sounds: No murmur heard.     No gallop.   Pulmonary:      Effort: Pulmonary effort is normal.      Breath sounds: Rales present. No wheezing.   Abdominal:      General: Abdomen is flat. Bowel sounds are normal. There is no distension.      Tenderness: There is no abdominal tenderness.   Musculoskeletal:         General: No swelling.      Cervical back: Normal range of motion.   Skin:     General: Skin is warm and dry.   Neurological:      Mental Status: She is alert and oriented to person, place, and time.          Medications:  Scheduled Meds:   cefTRIAXone (ROCEPHIN) IV  1,000 mg IntraVENous Q24H    furosemide  40 mg IntraVENous BID    midazolam  2 mg IntraVENous Once    pantoprazole  40 mg IntraVENous Daily    sodium chloride (Inhalant)  4 mL Nebulization Q8H RT    amiodarone  400 mg Oral BID    [Held by provider] carvedilol  3.125 mg Oral BID    rosuvastatin  10 mg Oral Nightly    [Held by provider] tamsulosin  0.4 mg Oral Daily    sodium chloride flush  5-40 
  Pulmonary & Critical Care Medicine ICU Progress Note      Events of Last 24 hours:   Pt having mucous plugging again in [place]. She was desaturating overnight.       Invasive Lines: PICC D#None             CVC D#6  Art Line D#None            Vitals:  /61   Pulse 88   Temp (!) 96.1 °F (35.6 °C) (Bladder)   Resp 27   Ht 1.6 m (5' 2.99\")   Wt 60.3 kg (132 lb 15 oz)   SpO2 (!) 85%   BMI 23.55 kg/m²    Tmax:  CVP: CVP (Mean): 8 mmHg      Intake/Output Summary (Last 24 hours) at 5/16/2025 0727  Last data filed at 5/16/2025 0700  Gross per 24 hour   Intake 828 ml   Output 930 ml   Net -102 ml       EXAM:  Physical Exam  Constitutional:       Appearance: She is ill-appearing and toxic-appearing.   HENT:      Head: Normocephalic and atraumatic.      Nose: Nose normal.      Mouth/Throat:      Pharynx: No oropharyngeal exudate.   Eyes:      General: No scleral icterus.        Right eye: No discharge.         Left eye: No discharge.   Cardiovascular:      Rate and Rhythm: Normal rate.      Heart sounds: No murmur heard.     No gallop.   Pulmonary:      Effort: Pulmonary effort is normal.      Breath sounds: Rales present. No wheezing.   Abdominal:      General: Abdomen is flat. Bowel sounds are normal. There is no distension.      Tenderness: There is no abdominal tenderness.   Musculoskeletal:         General: No swelling.      Cervical back: Normal range of motion.   Skin:     General: Skin is warm and dry.   Neurological:      Comments: Lethargic, not awakening to voice          Medications:  Scheduled Meds:   LORazepam  0.5 mg IntraVENous Once    acetylcysteine  600 mg Inhalation BID RT    senna  1 tablet Oral Nightly    pantoprazole  40 mg Oral QAM AC    ipratropium 0.5 mg-albuterol 2.5 mg  1 Dose Inhalation Q4H WA RT    sodium chloride (Inhalant)  4 mL Nebulization BID RT    apixaban  2.5 mg Oral BID    cefTRIAXone (ROCEPHIN) IV  1,000 mg IntraVENous Q24H    amiodarone  400 mg Oral BID    carvedilol  3.125 mg 
  Pulmonary & Critical Care Medicine ICU Progress Note      Events of Last 24 hours:   Pt says that she is starting to breath better. She has no other complaints.       Invasive Lines: PICC D#None   CVC D#5  Art Line D#None            Vitals:  /86   Pulse 89   Temp 97.8 °F (36.6 °C) (Bladder)   Resp 20   Ht 1.6 m (5' 2.99\")   Wt 60.6 kg (133 lb 9.6 oz)   SpO2 99%   BMI 23.67 kg/m²    Tmax:  CVP: CVP (Mean): 8 mmHg      Intake/Output Summary (Last 24 hours) at 5/15/2025 0721  Last data filed at 5/15/2025 0600  Gross per 24 hour   Intake 797.22 ml   Output 1440 ml   Net -642.78 ml       EXAM:  Physical Exam  Constitutional:       Appearance: She is ill-appearing.   HENT:      Head: Normocephalic and atraumatic.      Nose: Nose normal.      Mouth/Throat:      Pharynx: No oropharyngeal exudate.   Eyes:      General: No scleral icterus.        Right eye: No discharge.         Left eye: No discharge.   Cardiovascular:      Rate and Rhythm: Normal rate.      Heart sounds: No murmur heard.     No gallop.   Pulmonary:      Effort: Pulmonary effort is normal.      Breath sounds: Rales present. No wheezing.   Abdominal:      General: Abdomen is flat. Bowel sounds are normal. There is no distension.      Tenderness: There is no abdominal tenderness.   Musculoskeletal:         General: No swelling.      Cervical back: Normal range of motion.   Skin:     General: Skin is warm and dry.   Neurological:      Mental Status: She is alert and oriented to person, place, and time.          Medications:  Scheduled Meds:   polyethylene glycol  17 g Oral Daily    furosemide  40 mg IntraVENous BID    ipratropium 0.5 mg-albuterol 2.5 mg  1 Dose Inhalation Q4H WA RT    sodium chloride (Inhalant)  4 mL Nebulization BID RT    apixaban  2.5 mg Oral BID    cefTRIAXone (ROCEPHIN) IV  1,000 mg IntraVENous Q24H    midazolam  2 mg IntraVENous Once    pantoprazole  40 mg IntraVENous Daily    amiodarone  400 mg Oral BID    carvedilol  3.125 
  Speech Language Pathology  Attempt Note / Sign Off    Name: Muna Simon  : 1929  Medical Diagnosis: Atherosclerosis of abdominal aorta [I70.0]  PVD (peripheral vascular disease) [I73.9]      Per chart review, pt with unstable respiratory status overnight with code status amended to DNR-CC and comfort meds provided. SLP f/u with RN who verified pt/pt's family pursuing comfort care. Pt with no acute SLP needs at this time- SLP to sign off. Please re-consult as indicated.     Thank you,    Nya Hutchins M.A. CCC-SLP   Speech-Language Pathologist     
  Speech Language Pathology  Clinical Bedside Swallow Assessment  Facility/Department: [facility] C2 CARD TELEMETRY        Recommendations:  Diet recommendation: IDDSI 6 Soft and Bite Sized Solids; IDDSI 0 Thin Liquids - straws OK with small single sips; Meds PO as tolerated  Instrumentation: Not clinically indicated at this time. Will continue to monitor  Risk management: upright for all intake, stay upright for at least 30 mins after intake, small bites/sips, total feed, 1:1 supervision with intake, slow rate of intake, general GERD precautions, STRICT aspiration precautions, hold PO and contact SLP if s/s of aspiration or worsening respiratory status develop., Control risk factors for aspiration PNA by completing oral care 3-4x/day and increasing physical mobility as is medically feasible, and hold PO and contact SLP if s/s of aspiration or worsening respiratory status develop.      NAME:Muna Simon  : 1929 (89 y.o.)   MRN: [id number]  ROOM: [address] [place] [address]  ADMISSION DATE: 2025  PATIENT DIAGNOSIS(ES): Atherosclerosis of abdominal aorta [I70.0]  PVD (peripheral vascular disease) [I73.9]  No chief complaint on file.    Patient Active Problem List    Diagnosis Date Noted    Persistent atrial fibrillation with rapid ventricular response (HCC) 2025    LAE (left atrial enlargement) 2025    Nonrheumatic mitral valve regurgitation 2025    Ischemic rest pain of lower extremity 2025    PAD (peripheral artery disease) 2025    Atherosclerosis of abdominal aorta 2025    Stage 3a chronic kidney disease (HCC)     Atrial fibrillation with RVR ([facility]) 2021    CHF (congestive heart failure) (HCC) 2021    Heart failure, diastolic, with acute decompensation ([facility]) 2021    Hyponatremia 2021    TEREZA (acute kidney injury) 2021    Chronic heart failure with preserved ejection fraction (HCC) 06/15/2021    Paroxysmal atrial fibrillation (HCC) 06/15/2021    History of 
  Vascular Surgery Progress Note      POD#  1  S/P Aortobifemoral bypass    Chief Complaint: Postop follow up      SUBJECTIVE:  Has no complaints but wants coffee    OBJECTIVE    Physical  CURRENT VITALS:  /64   Pulse 86   Temp 97.5 °F (36.4 °C) (Bladder)   Resp 13   Ht 1.6 m (5' 3\")   Wt 58.1 kg (128 lb 1.4 oz)   SpO2 100%   BMI 22.69 kg/m²   24 HR INTAKE/OUTPUT:    Intake/Output Summary (Last 24 hours) at 2025 0830  Last data filed at 2025 0300  Gross per 24 hour   Intake 7019.26 ml   Output 947 ml   Net 6072.26 ml     UO:  550-172-250  ml /shift   N-NR-NR  ml /shift    Awake, alert and oriented  Not using Morphine PCA much  NG tube to suction - not much drainage  Abdomen soft, ND, absent bowel sounds  Mid abdominal soft, clean dry and intact with staples and DSD  Bilateral femoral incisions soft, clean dry and intact with Dermabond  Urinary catheter to gravity drainage for accurate I&O  Bilateral lower extremities warm with doppler signals to bilat PT/DO      Data  CBC:   Recent Labs     25  0833 25  0840 25  1117 25  0530   WBC  --   --  15.5* 8.2   HGB 8.1* 7.7* 9.8* 8.2*   HCT  --   --  28.6* 23.9*   MCV  --   --  89.5 90.4   PLT  --   --  357 321     BMP:   Recent Labs     25  1117 25  0530   * 139   K 3.7 3.9    108   CO2 23 25   GLUCOSE 176* 110*   BUN 20 17   CREATININE 1.2 1.1   CALCIUM 8.7 8.5     Accucheck Glucoses:   Recent Labs     25  0833 25  0840   POCGLU 129* 130*       Current Inpatient Medications  Current Facility-Administered Medications: furosemide (LASIX) injection 20 mg, 20 mg, IntraVENous, Once  mupirocin (BACTROBAN) 2 % ointment, , Topical, BID  lactated ringers infusion, , IntraVENous, Continuous  sodium chloride flush 0.9 % injection 5-40 mL, 5-40 mL, IntraVENous, 2 times per day  sodium chloride flush 0.9 % injection 5-40 mL, 5-40 mL, IntraVENous, PRN  0.9 % sodium chloride infusion, , IntraVENous, 
  Vascular Surgery Progress Note      POD#  10  S/P Aortobifemoral bypass (5/5/2025)    Chief Complaint: Postop follow up      SUBJECTIVE:  complains of not sleeping    24 hr interval history:  5/14 - dc amiodarone and heparin infusions. Started oral amio,apixaban, coreg. Lasix 40 mg bid. KCl 20 mEq x 1. OOB. PT/OT consulted. CVC line dc' and periph IV placed.    OBJECTIVE    Physical  CURRENT VITALS:  /69   Pulse 85   Temp 97.5 °F (36.4 °C)   Resp 20   Ht 1.6 m (5' 2.99\")   Wt 60.3 kg (132 lb 15 oz)   SpO2 100%   BMI 23.55 kg/m²   24 HR INTAKE/OUTPUT:    Intake/Output Summary (Last 24 hours) at 5/15/2025 0813  Last data filed at 5/15/2025 0750  Gross per 24 hour   Intake 977.22 ml   Output 1485 ml   Net -507.78 ml     UO:  257-723-460  ml /shift     LINES/ACCESS:   Vascath Access: LIJ Day #: 6   Peripheral Access: Lt forearm Day #: 1  Peripheral Access: Lt forearm Day #: 1                        Nelson Day #: 10             Awake, alert and oriented   Motor functions equal bilat upper and lower extremities  Breath sounds diminished.NSR  Abdomen soft, ND, active bowel sounds  Bilateral femoral incisions soft clean dry and intact with Dermabond  Bilateral lower extremities warm with doppler signals to bilat PT/DP      Data  CBC:   Recent Labs     05/12/25  1500 05/13/25  0630 05/14/25  0620 05/15/25  0320   WBC  --  7.9 11.6* 12.2*   HGB 8.4* 8.3* 9.0* 9.0*   HCT 24.5* 24.3* 26.4* 26.8*   MCV  --  88.9 88.6 88.6   PLT  --  276 287 296     BMP:   Recent Labs     05/12/25  1742 05/13/25  0630 05/14/25  0620 05/15/25  0319    136 138 134*   K 3.9 3.8 3.5 3.6    104 103 99   CO2 24 24 24 24   PHOS 1.5* 1.7* 4.3 4.3   GLUCOSE 112* 106* 85 120*   BUN 14 12 24* 31*   CREATININE 0.9 1.0 1.9* 2.6*   CALCIUM 7.7* 7.9* 8.1* 7.9*   MG 2.39 2.59* 2.48* 2.22     Accucheck Glucoses:   Recent Labs     05/12/25  1236 05/12/25  1501   POCGLU 127* 110*     Lab Results   Component Value Date    APTT 114.4 () 
  Vascular Surgery Progress Note      POD#  2  S/P Aortobifemoral bypass    Chief Complaint: Postop follow up      SUBJECTIVE:  States she doesn't feel well. Just went into atrial fib with RVR. Had preop paroxysmal atrial fib.    24 hr Interval history:  5/6 - dc 0.9% NS. Decrease IVF to 75 ml/hr. DC NG & arterial line. OOB to chair. Lasix 20 mg x 1. DC Morphine PCA. Start prn morphine     OBJECTIVE    Physical  CURRENT VITALS:  BP (!) 140/67   Pulse (!) 103   Temp 99.3 °F (37.4 °C) (Oral)   Resp 18   Ht 1.6 m (5' 3\")   Wt 62.6 kg (138 lb 0.1 oz)   SpO2 96%   BMI 24.45 kg/m²   24 HR INTAKE/OUTPUT:    Intake/Output Summary (Last 24 hours) at 5/7/2025 0724  Last data filed at 5/7/2025 0556  Gross per 24 hour   Intake 2474.48 ml   Output 1855 ml   Net 619.48 ml     UO:  0368-764-743  ml /shift       Awake, alert and oriented. OOB yesterday  Abdomen soft, ND, hypoactive bowel sounds  Mid abdominal soft, clean dry and intact with staples and DSD  Bilateral femoral incisions soft, clean dry and intact with Dermabond  Urinary catheter to gravity drainage for accurate I&O  Bilateral lower extremities warm with doppler signals to bilat PT/DO      Data  CBC:   Recent Labs     05/05/25  0833 05/05/25  0840 05/05/25  1117 05/06/25  0530 05/07/25  0423   WBC  --   --  15.5* 8.2 11.7*   HGB 8.1* 7.7* 9.8* 8.2* 8.9*   HCT  --   --  28.6* 23.9* 26.0*   MCV  --   --  89.5 90.4 90.4   PLT  --   --  357 321 359     BMP:   Recent Labs     05/05/25  1117 05/06/25  0530 05/07/25  0423   * 139 139   K 3.7 3.9 3.3*    108 104   CO2 23 25 23   GLUCOSE 176* 110* 91   BUN 20 17 18   CREATININE 1.2 1.1 1.1   CALCIUM 8.7 8.5 8.2*   MG  --   --  1.54*     Accucheck Glucoses:   Recent Labs     05/05/25  0833 05/05/25  0840   POCGLU 129* 130*       Current Inpatient Medications  Current Facility-Administered Medications: oxyCODONE (ROXICODONE) immediate release tablet 5 mg, 5 mg, Oral, Q4H PRN **OR** oxyCODONE (ROXICODONE) 
  Vascular Surgery Progress Note      POD#  3  S/P Aortobifemoral bypass    Chief Complaint: Postop follow up      SUBJECTIVE:  c/o Heartburn        OBJECTIVE    Physical  CURRENT VITALS:  /71   Pulse (!) 125   Temp 98.2 °F (36.8 °C) (Oral)   Resp 19   Ht 1.6 m (5' 3\")   Wt 60.1 kg (132 lb 7.9 oz)   SpO2 96%   BMI 23.47 kg/m²   24 HR INTAKE/OUTPUT:    Intake/Output Summary (Last 24 hours) at 5/8/2025 0703  Last data filed at 5/8/2025 0600  Gross per 24 hour   Intake 2869.89 ml   Output 317 ml   Net 2552.89 ml     UO:  8665-736-993  ml /shift       Awake, alert and oriented. OOB yesterday  Abdomen soft, ND, active bowel sounds  Mid abdominal soft, clean dry and intact with staples and DSD  Bilateral femoral incisions soft, clean dry and intact with Dermabond  Urinary catheter to gravity drainage for accurate I&O  Bilateral lower extremities warm with doppler signals to bilat PT/DO      Data  CBC:   Recent Labs     05/05/25  0840 05/05/25  1117 05/06/25  0530 05/07/25  0423 05/08/25  0512   WBC  --  15.5* 8.2 11.7* 15.5*   HGB 7.7* 9.8* 8.2* 8.9* 9.7*   HCT  --  28.6* 23.9* 26.0* 28.2*   MCV  --  89.5 90.4 90.4 90.4   PLT  --  357 321 359 433     BMP:   Recent Labs     05/05/25  1117 05/06/25  0530 05/07/25  0423 05/07/25  1015 05/08/25  0512   * 139 139 136 132*   K 3.7 3.9 3.3* 4.8 4.4    108 104 103 99   CO2 23 25 23 23 25   PHOS  --   --   --  3.0  --    GLUCOSE 176* 110* 91 131* 173*   BUN 20 17 18 19 23*   CREATININE 1.2 1.1 1.1 1.2 1.5*   CALCIUM 8.7 8.5 8.2* 8.3 8.6   MG  --   --  1.54* 2.34 2.06     Accucheck Glucoses:   Recent Labs     05/05/25  0833 05/05/25  0840   POCGLU 129* 130*       Current Inpatient Medications  Current Facility-Administered Medications: 0.9 % sodium chloride infusion, , IntraVENous, Continuous  sodium chloride 0.9 % bolus 500 mL, 500 mL, IntraVENous, Once  pantoprazole (PROTONIX) tablet 40 mg, 40 mg, Oral, QAM AC  oxyCODONE (ROXICODONE) immediate release 
  Vascular Surgery Progress Note      POD#  5 S/P Aortobifemoral bypass (5/5/2025)    Chief Complaint: Postop follow up      SUBJECTIVE:  Remains on Bipap     OBJECTIVE    Physical  CURRENT VITALS:  BP (!) 117/55   Pulse 83   Temp 97.2 °F (36.2 °C) (Axillary)   Resp 30   Ht 1.6 m (5' 3\")   Wt 67.4 kg (148 lb 9.4 oz)   SpO2 99%   BMI 26.32 kg/m²   24 HR INTAKE/OUTPUT:    Intake/Output Summary (Last 24 hours) at 5/10/2025 0822  Last data filed at 5/10/2025 0800  Gross per 24 hour   Intake 784.59 ml   Output 2536.4 ml   Net -1751.81 ml     UO:  127 last 8      Awake, alert and oriented but with increase O2 requirements. Placed on Bipap, given respiratory treatments. Breath sounds diminshed  Abdomen soft, ND, hypoactive bowel sounds  Mid abdominal soft, clean dry and intact with staples and DSD  Bilateral femoral incisions soft, clean dry and intact with Dermabond  Urinary catheter to gravity drainage for accurate I&O  Bilateral lower extremities warm with doppler signals to bilat PT/DO      Data  CBC:   Recent Labs     05/08/25  0512 05/09/25  0424 05/09/25  0756 05/10/25  0456   WBC 15.5* 11.1*  --  8.4   HGB 9.7* 8.5* 9.2* 7.5*   HCT 28.2* 25.0*  --  21.0*   MCV 90.4 89.9  --  88.5    372  --  323     BMP:   Recent Labs     05/07/25  1015 05/07/25  1015 05/08/25  0512 05/09/25  0424 05/09/25  1545 05/09/25  2112 05/10/25  0456     --  132* 132* 133* 132* 135*   K 4.8   < > 4.4 5.1 4.5 4.3 4.2  4.2     --  99 99 100 99 100   CO2 23  --  25 24 22 23 24   PHOS 3.0  --   --   --  3.2 2.8 2.4*   GLUCOSE 131*  --  173* 156* 125* 120* 148*   BUN 19  --  23* 31* 32* 29* 25*   CREATININE 1.2  --  1.5* 2.1* 2.0* 1.6* 1.5*   CALCIUM 8.3  --  8.6 7.3* 7.3* 7.7* 7.6*   MG 2.34  --  2.06  --  1.97 2.00 2.11    < > = values in this interval not displayed.     Accucheck Glucoses:   Recent Labs     05/09/25  0756   POCGLU 143*     Chest xray 5/10/2025 7:50AM:  FINDINGS:     Shift of silhouette of 
  Vascular Surgery Progress Note      POD#  7  S/P Aortobifemoral bypass (5/5/2025)    Chief Complaint: Postop follow up      SUBJECTIVE:  sedated and intubated.     OBJECTIVE    Physical  CURRENT VITALS:  BP (!) 116/47   Pulse 76   Temp 98.5 °F (36.9 °C) (Bladder)   Resp 20   Ht 1.6 m (5' 3\")   Wt 63.1 kg (139 lb 1.8 oz)   SpO2 98%   BMI 24.64 kg/m²   24 HR INTAKE/OUTPUT:    Intake/Output Summary (Last 24 hours) at 5/12/2025 0805  Last data filed at 5/12/2025 0800  Gross per 24 hour   Intake 1640.02 ml   Output 2587.6 ml   Net -947.58 ml     UO:  60-20-NR  ml /shift   CRRT:  1599-840.7-254.9  ml /shift     Nods head appropriately to questions asked  Motor functions equal bilat upper and lower extremities  Breath sounds diminished. NSR  Abdomen soft, ND, absent bowel sounds  Bilateral femoral incisions soft clean dry and intact with Dermabond  Bilateral lower extremities warm with doppler signals to bilat PT/DP      Data  CBC:   Recent Labs     05/10/25  0456 05/11/25  0540 05/11/25  1605   WBC 8.4 13.0*  --    HGB 7.5* 7.4*  --    HCT 21.0* 21.5* 22.9*   MCV 88.5 89.7  --     349  --      BMP:   Recent Labs     05/10/25  2025 05/11/25  0540 05/11/25  1335 05/11/25  2240 05/12/25  0445    137 136 140 138   K 4.3 4.3  4.3 4.1 4.3 4.2    102 102 105 104   CO2 25 25 26 26 25   PHOS 2.5 2.1* 1.9* 2.1* 2.2*   GLUCOSE 117* 125* 120* 146* 198*   BUN 19 17 15 15 15   CREATININE 1.2 1.2 0.9 1.0 0.9   CALCIUM 8.0* 8.0* 8.4 8.2* 8.0*   MG 2.22 2.34 2.43* 2.59* 2.46*     Accucheck Glucoses:   Recent Labs     05/12/25  0746   POCGLU 126*       Lab Results   Component Value Date/Time    COLORU Yellow 05/10/2025 10:19 AM    NITRU Negative 05/10/2025 10:19 AM    GLUCOSEU Negative 05/10/2025 10:19 AM    KETUA Negative 05/10/2025 10:19 AM    UROBILINOGEN 0.2 05/10/2025 10:19 AM    BILIRUBINUR Negative 05/10/2025 10:19 AM       Random Vancomycin Level: 10.1    Respiratory Culture 5/10/2025:  Enterobacter 
  Vascular Surgery Progress Note      POD#  9  S/P Aortobifemoral bypass (5/5/2025)    Chief Complaint: Postop follow up      SUBJECTIVE:  States she loves everyone. Wants water    OBJECTIVE    Physical  CURRENT VITALS:  BP (!) 124/50   Pulse 79   Temp 99.6 °F (37.6 °C) (Bladder)   Resp (!) 100   Ht 1.6 m (5' 2.99\")   Wt 60.6 kg (133 lb 9.6 oz)   SpO2 97%   BMI 23.67 kg/m²   24 HR INTAKE/OUTPUT:    Intake/Output Summary (Last 24 hours) at 5/14/2025 0751  Last data filed at 5/14/2025 0600  Gross per 24 hour   Intake 880.37 ml   Output 987 ml   Net -106.63 ml     UO:  -320  ml /shift   CRRT:  242 ml then dc'd     Awake, alert and oriented   Motor functions equal bilat upper and lower extremities  Breath sounds diminished.NSR  Abdomen soft, ND, few bowel sounds  Bilateral femoral incisions soft clean dry and intact with Dermabond  Bilateral lower extremities warm with doppler signals to bilat PT/DP      Data  CBC:   Recent Labs     05/11/25  1605 05/12/25  0744 05/12/25  1500 05/13/25  0630 05/14/25  0620   WBC  --  10.3  --  7.9 11.6*   HGB  --  6.9* 8.4* 8.3* 9.0*   HCT 22.9* 20.8* 24.5* 24.3* 26.4*   MCV  --  89.9  --  88.9 88.6   PLT  --  349  --  276 287     BMP:   Recent Labs     05/11/25  2240 05/12/25  0445 05/12/25  1742 05/13/25  0630 05/14/25  0620    138 136 136 138   K 4.3 4.2 3.9 3.8 3.5    104 104 104 103   CO2 26 25 24 24 24   PHOS 2.1* 2.2* 1.5* 1.7* 4.3   GLUCOSE 146* 198* 112* 106* 85   BUN 15 15 14 12 24*   CREATININE 1.0 0.9 0.9 1.0 1.9*   CALCIUM 8.2* 8.0* 7.7* 7.9* 8.1*   MG 2.59* 2.46* 2.39 2.59* 2.48*     Accucheck Glucoses:   Recent Labs     05/12/25  0746 05/12/25  1236 05/12/25  1501   POCGLU 126* 127* 110*     Lab Results   Component Value Date    APTT 50.5 (H) 05/14/2025     Lab Results   Component Value Date/Time    COLORU Yellow 05/10/2025 10:19 AM    NITRU Negative 05/10/2025 10:19 AM    GLUCOSEU Negative 05/10/2025 10:19 AM    KETUA Negative 05/10/2025 10:19 AM 
 notified of family's decision  
05/09/25 @ 0053 Vascular Paged per SHARON Garcia's request  
05/09/25 @ 0441 Vascular Paged per SHARON Garcia's request   
05/09/25 @ 0518 Vascular Paged per SHARON Garcia's request   
05/16/25 @ 0319 Vascular Surgery paged per SHARON Solomon's request  
05/16/25 @ 0520 Vascular Surgery paged per SHARON Solomon's request   
0738- Vascular surgery rounded  & updated by ICU RNs at bedside. MD updated family members currently at bedside. Remains on CRRT. Stat PCXR done per MD order. Nicolasa Hernandez RN    
0945- Dr Craft at bedside & updated patient & family concerning results of CXR done this AM & need for bronch to remove fluid & mucous from affected lung.   Nicolasa Hernandez RN    
1 mg Versed given per MD order for bronch. Nicolasa Hernandez RN    
1 mg Versed given per MD order.  Nicolasa Hernandez RN    
1 mg versed given per MD order.  Nicolasa Hernandez RN    
2043-15 of etomidate and 3 of rocuronium given for intubation. Patient intubated at 2044 by RT with hospitalist at bedside. See RT notes.  
4 Eyes Skin Assessment     NAME:  Muna Simon  YOB: 1929  MEDICAL RECORD NUMBER:  [id number]    The patient is being assessed for  Shift Handoff    I agree that at least one RN has performed a thorough Head to Toe Skin Assessment on the patient. ALL assessment sites listed below have been assessed.      Areas assessed by both nurses:    Head, Face, Ears, Shoulders, Back, Chest, Arms, Elbows, Hands, Sacrum. Buttock, Coccyx, Ischium, and Legs. Feet and Heels        Does the Patient have a Wound? No noted wound(s)       Philip Prevention initiated by RN: Yes  Wound Care Orders initiated by RN: No    Pressure Injury (Stage 3,4, Unstageable, DTI, NWPT, and Complex wounds) if present, place Wound referral order by RN under : No    New Ostomies, if present place, Ostomy referral order under : No     Nurse 1 eSignature: Electronically signed by Kathryn Gama RN on 5/16/25 at 6:51 PM EDT    **SHARE this note so that the co-signing nurse can place an eSignature**    Nurse 2 eSignature: {Esignature:[id number]}   
4 Eyes Skin Assessment     NAME:  Muna Simon  YOB: 1929  MEDICAL RECORD NUMBER:  [id number]    The patient is being assessed for  Shift Handoff    I agree that at least one RN has performed a thorough Head to Toe Skin Assessment on the patient. ALL assessment sites listed below have been assessed.      Areas assessed by both nurses:    Head, Face, Ears, Shoulders, Back, Chest, Arms, Elbows, Hands, Sacrum. Buttock, Coccyx, Ischium, Legs. Feet and Heels, and Under Medical Devices         Does the Patient have a Wound? Yes wound(s) were present on assessment. LDA wound assessment was Initiated and completed by RN       Philip Prevention initiated by RN: Yes  Wound Care Orders initiated by RN: No    Pressure Injury (Stage 3,4, Unstageable, DTI, NWPT, and Complex wounds) if present, place Wound referral order by RN under : No    New Ostomies, if present place, Ostomy referral order under : No     Nurse 1 eSignature: Electronically signed by Natasha Melgoza RN on 5/5/25 at 6:26 PM EDT    **SHARE this note so that the co-signing nurse can place an eSignature**    Nurse 2 eSignature: {Esignature:[id number]}    
4 Eyes Skin Assessment     NAME:  Muna Simon  YOB: 1929  MEDICAL RECORD NUMBER:  [id number]    The patient is being assessed for  Shift Handoff    I agree that at least one RN has performed a thorough Head to Toe Skin Assessment on the patient. ALL assessment sites listed below have been assessed.      Areas assessed by both nurses:    Head, Face, Ears, Shoulders, Back, Chest, Arms, Elbows, Hands, Sacrum. Buttock, Coccyx, Ischium, and Legs. Feet and Heels        Does the Patient have a Wound? No noted wound(s)       Philip Prevention initiated by RN: Yes  Wound Care Orders initiated by RN: No    Pressure Injury (Stage 3,4, Unstageable, DTI, NWPT, and Complex wounds) if present, place Wound referral order by RN under : No    New Ostomies, if present place, Ostomy referral order under : No     Nurse 1 eSignature: Electronically signed by Kathryn Gama RN on 5/15/25 at 6:51 PM EDT    **SHARE this note so that the co-signing nurse can place an eSignature**    Nurse 2 eSignature: {Esignature:[id number]}   
4 Eyes Skin Assessment     NAME:  Muna Simon  YOB: 1929  MEDICAL RECORD NUMBER:  [id number]    The patient is being assessed for  Shift Handoff    I agree that at least one RN has performed a thorough Head to Toe Skin Assessment on the patient. ALL assessment sites listed below have been assessed.      Areas assessed by both nurses:    Head, Face, Ears, Shoulders, Back, Chest, Arms, Elbows, Hands, Sacrum. Buttock, Coccyx, Ischium, Legs. Feet and Heels, and Under Medical Devices         Does the Patient have a Wound? No noted wound(s)       Philip Prevention initiated by RN: Yes  Wound Care Orders initiated by RN: No    Pressure Injury (Stage 3,4, Unstageable, DTI, NWPT, and Complex wounds) if present, place Wound referral order by RN under : No    New Ostomies, if present place, Ostomy referral order under : No     Nurse 1 eSignature: Electronically signed by Juanito Bain RN on 5/7/25 at 7:13 AM EDT    **SHARE this note so that the co-signing nurse can place an eSignature**    Nurse 2 eSignature: {Esignature:[id number]}   
500 ML fluid removed by MSD. Vitals stable & 02 sat 997%. Moist cough noted. Remains NPO with moist swabs. Left side thoracentesis site dressing CD&I. Patient tolerated procedure well. Repositioned & resting.  Monitoring post procedure vitals.  Nicolasa Hernandez RN    
ABG results reported to on call vascular [facility], no further orders at this time. Care continues.  
Alia cardiac NP in to see pt & port CXR done  
Alia cardiology NP here to see pt & was updated.  
Anti xA >1.1 so heparin gtt stopped for 2 hours & then will be resumed @ 7 u/kg/hr  
Arrived to [facility] consents verified, NPO since 2000, belongings on cart for ICU. Updated Dr. Ennis and Dr. Metz regarding patient taking MVI yesterday. Dopplers needed for bilateral pulses.  
At the request of son, writer convened w/ rené and two sisters, Vianca & Eleazar.  Some time after extubation, they report pt c/o difficulty breathing and needing to be placed on bipap.    Family shared that pt, w/o provocation, verbalizing feeling she is \"tired\" and, either Jair or Vianca, said she \"know's she may die.\"      Much support offered in this mtg, Pulm NP has also spoken w/ Jair.  They understand they don't need to make any different decisions at this time and can continue current plan of care.  It seems as though they'd like to continue w/ bipap, offering pt breaks when desired.  Family would be pleased if pt only requires intermittent use of bipap, \"even dialysis, if necessary\" but not sure what pt would/would not want.  However, they were quick to say pt doesn't really like the bipap and isn't keen on having to wear it.    Given family wanting to know what to expect if pt were to decline or indicate to them her wish for comfort, writer did discuss comfort meds and the focus of alleviating/avoiding respiratory distress or air hunger.       Writer broached discussion re: family understanding of pts wishes re: resuscitation?  No decision made but most a) don't want pt to be resuscitated and b) feel that she would survive it.  DNI for now      Family agree to f/u tomorrow  
Attempted to follow up with Pt. Pt asleep. Left Spiritual Health card, informing her of our continued availability for support.    Cortez Mckeon  
Attempted to run volera through bipap mask. Patient lasted about 2 minutes and panicked. Stated she couldn't breathe. Stopped treatment. Placed back on vapotherm 40L 100% sats 94%.   05/09/25 2120   Treatment   Treatment Type HHN;Intrapulmonary percussive aerosol nebulizer   $Bronchial Hygiene $Oscillatory therapy   $Treatment Type $Inhaled Therapy/Meds   Medications Albuterol/Ipratropium;N-Acetylcysteine   Pre-Tx Pulse 87   Pre-Tx Resps 25   Breath Sounds Pre-Tx SUKUMAR Diminished   Breath Sounds Pre-Tx LLL Diminished   Breath Sounds Pre-Tx RUL Diminished   Breath Sounds Pre-Tx RML Diminished   Breath Sounds Pre-Tx RLL Diminished   Breath Sounds Post-Tx SUKUMAR Diminished   Breath Sounds Post-Tx LLL Diminished   Breath Sounds Post-Tx RUL Diminished   Breath Sounds Post-Tx RML Diminished   Breath Sounds Post-Tx RLL Diminished   Post-Tx Pulse 88   Post-Tx Resps 25   Delivery Source Mask   Position Semi-Levine's   Treatment Tolerance Poor  (had to stop tx. pt stated she couldnt breathe.)   Is patient on O2? Y   Breath Sounds   Respiratory Pattern Regular   Oxygen Therapy/Pulse Ox   O2 Therapy Oxygen humidified   O2 Device Heated high flow cannula   O2 Flow Rate (L/min) 40 L/min   FiO2  100 %   Pulse 83   Respirations 29   SpO2 91 %   Humidification Temp 33       
Bronch completed. Adjustments to Bipap by RT per MD order. Specimens at bedside for lab.  Continue to monitor vitals q2 minutes. 02 sat 98%.Nicolasa Hernandez RN    
CRRT circuit timed out-217 ml of blood returned. Will prime new circuit.  
CRRT discontinued at this time. 217 ml of blood returned. Access and return lines cleaned with chloraprep, flushed with NS, and pressure caps applied.   
CRRT resumed. Both red & blue ports aspirate & flush.  
Called to bedside by son, patient c/o SOB.  Bipap restarted & RT notified.  Family at bedside. Nicolasa Hernandez RN    
Care rounds completed with Dr. Kirk. Pt's family included in discussion.  
Comprehensive Nutrition Assessment    Type and Reason for Visit:  Initial, LOS    Nutrition Recommendations/Plan:   Continue NPO (advance as medically feasible per MD).   Monitor ability to extubate and advance PO diet.   If unable to extubate or advance PO diet and alternative nutrition therapy is indicated, please consult RD for recommendations.  Continue to monitor electrolytes and replace as needed.   No BM documented this admission (8 days). Consider bowel regimen if accurate.   Monitor nutrition adequacy, pertinent labs, bowel habits, wt changes, and clinical progress.      Malnutrition Assessment:  Malnutrition Status:  At risk for malnutrition (05/13/25 1012)    Context:  Acute Illness     Findings of the 6 clinical characteristics of malnutrition:  Energy Intake:  75% or less of estimated energy requirements for 7 or more days  Weight Loss:  Unable to assess (limited recent hx and labile wts this admission)     Body Fat Loss:  Unable to assess     Muscle Mass Loss:  Unable to assess    Fluid Accumulation:  Mild Extremities, Generalized   Strength:  Not Performed    Nutrition Assessment:    Initial + LOS. Admitted for aortobifemoral bypass graft which was completed on 5/5. PMH of A-fib, HFpEF, PAD, CKD stage III, and HTN. Pt intubated since 5/11. Fentanyl and levophed off since this morning. Currently NPO. Plans to stay NPO at this time for SBT and possible extubation. Will monitor ability to extubate and advance PO diet versus need for alternative nutrition if consistent w/ pt's plan of care. On CRRT. Palliative care following. Code status modified from full to DNI this morning. Pt is s/p L thoracentesis on 5/11 w/ 500 mL removed. UBW in EMR appears to be around 128-135 lbs. Wts difficult to assess this admission d/t being labile. Will continue to monitor.    Nutrition Related Findings:    No BM documented this admit (not on bowel regimen). Hypoactive BS. Magnesium: 2.59 and phosphorus: 1.7. +1 
Consent signed by patient for left side thoracentesis by Dr Craft.  Nicolasa Hernandez RN      
Discharge instructions gone over with son & daughter, questions answered. Medications picked up from outpatient pharmacy by son.  Discharge instructions reviewed with patient and family member.  Patient and family verbalized understanding.  All home medications have been reviewed, questions answered and patient voiced understanding. Given prescriptions, discharge instructions, and appointment times.  Belongings taken by family members.  Nicolasa Hernandez RN    
Dr Barr notified of increased creat and  urine output, elevated BNP, and increased oxygen demand via perfect serve at Dr Metz request, will continue to monitor.  
Dr Craft at bedside . Time out done. Versed 1 mg given IVP per MD order for procedure. RT at bedside.  Nicolasa Hernandez RN    
Dr Craft notified of patients change in respiratory status. Patient with increased WOB and SOB and sats in low 80s. ABG and stat CXR obtained. Dr Craft on phone and discussed patients situation and options with family.  Patient agreeable to intubation. Rapid response called for intubation. Dr Craft to come to bedside.   
Dr Craft performed bedside bronch and left lower lobe lung lavage. Culture sent to lab. Patient tolerated well and VSS.   
Dr Metz updated.  
Dr. Barr with nephrology notified of no UOP. Order received to not pull fluid from patient. CRRT patient removal set to 0. Will continue to monitor.  
Dr. Jordan here to see pt. Palliative care meeting with pt's family.  
Dr. Metz & Porsha NP here to see pt. Family @ bedside.  
Dr. Metz here to see pt  
Dr. Pierre @ bedside to discuss with family doing a non intubation bronchoscopy. ABG's drawn rt brachial artery & sent to the lab.  
Dr. Tabares in to see pt  
ENDOTRACHEAL INTUBATION PROCEDURE NOTE    Name:  Muna Simon  Medical Record Number:  [id number]  Age: 89 y.o.   Gender: female  : 1929  Today's Date:  2025  Room:  [address] [place], [place] [address]    Equipment: [place] Scope  Blade Size: 3  Cricoid Pressure: Yes    Number of Attempts: 1   Size 7.5mm ET tube placed through the vocal cords to 21 cm at the lips.   ETT location confirmed by auscultation and EtCO2 monitor.  Post Intubation CXR Ordered:  Yes     Electronically signed by Libertad Chavez RCP on 2025 at 8:56 PM   
EP Cardiology NP rounded & updated concerning patient remains NPO with Bipap in use. Order obtained to continue amio gtt until patient able to take oral medications.  Nicolasa Hernandez RN    
Family @ bedside. Pt says she feels better sitting up in chair.  
Family originally agreed to bronch but ultimately decided not to proceed. Dr. Pierre informed & will notify Dr. Metz  
Family refused blood draws  
HOSPICE [facility]    Chart reviewed and discussed with BAKARI Galindo.  Family has elected to go with [place].  HOC will sign off but be available if needed.  
Heparin gtt held at this time per pharmacy. Hold 1 hr restart at 7 units. Care continues.    Electronically signed by Radha Parada RN on 5/9/2025 at 10:08 PM      
Hospice at bedside with patient & family.  Nicolasa Hernandez RN    
Hospital Day: 7  POD#  6  S/P Aortobifem       Ms. Simon  is more comfortable today.       Physical Exam:  Temp BP Pulse Resp Rate SpO2 FIO2 O2 Rate   Temp: 98.9 °F (37.2 °C) BP: (!) 113/48 Pulse: 78   SpO2: 100 % FiO2 : 75 %     Vitals:  Temp  Av °F (37.2 °C)  Min: 98.5 °F (36.9 °C)  Max: 100 °F (37.8 °C)    Vent Settings:   FiO2 : 75 %      Intake/Output Summary (Last 24 hours) at 2025 0801  Last data filed at 2025 0700  Gross per 24 hour   Intake 1510.75 ml   Output 3804 ml   Net -2293.25 ml      +6L from pre-op      Pre-op Weight:   Weight - Scale: 61.2 kg (135 lb)     + doppler pulses    Data Review:   Chemistry:  Recent Labs     05/10/25  1322 05/10/25  2025 05/11/25  0540   * 137 137   K 4.1 4.3 4.3  4.3   CO2    PHOS 2.2* 2.5 2.1*   BUN 19 19 17   CREATININE 1.1 1.2 1.2      .lastb  CBC:  Recent Labs     25  0424 25  0756 05/10/25  0456 25  0540   WBC 11.1*  --  8.4 13.0*   HGB 8.5* 9.2* 7.5* 7.4*   HCT 25.0*  --  21.0* 21.5*   MCV 89.9  --  88.5 89.7     --  323 349     Cardiac markers:   Lab Results   Component Value Date/Time    TROPONINI 0.22 2021 01:24 PM      Coags:  Lab Results   Component Value Date/Time    INR 2.27 2025 09:12 PM    INR 3.38 2025 03:48 PM    INR 1.41 2021 06:57 AM    PROTIME 25.0 2025 09:12 PM    PROTIME 34.0 2025 03:48 PM    PROTIME 16.2 2021 06:57 AM     CXR 5/11:  FINDINGS:     HEART / MEDIASTINUM: Chest rotated, grossly table heart size and mediastinal contours     LUNGS/PLEURA: Significantly improved aeration of left hemithorax with residual pleural effusion and airspace disease. Small right pleural effusion and right basilar atelectasis probably stable.     BONES / SOFT TISSUES: No acute abnormality.     OTHER: Right IJ and left IJ catheters are stable.     IMPRESSION:     1. Left pleural effusion and airspace disease, but improved atelectasis compared to yesterday  2. Probably 
Lab called this RN with critical Xa, greater than 1.1. Heparin gtt paused and paged sent out to vascular. MD returned page with call at 2208, to hold heparin for 2 hours and then restart 3unit/kg/hr lower (paused at 7unit/kg/hr, restart dose 4unit/kg/hr).   
Lyudmila palliative care RN here to see pt/family  
Muna Tarpoff    Age 89 y.o.    female    6/30/1929    MRN [id number]    5/5/2025  Arrival Time_____________  OR Time____________280 Min     Procedure(s):  AORTOBIFEMORAL BYPASS GRAFT                      General   Surgeon(s):  Kobe Metz, MD      DAY ADMIT ___  SDS/OP ___  OUTPT IN BED ___        Phone [phone] (home)                  PCP _____________________ Phone_________________ Epic ( ) Epic CE ( ) Appt ________    NOTES: _________________________________________ Consult/Cardio _______________    ____________________________________________________________________________    ____________________________________________________________________________  PAT APPT DATE:________ TIME: ________  FAXED QAD: _______  (__) H&P w/ Hospitalist    (__) PAT orders in EPIC    (__) Meet with PAT nurse  __________________________________________________________________________  Preop Nurse phone screen complete: _____________  (__) CBC     (__) W/ DIFF ___________  (__) CT CHEST  __________   (__) Hgb A1C    ___________  (__) CHEST X RAY   __________  (__) LIPID PROFILE  ___________  (__) EKG   __________  (__) PT-INR / APTT  ___________  (__) PFT's   __________  (__) BMP   ___________  (__) CAROTIDS  __________  (__) CMP   ___________  (__) VEIN MAPPING  __________  (__) U/A   ___________  ( X ) HISTORY & PHYSICAL __________  (__) URINE C & S  ___________  (__) CARDIAC CLEARANCE __________  (__) U/A W/ FLEX  ___________  (__) PULM. CLEARANCE __________  (__) SERUM PREGNANCY ___________  (__) Preop Orders in EPIC __________  (__) TYPE & SCREEN __________repeat ( ) (__)  __________________ __________  (__) Albumin   ___________  (__)  __________________ __________  (__) TRANSFERRIN  ___________  (__)  __________________ __________  (__) LIVER PROFILE  ___________  (__) URINE PREG DOS __________  (__) MRSA NASAL SWAB ___________  (__) BLOOD SUGAR DOS __________  (__) SED RATE  ___________  (__) OAC  
Nephrology rounded & updated by ICU RN. MD updated family at bedside. Nicolasa Hernandez RN    
New orders noted. Family members updated by Critical care MD & ICU RN. Questions answered. Emotional support given.  Nicolasa Hernandez RN    
No need to wait for BMP per Dr. Ennis.  
Notified vascular Ishan Jolley via telephone regarding patient sudden awakened and SOB with increased oxygen demands. RT shelly notified and at bedside with NRB + increased HF to 15L. New orders placed. ABG, stopped IVF, 1 x dose IV lasix 20mg, BNP, and CXR. No further orders per vascular at this time. Care continues.   
Occupational Therapy/Physical Therapy  Patient is not medically appropriate for therapy evaluation at this time per RN due to on 100% FiO2 and having a Bronch done later this date. Therapy to follow up as appropriate and schedule allows. Thank you.    Nadiya Saul MS, OTR/L  Jacinta Alford PT, DPT      
Occupational/Physical Therapy  Pt attempted to see for OT session this PM. RN ok'd session but pt declined 2/2 pain and requested to come back tomorrow.     Miley Hays, OTR/l  Savana Macario, PT, DPT  
Occupational/Physical Therapy  Pt continues to be sedated and intubated. Will sign off at this time. Please place new orders when pt becomes appropriate.     Miley Hays OTR/KYRIE Lockett, PT  
PIV & left vas cath removed per order & prior to discharge.  Complete bath done, hair brushed & oral care given. Dressing changed to mid abd incision. Family at bedside. Ice chips given. Waiting on outpatient pharmacy for home medications. Nicolasa Hernandez RN    
PULMONARY AND CRITICAL CARE INPATIENT NOTE        Muna Simon   : 1929  MRN: [id number]     Admitting Physician: Kobe Metz MD  Attending Physician: Kobe Metz MD  PCP: Ivan Orozco MD    Date of Service: 5/10/2025  Admission date:2025   LOS: Hospital Day: 6   Code:Full Code      ASSESSMENT & PLAN       89 y.o. 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  Acute hypoxic respiratory failure  Acute respiratory distress  Bibasilar atelectasis  Left upper lobe collapse consolidation.  Mostly mucous plugging  Volume overload  TEREZA/CKD  PAF  HFpEF  HTN, depression, TIA    Plan:              Keep n.p.o.  Bronch today   Continue BiPAP  Mucomyst/hypertonic saline  DuoNebs  Volara System (Oscillation & Lung Expansion Therapy)  Repeat CXR tomorrow   Fluid/diuretic/renal replacement therapy management per nephrology  On anticoagulation for A-fib.  EP on board  Vascular primary    ICU Quality Care  Delirium precautions.  Wean oxygen flow and target saturation 90 to 94%.  Target Hb >7, Platelets>10 unless specified otherwise.  Keep blood sugar between 140 and 180 mg/dL  Notify provider if no bowel movement for 48 hrs.  Bedsore prevention measures.   PT/OT if patient is able to participate.  Alert provider about unnecessary catheters (central lines, Nelson catheter) or tubes with any early signs of inflammation to discontinue.    Due to the immediate potential for life-threatening deterioration due to issues listed above, I spent 31 minutes providing critical care excluding time spent on billable procedures.         Subjective/Objective     Interval History: Encounter 5/10/2025  Oxygen needs increased again requ bipap 100%  No fever  No vomiting  Anxiet   Neg balnce 1.8 after lasx        CC/Reason for Consult: 
Paged vascular Ishan Jolley via telephone regarding patient feeling anxious and unable to sleep. New orders placed. 1 x Melatonin ordered. Care continues.   
Patient checked by Critical care MD, continue Bipap overnight and repeat CXR in AM. Nicolasa Hernandez RN    
Patient with low O2 saturation, patient on 15l high flow, and non re breather 100 percent, A/O x 4 expresses SOB, Rt to bedside, Porsha O Np with vascular notified, New orders received, patient O2 saturation 93-97 percent on Bipap, will continue to monitor.   
Physical Therapy  Facility/Department: [facility] C2 CARD TELEMETRY  Daily Treatment Note  NAME: Muna Simon  : 1929  MRN: [id number]    Date of Service: 5/15/2025    Discharge Recommendations:  IP Rehab   PT Equipment Recommendations  Equipment Needed: No  Other: defer  Therapy discharge recommendations take into account each patient's current medical complexities and are subject to input/oversight from the patient's healthcare team.   Barriers to Home Discharge:   [] Steps to access home entry or bed/bath:   [x] Unable to transfer, ambulate, or propel wheelchair household distances without assist   [x] Limited available assist at home upon discharge    [x] Patient or family requests d/c to post-acute facility    [] Poor cognition/safety awareness for d/c to home alone    []Unable to maintain ordered weight bearing status    [x] Patient with salient signs of long-standing immobility   [x] Patient is at risk for falls    If pt is unable to be seen after this session, please let this note serve as discharge summary.  Please see case management note for discharge disposition.  Thank you.    Patient Diagnosis(es): The primary encounter diagnosis was Persistent atrial fibrillation with rapid ventricular response (HCC). Diagnoses of Atrial fibrillation with rapid ventricular response (HCC), Stage 3a chronic kidney disease (HCC), TEREZA (acute kidney injury), and SOB (shortness of breath) were also pertinent to this visit.    Assessment  Assessment: Pt reported 10/10 pain and having difficulty breathing on arrival while in recliner chair, agreeable to therapy. Pt was able to transfer using Stedy this session, MOD A x 2. Bed mobility with MIN- MOD A x 2, encouragement throughout for increasing active movement. Pt continues to be below baseline with mobility and would benefit from PT to further strength, balance, and mobility training prior to d/c to IPR.  Activity Tolerance: Patient limited by pain;Patient limited by 
Physical Therapy  Facility/Department: [facility] C2 CARD TELEMETRY  Physical Therapy Initial Assessment    Name: Muna Simon  : 1929  MRN: [id number]  Date of Service: 2025    Discharge Recommendations:  IP Rehab   PT Equipment Recommendations  Equipment Needed: No  Other: defer  Therapy discharge recommendations take into account each patient's current medical complexities and are subject to input/oversight from the patient's healthcare team.   Barriers to Home Discharge:   [] Steps to access home entry or bed/bath   [x] Unable to transfer, ambulate, or propel wheelchair household distances without assist   [x] Limited available assist at home upon discharge    [x] Patient or family requests d/c to post-acute facility    [] Poor cognition/safety awareness for d/c to home alone    []Unable to maintain ordered weight bearing status    [x] Patient with salient signs of long-standing immobility   [x] Patient is at risk for falls   If pt is unable to be seen after this session, please let this note serve as discharge summary.  Please see case management note for discharge disposition.  Thank you.    Patient Diagnosis(es): The primary encounter diagnosis was Persistent atrial fibrillation with rapid ventricular response (HCC). Diagnoses of Atrial fibrillation with rapid ventricular response (HCC), Stage 3a chronic kidney disease (HCC), TEREZA (acute kidney injury), and SOB (shortness of breath) were also pertinent to this visit.  Past Medical History:  has a past medical history of Arthritis, Atrial fibrillation (HCC), Back fracture, CAD (coronary artery disease), Cancer (HCC), CHF (congestive heart failure) (HCC), HTN (hypertension), Hyperlipidemia, Panic attack, and Wears dentures.  Past Surgical History:  has a past surgical history that includes Hysterectomy; Hemorrhoid surgery; Colonoscopy (); eye surgery; and vein bypass graft,aortobifemoral (N/A, 2025).    Assessment  Body Structures, Functions, 
Physical Therapy  Facility/Department: [facility] C2 CARD TELEMETRY  Physical Therapy Initial Assessment    Name: Muna Simon  : 1929  MRN: [id number]  Date of Service: 2025    Discharge Recommendations:  IP Rehab   PT Equipment Recommendations  Equipment Needed: No  Other: defer    Therapy discharge recommendations are subject to collaboration from the patient’s interdisciplinary healthcare team, including MD and case management recommendations.    Barriers to Home Discharge:   [x] Steps to access home entry or bed/bath:   [x] Unable to transfer, ambulate, or propel wheelchair household distances without assist   [x] Limited available assist at home upon discharge    [] Patient or family requests d/c to post-acute facility    [] Poor cognition/safety awareness for d/c to home alone    [] Unable to maintain ordered weight bearing status    [] Patient with salient signs of long-standing immobility   [x] Decreased independence with ADLs   [x] Increased risk for falls   [] Other:    If pt is unable to be seen after this session, please let this note serve as discharge summary.  Please see case management note for discharge disposition.  Thank you.        Patient Diagnosis(es): The primary encounter diagnosis was Persistent atrial fibrillation with rapid ventricular response (HCC). Diagnoses of Atrial fibrillation with rapid ventricular response (HCC), Stage 3a chronic kidney disease (HCC), TEREZA (acute kidney injury), and SOB (shortness of breath) were also pertinent to this visit.  Past Medical History:  has a past medical history of Arthritis, Atrial fibrillation (HCC), Back fracture, CAD (coronary artery disease), Cancer (HCC), CHF (congestive heart failure) (HCC), HTN (hypertension), Hyperlipidemia, Panic attack, and Wears dentures.  Past Surgical History:  has a past surgical history that includes Hysterectomy; Hemorrhoid surgery; Colonoscopy (); eye surgery; and vein bypass graft,aortobifemoral (N/A, 
Physical Therapy & Occupational Therapy    Per chart review, patient with increased O2 demands (100% FiO2 on BiPAP this AM), not medically appropriate to participate in therapy this date. Will follow  up as medically appropriate.    Jacinta Alford, PT, DPT  Kristi Chun OTR/L    
Physical Therapy & Occupational Therapy    Upon bedside rounding (~1030 a.m.), patient in a-fib with HR as high as 140 at rest. Not medically appropriate to participate in therapy at this time. Will follow up as medically appropriate.    Jacinta Alford, PT, DPT  Kristi Chun, OTR/L  
Physical Therapy & Occupational Therapy  Pt re-intubated last night, sedated. Therapy held this date. Will follow up as medically appropriate.     Jacinta Alford, PT, DPT  Miley Hays, OTR/L  
Physical/Occupational Therapy    PT/OT attempted to see patient this morning, chart reviewed, per RN and palliative care RN Lyudmila, pt is going comfort care and is discontinuing therapy services at this time. PT/OT will sign off. If the need arises please reconsult, thank you.     Spencer Webb, PT, DPT   Nadiya Saul, OTR/L    
Placed patient on High Flow Nasal cannula for SPO2 less than 90% on 6lpm nasal cannula   05/08/25 1709   Oxygen Therapy/Pulse Ox   O2 Therapy Oxygen humidified   O2 Device High flow nasal cannula   O2 Flow Rate (L/min) 7 L/min   Pulse 90   SpO2 95 %       
P[facility]ha vascular NP here to see pt  
Pt developed afib with RVR.  12 lead EKG completed.  Magnesium started for replacement.  THAIS Story notified.  Coreg given as ordered and Potassium replacement started.    
Pt extubated to 12L NC at this time  
Pt family met with hoc stated they would like to use [place]. [place] [facility] meeting with pt family in AM.   
Pt has been having brief periods of controlled rate a-fib off & on today with a heart rate still in the 80-90's.  
Pt still c/o being \"unable to breathe\". Sa02 >92% on HFNC @ 5L. Pt refusing bipap. Using sling pt placed up in chair.  
Repeat APTT done: 84.7, no change to dosing per Heparin protocol.  Nicolasa Hernandez RN    
Rounds done with Critical care MD. Will bronch patient this afternoon. New order noted for Versed for sedation for bronch. Family updated by MD at bedside.  Nicolasa Hernandez RN    
Shift:  0700 -1900    Reason for ICU Admission: Aortobifemoral Bypass    Most recent vitals: BP (!) 110/50   Pulse 68   Temp 98.4 °F (36.9 °C) (Bladder)   Resp 20   Ht 1.6 m (5' 3\")   Wt 63.1 kg (139 lb 1.8 oz)   SpO2 98%   BMI 24.64 kg/m²      Drip rates at handoff:    propofol 15 mcg/kg/min (05/12/25 0600)    fentaNYL 20 mcg/hr (05/12/25 0600)    norepinephrine 7 mcg/min (05/12/25 0600)    amiodarone 0.5 mg/min (05/12/25 0618)    furosemide (LASIX) 500 mg in sodium chloride 0.9 % 100 mL infusion Stopped (05/10/25 0359)    prismaSATE BGK 4/2.5 500 mL/hr at 05/11/25 1635    prismaSATE BGK 4/2.5 500 mL/hr at 05/11/25 1634    prismaSATE BGK 4/2.5 500 mL/hr at 05/11/25 1634    heparin (PORCINE) Infusion 11 Units/kg/hr (05/12/25 0600)    sodium chloride Stopped (05/11/25 1806)    nitroGLYCERIN      norepinephrine         Current O2:   [] Room Air   [x] Heated high flow(Vapotherm)   [] BiPaP FiO2 100%   [] Vented  % Fi02,  Peep    Hospital Course:  ICU Day # 0 (5/5/25)  -Bifem from Dr. Metz  -UOP averaging 20mL/hr; 500mL NS bolus + maintenance NS & LR  -PCA pump morphine for pain control  -UOP 252mL/8hr shift    ICU Day #1  - NGT removed  - R rad A line removed  - PCA d/c  - up to chair   - NS d/c, LR @75mL   - without complaints of pain   - Flow track off  -UOP excellet  -IV 75/hr  -Up in chair x 3-4 hours   -Well tolerated   Nights  - UOP: 655ml  - PRN pain meds given x3  - No acute events overnight    ICU Day #2   Developed afib with RVR at 0800  Started Heparin and Amiodarone   PRN Oxycodone x 2  Dressings dry and intact  Right IJ and coy catheter continued today as u/o low     ICU Night #2  - conts to c/o pain  - PRN morphine & oxy given  - low U/O overnight  - Right IJ dressing changed  - wt 60.1kg    ICU Days #3  - PRN oxy  - UOP 75mL, Nephro aware  - Normal diet started  - Cardiovert to NS, 30 of prop for sedation, O2 requirements increased post cardiovert.   - 7L HFNC    ICU Nights #3 (5/8/25)  - 
Shift:  0700 -1900    Reason for ICU Admission: Aortobifemoral Bypass    Most recent vitals: BP (!) 113/48   Pulse 82   Temp 98.5 °F (36.9 °C) (Bladder)   Resp 23   Ht 1.6 m (5' 3\")   Wt 67.4 kg (148 lb 9.4 oz)   SpO2 96%   BMI 26.32 kg/m²      Drip rates at handoff:    amiodarone 0.5 mg/min (05/10/25 1720)    furosemide (LASIX) 500 mg in sodium chloride 0.9 % 100 mL infusion Stopped (05/10/25 0359)    prismaSATE BGK 4/2.5 500 mL/hr at 05/10/25 1013    prismaSATE BGK 4/2.5 500 mL/hr at 05/10/25 1012    prismaSATE BGK 4/2.5 500 mL/hr at 05/10/25 1012    heparin (PORCINE) Infusion 5 Units/kg/hr (05/10/25 1700)    sodium chloride 5 mL/hr at 05/10/25 1700    nitroGLYCERIN      norepinephrine         Current O2:   [] Room Air   [x] Heated high flow(Vapotherm)   [] BiPaP FiO2 100%   [] Vented  % Fi02,  Peep    Hospital Course:  ICU Day # 0 (5/5/25)  -Bifem from Dr. Metz  -UOP averaging 20mL/hr; 500mL NS bolus + maintenance NS & LR  -PCA pump morphine for pain control  -UOP 252mL/8hr shift    ICU Day #1  - NGT removed  - R rad A line removed  - PCA d/c  - up to chair   - NS d/c, LR @75mL   - without complaints of pain   - Flow track off  -UOP excellet  -IV 75/hr  -Up in chair x 3-4 hours   -Well tolerated   Nights  - UOP: 655ml  - PRN pain meds given x3  - No acute events overnight    ICU Day #2   Developed afib with RVR at 0800  Started Heparin and Amiodarone   PRN Oxycodone x 2  Dressings dry and intact  Right IJ and coy catheter continued today as u/o low     ICU Night #2  - conts to c/o pain  - PRN morphine & oxy given  - low U/O overnight  - Right IJ dressing changed  - wt 60.1kg    ICU Days #3  - PRN oxy  - UOP 75mL, Nephro aware  - Normal diet started  - Cardiovert to NS, 30 of prop for sedation, O2 requirements increased post cardiovert.   - 7L HFNC    ICU Nights #3 (5/8/25)  - Anxious  - Vascular paged   - One time dose Melatonin  - 0438 paged vascular SOB increase O2 demands  - 15 L HF + Non 
Shift:  0700 -1900    Reason for ICU Admission: Aortobifemoral Bypass    Most recent vitals: BP (!) 119/57   Pulse 67   Temp 98.6 °F (37 °C) (Bladder)   Resp 20   Ht 1.6 m (5' 3\")   Wt 63.1 kg (139 lb 1.8 oz)   SpO2 100%   BMI 24.64 kg/m²      Drip rates at handoff:    fentaNYL      propofol 20 mcg/kg/min (05/12/25 1559)    norepinephrine 3 mcg/min (05/12/25 1559)    amiodarone 0.5 mg/min (05/12/25 1559)    prismaSATE BGK 4/2.5 500 mL/hr at 05/12/25 1307    prismaSATE BGK 4/2.5 500 mL/hr at 05/12/25 1305    prismaSATE BGK 4/2.5 500 mL/hr at 05/12/25 1309    heparin (PORCINE) Infusion 7 Units/kg/hr (05/12/25 1559)    sodium chloride Stopped (05/12/25 1345)    nitroGLYCERIN         Current O2:   [] Room Air   [] Heated high flow(Vapotherm)   [] BiPaP FiO2 100%   [x] Vented 40 % Fi02,  Peep 5    Hospital Course:  ICU Day # 0 (5/5/25)  -Bifem from Dr. Metz  -UOP averaging 20mL/hr; 500mL NS bolus + maintenance NS & LR  -PCA pump morphine for pain control  -UOP 252mL/8hr shift    ICU Day #1  - NGT removed  - R rad A line removed  - PCA d/c  - up to chair   - NS d/c, LR @75mL   - without complaints of pain   - Flow track off  -UOP excellet  -IV 75/hr  -Up in chair x 3-4 hours   -Well tolerated   Nights  - UOP: 655ml  - PRN pain meds given x3  - No acute events overnight    ICU Day #2   Developed afib with RVR at 0800  Started Heparin and Amiodarone   PRN Oxycodone x 2  Dressings dry and intact  Right IJ and coy catheter continued today as u/o low     ICU Night #2  - conts to c/o pain  - PRN morphine & oxy given  - low U/O overnight  - Right IJ dressing changed  - wt 60.1kg    ICU Days #3  - PRN oxy  - UOP 75mL, Nephro aware  - Normal diet started  - Cardiovert to NS, 30 of prop for sedation, O2 requirements increased post cardiovert.   - 7L HFNC    ICU Nights #3 (5/8/25)  - Anxious  - Vascular paged   - One time dose Melatonin  - 0438 paged vascular SOB increase O2 demands  - 15 L HF + Non rebreather  - New 
Shift:  0700 -1900    Reason for ICU Admission: Aortobifemoral Bypass    Most recent vitals: BP (!) 123/59   Pulse 89   Temp 98.4 °F (36.9 °C) (Bladder)   Resp (!) 35   Ht 1.6 m (5' 3\")   Wt 62.6 kg (138 lb 0.1 oz)   SpO2 (!) 80%   BMI 24.45 kg/m²      Drip rates at handoff:    amiodarone 0.5 mg/min (05/11/25 1900)    furosemide (LASIX) 500 mg in sodium chloride 0.9 % 100 mL infusion Stopped (05/10/25 0359)    prismaSATE BGK 4/2.5 500 mL/hr at 05/11/25 1635    prismaSATE BGK 4/2.5 500 mL/hr at 05/11/25 1634    prismaSATE BGK 4/2.5 500 mL/hr at 05/11/25 1634    heparin (PORCINE) Infusion 9 Units/kg/hr (05/11/25 1900)    sodium chloride Stopped (05/11/25 1806)    nitroGLYCERIN      norepinephrine         Current O2:   [] Room Air   [x] Heated high flow(Vapotherm)   [] BiPaP FiO2 100%   [] Vented  % Fi02,  Peep    Hospital Course:  ICU Day # 0 (5/5/25)  -Bifem from Dr. Metz  -UOP averaging 20mL/hr; 500mL NS bolus + maintenance NS & LR  -PCA pump morphine for pain control  -UOP 252mL/8hr shift    ICU Day #1  - NGT removed  - R rad A line removed  - PCA d/c  - up to chair   - NS d/c, LR @75mL   - without complaints of pain   - Flow track off  -UOP excellet  -IV 75/hr  -Up in chair x 3-4 hours   -Well tolerated   Nights  - UOP: 655ml  - PRN pain meds given x3  - No acute events overnight    ICU Day #2   Developed afib with RVR at 0800  Started Heparin and Amiodarone   PRN Oxycodone x 2  Dressings dry and intact  Right IJ and coy catheter continued today as u/o low     ICU Night #2  - conts to c/o pain  - PRN morphine & oxy given  - low U/O overnight  - Right IJ dressing changed  - wt 60.1kg    ICU Days #3  - PRN oxy  - UOP 75mL, Nephro aware  - Normal diet started  - Cardiovert to NS, 30 of prop for sedation, O2 requirements increased post cardiovert.   - 7L HFNC    ICU Nights #3 (5/8/25)  - Anxious  - Vascular paged   - One time dose Melatonin  - 0438 paged vascular SOB increase O2 demands  - 15 L HF + Non 
Shift:  0700 -1900    Reason for ICU Admission: Aortobifemoral Bypass    Most recent vitals: BP (!) 129/58   Pulse 79   Temp 98.5 °F (36.9 °C) (Bladder)   Resp (!) 100   Ht 1.6 m (5' 2.99\")   Wt 61.3 kg (135 lb 2.3 oz)   SpO2 93%   BMI 23.95 kg/m²      Drip rates at handoff:    norepinephrine Stopped (05/13/25 0819)    amiodarone 0.5 mg/min (05/13/25 1533)    heparin (PORCINE) Infusion 8 Units/kg/hr (05/13/25 1024)    sodium chloride Stopped (05/13/25 1013)    nitroGLYCERIN         Current O2:   [] Room Air   [] Heated high flow(Vapotherm)   [x] BiPaP FiO2 100%   [] Vented 40 % Fi02,  Peep 5    Hospital Course:  ICU Day # 0 (5/5/25)  -Bifem from Dr. Metz  -UOP averaging 20mL/hr; 500mL NS bolus + maintenance NS & LR  -PCA pump morphine for pain control  -UOP 252mL/8hr shift    ICU Day #1  - NGT removed  - R rad A line removed  - PCA d/c  - up to chair   - NS d/c, LR @75mL   - without complaints of pain   - Flow track off  -UOP excellet  -IV 75/hr  -Up in chair x 3-4 hours   -Well tolerated   Nights  - UOP: 655ml  - PRN pain meds given x3  - No acute events overnight    ICU Day #2   Developed afib with RVR at 0800  Started Heparin and Amiodarone   PRN Oxycodone x 2  Dressings dry and intact  Right IJ and coy catheter continued today as u/o low     ICU Night #2  - conts to c/o pain  - PRN morphine & oxy given  - low U/O overnight  - Right IJ dressing changed  - wt 60.1kg    ICU Days #3  - PRN oxy  - UOP 75mL, Nephro aware  - Normal diet started  - Cardiovert to NS, 30 of prop for sedation, O2 requirements increased post cardiovert.   - 7L HFNC    ICU Nights #3 (5/8/25)  - Anxious  - Vascular paged   - One time dose Melatonin  - 0438 paged vascular SOB increase O2 demands  - 15 L HF + Non rebreather  - New orders from KADNY Jolley  - Caleb UO Vascular aware  - IVF stopped  - ABG sent  - IV lasix 20 x1  -    Latest Reference Range & Units 05/09/25 04:24   NT Pro-BNP 0 - 449 pg/mL 14,924 (H)   (H): Data is 
Shift:  4019-5998 (5/14/25) Days    Reason for ICU Admission: Scheduled Aortobifemoral Bypass     Most recent vitals: /61   Pulse 88   Temp (!) 96.1 °F (35.6 °C) (Bladder)   Resp 27   Ht 1.6 m (5' 2.99\")   Wt 60.3 kg (132 lb 15 oz)   SpO2 (!) 85%   BMI 23.55 kg/m²      Drip rates at handoff:    sodium chloride Stopped (05/13/25 1013)       Current O2:   [] Room Air   [] Heated high flow(Vapotherm)   [x] BiPaP FiO2 100%   [x] Nasal Cannula HF @ 5L   [] Vented 40 % Fi02,  Peep 5    Hospital Course:  ICU Day # 0 (5/5/25)  -Bifem from Dr. Metz  -UOP averaging 20mL/hr; 500mL NS bolus + maintenance NS & LR  -PCA pump morphine for pain control  -UOP 252mL/8hr shift    ICU Day #1  - NGT removed  - R rad A line removed  - PCA d/c  - up to chair   - NS d/c, LR @75mL   - without complaints of pain   - Flow track off  -UOP excellet  -IV 75/hr  -Up in chair x 3-4 hours   -Well tolerated   Nights  - UOP: 655ml  - PRN pain meds given x3  - No acute events overnight    ICU Day #2   Developed afib with RVR at 0800  Started Heparin and Amiodarone   PRN Oxycodone x 2  Dressings dry and intact  Right IJ and coy catheter continued today as u/o low     ICU Night #2  - conts to c/o pain  - PRN morphine & oxy given  - low U/O overnight  - Right IJ dressing changed  - wt 60.1kg    ICU Days #3  - PRN oxy  - UOP 75mL, Nephro aware  - Normal diet started  - Cardiovert to NS, 30 of prop for sedation, O2 requirements increased post cardiovert.   - 7L HFNC    ICU Nights #3 (5/8/25)  - Anxious  - Vascular paged   - One time dose Melatonin  - 0438 paged vascular SOB increase O2 demands  - 15 L HF + Non rebreather  - New orders from KANDY Jolley  - Low UO Vascular aware  - IVF stopped  - ABG sent  - IV lasix 20 x1  -    Latest Reference Range & Units 05/09/25 04:24   NT Pro-BNP 0 - 449 pg/mL 14,924 (H)   (H): Data is abnormally high  -CXR ordered    ICU Days #4 (5/9/25)  - Bipap  - Lasix gtt started  - Heparin gtt restarted  - CRRT 
Shift:  4288-8892 (5/14/25) Days    Reason for ICU Admission: Aortobifemoral Bypass    Most recent vitals: BP (!) 131/51   Pulse 84   Temp 99.1 °F (37.3 °C) (Bladder)   Resp 20   Ht 1.6 m (5' 2.99\")   Wt 60.6 kg (133 lb 9.6 oz)   SpO2 99%   BMI 23.67 kg/m²      Drip rates at handoff:    norepinephrine      sodium chloride Stopped (05/13/25 1013)    nitroGLYCERIN         Current O2:   [] Room Air   [] Heated high flow(Vapotherm)   [x] BiPaP FiO2 100%   [x] Nasal Cannula @ 7L   [] Vented 40 % Fi02,  Peep 5    Hospital Course:  ICU Day # 0 (5/5/25)  -Bifem from Dr. Metz  -UOP averaging 20mL/hr; 500mL NS bolus + maintenance NS & LR  -PCA pump morphine for pain control  -UOP 252mL/8hr shift    ICU Day #1  - NGT removed  - R rad A line removed  - PCA d/c  - up to chair   - NS d/c, LR @75mL   - without complaints of pain   - Flow track off  -UOP excellet  -IV 75/hr  -Up in chair x 3-4 hours   -Well tolerated   Nights  - UOP: 655ml  - PRN pain meds given x3  - No acute events overnight    ICU Day #2   Developed afib with RVR at 0800  Started Heparin and Amiodarone   PRN Oxycodone x 2  Dressings dry and intact  Right IJ and coy catheter continued today as u/o low     ICU Night #2  - conts to c/o pain  - PRN morphine & oxy given  - low U/O overnight  - Right IJ dressing changed  - wt 60.1kg    ICU Days #3  - PRN oxy  - UOP 75mL, Nephro aware  - Normal diet started  - Cardiovert to NS, 30 of prop for sedation, O2 requirements increased post cardiovert.   - 7L HFNC    ICU Nights #3 (5/8/25)  - Anxious  - Vascular paged   - One time dose Melatonin  - 0438 paged vascular SOB increase O2 demands  - 15 L HF + Non rebreather  - New orders from KANDY Jolley  - Low UO Vascular aware  - IVF stopped  - ABG sent  - IV lasix 20 x1  -    Latest Reference Range & Units 05/09/25 04:24   NT Pro-BNP 0 - 449 pg/mL 14,924 (H)   (H): Data is abnormally high  -CXR ordered    ICU Days #4 (5/9/25)  - Bipap  - Lasix gtt started  - Heparin 
Shift:  6655-5374    Reason for ICU Admission: Aortobifemoral Bypass    Most recent vitals: BP (!) 127/53   Pulse 87   Temp 99.6 °F (37.6 °C) (Bladder)   Resp (!) 100   Ht 1.6 m (5' 2.99\")   Wt 60.6 kg (133 lb 9.6 oz)   SpO2 98%   BMI 23.67 kg/m²      Drip rates at handoff:    norepinephrine      amiodarone 0.5 mg/min (05/14/25 0623)    heparin (PORCINE) Infusion 12 Units/kg/hr (05/14/25 0745)    sodium chloride Stopped (05/13/25 1013)    nitroGLYCERIN         Current O2:   [] Room Air   [] Heated high flow(Vapotherm)   [x] BiPaP FiO2 100%   [x] Nasal Cannula @ 10L   [] Vented 40 % Fi02,  Peep 5    Hospital Course:  ICU Day # 0 (5/5/25)  -Bifem from Dr. Metz  -UOP averaging 20mL/hr; 500mL NS bolus + maintenance NS & LR  -PCA pump morphine for pain control  -UOP 252mL/8hr shift    ICU Day #1  - NGT removed  - R rad A line removed  - PCA d/c  - up to chair   - NS d/c, LR @75mL   - without complaints of pain   - Flow track off  -UOP excellet  -IV 75/hr  -Up in chair x 3-4 hours   -Well tolerated   Nights  - UOP: 655ml  - PRN pain meds given x3  - No acute events overnight    ICU Day #2   Developed afib with RVR at 0800  Started Heparin and Amiodarone   PRN Oxycodone x 2  Dressings dry and intact  Right IJ and coy catheter continued today as u/o low     ICU Night #2  - conts to c/o pain  - PRN morphine & oxy given  - low U/O overnight  - Right IJ dressing changed  - wt 60.1kg    ICU Days #3  - PRN oxy  - UOP 75mL, Nephro aware  - Normal diet started  - Cardiovert to NS, 30 of prop for sedation, O2 requirements increased post cardiovert.   - 7L HFNC    ICU Nights #3 (5/8/25)  - Anxious  - Vascular paged   - One time dose Melatonin  - 0438 paged vascular SOB increase O2 demands  - 15 L HF + Non rebreather  - New orders from KANDY Jolley  - Low UO Vascular aware  - IVF stopped  - ABG sent  - IV lasix 20 x1  -    Latest Reference Range & Units 05/09/25 04:24   NT Pro-BNP 0 - 449 pg/mL 14,924 (H)   (H): Data is 
Shift:  7616-6851     Reason for ICU Admission: Scheduled Aortobifemoral Bypass     Most recent vitals: BP (!) 104/59   Pulse 93   Temp (!) 96 °F (35.6 °C) (Bladder)   Resp 26   Ht 1.6 m (5' 2.99\")   Wt 60.3 kg (132 lb 15 oz)   SpO2 (!) 82%   BMI 23.55 kg/m²      Drip rates at handoff:    sodium chloride Stopped (05/13/25 1013)       Current O2:   [] Room Air   [] Heated high flow(Vapotherm)   [] BiPaP FiO2 100%   [x] Nasal Cannula HF @ 12L   [] Vented 40 % Fi02,  Peep 5    Hospital Course:  ICU Day # 0 (5/5/25)  -Bifem from Dr. Metz  -UOP averaging 20mL/hr; 500mL NS bolus + maintenance NS & LR  -PCA pump morphine for pain control  -UOP 252mL/8hr shift    ICU Day #1  - NGT removed  - R rad A line removed  - PCA d/c  - up to chair   - NS d/c, LR @75mL   - without complaints of pain   - Flow track off  -UOP excellet  -IV 75/hr  -Up in chair x 3-4 hours   -Well tolerated   Nights  - UOP: 655ml  - PRN pain meds given x3  - No acute events overnight    ICU Day #2   Developed afib with RVR at 0800  Started Heparin and Amiodarone   PRN Oxycodone x 2  Dressings dry and intact  Right IJ and coy catheter continued today as u/o low     ICU Night #2  - conts to c/o pain  - PRN morphine & oxy given  - low U/O overnight  - Right IJ dressing changed  - wt 60.1kg    ICU Days #3  - PRN oxy  - UOP 75mL, Nephro aware  - Normal diet started  - Cardiovert to NS, 30 of prop for sedation, O2 requirements increased post cardiovert.   - 7L HFNC    ICU Nights #3 (5/8/25)  - Anxious  - Vascular paged   - One time dose Melatonin  - 0438 paged vascular SOB increase O2 demands  - 15 L HF + Non rebreather  - New orders from KANDY Jolley  - Low UO Vascular aware  - IVF stopped  - ABG sent  - IV lasix 20 x1  -    Latest Reference Range & Units 05/09/25 04:24   NT Pro-BNP 0 - 449 pg/mL 14,924 (H)   (H): Data is abnormally high  -CXR ordered    ICU Days #4 (5/9/25)  - Bipap  - Lasix gtt started  - Heparin gtt restarted  - CRRT started 
Shift:  7677-1211 (5/14/25) Days    Reason for ICU Admission: Scheduled Aortobifemoral Bypass     Most recent vitals: BP (!) 84/53   Pulse 90   Temp 97.5 °F (36.4 °C) (Oral)   Resp 19   Ht 1.6 m (5' 2.99\")   Wt 60.3 kg (132 lb 15 oz)   SpO2 (!) 89%   BMI 23.55 kg/m²      Drip rates at handoff:    norepinephrine      sodium chloride Stopped (05/13/25 1013)    nitroGLYCERIN         Current O2:   [] Room Air   [] Heated high flow(Vapotherm)   [] BiPaP FiO2 100%   [x] Nasal Cannula HF @ 5L   [] Vented 40 % Fi02,  Peep 5    Hospital Course:  ICU Day # 0 (5/5/25)  -Bifem from Dr. Metz  -UOP averaging 20mL/hr; 500mL NS bolus + maintenance NS & LR  -PCA pump morphine for pain control  -UOP 252mL/8hr shift    ICU Day #1  - NGT removed  - R rad A line removed  - PCA d/c  - up to chair   - NS d/c, LR @75mL   - without complaints of pain   - Flow track off  -UOP excellet  -IV 75/hr  -Up in chair x 3-4 hours   -Well tolerated   Nights  - UOP: 655ml  - PRN pain meds given x3  - No acute events overnight    ICU Day #2   Developed afib with RVR at 0800  Started Heparin and Amiodarone   PRN Oxycodone x 2  Dressings dry and intact  Right IJ and coy catheter continued today as u/o low     ICU Night #2  - conts to c/o pain  - PRN morphine & oxy given  - low U/O overnight  - Right IJ dressing changed  - wt 60.1kg    ICU Days #3  - PRN oxy  - UOP 75mL, Nephro aware  - Normal diet started  - Cardiovert to NS, 30 of prop for sedation, O2 requirements increased post cardiovert.   - 7L HFNC    ICU Nights #3 (5/8/25)  - Anxious  - Vascular paged   - One time dose Melatonin  - 0438 paged vascular SOB increase O2 demands  - 15 L HF + Non rebreather  - New orders from KANDY Jolley  - Low UO Vascular aware  - IVF stopped  - ABG sent  - IV lasix 20 x1  -    Latest Reference Range & Units 05/09/25 04:24   NT Pro-BNP 0 - 449 pg/mL 14,924 (H)   (H): Data is abnormally high  -CXR ordered    ICU Days #4 (5/9/25)  - Bipap  - Lasix gtt 
Shift: 1542-3007    Reason for ICU Admission: Aortobifemoral Bypass    Most recent vitals: /62   Pulse 85   Temp 97.8 °F (36.6 °C) (Oral)   Resp 24   Ht 1.6 m (5' 3\")   Wt 68.2 kg (150 lb 5.7 oz) Comment: unable to stand pt at this time  SpO2 99%   BMI 26.63 kg/m²      Drip rates at handoff:    amiodarone 0.5 mg/min (05/09/25 0400)    sodium chloride      nitroGLYCERIN      norepinephrine         Current O2:   [] Room Air   [x] NC 15 L + (NRB)*   [] BiPaP    [] Vented  % Fi02,  Peep    Hospital Course:  ICU Day # 0 (5/5/25)  -Bifem from Dr. Metz  -UOP averaging 20mL/hr; 500mL NS bolus + maintenance NS & LR  -PCA pump morphine for pain control  -UOP 252mL/8hr shift    ICU Day #1  - NGT removed  - R rad A line removed  - PCA d/c  - up to chair   - NS d/c, LR @75mL   - without complaints of pain   - Flow track off  -UOP excellet  -IV 75/hr  -Up in chair x 3-4 hours   -Well tolerated   Nights  - UOP: 655ml  - PRN pain meds given x3  - No acute events overnight    ICU Day #2   Developed afib with RVR at 0800  Started Heparin and Amiodarone   PRN Oxycodone x 2  Dressings dry and intact  Right IJ and coy catheter continued today as u/o low     ICU Night #2  - conts to c/o pain  - PRN morphine & oxy given  - low U/O overnight  - Right IJ dressing changed  - wt 60.1kg    ICU Days #3  - PRN oxy  - UOP 75mL, Nephro aware  - Normal diet started  - Cardiovert to NS, 30 of prop for sedation, O2 requirements increased post cardiovert.   - 7L HFNC    ICU Nights #3 (5/8/25)  - Anxious  - Vascular paged   - One time dose Melatonin  - 0438 paged vascular SOB increase O2 demands  - 15 L HF + Non rebreather  - New orders from KANDY Jolley  - Low UO Vascular aware  - IVF stopped  - ABG sent  - IV lasix 20 x1  -    Latest Reference Range & Units 05/09/25 04:24   NT Pro-BNP 0 - 449 pg/mL 14,924 (H)   (H): Data is abnormally high  -CXR ordered  -  Electronically signed by Radha Parada RN on 5/8/2025 at 5:58 AM    
Shift: 2642-4537    Reason for ICU Admission: Aortobifemoral Bypass    Most recent vitals: BP (!) 145/64   Pulse 98   Temp 99.8 °F (37.7 °C) (Bladder)   Resp 19   Ht 1.6 m (5' 3\")   Wt 58.1 kg (128 lb 1.4 oz)   SpO2 92%   BMI 22.69 kg/m²      Drip rates at handoff:    lactated ringers 75 mL/hr at 05/06/25 1705    sodium chloride      nitroGLYCERIN      norepinephrine         Current O2:   [x] Room Air   [] NC 2 L   [] BiPaP    [] Vented  % Fi02,  Peep    Hospital Course:  ICU Day # 0 (5/5/25)  -Bifem from Dr. Metz  -UOP averaging 20mL/hr; 500mL NS bolus + maintenance NS & LR  -PCA pump morphine for pain control  -UOP 252mL/8hr shift    ICU Day #1  UOP excellet  IV 75/hr  PCA discontinued  Up in chair x 3-4 hours   Well tolerated   
Shift: 2932-8544    Reason for ICU Admission: Aortobifemoral Bypass    Most recent vitals: /62   Pulse (!) 109   Temp 98.4 °F (36.9 °C) (Axillary)   Resp 25   Ht 1.6 m (5' 3\")   Wt 62.6 kg (138 lb 0.1 oz)   SpO2 94%   BMI 24.45 kg/m²      Drip rates at handoff:    heparin (PORCINE) Infusion 12 Units/kg/hr (05/07/25 1841)    amiodarone 0.5 mg/min (05/07/25 1841)    sodium chloride      nitroGLYCERIN      norepinephrine         Current O2:   [] Room Air   [x] NC 4 L   [] BiPaP    [] Vented  % Fi02,  Peep    Hospital Course:  ICU Day # 0 (5/5/25)  -Bifem from Dr. Metz  -UOP averaging 20mL/hr; 500mL NS bolus + maintenance NS & LR  -PCA pump morphine for pain control  -UOP 252mL/8hr shift    ICU Day #1  - NGT removed  - R rad A line removed  - PCA d/c  - up to chair   - NS d/c, LR @75mL   - without complaints of pain   - Flow track off  -UOP excellet  -IV 75/hr  -Up in chair x 3-4 hours   -Well tolerated   Nights  - UOP: 655ml  - PRN pain meds given x3  - No acute events overnight    ICU Day #2   Developed afib with RVR at 0800  Started Heparin and Amiodarone   PRN Oxycodone x 2  Dressings dry and intact  Right IJ and coy catheter continued today as u/o low   
Shift: 3114-1807    Reason for ICU Admission: Aortobifemoral Bypass    Most recent vitals: BP (!) 125/59   Pulse (!) 104   Temp 98 °F (36.7 °C) (Oral)   Resp 24   Ht 1.6 m (5' 3\")   Wt 58.1 kg (128 lb 1.4 oz)   SpO2 92%   BMI 22.69 kg/m²      Drip rates at handoff:    lactated ringers 75 mL/hr at 05/06/25 2357    sodium chloride      nitroGLYCERIN      norepinephrine         Current O2:   [x] Room Air   [] NC 2 L   [] BiPaP    [] Vented  % Fi02,  Peep    Hospital Course:  ICU Day # 0 (5/5/25)  -Bifem from Dr. Metz  -UOP averaging 20mL/hr; 500mL NS bolus + maintenance NS & LR  -PCA pump morphine for pain control  -UOP 252mL/8hr shift    ICU Day #1  - NGT removed  - R rad A line removed  - PCA d/c  - up to chair   - NS d/c, LR @75mL   - without complaints of pain   - Flow track off  -UOP excellet  -IV 75/hr  -Up in chair x 3-4 hours   -Well tolerated   Nights  - UOP: 655ml  - PRN pain meds given x3  - No acute events overnight  
Shift: 5160-6987    Reason for ICU Admission: Aortobifemoral Bypass    Most recent vitals: BP (!) 116/58   Pulse 83   Temp 97.5 °F (36.4 °C) (Oral)   Resp 29   Ht 1.6 m (5' 3\")   Wt 68.2 kg (150 lb 5.7 oz) Comment: unable to stand pt at this time  SpO2 93%   BMI 26.63 kg/m²      Drip rates at handoff:    furosemide (LASIX) 500 mg in sodium chloride 0.9 % 100 mL infusion Stopped (05/10/25 0359)    prismaSATE BGK 4/2.5 500 mL/hr at 05/10/25 0011    prismaSATE BGK 4/2.5 500 mL/hr at 05/10/25 0011    prismaSATE BGK 4/2.5 500 mL/hr at 05/10/25 0011    heparin (PORCINE) Infusion 7 Units/kg/hr (05/10/25 0500)    amiodarone 0.5 mg/min (05/10/25 0500)    sodium chloride Stopped (05/09/25 6426)    nitroGLYCERIN      norepinephrine         Current O2:   [] Room Air   [] NC 15 L + (NRB)*   [x] BiPaP FiO2 100%   [] Vented  % Fi02,  Peep    Hospital Course:  ICU Day # 0 (5/5/25)  -Bifem from Dr. Metz  -UOP averaging 20mL/hr; 500mL NS bolus + maintenance NS & LR  -PCA pump morphine for pain control  -UOP 252mL/8hr shift    ICU Day #1  - NGT removed  - R rad A line removed  - PCA d/c  - up to chair   - NS d/c, LR @75mL   - without complaints of pain   - Flow track off  -UOP excellet  -IV 75/hr  -Up in chair x 3-4 hours   -Well tolerated   Nights  - UOP: 655ml  - PRN pain meds given x3  - No acute events overnight    ICU Day #2   Developed afib with RVR at 0800  Started Heparin and Amiodarone   PRN Oxycodone x 2  Dressings dry and intact  Right IJ and coy catheter continued today as u/o low     ICU Night #2  - conts to c/o pain  - PRN morphine & oxy given  - low U/O overnight  - Right IJ dressing changed  - wt 60.1kg    ICU Days #3  - PRN oxy  - UOP 75mL, Nephro aware  - Normal diet started  - Cardiovert to NS, 30 of prop for sedation, O2 requirements increased post cardiovert.   - 7L HFNC    ICU Nights #3 (5/8/25)  - Anxious  - Vascular paged   - One time dose Melatonin  - 0438 paged vascular SOB increase O2 demands  - 15 
Shift: 5634-0505    Reason for ICU Admission: Aortobifemoral Bypass    Most recent vitals: /86   Pulse (!) 114   Temp 98.2 °F (36.8 °C) (Oral)   Resp 20   Ht 1.6 m (5' 3\")   Wt 60.3 kg (132 lb 15.4 oz)   SpO2 90%   BMI 23.55 kg/m²      Drip rates at handoff:    heparin (PORCINE) Infusion 10 Units/kg/hr (05/08/25 0054)    amiodarone 0.5 mg/min (05/08/25 0437)    sodium chloride      nitroGLYCERIN      norepinephrine         Current O2:   [] Room Air   [x] NC 4 L   [] BiPaP    [] Vented  % Fi02,  Peep    Hospital Course:  ICU Day # 0 (5/5/25)  -Bifem from Dr. Metz  -UOP averaging 20mL/hr; 500mL NS bolus + maintenance NS & LR  -PCA pump morphine for pain control  -UOP 252mL/8hr shift    ICU Day #1  - NGT removed  - R rad A line removed  - PCA d/c  - up to chair   - NS d/c, LR @75mL   - without complaints of pain   - Flow track off  -UOP excellet  -IV 75/hr  -Up in chair x 3-4 hours   -Well tolerated   Nights  - UOP: 655ml  - PRN pain meds given x3  - No acute events overnight    ICU Day #2   Developed afib with RVR at 0800  Started Heparin and Amiodarone   PRN Oxycodone x 2  Dressings dry and intact  Right IJ and coy catheter continued today as u/o low     ICU Night #2  - conts to c/o pain  - PRN morphine & oxy given  - low U/O overnight  - Right IJ dressing changed  - wt 60.1kg        Electronically signed by Radha Parada RN on 5/8/2025 at 5:52 AM    
Shift: 5967-4298    Reason for ICU Admission: Aortobifemoral Bypass    Most recent vitals: /75   Pulse 91   Temp 98.9 °F (37.2 °C) (Bladder)   Resp 21   Ht 1.6 m (5' 3\")   Wt 58.3 kg (128 lb 7 oz)   SpO2 98%   BMI 22.75 kg/m²      Drip rates at handoff:    lactated ringers 150 mL/hr at 05/05/25 1402    sodium chloride      nitroGLYCERIN      morphine      norepinephrine      sodium chloride 100 mL/hr at 05/05/25 1652       Current O2:   [] Room Air   [x] NC 2 L   [] BiPaP    [] Vented  % Fi02,  Peep    Hospital Course:  ICU Day # 0 (5/5/25)  -Bifem from Dr. Metz  -UOP averaging 20mL/hr; 500mL NS bolus + maintenance NS & LR  -PCA pump morphine for pain control  -UOP 252mL/8hr shift  
Shift: 8363-4242    Reason for ICU Admission: Aortobifemoral Bypass    Most recent vitals: /62   Pulse (!) 101   Temp 99.2 °F (37.3 °C) (Bladder)   Resp 26   Ht 1.6 m (5' 3\")   Wt 58.1 kg (128 lb 1.4 oz)   SpO2 94%   BMI 22.69 kg/m²      Drip rates at handoff:    lactated ringers 75 mL/hr at 05/06/25 0958    sodium chloride      nitroGLYCERIN      norepinephrine         Current O2:   [x] Room Air   [] NC L   [] BiPaP    [] Vented  % Fi02,  Peep    Hospital Course:  ICU Day # 0 (5/5/25)  -Bifem from Dr. Metz  -UOP averaging 20mL/hr; 500mL NS bolus + maintenance NS & LR  -PCA pump morphine for pain control  -UOP 252mL/8hr shift    ICU Day # 1 (5/6/25)  - NGT removed  - R rad A line removed  - PCA d/c  - up to chair   - NS d/c, LR @75mL   - without complaints of pain   - Flow track off  
Shift: 8824-3615    Reason for ICU Admission: Aortobifemoral Bypass    Most recent vitals: /79   Pulse 98   Temp 97.9 °F (36.6 °C) (Oral)   Resp 12   Ht 1.6 m (5' 3\")   Wt 60.1 kg (132 lb 7.9 oz)   SpO2 92%   BMI 23.47 kg/m²      Drip rates at handoff:    sodium chloride Stopped (05/08/25 1250)    amiodarone 0.5 mg/min (05/08/25 1613)    sodium chloride      nitroGLYCERIN      norepinephrine         Current O2:   [] Room Air   [x] NC 7 L   [] BiPaP    [] Vented  % Fi02,  Peep    Hospital Course:  ICU Day # 0 (5/5/25)  -Bifem from Dr. Metz  -UOP averaging 20mL/hr; 500mL NS bolus + maintenance NS & LR  -PCA pump morphine for pain control  -UOP 252mL/8hr shift    ICU Day #1  - NGT removed  - R rad A line removed  - PCA d/c  - up to chair   - NS d/c, LR @75mL   - without complaints of pain   - Flow track off  -UOP excellet  -IV 75/hr  -Up in chair x 3-4 hours   -Well tolerated   Nights  - UOP: 655ml  - PRN pain meds given x3  - No acute events overnight    ICU Day #2   Developed afib with RVR at 0800  Started Heparin and Amiodarone   PRN Oxycodone x 2  Dressings dry and intact  Right IJ and coy catheter continued today as u/o low     ICU Night #2  - conts to c/o pain  - PRN morphine & oxy given  - low U/O overnight  - Right IJ dressing changed  - wt 60.1kg    ICU Days #3  - PRN oxy  - UOP 75mL, Nephro aware  - Normal diet started  - Cardiovert to NS, 30 of prop for sedation, O2 requirements increased post cardiovert.   - 7L HFNC    Electronically signed by Chio Floyd RN on 5/8/2025 at 7:16 PM    
Spontaneous Awakening Trial    Patient meets criteria for spontaneous awakening trial. Assessed at 0600 hours.     Sedation is currently maintained.     Current RASS score is RASS -1 (Drowsy).     Safety assessed. Restraints continued.       SAT deferred at this time as patient responding to commands on minimal sedation levels.    - 20 mcg Fentanyl continued from yesterday   - 20 mcg propofol continued from yesterday    
This RN asked patient if it was okay to get her washed up. Patient stated that she wasn't \"sure if she could get up into the bathroom.\" This RN reassured her that he could get her washed up in bed so that she did not have to worry about moving around too much. Patient stated that she did not want to be washed up at this time and would consider it later in the day. This RN explained concerns of infection with a coy and CVC and needs to cleanse around these areas. Patient was agreeable for this RN to CHG around CVC as well as use perineal wipes on coy.   
Time out completed at 1010 hours. Verified patient’s name,  for consented procedure Bronchoscopy. Pt’s allergies and code status have been verified. Medical providers present in room include Shama URBINA, Dr. Pierre & RN. All providers in agreement.   
Time out done with RN & MD- ID patient &  checked.  Positioned  to right side for procedure. Consent signed by MD Nicolasa Hernandez RN      
Updated Dr Barr regarding continued decreased Urine output 50 ml since giving IV push lasix and starting lasix gtt, will continue to monitor, verbal order to continue lasix gtt for now.   
Vascular    Respiratory issues again overnight despite having a relatively good day yesterday.   Am CXR showing collapse of SUKUMAR.  Discussed with pulmonary need for bronch and removal mucous plugging.  Discussed this with family as well.    Received 40 IV Lasix early this am with 100cc UO since.  Nephro following- will check BMP, additional diuresis per their orders, perhas increased dose Bumex.    
Vascular  surgery rounded & updated by ICU RN. No new orders. Nicolasa Hernandez RN    
Vascular Progress Note    5/17/2025 8:38 AM    Chief complaint / Reason for visit : s/p ABFG (5/5/25)    Subjective:  Patient resting in bed comfortably.  Pleasantly confused.  Family members at bedside.  On 12 lpm high flow nasal cannula.      Vital Signs: BP (!) 122/46   Pulse 81   Temp (!) 96 °F (35.6 °C) (Bladder)   Resp 18   Ht 1.6 m (5' 2.99\")   Wt 60.3 kg (132 lb 15 oz)   SpO2 97%   BMI 23.55 kg/m²  O2 Flow Rate (L/min): 12 L/min   I/O:    Intake/Output Summary (Last 24 hours) at 5/17/2025 0838  Last data filed at 5/17/2025 0817  Gross per 24 hour   Intake 0 ml   Output 440 ml   Net -440 ml       Physical Exam:   General: no apparent distress, appears stated age  Chest/Lungs: no accessory muscle use  Cardiac:  irregular rate rhythm, controlled rate  Abdomen:  no significant distention  Extremities: generalized edema  Skin: bilateral groin incisions (+)    Labs:   Lab Results   Component Value Date/Time     05/15/2025 03:19 AM    K 3.6 05/15/2025 03:19 AM    K 4.3 05/11/2025 05:40 AM    CL 99 05/15/2025 03:19 AM    CO2 24 05/15/2025 03:19 AM    BUN 31 05/15/2025 03:19 AM    CREATININE 2.6 05/15/2025 03:19 AM    GFRAA 51 07/11/2021 07:40 AM    LABGLOM 16 05/15/2025 03:19 AM    LABGLOM 42 12/14/2023 05:30 PM    GLUCOSE 120 05/15/2025 03:19 AM    PHOS 4.3 05/15/2025 03:19 AM    MG 2.22 05/15/2025 03:19 AM    CALCIUM 7.9 05/15/2025 03:19 AM     Lab Results   Component Value Date/Time    WBC 12.2 05/15/2025 03:20 AM    RBC 3.03 05/15/2025 03:20 AM    HGB 9.0 05/15/2025 03:20 AM    HCT 26.8 05/15/2025 03:20 AM    MCV 88.6 05/15/2025 03:20 AM    RDW 15.1 05/15/2025 03:20 AM     05/15/2025 03:20 AM     Lab Results   Component Value Date    INR 2.27 (H) 05/09/2025    PROTIME 25.0 (H) 05/09/2025        Scheduled Meds:    polyethylene glycol  17 g Oral Daily    LORazepam  0.5 mg IntraVENous Once    bumetanide  3 mg IntraVENous BID    senna  1 tablet Oral Nightly    pantoprazole  40 mg Oral QAM AC    
5-40 mL IntraVENous 2 times per day       PRN Meds:  midazolam, ipratropium 0.5 mg-albuterol 2.5 mg, heparin (porcine) **AND** heparin (porcine), potassium chloride, magnesium sulfate, calcium gluconate **OR** calcium gluconate **OR** calcium gluconate **OR** calcium gluconate, sodium phosphate 6 mmol in sodium chloride 0.9 % 250 mL IVPB **OR** sodium phosphate 12 mmol in sodium chloride 0.9 % 250 mL IVPB **OR** sodium phosphate 18 mmol in sodium chloride 0.9 % 500 mL IVPB **OR** sodium phosphate 24 mmol in sodium chloride 0.9 % 500 mL IVPB, heparin (porcine), heparin (porcine), oxyCODONE **OR** oxyCODONE, sodium chloride flush, sodium chloride, hydrALAZINE, nitroGLYCERIN, promethazine **OR** ondansetron, norepinephrine, morphine **OR** morphine    Results:  CBC:   Recent Labs     05/09/25  0756 05/10/25  0456 05/11/25  0540 05/11/25  1605   WBC  --  8.4 13.0*  --    HGB 9.2* 7.5* 7.4*  --    HCT  --  21.0* 21.5* 22.9*   MCV  --  88.5 89.7  --    PLT  --  323 349  --      BMP:   Recent Labs     05/11/25  1335 05/11/25  2240 05/12/25  0445    140 138   K 4.1 4.3 4.2    105 104   CO2 26 26 25   PHOS 1.9* 2.1* 2.2*   BUN 15 15 15   CREATININE 0.9 1.0 0.9     LIVER PROFILE: No results for input(s): \"AST\", \"ALT\", \"LIPASE\", \"AMYLASE\", \"BILIDIR\", \"BILITOT\", \"ALKPHOS\" in the last 72 hours.    Invalid input(s): \"ALB\"  PT/INR:   Recent Labs     05/09/25  1548 05/09/25  2112   PROTIME 34.0* 25.0*   INR 3.38* 2.27*     APTT:   Recent Labs     05/11/25  1438 05/11/25  2240 05/12/25  0610   APTT 84.7* 71.3* 104.1*     UA:  Recent Labs     05/10/25  1019   COLORU Yellow   PHUR 5.5   WBCUA None seen   RBCUA None seen   CLARITYU Clear   LEUKOCYTESUR Negative   UROBILINOGEN 0.2   BILIRUBINUR Negative   BLOODU Negative   GLUCOSEU Negative       Cultures:  Culture, Respiratory (with Gram Stain) [[id number]] Collected: 05/11/25 2200   Order Status: Sent Specimen: BAL- Bronch. Lavage Updated: 05/12/25 0532    Gram Stain 
Meds:  ipratropium 0.5 mg-albuterol 2.5 mg, heparin (porcine) **AND** heparin (porcine), potassium chloride, magnesium sulfate, calcium gluconate **OR** calcium gluconate **OR** calcium gluconate **OR** calcium gluconate, sodium phosphate 6 mmol in sodium chloride 0.9 % 250 mL IVPB **OR** sodium phosphate 12 mmol in sodium chloride 0.9 % 250 mL IVPB **OR** sodium phosphate 18 mmol in sodium chloride 0.9 % 500 mL IVPB **OR** sodium phosphate 24 mmol in sodium chloride 0.9 % 500 mL IVPB, heparin (porcine), heparin (porcine), oxyCODONE **OR** oxyCODONE, sodium chloride flush, sodium chloride, hydrALAZINE, nitroGLYCERIN, promethazine **OR** ondansetron, morphine **OR** morphine    Results:  CBC:   Recent Labs     05/11/25  0540 05/11/25  1605 05/12/25  0744 05/12/25  1500 05/13/25  0630   WBC 13.0*  --  10.3  --  7.9   HGB 7.4*  --  6.9* 8.4* 8.3*   HCT 21.5*   < > 20.8* 24.5* 24.3*   MCV 89.7  --  89.9  --  88.9     --  349  --  276    < > = values in this interval not displayed.     BMP:   Recent Labs     05/11/25  2240 05/12/25  0445 05/12/25  1742    138 136   K 4.3 4.2 3.9    104 104   CO2 26 25 24   PHOS 2.1* 2.2* 1.5*   BUN 15 15 14   CREATININE 1.0 0.9 0.9     LIVER PROFILE: No results for input(s): \"AST\", \"ALT\", \"LIPASE\", \"AMYLASE\", \"BILIDIR\", \"BILITOT\", \"ALKPHOS\" in the last 72 hours.    Invalid input(s): \"ALB\"  PT/INR: No results for input(s): \"PROTIME\", \"INR\" in the last 72 hours.  APTT:   Recent Labs     05/11/25  1438 05/11/25  2240 05/12/25  0610   APTT 84.7* 71.3* 104.1*     UA:  Recent Labs     05/10/25  1019   COLORU Yellow   PHUR 5.5   WBCUA None seen   RBCUA None seen   CLARITYU Clear   LEUKOCYTESUR Negative   UROBILINOGEN 0.2   BILIRUBINUR Negative   BLOODU Negative   GLUCOSEU Negative       Cultures:  Culture, Respiratory (with Gram Stain) [[id number]] Collected: 05/11/25 2200   Order Status: Sent Specimen: BAL- Bronch. Lavage Updated: 05/12/25 0532     Gram Stain Result Cytospin 
Oral Daily    LORazepam  0.5 mg IntraVENous Once    bumetanide  3 mg IntraVENous BID    senna  1 tablet Oral Nightly    pantoprazole  40 mg Oral QAM AC    apixaban  2.5 mg Oral BID    cefTRIAXone (ROCEPHIN) IV  1,000 mg IntraVENous Q24H    amiodarone  400 mg Oral BID    carvedilol  3.125 mg Oral BID    rosuvastatin  10 mg Oral Nightly    tamsulosin  0.4 mg Oral Daily    sodium chloride flush  5-40 mL IntraVENous 2 times per day     Continuous Infusions:    sodium chloride Stopped (05/13/25 1013)         Assessment:   s/p ABFG (5/5/25) - POD # 13  PAF - on Eliquis  TEREZA on CKD - creat 4.1  Mucous plugging  Acute hypoxic respiratory failure- on 12 lpm HF NC  Pneumonia - on abx    Plan:  Patient is DNR-CC  Family at bedside and meeting with hospice today at 10 am.  They are still unsure if want to move forward with hospice now that patient is more awake.  If family decides not to pursue hospice will need to change code status and then re-consult pulmonary and nephrology.  Continue with scheduled meds at this time.  Labs drawn this morning with worsening creatinine 4.1.  Will await family discussions with hospice for further plans and management.     Patient educated on plan of care and disease process.  All questions answered.        Electronically signed by KRYSTLE Edwards CNP on 5/18/2025 at 8:50 AM                 
completely.  I discussed with the family the options including comfort care versus repeating bronchoscopy to clean her lung as we did yesterday.  The family said she is a fighter and she wants to live.  I recommended to intubate the patient to improve her oxygenation and keep her right side down until I can arrive to bring the patient.  Patient was intubated successfully.     I arrived and did bedside bronchoscopy and cleared the left lung from extensive amount of mucus and pus.  Sample sent for analysis.  Will keep the patient intubated, sedated and add Levophed for low blood pressure.    Due to the immediate potential for life-threatening deterioration due to issues listed above, I spent 150 minutes providing critical care excluding time spent on billable procedures.             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  Patient was admitted for aortobifemoral bypass surgery which was completed on May 5 successfully.    Postoperatively hospital course was complicated by A-fib with RVR, acute kidney injury, volume overload.  Patient started to have escalating oxygen needs over the last 48 hours ending up on nonrebreather mask this morning and sudden desaturation to the 70s on mouth care.  Pulmonary team consulted emergently.  Patient was in mild distress and was started on BiPAP with oxygen saturations improved to the 90s.  Chest x-ray showed bibasilar atelectasis with left upper lobe consolidation suspicious for mucous plugging.      The following portions of the patient's history were reviewed: past medical history, surgical history, family history, social history, current 
coping skills/support systems, Provided sacramental/Anabaptism ritual, and Facilitated life review and/ or legacy. Contacted [facility] ([phone]) for  visit and Sacrament of Anointing of the Sick  Family/Friends Interventions include: Facilitated expression of thoughts and feelings, Explored spiritual coping/struggle/distress, Engaged in theological reflection, Affirmed coping skills/support systems, Provided sacramental/Anabaptism ritual, and Facilitated life review and/or legacy    Patient Plan of Care: Contact Patti  for support or sacramental needs, Spiritual Care available upon further referral, and Other: Ruth desires outpatient Spiritual Health support, gave her a Spiritual Health Outpatient brochure to read over. Ruth is  Family/Friends Plan of Care: Spiritual Care available upon further referral    Electronically signed by Chaplain Felix on 5/6/2025 at 11:17 AM  
heparin (porcine), heparin (porcine), oxyCODONE **OR** oxyCODONE, sodium chloride flush, sodium chloride, hydrALAZINE, nitroGLYCERIN, promethazine **OR** ondansetron, norepinephrine, morphine **OR** morphine    History of Present Illness on 5/8/2025:    89 y.o. yo female with PMH of atrial fibrillation, PAD, hyponatremia, chronic kidney disease stage III with baseline creatinine of 1-1.2 who is admitted after aortobifemoral bypass using Dacron graft on 5/5/2025.  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  Since surgery, patient received IV Lasix on 5/6 and 5/7; she was also receiving LR at 75 mL/h which was stopped on 5/7  Patient received 500 mL normal saline on 5/8 and has been continued at 75 mL/h   For A-fib RVR, patient has been on amiodarone drip since 5/7 with persistent rapid ventricular rate  Blood pressure has intermittently dropped in the 80s to 90s and has remained in the 100s recently  Patient complains of passing flatus 1 time, discomfort around the incision site and shortness of breath; she is currently on 4 L of oxygen    Physical exam:   Constitutional:  VITALS:  BP (!) 116/47   Pulse 76   Temp 98.5 °F (36.9 °C) (Bladder)   Resp 20   Ht 1.6 m (5' 3\")   Wt 63.1 kg (139 lb 1.8 oz)   SpO2 98%   BMI 24.64 kg/m²   Gen: alert, awake  Neck: No JVD  Skin: Unremarkable  Cardiovascular:  S1, S2 without m/r/g   Respiratory: Diminished breath sounds without w/r/r; on mechanical ventilation  Abdomen:  soft, nt, nd,   Extremities: no lower extremity edema  Neuro/Psy: AAoriented times 3 ; moves all 4 ext    Data/  Recent Labs     05/10/25  0456 05/11/25  0540 05/11/25  1605 05/12/25  0744   WBC 8.4 13.0*  --  10.3   HGB 7.5* 7.4*  --  6.9*   HCT 21.0* 21.5* 22.9* 20.8*   MCV 88.5 89.7  --  89.9    349  --  349     Recent 
mcg/kg/min IntraVENous Continuous Catarino Maciel APRN - CNP 7.5 mL/hr at 05/12/25 1100 20 mcg/kg/min at 05/12/25 1100    norepinephrine (LEVOPHED) 16 mg in sodium chloride 0.9 % 250 mL infusion (premix)  1-100 mcg/min IntraVENous Continuous Jed Pierre MD 2.8 mL/hr at 05/12/25 1100 3 mcg/min at 05/12/25 1100    amiodarone (NEXTERONE) 360 mg in dextrose 5% 200 ml  0.5 mg/min IntraVENous Continuous Lyn De Luna APRN - CNP 16.7 mL/hr at 05/12/25 1100 0.5 mg/min at 05/12/25 1100    ceFEPIme (MAXIPIME) 2,000 mg in sodium chloride 0.9 % 100 mL IVPB (addEASE)  2,000 mg IntraVENous Q8H Hina Craft MD 25 mL/hr at 05/12/25 1100 Rate Verify at 05/12/25 1100    sodium chloride (Inhalant) 3 % nebulizer solution 4 mL  4 mL Nebulization Q8H RT Hina Craft MD   4 mL at 05/12/25 0021    ipratropium 0.5 mg-albuterol 2.5 mg (DUONEB) nebulizer solution 1 Dose  1 Dose Inhalation Q4H PRN Hina Craft MD   1 Dose at 05/12/25 0751    amiodarone (CORDARONE) tablet 400 mg  400 mg Oral BID Lyn De Luna APRN - CNP   400 mg at 05/09/25 1540    prismaSATE BGK 4/2.5 dialysis solution   Dialysis Continuous Naun Barr  mL/hr at 05/11/25 1635 New Bag at 05/11/25 1635    prismaSATE BGK 4/2.5 dialysis solution   Dialysis Continuous Naun Barr  mL/hr at 05/11/25 1634 New Bag at 05/11/25 1634    prismaSATE BGK 4/2.5 dialysis solution   Dialysis Continuous Naun Barr  mL/hr at 05/11/25 1634 New Bag at 05/11/25 1634    heparin (porcine) injection 1,100-1,900 Units  1,100-1,900 Units IntraCATHeter PRN Naun Barr MD        And    heparin (porcine) injection 1,100-1,900 Units  1,100-1,900 Units IntraCATHeter PRN Naun Barr MD        potassium chloride 20 mEq/50 mL IVPB (Central Line)  20 mEq IntraVENous PRN Naun Barr MD        magnesium sulfate 1000 mg in dextrose 5% 100 mL IVPB  1,000 mg IntraVENous PRN Naun Barr MD        calcium gluconate 1,000 mg in sodium 
oral  (hhn with aerogen)       
orders from KANDY Jolley  - Low UO Vascular aware  - IVF stopped  - ABG sent  - IV lasix 20 x1  -    Latest Reference Range & Units 05/09/25 04:24   NT Pro-BNP 0 - 449 pg/mL 14,924 (H)   (H): Data is abnormally high  -CXR ordered    ICU Days #4 (5/9/25)  - Bipap  - Lasix gtt started  - Heparin gtt restarted  - CRRT started 1500  - temp vast cath placed  - calcium gluconate replacement    ICU Nights #4 (5/9/25)  - CRRT cont goal 100 hr  - Family at bedside  - UO:  - calcium gluconate replaced x2  - Lasix gt stopped per nursing communication  - decreased heparin gtt 5 units  - hgb 7.5 continue to monitor  - Phos 2.4, replacement infusing    ICU Days #5( POD #5) 5-10-25  - Bipap all shift , 100% FIO2  - AM CXRay - Left lung dorothy-out  - Bronch done today, medicated with 4 Versed  - IV Antibiotics started  - Labs Q 8 hrs  - Calcium replaced  - UOP- 200 ml  - Continue CRRT another 24 hrs per Nephrology  - Continue Amio gtt per EP Cardiology d/t NPO d/t Bipap use  -Heparin gtt increased to 9 u/hr, repeat APTT ordered by Pharmacy  - Family at bedside    ICU Days # 6 (POD # 6) 5/11/25  - Off Bipap @ 0900 to heated high flow, 02 sat %  - CXR improved from 5/10  -Left side thoracentesis done, 500 ml removed. Site okay.  - remains NPO, swabs okay- but cough more moist this afternoon, may need ST eval  -Labs Q 8 hrs  - Remains on Heparin gtt & Amio gtt  - UOP- 85 ml  - PT & OT worked with patient today, sat on side of bed for 5 mins.    ICU Nights #6 (POD #6) 5/11/25  -Increased work of breathing and SOB on BiPAP at 100%-Dr Craft notified of patient status and O2 stats in 80's-orders received to intubate  -Intubated at 2050  -Bronch performed  -Levophed, Propofol, and Fentanyl started  -Nephro called d/t no UOP, orders received not to pull fluid from CRRT  -Heparin gtt increased to 11 u/k/hr and 2000 unit bolus  -Calcium and phos replaced    ICU/hospital Day #7 5/12/25 Days:  Keep even on CRRT with labs q12h; another 
vast cath placed  - calcium gluconate replacement    ICU Nights #4 (5/9/25)  - CRRT cont goal 100 hr  - Family at bedside  - UO:  - calcium gluconate replaced x2  - Lasix gt stopped per nursing communication  - decreased heparin gtt 5 units  - hgb 7.5 continue to monitor  - Phos 2.4, replacement infusing    ICU Days #5( POD #5) 5-10-25  - Bipap all shift , 100% FIO2  - AM CXRay - Left lung dorothy-out  - Bronch done today, medicated with 4 Versed  - IV Antibiotics started  - Labs Q 8 hrs  - Calcium replaced  - UOP- 200 ml  - Continue CRRT another 24 hrs per Nephrology  - Continue Amio gtt per EP Cardiology d/t NPO d/t Bipap use  -Heparin gtt increased to 9 u/hr, repeat APTT ordered by Pharmacy  - Family at bedside    ICU Days # 6 (POD # 6) 5/11/25  - Off Bipap @ 0900 to heated high flow, 02 sat %  - CXR improved from 5/10  -Left side thoracentesis done, 500 ml removed. Site okay.  - remains NPO, swabs okay- but cough more moist this afternoon, may need ST eval  -Labs Q 8 hrs  - Remains on Heparin gtt & Amio gtt  - UOP- 85 ml  - PT & OT worked with patient today, sat on side of bed for 5 mins.    ICU Nights #6 (POD #6) 5/11/25  -Increased work of breathing and SOB on BiPAP at 100%-Dr Craft notified of patient status and O2 stats in 80's-orders received to intubate  -Intubated at 2050  -Bronch performed  -Levophed, Propofol, and Fentanyl started  -Nephro called d/t no UOP, orders received not to pull fluid from CRRT  -Heparin gtt increased to 11 u/k/hr and 2000 unit bolus  -Calcium and phos replaced    ICU/hospital Day #7 5/12/25 Days:  Keep even on CRRT with labs q12h; another bronch done for minimal secretions; 1 u PRBC for hgb 6.9 with increase to 8.4; weaning levo; will leave intubated today; if not extubated tomorrow will need NG placed   Nights  - Intubation and CRRT continued overnight  - No acute events overnight    ICU Day #8 (5/13/2025) Days:  - Extubated to 12 L, pt complaining of SOB so BIPAP 
House  Home Layout: One level, Laundry in basement  Bathroom Shower/Tub: Walk-in shower  Bathroom Toilet: Standard  Bathroom Equipment: Grab bars in shower, Shower chair  Home Equipment: Wheelchair - Manual  Has the patient had two or more falls in the past year or any fall with injury in the past year?: No  Receives Help From: Family  Prior Level of Assist for ADLs: Independent  Prior Level of Assist for Homemaking: Needs assistance (children assist with various needs: meals, medications, etc.)  Prior Level of Assist for Ambulation: Independent household ambulator, with or without device, Independent community ambulator, with or without device  Active : No  Patient's  Info: family provides transportation  Leisure & Hobbies: TV, family    Objective  Temp: 99.1 °F (37.3 °C)  Pulse: 94  Heart Rate Source: Monitor  Respirations: 18  SpO2: 97 %  O2 Device: High flow nasal cannula  BP: (!) 118/59  MAP (Calculated): 79  BP Location: Right upper arm  BP Method: Automatic  Patient Position: Semi fowlers          Observation/Palpation  Posture: Fair  Safety Devices  Type of Devices: All fall risk precautions in place;Patient at risk for falls;Call light within reach;Nurse notified;Chair alarm in place;Left in chair  Restraints  Restraints Initially in Place: No  Bed Mobility Training  Bed Mobility Training: Yes  Interventions: Verbal cues;Weight shifting training/pressure relief;Safety awareness training;Tactile cues  Rolling: Substantial/Maximal assistance (able to push through LE, reach with UE, extra time)  Balance  Sitting: Impaired  Sitting - Static: Poor (constant support) (sitting unsupported in recliner ~2 minutes, decreased tolerance 2* short of breath)  Transfer Training  Transfer Training: Yes  Bed to Chair: Dependent/Total (overhead ayanna lift utilized)  Gait  Gait Training: No     AROM: Generally decreased, functional  Strength: Generally decreased, functional  Coordination: Generally decreased, 
PIV; heated high flow nasal cannula; zbigniew    Subjective  General  Chart Reviewed: Yes  Patient assessed for rehabilitation services?: Yes  Family / Caregiver Present: Yes  Referring Practitioner: Porsha Holguin APRN - CNP  Diagnosis: s/p aortobifemoral bypass  Subjective  Subjective: Patient received supine in bed and agreeablt to OT treatment this date     Social/Functional History  Social/Functional History  Lives With: Alone  Type of Home: House  Home Layout: One level, Laundry in basement  Bathroom Shower/Tub: Walk-in shower  Bathroom Toilet: Standard  Bathroom Equipment: Grab bars in shower, Shower chair  Home Equipment: Wheelchair - Manual  Has the patient had two or more falls in the past year or any fall with injury in the past year?: No  Receives Help From: Family  Prior Level of Assist for ADLs: Independent  Prior Level of Assist for Homemaking: Needs assistance (children assisted with meals, groceries, medication management, cleaning)  Prior Level of Assist for Ambulation: Independent household ambulator, with or without device, Independent community ambulator, with or without device  Active : No  Patient's  Info: family provides rides.  Leisure & Hobbies: watch television    Objective  Temp: 97.8 °F (36.6 °C)  Pulse: 76  Heart Rate Source: Monitor  Respirations: 17  SpO2: 97 %  O2 Device: Heated high flow cannula  BP: 95/67  MAP (Calculated): 76  BP Location: Right upper arm  BP Method: Automatic  Patient Position: Semi fowlers  Comment: reports pain at CRRT line sit - 10/10; RN aware  Vision  Vision: Impaired  Vision Exceptions: Wears glasses for reading  Hearing  Hearing: Exceptions to WFL  Hearing Exceptions: Hard of hearing/hearing concerns       Observation/Palpation  Posture: Fair  Edema: mild edema in B hands  Safety Devices  Type of Devices: All fall risk precautions in place;Patient at risk for falls;Call light within reach;Left in bed;Nurse notified  Restraints  Restraints Initially 
PRN  0.9 % sodium chloride infusion, , IntraVENous, PRN  hydrALAZINE (APRESOLINE) injection 10 mg, 10 mg, IntraVENous, Q1H PRN  nitroGLYCERIN 200 mcg/mL in dextrose 5%, 5-200 mcg/min, IntraVENous, Continuous PRN  promethazine (PHENERGAN) tablet 12.5 mg, 12.5 mg, Oral, Q6H PRN **OR** ondansetron (ZOFRAN) injection 4 mg, 4 mg, IntraVENous, Q6H PRN  norepinephrine (LEVOPHED) 16 mg in sodium chloride 0.9 % 250 mL infusion (premix), 1-100 mcg/min, IntraVENous, Continuous PRN  morphine (PF) injection 2 mg, 2 mg, IntraVENous, Q2H PRN **OR** morphine sulfate (PF) injection 4 mg, 4 mg, IntraVENous, Q2H PRN    ASSESSMENT  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 If unsucessful, may need CRRT. Nephrology managing.  Continue respiratory treatment, BiPap - Pulm/CC managing  Continue Amiodarone/oral anticoagulation per EP      Patient educated on plan of care and disease process.  All questions answered.        KRYSTLE Watts  5/9/2025  11:38 AM          
Units, IntraCATHeter, PRN  potassium chloride 20 mEq/50 mL IVPB (Central Line), 20 mEq, IntraVENous, PRN  magnesium sulfate 1000 mg in dextrose 5% 100 mL IVPB, 1,000 mg, IntraVENous, PRN  calcium gluconate 1,000 mg in sodium chloride 50 mL, 1,000 mg, IntraVENous, PRN **OR** calcium gluconate 2,000 mg in sodium chloride 100 mL, 2,000 mg, IntraVENous, PRN **OR** calcium gluconate 3,000 mg in sodium chloride 0.9 % 100 mL IVPB, 3,000 mg, IntraVENous, PRN **OR** calcium gluconate 4,000 mg in sodium chloride 0.9 % 100 mL IVPB, 4,000 mg, IntraVENous, PRN  sodium phosphate 6 mmol in sodium chloride 0.9 % 250 mL IVPB, 6 mmol, IntraVENous, PRN **OR** sodium phosphate 12 mmol in sodium chloride 0.9 % 250 mL IVPB, 12 mmol, IntraVENous, PRN **OR** sodium phosphate 18 mmol in sodium chloride 0.9 % 500 mL IVPB, 18 mmol, IntraVENous, PRN **OR** sodium phosphate 24 mmol in sodium chloride 0.9 % 500 mL IVPB, 24 mmol, IntraVENous, PRN  heparin (porcine) injection 4,000 Units, 4,000 Units, IntraVENous, PRN  heparin (porcine) injection 2,000 Units, 2,000 Units, IntraVENous, PRN  heparin 25,000 units in dextrose 5% 250 mL (premix) infusion, 5-30 Units/kg/hr, IntraVENous, Continuous  oxyCODONE (ROXICODONE) immediate release tablet 5 mg, 5 mg, Oral, Q4H PRN **OR** oxyCODONE (ROXICODONE) immediate release tablet 10 mg, 10 mg, Oral, Q4H PRN  [Held by provider] carvedilol (COREG) tablet 3.125 mg, 3.125 mg, Oral, BID  rosuvastatin (CRESTOR) tablet 10 mg, 10 mg, Oral, Nightly  [Held by provider] tamsulosin (FLOMAX) capsule 0.4 mg, 0.4 mg, Oral, Daily  sodium chloride flush 0.9 % injection 5-40 mL, 5-40 mL, IntraVENous, 2 times per day  sodium chloride flush 0.9 % injection 5-40 mL, 5-40 mL, IntraVENous, PRN  0.9 % sodium chloride infusion, , IntraVENous, PRN  hydrALAZINE (APRESOLINE) injection 10 mg, 10 mg, IntraVENous, Q1H PRN  nitroGLYCERIN 200 mcg/mL in dextrose 5%, 5-200 mcg/min, IntraVENous, Continuous PRN  promethazine (PHENERGAN) tablet 
pt complaining of SOB so BIPAP applied  - CRRT discontinued  - Palliative care discussion: code status changed to DNI  Nights  - Patient/Family requested to trial off Bipap. Placed on 10L HFNC @ 2030  - SpO2 mid-upper 90s. Around 0300 patient stated was SOB, placed back on Bipap even though SpO2 was good  - UOP: 565mL  - Back on HFNC @ 0700  - No acute events overnight     ICU Days #9 Days (5/14/25)  -Off bipap @0800, weaned to 7L NC  -up to chair [id number]  -PT/OT started  -Extravasation meds to R-FA IV site for amio infiltration  -removed TLC, 2 PIV placed  -Lasix 40mg BID   -UOP 515ml  -20meq Effer-K given  -Heparin/Amio gtts stopped; converted to PO forms  -speech eval = cleared for small, slow, soft & bite sized w/thin lqds  Nights  - UOP: 925mL  - Patient did not want to wear bipap overnight despite complaints of SOB, SpO2 >90 all night and lungs sound clear  - Bowel sounds more active  - Tolerating PO pills  - No acute events overnight    
toileting with Mod A  Short Term Goal 3: Pt will complete LB dressing with Mod A  Short Term Goal 4: Pt will complete dynamic seated grooming tasks (2-4 tasks) with SBA  Short Term Goal 5: Pt will complete UE HEP 15x reps each by  Patient Goals   Patient goals : \"to go home\"    AM-PAC - ADL  AM-PAC Daily Activity - Inpatient   How much help is needed for putting on and taking off regular lower body clothing?: Total  How much help is needed for bathing (which includes washing, rinsing, drying)?: Total  How much help is needed for toileting (which includes using toilet, bedpan, or urinal)?: Total  How much help is needed for putting on and taking off regular upper body clothing?: A Lot  How much help is needed for taking care of personal grooming?: A Lot  How much help for eating meals?: A Lot  AM-PAC Inpatient Daily Activity Raw Score: 9  AM-PAC Inpatient ADL T-Scale Score : 25.33  ADL Inpatient CMS 0-100% Score: 79.59  ADL Inpatient CMS G-Code Modifier : CL    Therapy Time   Individual Concurrent Group Co-treatment   Time In       1425   Time Out       1506   Minutes       41   Timed Code Treatment Minutes: 41 Minutes       Kristi Chun, OT     
reviewed.  Lab Review     Renal Profile:   Lab Results   Component Value Date/Time    CREATININE 2.6 05/15/2025 03:19 AM    BUN 31 05/15/2025 03:19 AM     05/15/2025 03:19 AM    K 3.6 05/15/2025 03:19 AM    K 4.3 05/11/2025 05:40 AM    CL 99 05/15/2025 03:19 AM    CO2 24 05/15/2025 03:19 AM     CBC:    Lab Results   Component Value Date/Time    WBC 12.2 05/15/2025 03:20 AM    RBC 3.03 05/15/2025 03:20 AM    HGB 9.0 05/15/2025 03:20 AM    HCT 26.8 05/15/2025 03:20 AM    MCV 88.6 05/15/2025 03:20 AM    RDW 15.1 05/15/2025 03:20 AM     05/15/2025 03:20 AM     BNP:  No results found for: \"BNP\"  Fasting Lipid Panel:    Lab Results   Component Value Date/Time    CHOL 84 05/11/2025 04:05 PM    HDL 54 04/15/2025 05:18 AM    TRIG 143 04/15/2025 05:18 AM     Cardiac Enzymes:  CK/MbTroponin  Lab Results   Component Value Date/Time    CKTOTAL 154 05/08/2025 05:12 AM    TROPONINI 0.22 07/08/2021 01:24 PM     PT/ INR   Lab Results   Component Value Date/Time    INR 2.27 05/09/2025 09:12 PM    INR 3.38 05/09/2025 03:48 PM    INR 1.41 07/09/2021 06:57 AM    PROTIME 25.0 05/09/2025 09:12 PM    PROTIME 34.0 05/09/2025 03:48 PM    PROTIME 16.2 07/09/2021 06:57 AM     PTT No components found for: \"PTT\"   Lab Results   Component Value Date/Time    MG 2.22 05/15/2025 03:19 AM      Lab Results   Component Value Date/Time    TSH 3.02 06/21/2021 08:39 PM       Assessment:  Paroxysmal atrial fibrillation   - [id number] score 5-female, age, hypertension, heart failure  Chronic diastolic heart failure  Hypertension  History of TIA  Anxiety/depression  Acute hypoxic respiratory failure with mucous plugging  Bilateral iliac occlusion s/p aortic bifemoral bypass  Pneumonia/atelectasis  Hyperlipidemia  TEREZA-creatinine 2.6  Anemia-hemoglobin 9.0    Plan:   Continue amiodarone 400 mg twice daily--will reduce tomorrow  Monitor for drug toxicity  Continue carvedilol 3.125 mg twice daily  Continue Crestor 10 mg nightly  Continue Eliquis 
mg/min  Continue amiodarone 400 mg twice daily--no oral access at this time  Continue carvedilol 3.125 mg twice daily--no oral access at this time  Continue Crestor 10 mg nightly  Continue heparin drip per weight-based protocol for stroke risk reduction  Vasopressors per primary team  Agree with Palliative Care consult  Continue to monitor on telemetry  Daily labs replace electrolytes as needed  Discussed plan with nursing & son    Time spent 50 minutes on plan and care    Alia Mcgregor APRN-CNP  [facility]  [phone]

## 2025-05-19 NOTE — CARE COORDINATION
CASE MANAGEMENT DISCHARGE SUMMARY      Discharge to: Home with [facility]    Precertification completed: N/A  Hospital Exemption Notification (HENS) completed: N/A    IMM given: (date) N/A    New Durable Medical Equipment ordered/agency: Provided by [facility]    Transportation:       Medical Transport explained to pt/family. Pt/family voice no agency preference.    Agency used: [facility]   time: 3025-4694   Ambulance form completed: Yes          RN, name: Inder    Note: Discharging nurse to complete GOPAL, reconcile AVS, and place final copy with patient's discharge packet. RN to ensure that written prescriptions for  Level II medications are sent with patient to the facility as per protocol.  .Josie Del Rosario RN      
CM update; Patient and family met with [facility] today.POC is to go home with Hospice in place. Will follow.Josie Del Rosario RN      Call placed to [facility] to check on pOC and discharge for this patient contact information provided for call back.Josie Del Rosario RN    
Chart reviewed. LOS day # 3. Admitted as inpatient. Followed by Vascular, EP,, Cards, Nephro. S/p aortobifemoral bypass POD # 3. IVF for low UO. Nnow in A fib with RVR. On IV amio.  DCCV/LUCIA today per EP. Pt is from home alone. Family is helpful and can assist. Active with [facility] for PT/OT/SN.   
Chart reviewed. LOS day # 4. Admitted as inpatient. Followed by Vascular, EP, Cards, Nephro. S/p aortobifemoral bypass POD # 4. IVF for low UO. Start lasix gtt. Temporary Vas Cath placed today. Start CRRT. A fib with RVR. DCCV/LUCIA yest. per E to NSR. Remains on Amio gtt and oral. Now with SOB, on Bipap. Pt is from home alone. Family is helpful and can assist. Active with [facility] for PT/OT/SN.   
Chart reviewed. LOS day # 7. Admitted as inpatient. Followed by Vascular, EP, Cards, Nephro. S/p aortobifemoral bypass POD # 7. Temporary Vas Cath placed friday. on CRRT. DCCV/LUCIA last thurs. For A fib w./ RVR. Re intubated overnight 5/11. S/p thoracentesis 5/11 with 500ml removed. Remains on Amio gtt and oral. Pt is from home alone. Family is helpful and can assist. Active with [facility] for PT/OT/SN. PC involved. Full code.  
Chart reviewed. LOS day # 9. Admitted as inpatient. Followed by Vascular, EP, Pulm, Nephro, and SLP. Extubated yesterday. On HF NC 7lpm now. Weaning as tolerated. Remains off CRRT, dose of lasix today. Plan to d/c Amio gtt. Pt passed swallow eval. Therapy eval ordered per Provider. Will follow for recs and discuss with Pt and family once known.   
Cm update; Rounds completed. Family and patient have decided on Comfort Care at this time . Will follow for any needs.Josie Del Rosario RN    
[facility] - Acute Rehab Unit     Patient now with comfort measures. ARU will sign off.       Thank you for the referral.    Miky Wolfe MPH, RRT  ARU Admissions Supervisor-[facility]  (P)[phone]  (F)[phone]   Electronically signed by Miky Wolfe on 5/16/2025 at 9:38 AM        
[facility] - Acute Rehab Unit   After review, this patient is felt to be:       []  Appropriate for Acute Inpatient Rehab    []  Appropriate for Acute Inpatient Rehab Pending Insurance Authorization    []  Not appropriate for Acute Inpatient Rehab    [x]  Referral received and ARU reviewing patient; Evaluation ongoing.      Will follow. Currently at a dependent via ayanna lift. Limited by shortness of breath.    Will notify DCP with further updates. Thank you for the referral.    Miky Wolfe MPH, RRT  ARU Admissions Supervisor-[facility]  (P)[phone]  (F)[phone]   Electronically signed by Miky Wolfe on 5/15/2025 at 10:03 AM      
Per RN pt has rallied some and family now hoping to dc home with hospice. Spoke to son, made referral to [facility] per their request.   
were provided with a choice of provider and agrees with the discharge plan. Freedom of choice list with basic dialogue that supports the patient's individualized plan of care/goals and shares the quality data associated with the providers was provided to: Patient   Patient Representative Name:       The Patient and/or Patient Representative Agree with the Discharge Plan? Yes    Malgorzata Walls RN  Case Management Department  Ph:

## 2025-05-19 NOTE — PLAN OF CARE
Palliative Care Progress Note  Palliative Care Admit date:  5/12/25    Advance Directives: Code status amended to DNR-CC    Plan of care/goals:  Met w/ Jair, his wife and pts dtr, Vianca. They shared that pt had difficulties w/ \"not feeling like she can breathe.\"   Family discussed options presented by Pulm for bronch but they elected to forego.  They have also had the benefit of d/w nephrology, vascular and family feel resolved in their decision for comfort measures.    We have discussed comfort meds for air hunger, pain, anxiety.  Assured family pt can be kept comfortable as they are afraid \"we'll watch her not be able to breathe.\"  Currently, FiO2 @ 12L/hfnc.  Explained to family that use of comfort medications likely to provide improvement from air hunger and that FiO2 can be weaned.  All agree team will not put pt back on bipap and we will not escalate care.     Family clearly verb'd  their goals to shift the focus of care to comfort and allow natural death. They prefer not to move pt to a hospice IPU and would prefer to hold off on hospice at this time.  If pts condition and progression of dying process warrants hospice, family amenable.  D/w shift RN, Pulm NP, CM, PT    Social/Spiritual:  Family very receptive to ongoing support visits by .  Message left for pastoral care.  Comfort care requested.  This family has repeatedly expressed appreciation for the care from all team and provider's; they feel pts has been well cared for.     Plan:  Comfort care now.   DNR-CC.  Can consider hospice if  timing warrants      Reason for consult:  ___ Advance Care Planning  _X_ Transition of Care Planning  _X_ Psychosocial/Spiritual Support  ___ Symptom Management                                                                                                                                                                                            Lyudmila Ramos RN   
  Problem: Discharge Planning  Goal: Discharge to home or other facility with appropriate resources  5/10/2025 0851 by Nicolasa Hernandez RN  Outcome: Progressing  Flowsheets (Taken 5/10/2025 0800)  Discharge to home or other facility with appropriate resources: Refer to discharge planning if patient needs post-hospital services based on physician order or complex needs related to functional status, cognitive ability or social support system     Problem: Pain  Goal: Verbalizes/displays adequate comfort level or baseline comfort level  5/10/2025 0851 by Nicolasa Hernandez RN  Outcome: Progressing     Problem: Safety - Adult  Goal: Free from fall injury  5/10/2025 0851 by Nicolasa Hernandez RN  Outcome: Progressing     Problem: Chronic Conditions and Co-morbidities  Goal: Patient's chronic conditions and co-morbidity symptoms are monitored and maintained or improved  5/10/2025 0851 by Nicolasa Hernandez RN  Outcome: Progressing  Flowsheets (Taken 5/10/2025 0800)  Care Plan - Patient's Chronic Conditions and Co-Morbidity Symptoms are Monitored and Maintained or Improved:   Monitor and assess patient's chronic conditions and comorbid symptoms for stability, deterioration, or improvement   Collaborate with multidisciplinary team to address chronic and comorbid conditions and prevent exacerbation or deterioration   Update acute care plan with appropriate goals if chronic or comorbid symptoms are exacerbated and prevent overall improvement and discharge     Problem: Skin/Tissue Integrity  Goal: Skin integrity remains intact  Description: 1.  Monitor for areas of redness and/or skin breakdown2.  Assess vascular access sites hourly3.  Every 4-6 hours minimum:  Change oxygen saturation probe site4.  Every 4-6 hours:  If on nasal continuous positive airway pressure, respiratory therapy assess nares and determine need for appliance change or resting period  5/10/2025 0851 by Nicolasa Hernandez RN  Outcome: 
  Problem: Discharge Planning  Goal: Discharge to home or other facility with appropriate resources  5/19/2025 1038 by Nicolasa Hernandez RN  Outcome: Progressing     Problem: Pain  Goal: Verbalizes/displays adequate comfort level or baseline comfort level  5/19/2025 1038 by Nicolasa Hernandez, RN  Outcome: Progressing     Problem: Safety - Adult  Goal: Free from fall injury  5/19/2025 1038 by Nicolasa Hernandez, RN  Outcome: Progressing     Problem: Chronic Conditions and Co-morbidities  Goal: Patient's chronic conditions and co-morbidity symptoms are monitored and maintained or improved  5/19/2025 1038 by Nicolasa Hernandez, RN  Outcome: Progressing     
  Problem: Discharge Planning  Goal: Discharge to home or other facility with appropriate resources  5/8/2025 0308 by Radha Parada, RN  Outcome: Progressing  Flowsheets (Taken 5/7/2025 2042)  Discharge to home or other facility with appropriate resources: Identify barriers to discharge with patient and caregiver  5/7/2025 1650 by Anne Lo RN  Outcome: Progressing  Flowsheets (Taken 5/7/2025 0800)  Discharge to home or other facility with appropriate resources:   Identify barriers to discharge with patient and caregiver   Arrange for needed discharge resources and transportation as appropriate   Identify discharge learning needs (meds, wound care, etc)   Refer to discharge planning if patient needs post-hospital services based on physician order or complex needs related to functional status, cognitive ability or social support system     Problem: Pain  Goal: Verbalizes/displays adequate comfort level or baseline comfort level  5/8/2025 0308 by Radha Parada RN  Outcome: Progressing  Flowsheets (Taken 5/7/2025 1800 by Anne Lo, RN)  Verbalizes/displays adequate comfort level or baseline comfort level: Assess pain using appropriate pain scale  5/7/2025 1650 by Anne Lo RN  Outcome: Progressing  Flowsheets  Taken 5/7/2025 1300  Verbalizes/displays adequate comfort level or baseline comfort level:   Encourage patient to monitor pain and request assistance   Assess pain using appropriate pain scale  Taken 5/7/2025 1200  Verbalizes/displays adequate comfort level or baseline comfort level:   Encourage patient to monitor pain and request assistance   Assess pain using appropriate pain scale  Taken 5/7/2025 1000  Verbalizes/displays adequate comfort level or baseline comfort level:   Encourage patient to monitor pain and request assistance   Assess pain using appropriate pain scale  Taken 5/7/2025 0900  Verbalizes/displays adequate comfort level or baseline comfort level:   Encourage patient to 
  Problem: Discharge Planning  Goal: Discharge to home or other facility with appropriate resources  5/9/2025 0022 by Radha Parada RN  Outcome: Progressing  5/8/2025 1920 by Chio Floyd RN  Outcome: Progressing     Problem: Pain  Goal: Verbalizes/displays adequate comfort level or baseline comfort level  5/9/2025 0022 by Radha Parada RN  Outcome: Progressing  5/8/2025 1920 by Chio Floyd RN  Outcome: Progressing     Problem: Safety - Adult  Goal: Free from fall injury  5/9/2025 0022 by Radha Parada RN  Outcome: Progressing  5/8/2025 1920 by Chio Floyd RN  Outcome: Progressing     Problem: ABCDS Injury Assessment  Goal: Absence of physical injury  5/9/2025 0022 by Radha Parada RN  Outcome: Progressing  5/8/2025 1920 by Chio Floyd RN  Outcome: Progressing     Problem: Chronic Conditions and Co-morbidities  Goal: Patient's chronic conditions and co-morbidity symptoms are monitored and maintained or improved  5/9/2025 0022 by Radha Parada RN  Outcome: Progressing  5/8/2025 1920 by Chio Floyd RN  Outcome: Progressing     Problem: Skin/Tissue Integrity  Goal: Skin integrity remains intact  Description: 1.  Monitor for areas of redness and/or skin breakdown2.  Assess vascular access sites hourly3.  Every 4-6 hours minimum:  Change oxygen saturation probe site4.  Every 4-6 hours:  If on nasal continuous positive airway pressure, respiratory therapy assess nares and determine need for appliance change or resting period  5/9/2025 0022 by Radha Parada RN  Outcome: Progressing  5/8/2025 1920 by Chio Floyd RN  Outcome: Progressing     
  Problem: Discharge Planning  Goal: Discharge to home or other facility with appropriate resources  Outcome: Not Progressing     Problem: Chronic Conditions and Co-morbidities  Goal: Patient's chronic conditions and co-morbidity symptoms are monitored and maintained or improved  Outcome: Not Progressing     Problem: Pain  Goal: Verbalizes/displays adequate comfort level or baseline comfort level  Outcome: Progressing     Problem: Safety - Adult  Goal: Free from fall injury  Outcome: Progressing     Problem: ABCDS Injury Assessment  Goal: Absence of physical injury  Outcome: Progressing     Problem: Skin/Tissue Integrity  Goal: Skin integrity remains intact  Description: 1.  Monitor for areas of redness and/or skin breakdown2.  Assess vascular access sites hourly3.  Every 4-6 hours minimum:  Change oxygen saturation probe site4.  Every 4-6 hours:  If on nasal continuous positive airway pressure, respiratory therapy assess nares and determine need for appliance change or resting period  Outcome: Progressing     Problem: Discharge Planning  Goal: Discharge to home or other facility with appropriate resources  Outcome: Not Progressing     Problem: Chronic Conditions and Co-morbidities  Goal: Patient's chronic conditions and co-morbidity symptoms are monitored and maintained or improved  Outcome: Not Progressing     
  Problem: Discharge Planning  Goal: Discharge to home or other facility with appropriate resources  Outcome: Progressing     Problem: Pain  Goal: Verbalizes/displays adequate comfort level or baseline comfort level  5/12/2025 2041 by Juanito Bain RN  Outcome: Progressing  5/12/2025 0756 by Kathryn Gama RN  Outcome: Progressing     Problem: Safety - Adult  Goal: Free from fall injury  Outcome: Progressing     Problem: ABCDS Injury Assessment  Goal: Absence of physical injury  Outcome: Progressing     Problem: Chronic Conditions and Co-morbidities  Goal: Patient's chronic conditions and co-morbidity symptoms are monitored and maintained or improved  Outcome: Progressing     Problem: Skin/Tissue Integrity  Goal: Skin integrity remains intact  Description: 1.  Monitor for areas of redness and/or skin breakdown2.  Assess vascular access sites hourly3.  Every 4-6 hours minimum:  Change oxygen saturation probe site4.  Every 4-6 hours:  If on nasal continuous positive airway pressure, respiratory therapy assess nares and determine need for appliance change or resting period  Outcome: Progressing     Problem: Safety - Medical Restraint  Goal: Remains free of injury from restraints (Restraint for Interference with Medical Device)  Description: INTERVENTIONS:1. Determine that other, less restrictive measures have been tried or would not be effective before applying the restraint2. Evaluate the patient's condition at the time of restraint application3. Inform patient/family regarding the reason for restraint4. Q2H: Monitor safety, psychosocial status, comfort, nutrition and hydration  Outcome: Progressing  Flowsheets  Taken 5/12/2025 2000 by Juanito Bain RN  Remains free of injury from restraints (restraint for interference with medical device):   Every 2 hours: Monitor safety, psychosocial status, comfort, nutrition and hydration   Determine that other, less restrictive measures have been tried or would not be effective 
  Problem: Discharge Planning  Goal: Discharge to home or other facility with appropriate resources  Outcome: Progressing     Problem: Pain  Goal: Verbalizes/displays adequate comfort level or baseline comfort level  Outcome: Progressing     Problem: Safety - Adult  Goal: Free from fall injury  Outcome: Progressing     Problem: ABCDS Injury Assessment  Goal: Absence of physical injury  Outcome: Progressing     Problem: Chronic Conditions and Co-morbidities  Goal: Patient's chronic conditions and co-morbidity symptoms are monitored and maintained or improved  Outcome: Progressing     
  Problem: Discharge Planning  Goal: Discharge to home or other facility with appropriate resources  Outcome: Progressing     Problem: Pain  Goal: Verbalizes/displays adequate comfort level or baseline comfort level  Outcome: Progressing     Problem: Safety - Adult  Goal: Free from fall injury  Outcome: Progressing     Problem: ABCDS Injury Assessment  Goal: Absence of physical injury  Outcome: Progressing     Problem: Chronic Conditions and Co-morbidities  Goal: Patient's chronic conditions and co-morbidity symptoms are monitored and maintained or improved  Outcome: Progressing     Problem: Skin/Tissue Integrity  Goal: Skin integrity remains intact  Description: 1.  Monitor for areas of redness and/or skin breakdown2.  Assess vascular access sites hourly3.  Every 4-6 hours minimum:  Change oxygen saturation probe site4.  Every 4-6 hours:  If on nasal continuous positive airway pressure, respiratory therapy assess nares and determine need for appliance change or resting period  Outcome: Progressing     
  Problem: Discharge Planning  Goal: Discharge to home or other facility with appropriate resources  Outcome: Progressing     Problem: Pain  Goal: Verbalizes/displays adequate comfort level or baseline comfort level  Outcome: Progressing     Problem: Safety - Adult  Goal: Free from fall injury  Outcome: Progressing     Problem: ABCDS Injury Assessment  Goal: Absence of physical injury  Outcome: Progressing     Problem: Skin/Tissue Integrity  Goal: Skin integrity remains intact  Description: 1.  Monitor for areas of redness and/or skin breakdown2.  Assess vascular access sites hourly3.  Every 4-6 hours minimum:  Change oxygen saturation probe site4.  Every 4-6 hours:  If on nasal continuous positive airway pressure, respiratory therapy assess nares and determine need for appliance change or resting period  Outcome: Progressing     
  Problem: Discharge Planning  Goal: Discharge to home or other facility with appropriate resources  Outcome: Progressing  Flowsheets  Taken 5/6/2025 1600 by Anne Lo RN  Discharge to home or other facility with appropriate resources: Identify barriers to discharge with patient and caregiver  Taken 5/6/2025 0800 by Vanessa York RN  Discharge to home or other facility with appropriate resources:   Identify barriers to discharge with patient and caregiver   Arrange for interpreters to assist at discharge as needed   Identify discharge learning needs (meds, wound care, etc)     Problem: Pain  Goal: Verbalizes/displays adequate comfort level or baseline comfort level  Outcome: Progressing  Flowsheets  Taken 5/6/2025 1700 by Anne Lo RN  Verbalizes/displays adequate comfort level or baseline comfort level:   Encourage patient to monitor pain and request assistance   Assess pain using appropriate pain scale   Administer analgesics based on type and severity of pain and evaluate response   Implement non-pharmacological measures as appropriate and evaluate response   Consider cultural and social influences on pain and pain management  Taken 5/6/2025 1600 by Anne Lo RN  Verbalizes/displays adequate comfort level or baseline comfort level:   Encourage patient to monitor pain and request assistance   Assess pain using appropriate pain scale   Administer analgesics based on type and severity of pain and evaluate response  Taken 5/6/2025 0800 by Vanessa Yrok RN  Verbalizes/displays adequate comfort level or baseline comfort level:   Assess pain using appropriate pain scale   Encourage patient to monitor pain and request assistance   Implement non-pharmacological measures as appropriate and evaluate response     Problem: Safety - Adult  Goal: Free from fall injury  Outcome: Progressing  Flowsheets (Taken 5/6/2025 1656 by Vanessa York RN)  Free From Fall Injury: Instruct family/caregiver on 
  Problem: Discharge Planning  Goal: Discharge to home or other facility with appropriate resources  Outcome: Progressing  Flowsheets (Taken 5/11/2025 0800)  Discharge to home or other facility with appropriate resources: Refer to discharge planning if patient needs post-hospital services based on physician order or complex needs related to functional status, cognitive ability or social support system     Problem: Pain  Goal: Verbalizes/displays adequate comfort level or baseline comfort level  Outcome: Progressing  Flowsheets (Taken 5/11/2025 0800)  Verbalizes/displays adequate comfort level or baseline comfort level:   Encourage patient to monitor pain and request assistance   Assess pain using appropriate pain scale   Administer analgesics based on type and severity of pain and evaluate response   Implement non-pharmacological measures as appropriate and evaluate response     Problem: Safety - Adult  Goal: Free from fall injury  Outcome: Progressing     Problem: ABCDS Injury Assessment  Goal: Absence of physical injury  Outcome: Progressing     Problem: Chronic Conditions and Co-morbidities  Goal: Patient's chronic conditions and co-morbidity symptoms are monitored and maintained or improved  Outcome: Progressing  Flowsheets (Taken 5/11/2025 0800)  Care Plan - Patient's Chronic Conditions and Co-Morbidity Symptoms are Monitored and Maintained or Improved:   Monitor and assess patient's chronic conditions and comorbid symptoms for stability, deterioration, or improvement   Collaborate with multidisciplinary team to address chronic and comorbid conditions and prevent exacerbation or deterioration   Update acute care plan with appropriate goals if chronic or comorbid symptoms are exacerbated and prevent overall improvement and discharge     Problem: Skin/Tissue Integrity  Goal: Skin integrity remains intact  Description: 1.  Monitor for areas of redness and/or skin breakdown2.  Assess vascular access sites hourly3.  
  Problem: Discharge Planning  Goal: Discharge to home or other facility with appropriate resources  Outcome: Progressing  Flowsheets (Taken 5/18/2025 2000)  Discharge to home or other facility with appropriate resources:   Identify barriers to discharge with patient and caregiver   Arrange for needed discharge resources and transportation as appropriate     Problem: Pain  Goal: Verbalizes/displays adequate comfort level or baseline comfort level  Outcome: Progressing  Flowsheets (Taken 5/18/2025 2000)  Verbalizes/displays adequate comfort level or baseline comfort level:   Encourage patient to monitor pain and request assistance   Assess pain using appropriate pain scale   Administer analgesics based on type and severity of pain and evaluate response   Implement non-pharmacological measures as appropriate and evaluate response     Problem: Safety - Adult  Goal: Free from fall injury  Outcome: Progressing  Flowsheets (Taken 5/18/2025 2110)  Free From Fall Injury: Instruct family/caregiver on patient safety     Problem: ABCDS Injury Assessment  Goal: Absence of physical injury  Outcome: Progressing  Flowsheets (Taken 5/18/2025 2110)  Absence of Physical Injury: Implement safety measures based on patient assessment     Problem: Chronic Conditions and Co-morbidities  Goal: Patient's chronic conditions and co-morbidity symptoms are monitored and maintained or improved  Outcome: Progressing  Flowsheets  Taken 5/18/2025 2000 by Ciarra Paige, RN  Care Plan - Patient's Chronic Conditions and Co-Morbidity Symptoms are Monitored and Maintained or Improved: Monitor and assess patient's chronic conditions and comorbid symptoms for stability, deterioration, or improvement  Taken 5/18/2025 0800 by Park Yost, RN  Care Plan - Patient's Chronic Conditions and Co-Morbidity Symptoms are Monitored and Maintained or Improved: Monitor and assess patient's chronic conditions and comorbid symptoms for stability, deterioration, or 
  Problem: Discharge Planning  Goal: Discharge to home or other facility with appropriate resources  Outcome: Progressing  Flowsheets (Taken 5/7/2025 0800)  Discharge to home or other facility with appropriate resources:   Identify barriers to discharge with patient and caregiver   Arrange for needed discharge resources and transportation as appropriate   Identify discharge learning needs (meds, wound care, etc)   Refer to discharge planning if patient needs post-hospital services based on physician order or complex needs related to functional status, cognitive ability or social support system     Problem: Pain  Goal: Verbalizes/displays adequate comfort level or baseline comfort level  Outcome: Progressing  Flowsheets  Taken 5/7/2025 1300  Verbalizes/displays adequate comfort level or baseline comfort level:   Encourage patient to monitor pain and request assistance   Assess pain using appropriate pain scale  Taken 5/7/2025 1200  Verbalizes/displays adequate comfort level or baseline comfort level:   Encourage patient to monitor pain and request assistance   Assess pain using appropriate pain scale  Taken 5/7/2025 1000  Verbalizes/displays adequate comfort level or baseline comfort level:   Encourage patient to monitor pain and request assistance   Assess pain using appropriate pain scale  Taken 5/7/2025 0900  Verbalizes/displays adequate comfort level or baseline comfort level:   Encourage patient to monitor pain and request assistance   Assess pain using appropriate pain scale  Taken 5/7/2025 0800  Verbalizes/displays adequate comfort level or baseline comfort level:   Encourage patient to monitor pain and request assistance   Assess pain using appropriate pain scale   Administer analgesics based on type and severity of pain and evaluate response   Implement non-pharmacological measures as appropriate and evaluate response   Consider cultural and social influences on pain and pain management     Problem: Safety - 
  Problem: Discharge Planning  Goal: Discharge to home or other facility with appropriate resources  Outcome: Progressing  Flowsheets (Taken 5/9/2025 2000)  Discharge to home or other facility with appropriate resources: Identify barriers to discharge with patient and caregiver     Problem: Pain  Goal: Verbalizes/displays adequate comfort level or baseline comfort level  Outcome: Progressing     Problem: Safety - Adult  Goal: Free from fall injury  Outcome: Progressing     Problem: ABCDS Injury Assessment  Goal: Absence of physical injury  Outcome: Progressing     Problem: Chronic Conditions and Co-morbidities  Goal: Patient's chronic conditions and co-morbidity symptoms are monitored and maintained or improved  Outcome: Progressing  Flowsheets (Taken 5/9/2025 2000)  Care Plan - Patient's Chronic Conditions and Co-Morbidity Symptoms are Monitored and Maintained or Improved: Monitor and assess patient's chronic conditions and comorbid symptoms for stability, deterioration, or improvement     Problem: Skin/Tissue Integrity  Goal: Skin integrity remains intact  Description: 1.  Monitor for areas of redness and/or skin breakdown2.  Assess vascular access sites hourly3.  Every 4-6 hours minimum:  Change oxygen saturation probe site4.  Every 4-6 hours:  If on nasal continuous positive airway pressure, respiratory therapy assess nares and determine need for appliance change or resting period  Outcome: Progressing     
  Problem: Pain  Goal: Verbalizes/displays adequate comfort level or baseline comfort level  5/12/2025 0756 by Kathryn Gama, RN  Outcome: Progressing     Problem: Discharge Planning  Goal: Discharge to home or other facility with appropriate resources  5/12/2025 0325 by Alia Borges, RN  Outcome: Not Progressing     Problem: Chronic Conditions and Co-morbidities  Goal: Patient's chronic conditions and co-morbidity symptoms are monitored and maintained or improved  5/12/2025 0325 by Alia Borges, RN  Outcome: Not Progressing     
  Problem: Pain  Goal: Verbalizes/displays adequate comfort level or baseline comfort level  Outcome: Progressing     Problem: Safety - Adult  Goal: Free from fall injury  Outcome: Progressing     Problem: ABCDS Injury Assessment  Goal: Absence of physical injury  Outcome: Progressing     Problem: Skin/Tissue Integrity  Goal: Skin integrity remains intact  Description: 1.  Monitor for areas of redness and/or skin breakdown2.  Assess vascular access sites hourly3.  Every 4-6 hours minimum:  Change oxygen saturation probe site4.  Every 4-6 hours:  If on nasal continuous positive airway pressure, respiratory therapy assess nares and determine need for appliance change or resting period  Outcome: Progressing     
  Problem: Pain  Goal: Verbalizes/displays adequate comfort level or baseline comfort level  Outcome: Progressing     Problem: Safety - Adult  Goal: Free from fall injury  Outcome: Progressing     Problem: ABCDS Injury Assessment  Goal: Absence of physical injury  Outcome: Progressing     Problem: Skin/Tissue Integrity  Goal: Skin integrity remains intact  Description: 1.  Monitor for areas of redness and/or skin breakdown2.  Assess vascular access sites hourly3.  Every 4-6 hours minimum:  Change oxygen saturation probe site4.  Every 4-6 hours:  If on nasal continuous positive airway pressure, respiratory therapy assess nares and determine need for appliance change or resting period  Outcome: Progressing  Flowsheets (Taken 5/14/2025 1437 by Sheridan Swift, RN)  Skin Integrity Remains Intact:   Monitor for areas of redness and/or skin breakdown   Assess vascular access sites hourly     
  Problem: Safety - Medical Restraint  Goal: Remains free of injury from restraints (Restraint for Interference with Medical Device)  Description: INTERVENTIONS:1. Determine that other, less restrictive measures have been tried or would not be effective before applying the restraint2. Evaluate the patient's condition at the time of restraint application3. Inform patient/family regarding the reason for restraint4. Q2H: Monitor safety, psychosocial status, comfort, nutrition and hydration  Outcome: Completed     Problem: Skin/Tissue Integrity  Goal: Skin integrity remains intact  Description: 1.  Monitor for areas of redness and/or skin breakdown2.  Assess vascular access sites hourly3.  Every 4-6 hours minimum:  Change oxygen saturation probe site4.  Every 4-6 hours:  If on nasal continuous positive airway pressure, respiratory therapy assess nares and determine need for appliance change or resting period  5/16/2025 0847 by Kathryn Gama, RN  Outcome: Progressing     
  Problem: Skin/Tissue Integrity  Goal: Skin integrity remains intact  Description: 1.  Monitor for areas of redness and/or skin breakdown2.  Assess vascular access sites hourly3.  Every 4-6 hours minimum:  Change oxygen saturation probe site4.  Every 4-6 hours:  If on nasal continuous positive airway pressure, respiratory therapy assess nares and determine need for appliance change or resting period  5/15/2025 0841 by Kathryn Gama RN  Outcome: Progressing     
CHF Care Plan      Patient's EF (Ejection Fraction) is greater than 40%    Heart Failure Medications:  Diuretics:: Furosemide    (One of the following REQUIRED for EF </= 40%/SYSTOLIC FAILURE but MAY be used in EF% >40%/DIASTOLIC FAILURE)        ACE:: None        ARB:: None         ARNI:: None    (Beta Blockers)  NON- Evidenced Based Beta Blocker (for EF% >40%/DIASTOLIC FAILURE): None    Evidenced Based Beta Blocker::(REQUIRED for EF% <40%/SYSTOLIC FAILURE) None  ...................................................................................................................................................    Failed to redirect to the Timeline version of the [employer] SmartLink.      Patient's weights and intake/output reviewed    Daily Weight log at bedside, patient/family participation in use of log: \"yes    Patient's current weight today shows a difference of 1.5 lbs less than last documented weight.      Intake/Output Summary (Last 24 hours) at 5/14/2025 0828  Last data filed at 5/14/2025 0600  Gross per 24 hour   Intake 831.68 ml   Output 930 ml   Net -98.32 ml       Education Booklet Provided: yes    Comorbidities Reviewed Yes    Patient has a past medical history of Arthritis, Atrial fibrillation (HCC), Back fracture, CAD (coronary artery disease), Cancer (HCC), CHF (congestive heart failure) (HCC), HTN (hypertension), Hyperlipidemia, Panic attack, and Wears dentures.     >>For CHF and Comorbidity documentation on Education Time and Topics, please see Education Tab      CHF Education    Learners:  Patient and Family  Readineess:   Acceptance  Method:   Explanation  Response:   Verbalizes Understanding  Comments: Talked with patient's family while she was asleep about heart failure and reasoning of why she is on lasix.     Time Spent: 15      Pt resting in bed at this time on  10 L O2. Pt with complaints of shortness of breath. Pt with nonpitting lower extremity edema.     Patient and/or Family's stated Goal of 
CHF Care Plan      Patient's EF (Ejection Fraction) is greater than 40%    Heart Failure Medications:  Diuretics:: Furosemide and Other bumex    (One of the following REQUIRED for EF </= 40%/SYSTOLIC FAILURE but MAY be used in EF% >40%/DIASTOLIC FAILURE)        ACE:: None        ARB:: None         ARNI:: None    (Beta Blockers)  NON- Evidenced Based Beta Blocker (for EF% >40%/DIASTOLIC FAILURE): None    Evidenced Based Beta Blocker::(REQUIRED for EF% <40%/SYSTOLIC FAILURE) Carvedilol- Coreg  ...................................................................................................................................................    Failed to redirect to the Timeline version of the [employer] SmartLink.      Patient's weights and intake/output reviewed    Daily Weight log at bedside, patient/family participation in use of log: \"yes    Patient's current weight today shows a difference of 0.6 kg  less than last documented weight.      Intake/Output Summary (Last 24 hours) at 5/15/2025 1504  Last data filed at 5/15/2025 1500  Gross per 24 hour   Intake 910 ml   Output 1603 ml   Net -693 ml       Education Booklet Provided: no    Comorbidities Reviewed Yes    Patient has a past medical history of Arthritis, Atrial fibrillation (HCC), Back fracture, CAD (coronary artery disease), Cancer (HCC), CHF (congestive heart failure) (HCC), HTN (hypertension), Hyperlipidemia, Panic attack, and Wears dentures.     >>For CHF and Comorbidity documentation on Education Time and Topics, please see Education Tab      CHF Education    Learners:  Patient and Family  Readineess:   Acceptance  Method:   Explanation  Response:   Verbalizes Understanding  Comments: Reviewed intake & output    Time Spent: 3      Pt up in chair at this time on  5LHF L O2. Pt with complaints of shortness of breath. Pt with nonpitting lower extremity edema.     Patient and/or Family's stated Goal of Care this Admission: reduce shortness of breath, increase 
CHF Care Plan      Patient's EF (Ejection Fraction) is greater than 40%    Heart Failure Medications:  Diuretics:: None    (One of the following REQUIRED for EF </= 40%/SYSTOLIC FAILURE but MAY be used in EF% >40%/DIASTOLIC FAILURE)        ACE:: None        ARB:: None         ARNI:: None    (Beta Blockers)  NON- Evidenced Based Beta Blocker (for EF% >40%/DIASTOLIC FAILURE): Metoprolol TARTrate- Lopressor    Evidenced Based Beta Blocker::(REQUIRED for EF% <40%/SYSTOLIC FAILURE) None  ...................................................................................................................................................    Failed to redirect to the Timeline version of the [employer] SmartLink.      Patient's weights and intake/output reviewed    Daily Weight log at bedside, patient/family participation in use of log: \"yes    Patient's current weight today shows a difference of 2lbs less than last documented weight.      Intake/Output Summary (Last 24 hours) at 5/10/2025 0923  Last data filed at 5/10/2025 0900  Gross per 24 hour   Intake 916.32 ml   Output 2790.4 ml   Net -1874.08 ml       Education Booklet Provided: yes    Comorbidities Reviewed Yes    Patient has a past medical history of Arthritis, Atrial fibrillation (HCC), Back fracture, CAD (coronary artery disease), Cancer (HCC), CHF (congestive heart failure) (HCC), HTN (hypertension), Hyperlipidemia, Panic attack, and Wears dentures.     >>For CHF and Comorbidity documentation on Education Time and Topics, please see Education Tab      CHF Education    Learners:  Patient  Readineess:   Acceptance  Method:   Explanation and Handout  Response:   Needs Reinforcement  Comments:     Time Spent: 15 minutes      Pt resting in bed at this time on vapo/bipap 100%. Pt denies shortness of breath. Pt with pitting lower extremity edema.     Patient and/or Family's stated Goal of Care this Admission: reduce shortness of breath, increase activity tolerance, better 
CHF Care Plan      Patient's EF (Ejection Fraction) is greater than 40%    Heart Failure Medications:  Diuretics:: None    (One of the following REQUIRED for EF </= 40%/SYSTOLIC FAILURE but MAY be used in EF% >40%/DIASTOLIC FAILURE)        ACE:: None        ARB:: None         ARNI:: None    (Beta Blockers)  NON- Evidenced Based Beta Blocker (for EF% >40%/DIASTOLIC FAILURE): Metoprolol TARTrate- Lopressor    Evidenced Based Beta Blocker::(REQUIRED for EF% <40%/SYSTOLIC FAILURE) None  ...................................................................................................................................................    Failed to redirect to the Timeline version of the [employer] SmartLink.      Patient's weights and intake/output reviewed    Daily Weight log at bedside, patient/family participation in use of log: \"yes    Patient's current weight today shows a difference of 0.3 lbs less than last documented weight.      Intake/Output Summary (Last 24 hours) at 5/6/2025 1942  Last data filed at 5/6/2025 1800  Gross per 24 hour   Intake 5187.13 ml   Output 2470 ml   Net 2717.13 ml       Education Booklet Provided: yes    Comorbidities Reviewed Yes    Patient has a past medical history of Arthritis, Atrial fibrillation (HCC), Back fracture, CAD (coronary artery disease), Cancer (HCC), CHF (congestive heart failure) (HCC), HTN (hypertension), Hyperlipidemia, Panic attack, and Wears dentures.     >>For CHF and Comorbidity documentation on Education Time and Topics, please see Education Tab      CHF Education    Learners:  Patient  Readineess:   Acceptance  Method:   Explanation and Handout  Response:   Needs Reinforcement  Comments:     Time Spent: 15 minutes      Pt resting in bed at this time on room air. Pt denies shortness of breath. Pt with pitting lower extremity edema.     Patient and/or Family's stated Goal of Care this Admission: reduce shortness of breath, increase activity tolerance, better understand 
CHF Care Plan      Patient's EF (Ejection Fraction) is greater than 40%    Heart Failure Medications:  Diuretics:: None    (One of the following REQUIRED for EF </= 40%/SYSTOLIC FAILURE but MAY be used in EF% >40%/DIASTOLIC FAILURE)        ACE:: None        ARB:: None         ARNI:: None    (Beta Blockers)  NON- Evidenced Based Beta Blocker (for EF% >40%/DIASTOLIC FAILURE): Metoprolol TARTrate- Lopressor    Evidenced Based Beta Blocker::(REQUIRED for EF% <40%/SYSTOLIC FAILURE) None  ...................................................................................................................................................    Failed to redirect to the Timeline version of the [employer] SmartLink.      Patient's weights and intake/output reviewed    Daily Weight log at bedside, patient/family participation in use of log: \"yes    Patient's current weight today shows a difference of 14lbs MORE than last documented weight.      Intake/Output Summary (Last 24 hours) at 5/9/2025 0601  Last data filed at 5/9/2025 0506  Gross per 24 hour   Intake 2430.67 ml   Output 166 ml   Net 2264.67 ml       Education Booklet Provided: yes    Comorbidities Reviewed Yes    Patient has a past medical history of Arthritis, Atrial fibrillation (HCC), Back fracture, CAD (coronary artery disease), Cancer (HCC), CHF (congestive heart failure) (HCC), HTN (hypertension), Hyperlipidemia, Panic attack, and Wears dentures.     >>For CHF and Comorbidity documentation on Education Time and Topics, please see Education Tab      CHF Education    Learners:  Patient  Readineess:   Acceptance  Method:   Explanation and Handout  Response:   Needs Reinforcement  Comments:     Time Spent: 15 minutes      Pt resting in bed at this time on 15 L HF. Pt C/O shortness of breath. Pt with pitting lower extremity edema.     Patient and/or Family's stated Goal of Care this Admission: reduce shortness of breath, increase activity tolerance, better understand heart 
CHF Care Plan      Patient's EF (Ejection Fraction) is greater than 40%    Heart Failure Medications:  Diuretics:: None    (One of the following REQUIRED for EF </= 40%/SYSTOLIC FAILURE but MAY be used in EF% >40%/DIASTOLIC FAILURE)        ACE:: None        ARB:: None         ARNI:: None    (Beta Blockers)  NON- Evidenced Based Beta Blocker (for EF% >40%/DIASTOLIC FAILURE): Metoprolol TARTrate- Lopressor    Evidenced Based Beta Blocker::(REQUIRED for EF% <40%/SYSTOLIC FAILURE) None  ...................................................................................................................................................    Failed to redirect to the Timeline version of the [employer] SmartLink.      Patient's weights and intake/output reviewed    Daily Weight log at bedside, patient/family participation in use of log: \"yes    Patient's current weight today shows a difference of 9.91 lbs  more than last documented weight.      Intake/Output Summary (Last 24 hours) at 5/7/2025 0633  Last data filed at 5/7/2025 0556  Gross per 24 hour   Intake 2474.48 ml   Output 2855 ml   Net -380.52 ml       Education Booklet Provided: yes    Comorbidities Reviewed Yes    Patient has a past medical history of Arthritis, Atrial fibrillation (HCC), Back fracture, CAD (coronary artery disease), Cancer (HCC), CHF (congestive heart failure) (HCC), HTN (hypertension), Hyperlipidemia, Panic attack, and Wears dentures.     >>For CHF and Comorbidity documentation on Education Time and Topics, please see Education Tab      CHF Education    Learners:  Patient  Readineess:   Acceptance  Method:   Explanation and Handout  Response:   Needs Reinforcement  Comments:     Time Spent: 15 minutes      Pt resting in bed at this time on room air. Pt denies shortness of breath. Pt without lower extremity edema.     Patient and/or Family's stated Goal of Care this Admission: reduce shortness of breath, increase activity tolerance, better understand heart 
CHF Care Plan      Patient's EF (Ejection Fraction) is greater than 40%    Heart Failure Medications:  Diuretics:: None    (One of the following REQUIRED for EF </= 40%/SYSTOLIC FAILURE but MAY be used in EF% >40%/DIASTOLIC FAILURE)        ACE:: None        ARB:: None         ARNI:: None    (Beta Blockers)  NON- Evidenced Based Beta Blocker (for EF% >40%/DIASTOLIC FAILURE): Metoprolol TARTrate- Lopressor    Evidenced Based Beta Blocker::(REQUIRED for EF% <40%/SYSTOLIC FAILURE) None  ...................................................................................................................................................    Failed to redirect to the Timeline version of the [employer] SmartLink.      Patient's weights and intake/output reviewed    Daily Weight log at bedside, patient/family participation in use of log: \"yes    Patient's current weight today shows a difference of 2lbs less than last documented weight.      Intake/Output Summary (Last 24 hours) at 5/10/2025 0657  Last data filed at 5/10/2025 0600  Gross per 24 hour   Intake 625.96 ml   Output 2424.4 ml   Net -1798.44 ml       Education Booklet Provided: yes    Comorbidities Reviewed Yes    Patient has a past medical history of Arthritis, Atrial fibrillation (HCC), Back fracture, CAD (coronary artery disease), Cancer (HCC), CHF (congestive heart failure) (HCC), HTN (hypertension), Hyperlipidemia, Panic attack, and Wears dentures.     >>For CHF and Comorbidity documentation on Education Time and Topics, please see Education Tab      CHF Education    Learners:  Patient  Readineess:   Acceptance  Method:   Explanation and Handout  Response:   Needs Reinforcement  Comments:     Time Spent: 15 minutes      Pt resting in bed at this time on vapo/bipap 100%. Pt denies shortness of breath. Pt with pitting lower extremity edema.     Patient and/or Family's stated Goal of Care this Admission: reduce shortness of breath, increase activity tolerance, better 
EP: Pt is now DNRcc. EP will sign off.  
Increase function to baseline    
Increase function to baseline.    
Increase patients ADLs/functional status to baseline.    
Increase patients ADLs/functional status to baseline.    
Patient with significant pleural effusion and is on continuous BiPAP.  Her primary RN, she is not able to take p.o. at this time.  Please continue IV amiodarone drip and hold PO amiodarone until patient is stable enough to take by mouth.    Lyn De Luna, APRN-CNP  [facility]  [phone]     
    Patient and/or Family's stated Goal of Care this Admission: reduce shortness of breath, increase activity tolerance, better understand heart failure and disease management, be more comfortable, and reduce lower extremity edema prior to discharge        :   
  Problem: Safety - Adult  Goal: Free from fall injury  5/7/2025 0019 by Juanito Bain RN  Outcome: Progressing  5/6/2025 1939 by Anne Lo RN  Outcome: Progressing  Flowsheets (Taken 5/6/2025 1656 by Vanessa York RN)  Free From Fall Injury: Instruct family/caregiver on patient safety     Problem: ABCDS Injury Assessment  Goal: Absence of physical injury  5/7/2025 0019 by Juanito Bain RN  Outcome: Progressing  5/6/2025 1939 by Anne Lo RN  Outcome: Progressing  Flowsheets (Taken 5/6/2025 1656 by Vanessa York RN)  Absence of Physical Injury: Implement safety measures based on patient assessment     Problem: Chronic Conditions and Co-morbidities  Goal: Patient's chronic conditions and co-morbidity symptoms are monitored and maintained or improved  5/7/2025 0019 by Juanito Bain RN  Outcome: Progressing  5/6/2025 1939 by Anne Lo RN  Outcome: Progressing  Flowsheets (Taken 5/6/2025 0800 by Vanessa York RN)  Care Plan - Patient's Chronic Conditions and Co-Morbidity Symptoms are Monitored and Maintained or Improved:   Collaborate with multidisciplinary team to address chronic and comorbid conditions and prevent exacerbation or deterioration   Monitor and assess patient's chronic conditions and comorbid symptoms for stability, deterioration, or improvement     Problem: Skin/Tissue Integrity  Goal: Skin integrity remains intact  Description: 1.  Monitor for areas of redness and/or skin breakdown2.  Assess vascular access sites hourly3.  Every 4-6 hours minimum:  Change oxygen saturation probe site4.  Every 4-6 hours:  If on nasal continuous positive airway pressure, respiratory therapy assess nares and determine need for appliance change or resting period  Outcome: Progressing  Flowsheets (Taken 5/6/2025 1935 by Anne Lo RN)  Skin Integrity Remains Intact:   Monitor for areas of redness and/or skin breakdown   Every 4-6 hours minimum:  Change oxygen saturation probe site     
Care this Admission: reduce shortness of breath, increase activity tolerance, better understand heart failure and disease management, be more comfortable, and reduce lower extremity edema prior to discharge        :   
heart failure and disease management, be more comfortable, and reduce lower extremity edema prior to discharge        :   
tolerance, better understand heart failure and disease management, be more comfortable, and reduce lower extremity edema prior to discharge        :

## 2025-05-19 NOTE — DISCHARGE INSTR - COC
Continuity of Care Form    Patient Name: Muna Izaguirre   :  1929  MRN:  [id number]    Admit date:  2025  Discharge date:  25    Code Status Order: DNR-CC   Advance Directives:    Date/Time Healthcare Directive Type of Healthcare Directive Copy in Chart Healthcare Agent Appointed Healthcare Agent's Name Healthcare Agent's Phone Number    [phone] Yes, patient has an advance directive for healthcare treatment  Durable power of  for health care  No, copy requested from family  Legal Guardian  Libertad DEL ROSARIOROBERTO  --             Admitting Physician:  Kobe Metz MD  PCP: Ivan Orozco MD    Discharging Nurse: Nicolasa HERRERA  Discharging Hospital Unit/Room#: 0229/0229-01  Discharging Unit Phone Number: [phone]    Emergency Contact:   Extended Emergency Contact Information  Primary Emergency Contact: KINDRA IZAGUIRRE  Home Phone: [phone]  Relation: Child  Secondary Emergency Contact: Libertad Meneses  Address: [address] [place] [address]  Home Phone: [phone]  Work Phone: [phone]  Mobile Phone: [phone]  Relation: Child    Past Surgical History:  Past Surgical History:   Procedure Laterality Date    COLONOSCOPY      tics    EYE SURGERY      HEMORRHOID SURGERY      HYSTERECTOMY (CERVIX STATUS UNKNOWN)      VEIN BYPASS GRAFT,AORTOBIFEMORAL N/A 2025    AORTOBIFEMORAL BYPASS GRAFT performed by Kobe Metz MD at [facility] OR       Immunization History:     There is no immunization history on file for this patient.    Active Problems:  Patient Active Problem List   Diagnosis Code    Atrial fibrillation with rapid ventricular response (HCC) I48.91    Essential hypertension I10    Amaurosis fugax of right eye G45.3    Chronic heart failure with preserved ejection fraction (HCC) I50.32    Paroxysmal atrial fibrillation (HCC) I48.0    History of TIA (transient ischemic attack) Z86.73    SOB (shortness of breath) R06.02    Heart failure,

## 2025-05-27 ENCOUNTER — TELEPHONE (OUTPATIENT)
Age: 89
End: 2025-05-27

## (undated) DEVICE — DISH PETRI ST LF

## (undated) DEVICE — FOGARTY - HYDRAGRIP SURGICAL - CLAMP INSERTS: Brand: FOGARTY SOFTJAW

## (undated) DEVICE — RETAINER VISCERA GLASSMAN FISH DISP ST

## (undated) DEVICE — SUTURE VICRYL + SZ 3-0 L27IN ABSRB UD L26MM SH 1/2 CIR VCP416H

## (undated) DEVICE — DRAPE,LAP,CHOLE,W/TROUGHS,STERILE: Brand: MEDLINE

## (undated) DEVICE — ELECTRODE BLDE L4IN NONINSULATED EDGE

## (undated) DEVICE — SPONGE LAP W18XL18IN WHT COT 4 PLY FLD STRUNG RADPQ DISP ST 2 PER PACK

## (undated) DEVICE — BLOOD TRANSFUSION FILTER: Brand: HAEMONETICS

## (undated) DEVICE — OPTIFOAM GENTLE SA, POSTOP, 4X12: Brand: MEDLINE

## (undated) DEVICE — APPLIER LIG CLP M L11IN TI STR RNG HNDL FOR 20 CLP DISP

## (undated) DEVICE — 3M™ TEGADERM™ CHG DRESSING 25/CARTON 4 CARTONS/CASE 1660: Brand: TEGADERM™

## (undated) DEVICE — TOWEL,OR,DSP,ST,BLUE,STD,6/PK,12PK/CS: Brand: MEDLINE

## (undated) DEVICE — ADHESIVE SKIN CLOSURE WND 8.661X1.5 IN 22 CM LIQUIBAND SECUR

## (undated) DEVICE — STAPLER EXT SKIN 35 WIDE S STL STPL SQUEEZE HNDL VISISTAT

## (undated) DEVICE — TTL1LYR 16FR10ML 100%SIL TMPST TR: Brand: MEDLINE

## (undated) DEVICE — FOGARTY OCCLUSION CATHETER 4F 40CM: Brand: FOGARTY

## (undated) DEVICE — RESERVOIR AUTOTRANSFUSION 225/120 CC GS FILTERED XTRA

## (undated) DEVICE — SUTURE ABSORBABLE MONOFILAMENT 0 CTX 60 IN VIO PDS + PDP990G

## (undated) DEVICE — SOLUTION IRRIG 1000ML 0.9% SOD CHL USP POUR PLAS BTL

## (undated) DEVICE — SUTURE N ABSRB L 36 IN SZ 5-0 NDL L 13 MM POLYPRO OR PPL BLU

## (undated) DEVICE — SOLUTION IRRIG 2000ML 0.9% SOD CHL USP UROMATIC PLAS CONT

## (undated) DEVICE — SUTURE NONABSORBABLE MONOFILAMENT 6-0 C-1 1X30 IN PROLENE 8706H

## (undated) DEVICE — LOOP VES L406MM DIA1MM MAXI BLU SIL RADPQ DISP

## (undated) DEVICE — LOOP VES W13MM THK09MM MINI RED SIL FLD REPELLENT

## (undated) DEVICE — GOWN SIRUS NONREIN LG W/TWL: Brand: MEDLINE INDUSTRIES, INC.

## (undated) DEVICE — PINNACLE PRECISION ACCESS SYSTEM INTRODUCER SHEATH: Brand: PINNACLE PRECISION ACCESS SYSTEM

## (undated) DEVICE — COVER,TABLE,HEAVY DUTY,50"X90",STRL: Brand: MEDLINE

## (undated) DEVICE — GLOVE SURG SZ 8 L11.77IN FNGR THK9.8MIL STRW LTX POLYMER

## (undated) DEVICE — PLEDGET SURG W0.375XL0.062IN THK1.5MM WHT SFT PTFE RECT
Type: IMPLANTABLE DEVICE | Site: ABDOMEN | Status: NON-FUNCTIONAL
Removed: 2025-05-05

## (undated) DEVICE — Device

## (undated) DEVICE — SUTURE VICRYL + SZ 2-0 L27IN ABSRB CLR CT-1 1/2 CIR TAPERCUT VCP259H

## (undated) DEVICE — NEPTUNE E-SEP SMOKE EVACUATION PENCIL, COATED, 70MM BLADE, PUSH BUTTON SWITCH: Brand: NEPTUNE E-SEP

## (undated) DEVICE — SYRINGE IRRIG 60ML SFT PLIABLE BLB EZ TO GRP 1 HND USE W/

## (undated) DEVICE — TAPE UMBILICAL W1/16XL30IN COTTON ROUND NONRADIOPAQUE

## (undated) DEVICE — DRESSING FOAM W4XL10IN AG SIL ADH ANTIMIC POSTOP OPTIFOAM

## (undated) DEVICE — FOGARTY OCCLUSION CATHETER 5F 40CM: Brand: FOGARTY

## (undated) DEVICE — SYRINGE MED 20ML STD CLR PLAS LUERLOCK TIP N CTRL DISP

## (undated) DEVICE — 3M™ IOBAN™ 2 ANTIMICROBIAL INCISE DRAPE 6651EZ: Brand: IOBAN™ 2

## (undated) DEVICE — INTRODUCER SHTH 0.018 IN 4 FRX40 CM KT SFT TIP NIT VSI 7229V

## (undated) DEVICE — SUTURE PROL SZ 3-0 L48IN NONABSORBABLE BLU SH L26MM 1/2 CIR 8534H